# Patient Record
Sex: FEMALE | Race: WHITE | NOT HISPANIC OR LATINO | URBAN - METROPOLITAN AREA
[De-identification: names, ages, dates, MRNs, and addresses within clinical notes are randomized per-mention and may not be internally consistent; named-entity substitution may affect disease eponyms.]

---

## 2018-04-12 ENCOUNTER — OUTPATIENT (OUTPATIENT)
Dept: OUTPATIENT SERVICES | Facility: HOSPITAL | Age: 83
LOS: 1 days | Discharge: HOME | End: 2018-04-12

## 2018-04-12 DIAGNOSIS — R51 HEADACHE: ICD-10-CM

## 2020-01-01 ENCOUNTER — OUTPATIENT (OUTPATIENT)
Dept: OUTPATIENT SERVICES | Facility: HOSPITAL | Age: 85
LOS: 1 days | End: 2020-01-01
Payer: MEDICARE

## 2020-01-01 PROCEDURE — G9001: CPT

## 2020-01-27 ENCOUNTER — INPATIENT (INPATIENT)
Facility: HOSPITAL | Age: 85
LOS: 2 days | Discharge: HOME HEALTH | End: 2020-01-30
Attending: INTERNAL MEDICINE | Admitting: INTERNAL MEDICINE
Payer: MEDICARE

## 2020-01-27 VITALS
OXYGEN SATURATION: 90 % | SYSTOLIC BLOOD PRESSURE: 175 MMHG | DIASTOLIC BLOOD PRESSURE: 81 MMHG | HEART RATE: 74 BPM | RESPIRATION RATE: 18 BRPM

## 2020-01-27 LAB
ALBUMIN SERPL ELPH-MCNC: 4 G/DL — SIGNIFICANT CHANGE UP (ref 3.5–5.2)
ALP SERPL-CCNC: 200 U/L — HIGH (ref 30–115)
ALT FLD-CCNC: 7 U/L — SIGNIFICANT CHANGE UP (ref 0–41)
ANION GAP SERPL CALC-SCNC: 13 MMOL/L — SIGNIFICANT CHANGE UP (ref 7–14)
APPEARANCE UR: CLEAR — SIGNIFICANT CHANGE UP
APTT BLD: 30.4 SEC — SIGNIFICANT CHANGE UP (ref 27–39.2)
AST SERPL-CCNC: 20 U/L — SIGNIFICANT CHANGE UP (ref 0–41)
BACTERIA # UR AUTO: NEGATIVE — SIGNIFICANT CHANGE UP
BASOPHILS # BLD AUTO: 0.03 K/UL — SIGNIFICANT CHANGE UP (ref 0–0.2)
BASOPHILS NFR BLD AUTO: 0.3 % — SIGNIFICANT CHANGE UP (ref 0–1)
BILIRUB SERPL-MCNC: 0.7 MG/DL — SIGNIFICANT CHANGE UP (ref 0.2–1.2)
BILIRUB UR-MCNC: NEGATIVE — SIGNIFICANT CHANGE UP
BUN SERPL-MCNC: 17 MG/DL — SIGNIFICANT CHANGE UP (ref 10–20)
CALCIUM SERPL-MCNC: 9.5 MG/DL — SIGNIFICANT CHANGE UP (ref 8.5–10.1)
CHLORIDE SERPL-SCNC: 105 MMOL/L — SIGNIFICANT CHANGE UP (ref 98–110)
CO2 SERPL-SCNC: 25 MMOL/L — SIGNIFICANT CHANGE UP (ref 17–32)
COLOR SPEC: COLORLESS — SIGNIFICANT CHANGE UP
CREAT SERPL-MCNC: 0.9 MG/DL — SIGNIFICANT CHANGE UP (ref 0.7–1.5)
DIFF PNL FLD: ABNORMAL
EOSINOPHIL # BLD AUTO: 0.21 K/UL — SIGNIFICANT CHANGE UP (ref 0–0.7)
EOSINOPHIL NFR BLD AUTO: 2.3 % — SIGNIFICANT CHANGE UP (ref 0–8)
EPI CELLS # UR: 2 /HPF — SIGNIFICANT CHANGE UP (ref 0–5)
GLUCOSE BLDC GLUCOMTR-MCNC: 134 MG/DL — HIGH (ref 70–99)
GLUCOSE SERPL-MCNC: 190 MG/DL — HIGH (ref 70–99)
GLUCOSE UR QL: NEGATIVE — SIGNIFICANT CHANGE UP
HCT VFR BLD CALC: 42.2 % — SIGNIFICANT CHANGE UP (ref 37–47)
HGB BLD-MCNC: 13.5 G/DL — SIGNIFICANT CHANGE UP (ref 12–16)
HYALINE CASTS # UR AUTO: 4 /LPF — SIGNIFICANT CHANGE UP (ref 0–7)
IMM GRANULOCYTES NFR BLD AUTO: 0.2 % — SIGNIFICANT CHANGE UP (ref 0.1–0.3)
INR BLD: 1.14 RATIO — SIGNIFICANT CHANGE UP (ref 0.65–1.3)
KETONES UR-MCNC: NEGATIVE — SIGNIFICANT CHANGE UP
LEUKOCYTE ESTERASE UR-ACNC: ABNORMAL
LYMPHOCYTES # BLD AUTO: 3.24 K/UL — SIGNIFICANT CHANGE UP (ref 1.2–3.4)
LYMPHOCYTES # BLD AUTO: 35.5 % — SIGNIFICANT CHANGE UP (ref 20.5–51.1)
MCHC RBC-ENTMCNC: 29.6 PG — SIGNIFICANT CHANGE UP (ref 27–31)
MCHC RBC-ENTMCNC: 32 G/DL — SIGNIFICANT CHANGE UP (ref 32–37)
MCV RBC AUTO: 92.5 FL — SIGNIFICANT CHANGE UP (ref 81–99)
MONOCYTES # BLD AUTO: 0.6 K/UL — SIGNIFICANT CHANGE UP (ref 0.1–0.6)
MONOCYTES NFR BLD AUTO: 6.6 % — SIGNIFICANT CHANGE UP (ref 1.7–9.3)
NEUTROPHILS # BLD AUTO: 5.02 K/UL — SIGNIFICANT CHANGE UP (ref 1.4–6.5)
NEUTROPHILS NFR BLD AUTO: 55.1 % — SIGNIFICANT CHANGE UP (ref 42.2–75.2)
NITRITE UR-MCNC: NEGATIVE — SIGNIFICANT CHANGE UP
NRBC # BLD: 0 /100 WBCS — SIGNIFICANT CHANGE UP (ref 0–0)
PH UR: 7.5 — SIGNIFICANT CHANGE UP (ref 5–8)
PLATELET # BLD AUTO: 190 K/UL — SIGNIFICANT CHANGE UP (ref 130–400)
POTASSIUM SERPL-MCNC: 4.4 MMOL/L — SIGNIFICANT CHANGE UP (ref 3.5–5)
POTASSIUM SERPL-SCNC: 4.4 MMOL/L — SIGNIFICANT CHANGE UP (ref 3.5–5)
PROT SERPL-MCNC: 6.7 G/DL — SIGNIFICANT CHANGE UP (ref 6–8)
PROT UR-MCNC: ABNORMAL
PROTHROM AB SERPL-ACNC: 13.1 SEC — HIGH (ref 9.95–12.87)
RBC # BLD: 4.56 M/UL — SIGNIFICANT CHANGE UP (ref 4.2–5.4)
RBC # FLD: 13.4 % — SIGNIFICANT CHANGE UP (ref 11.5–14.5)
RBC CASTS # UR COMP ASSIST: 153 /HPF — HIGH (ref 0–4)
SODIUM SERPL-SCNC: 143 MMOL/L — SIGNIFICANT CHANGE UP (ref 135–146)
SP GR SPEC: >1.05 (ref 1.01–1.02)
UROBILINOGEN FLD QL: SIGNIFICANT CHANGE UP
WBC # BLD: 9.12 K/UL — SIGNIFICANT CHANGE UP (ref 4.8–10.8)
WBC # FLD AUTO: 9.12 K/UL — SIGNIFICANT CHANGE UP (ref 4.8–10.8)
WBC UR QL: 43 /HPF — HIGH (ref 0–5)

## 2020-01-27 PROCEDURE — 99221 1ST HOSP IP/OBS SF/LOW 40: CPT

## 2020-01-27 PROCEDURE — 70450 CT HEAD/BRAIN W/O DYE: CPT | Mod: 26

## 2020-01-27 PROCEDURE — 93306 TTE W/DOPPLER COMPLETE: CPT | Mod: 26

## 2020-01-27 PROCEDURE — 93010 ELECTROCARDIOGRAM REPORT: CPT

## 2020-01-27 PROCEDURE — 0042T: CPT

## 2020-01-27 PROCEDURE — 99291 CRITICAL CARE FIRST HOUR: CPT

## 2020-01-27 RX ORDER — SIMVASTATIN 20 MG/1
1 TABLET, FILM COATED ORAL
Qty: 0 | Refills: 0 | DISCHARGE

## 2020-01-27 RX ORDER — LOSARTAN POTASSIUM 100 MG/1
1 TABLET, FILM COATED ORAL
Qty: 0 | Refills: 0 | DISCHARGE

## 2020-01-27 RX ORDER — ASPIRIN/CALCIUM CARB/MAGNESIUM 324 MG
1 TABLET ORAL
Qty: 0 | Refills: 0 | DISCHARGE

## 2020-01-27 RX ORDER — LANOLIN ALCOHOL/MO/W.PET/CERES
3 CREAM (GRAM) TOPICAL AT BEDTIME
Refills: 0 | Status: DISCONTINUED | OUTPATIENT
Start: 2020-01-27 | End: 2020-01-30

## 2020-01-27 RX ORDER — LEVOTHYROXINE SODIUM 125 MCG
1 TABLET ORAL
Qty: 0 | Refills: 0 | DISCHARGE

## 2020-01-27 RX ORDER — SIMVASTATIN 20 MG/1
40 TABLET, FILM COATED ORAL AT BEDTIME
Refills: 0 | Status: DISCONTINUED | OUTPATIENT
Start: 2020-01-27 | End: 2020-01-28

## 2020-01-27 RX ORDER — LOSARTAN POTASSIUM 100 MG/1
100 TABLET, FILM COATED ORAL DAILY
Refills: 0 | Status: DISCONTINUED | OUTPATIENT
Start: 2020-01-27 | End: 2020-01-30

## 2020-01-27 RX ORDER — ENOXAPARIN SODIUM 100 MG/ML
40 INJECTION SUBCUTANEOUS AT BEDTIME
Refills: 0 | Status: DISCONTINUED | OUTPATIENT
Start: 2020-01-27 | End: 2020-01-30

## 2020-01-27 RX ORDER — METOPROLOL TARTRATE 50 MG
1 TABLET ORAL
Qty: 0 | Refills: 0 | DISCHARGE

## 2020-01-27 RX ORDER — CHLORHEXIDINE GLUCONATE 213 G/1000ML
1 SOLUTION TOPICAL
Refills: 0 | Status: DISCONTINUED | OUTPATIENT
Start: 2020-01-27 | End: 2020-01-30

## 2020-01-27 RX ORDER — ASPIRIN/CALCIUM CARB/MAGNESIUM 324 MG
324 TABLET ORAL DAILY
Refills: 0 | Status: DISCONTINUED | OUTPATIENT
Start: 2020-01-27 | End: 2020-01-28

## 2020-01-27 RX ORDER — LEVOTHYROXINE SODIUM 125 MCG
50 TABLET ORAL DAILY
Refills: 0 | Status: DISCONTINUED | OUTPATIENT
Start: 2020-01-27 | End: 2020-01-30

## 2020-01-27 RX ORDER — LANOLIN ALCOHOL/MO/W.PET/CERES
1 CREAM (GRAM) TOPICAL
Qty: 0 | Refills: 0 | DISCHARGE

## 2020-01-27 RX ORDER — METOPROLOL TARTRATE 50 MG
50 TABLET ORAL
Refills: 0 | Status: DISCONTINUED | OUTPATIENT
Start: 2020-01-27 | End: 2020-01-30

## 2020-01-27 RX ADMIN — Medication 324 MILLIGRAM(S): at 11:06

## 2020-01-27 RX ADMIN — Medication 50 MILLIGRAM(S): at 17:38

## 2020-01-27 RX ADMIN — ENOXAPARIN SODIUM 40 MILLIGRAM(S): 100 INJECTION SUBCUTANEOUS at 22:44

## 2020-01-27 RX ADMIN — SIMVASTATIN 40 MILLIGRAM(S): 20 TABLET, FILM COATED ORAL at 22:44

## 2020-01-27 RX ADMIN — Medication 3 MILLIGRAM(S): at 22:44

## 2020-01-27 NOTE — ED PROVIDER NOTE - PHYSICAL EXAMINATION
CONSTITUTIONAL: Well-developed; well-nourished; in no acute distress.   SKIN: warm, dry  HEAD: Normocephalic; atraumatic.  EYES: no conjunctival injection. PERRL.   ENT: No nasal discharge; airway clear.  NECK: Supple; non tender.  CARD: S1, S2 normal; no murmurs, gallops, or rubs. Regular rate and rhythm.   RESP: No wheezes, rales or rhonchi.  ABD: soft ntnd  EXT: Normal ROM.  No clubbing, cyanosis or edema.   LYMPH: No acute cervical adenopathy.  NEURO: Moving all extremities. Follows commands intermittently. Speaking a few Colombian words.   PSYCH: Cooperative, appropriate.

## 2020-01-27 NOTE — H&P ADULT - NSHPSOCIALHISTORY_GEN_ALL_CORE
Never smoker, sip of wine with dinner, no illicit drug use  Lives with daughter and son in law  Walks with walker at home

## 2020-01-27 NOTE — ED PROVIDER NOTE - CLINICAL SUMMARY MEDICAL DECISION MAKING FREE TEXT BOX
A/P: Code stroke activated. See by neurology. Head CT with no acute hemorrhage. Perfusion study with no acute abnormality. Labs reviewed. Out of window for tPA. No indication for intervention.   Strength in arm returned to 5/5. Confusion persists.  Given ASA.  Will admit to stroke unit.

## 2020-01-27 NOTE — ED PROVIDER NOTE - PROGRESS NOTE DETAILS
Stroke code pre-note. ElSherif at bedside, can got o stroke unit. Patient to be admitted to an inpatient floor-stroke unit Case discussed with and care endorsed to medical admitting resident- MAR made aware that US is still pending, will follow results. Admitting physician notified. Attending read of perfusion scan with possible small area of ischemic penumbra to cerebellum. Received ASA. Admitted to stroke unit.

## 2020-01-27 NOTE — ED PROVIDER NOTE - OBJECTIVE STATEMENT
89 y/o female with PMH of HTN who presents to ED for aphasia. Pt was last seen well at 10pm last night, woke this AM and was noted to be aphasic by daughter, on arrival to Ed pt speaking itaian, normally speaks mostly english. No fever or chills. No head injury.

## 2020-01-27 NOTE — H&P ADULT - ATTENDING COMMENTS
Patient seen and evaluated in the ED. Agree with a resident as above, except as noted in my note from 1/28/20

## 2020-01-27 NOTE — H&P ADULT - NSHPPHYSICALEXAM_GEN_ALL_CORE
Vital Signs:  T(C): 36.4, Max: 36.4 (01-27 @ 11:06)  T(F): 97.5, Max: 97.5 (01-27 @ 11:06)  HR: 84 (74 - 84)  BP: 150/110 (150/110 - 175/81)  RR: 18 (18 - 18)  SpO2: 98% (88% - 99%)    Physical Exam:  General: Awake, Alert. Not in acute distress.  Heart: Regular rate and rhythm; S1, S2; No murmurs.  Lungs: Clear to auscultation bilaterally.  Abdomen: Soft, nontender, nondistended.  Extremities: No edema in upper or lower extremities.  Neuro: AAOx1 name only, No focal deficits.

## 2020-01-27 NOTE — H&P ADULT - HISTORY OF PRESENT ILLNESS
89 year old female with PMH of Dementia, HTN, HLD, Hypothyroidism, and Diabetes presents for TIA.    Last known well was 10pm last night when patient's daughter was helping her get ready for bed. This morning at approximately 8am the daughter went to help the patient get up and found the patient not speaking, not responding to questions, and not following commands. They tried to give her morning Aspirin 81mg with water, but the patient was unable to take the medicine. They brought the patient to the hospital, where the patient began speaking [place]ian. The family reports the patient normally speaks english and hasn't spoken Bruneian for years. Shortly afterward the patient began acting normally with no persistent symptoms.    The patient had a TIA in February 2018 involving a fall in the bathroom with no residual deficits. For the past week the family noticed the patient exhibiting right arm weakness and appearing more unsteady than usual, but otherwise no symptoms prior to the episode this morning.    In the ED stroke code was called. CT negative for acute hemorrhage or large territory infarct. Patient was given Aspirin and evaluated by Neurology who approved admission to the stroke unit.

## 2020-01-27 NOTE — H&P ADULT - NSHPLABSRESULTS_GEN_ALL_CORE
Labs:  CBC (01-27 @ 09:53)                        Hgb: 13.5   WBC: 9.12  )-----------------( Plts: 190                              Hct: 42.2     Chem (01-27 @ 09:53)  Na: 143  |     Cl: 105     |  BUN: 17  -----------------------------------------< Gluc: 190    K: 4.4   |    HCO3: 25  |  Cr: 0.9    Ca 9.5 (01-27 @ 09:53)    LFTs (01-27 @ 09:53)  TPro 6.7  /  Alb 4.0  TBili 0.7  /  DBili     AST 20  /  ALT 7   /  AlkPhos 200    PT/INR (01-27 @ 09:53)  PT: 13.10; INR: 1.14   PTT: 30.4      RADIOLOGY:    < from: CT Brain Stroke Protocol (01.27.20 @ 10:09) >  IMPRESSION:  In comparison with the prior noncontrast CT scan of the brain dated April 12, 2018:  No CT evidence of acute intracranial hemorrhage or large territorial infarct.  Mild progression of the periventricular subcortical white matter hypodensities consistent with ischemic change, age indeterminate.  In view of the patient's stated clinical history of possible cerebrovascular accident, follow-up examination is recommended, if clinically warranted.  < end of copied text >      < from: CT Perfusion w/ Maps w/ IV Cont (01.27.20 @ 10:16) >  IMPRESSION:  1.  CTA: No evidence of major vascular stenosis or occlusion.   2.  Scattered areas of calcific plaque with about 50% stenosis of the right ICA origin and mild stenoses of the carotid siphons and distal vertebral arteries.  3.  CT perfusion: Apparent small (3 cc) area of ischemic penumbra within the right cerebellar hemisphere without core infarct.  This can be artifactual; recommend attention on follow up head CT or MRI.  < end of copied text >

## 2020-01-27 NOTE — SWALLOW BEDSIDE ASSESSMENT ADULT - SWALLOW EVAL: DIAGNOSIS
+toleration of Dysphagia Diet III Mechanical soft consistency with cut up meats, thins with no overt s/s of aspiration vs penetration, +mild oral dysphagia for regular +toleration of Dysphagia diet I puree consistency, Dysphagia Diet II mechanical soft consistency with ground meat , Dysphagia Diet III Mechanical soft consistency with cut up meats, thins with no overt s/s of aspiration vs penetration, +mild oral dysphagia for regular

## 2020-01-27 NOTE — CONSULT NOTE ADULT - SUBJECTIVE AND OBJECTIVE BOX
**STROKE CODE CONSULT NOTE**    Last known well time/Time of onset of symptoms: 10:30 pm last night 1/26/2020    HPI:   · HPI Objective Statement: 89 y/o female with PMH of HTN who presents to ED for aphasia. Pt was last seen well at 10pm last night, woke this AM and was noted to be aphasic by daughter, on arrival to Ed pt speaking itaian, normally speaks mostly english. No fever or chills. No head injury.	  Patient language improved from when the daughter initially saw her this morning but still not baseline.  PAtient usually needs assistance with most ADLs  EMS noted right sided weakness which is better but still present on arrival     PAST MEDICAL & SURGICAL HISTORY:  Diabetes  Hypothyroidism  Hyperlipidemia  Hypertension  Dementia      FAMILY HISTORY: non contributory      SOCIAL HISTORY:  No smoking drinking or drug use    ROS:  Constitutional: No fever, weight loss or fatigue  Eyes: No eye pain, visual disturbances, or discharge  ENMT:  No difficulty hearing, tinnitus, vertigo; No sinus or throat pain  Neck: No pain or stiffness  Respiratory: No cough, wheezing, chills or hemoptysis  Cardiovascular: No chest pain, palpitations, shortness of breath, dizziness or leg swelling  Gastrointestinal: No abdominal pain. No nausea, vomiting or hematemesis; No diarrhea or constipation. Nohematochezia.  Genitourinary: No dysuria, frequency, hematuria or incontinence  Neurological: As per HPI  Skin: No itching, burning, rashes or lesions   Endocrine: No heat or cold intolerance; No hair loss  Musculoskeletal: No joint pain or swelling; No muscle, back or extremity pain  Psychiatric: No depression, anxiety, mood swings or difficulty sleeping  Heme/Lymph: No easy bruising or bleeding gums    MEDICATIONS  (STANDING):  aspirin  chewable 324 milliGRAM(s) Oral daily  chlorhexidine 4% Liquid 1 Application(s) Topical <User Schedule>  enoxaparin Injectable 40 milliGRAM(s) SubCutaneous at bedtime  levothyroxine 50 MICROGram(s) Oral daily  losartan 100 milliGRAM(s) Oral daily  melatonin 3 milliGRAM(s) Oral at bedtime  metoprolol tartrate 50 milliGRAM(s) Oral two times a day  simvastatin 40 milliGRAM(s) Oral at bedtime    MEDICATIONS  (PRN):      Allergies    No Known Allergies    Intolerances        Vital Signs Last 24 Hrs  T(C): 36.4 (27 Jan 2020 11:06), Max: 36.4 (27 Jan 2020 11:06)  T(F): 97.5 (27 Jan 2020 11:06), Max: 97.5 (27 Jan 2020 11:06)  HR: 76 (27 Jan 2020 17:06) (74 - 84)  BP: 130/71 (27 Jan 2020 17:06) (130/71 - 175/81)  BP(mean): 94 (27 Jan 2020 17:06) (94 - 94)  RR: 20 (27 Jan 2020 17:06) (18 - 20)  SpO2: 94% (27 Jan 2020 17:06) (88% - 99%)    PHYSICAL EXAM:  Constitutional: WDWN; NAD  Cardiovascular: RRR, no appreciable murmurs; no carotid bruits  Neurologic:  Alert following simple commands, mostly cursing at staff  BTT b/l present  NO facial  RUE drift  b/l LE drift  Sensory responds to stimuli b/l  FTN no dysmetric      NIHSS: 5  mrankin 4      Fingerstick Blood Glucose: CAPILLARY BLOOD GLUCOSE      POCT Blood Glucose.: 134 mg/dL (27 Jan 2020 17:14)       LABS:                        13.5   9.12  )-----------( 190      ( 27 Jan 2020 09:53 )             42.2     01-27    143  |  105  |  17  ----------------------------<  190<H>  4.4   |  25  |  0.9    Ca    9.5      27 Jan 2020 09:53    TPro  6.7  /  Alb  4.0  /  TBili  0.7  /  DBili  x   /  AST  20  /  ALT  7   /  AlkPhos  200<H>  01-27    PT/INR - ( 27 Jan 2020 09:53 )   PT: 13.10 sec;   INR: 1.14 ratio         PTT - ( 27 Jan 2020 09:53 )  PTT:30.4 sec      Urinalysis Basic - ( 27 Jan 2020 12:19 )    Color: Colorless / Appearance: Clear / SG: >1.050 / pH: x  Gluc: x / Ketone: Negative  / Bili: Negative / Urobili: <2 mg/dL   Blood: x / Protein: 30 mg/dL / Nitrite: Negative   Leuk Esterase: Moderate / RBC: 153 /HPF / WBC 43 /HPF   Sq Epi: x / Non Sq Epi: 2 /HPF / Bacteria: Negative        RADIOLOGY & ADDITIONAL STUDIES:  < from: CT Brain Stroke Protocol (01.27.20 @ 10:09) >    IMPRESSION:    In comparison with the prior noncontrast CT scan of the brain dated April 12, 2018:    No CT evidence of acute intracranial hemorrhage or large territorial infarct.    Mild progression of the periventricular subcortical white matter hypodensities consistent with ischemic change, age indeterminate.    In view of the patient's stated clinical history of possible cerebrovascular accident, follow-up examination is recommended, if clinically warranted.    Dr. Sonu Mccarthy discussed preliminary findings with ANUJ MEJIA MD on 1/27/2020 10:14 AM with readback.    < end of copied text >      < from: CT Perfusion w/ Maps w/ IV Cont (01.27.20 @ 10:16) >  A neck:   The visualized aortic arch and great vessel origins demonstrate calcific plaque without thickening and stenosis.    The right common carotid artery is patent. There is atheromatous plaque at the right carotid bifurcation with about 50% stenosis of the distal CCA extending into the ICA origin. The ECA origin is patent.    The left common, internal and external carotid arteries are patent. There is calcific plaque at the left carotidbifurcation without significant stenosis.    The vertebral arteries are patent.    CTA brain: There is calcific plaque of the carotid siphons with mild stenoses. The anterior and middle cerebral arteries are patent. There is a hypoplastic A1 segment of the right NICK, normal variation.    The distal vertebral arteries demonstrate calcific plaque with mild stenoses. The basilar artery is patent. The posterior cerebral arteries are patent.    Perfusion: An apparent small area of Tmax >6 seconds volume of 3 mL is noted within the right cerebellar hemisphere. There is no evidence of diminished cerebral blood flow.    Other:   Multilevel cervical spine degenerative changes.  Patchy right upper lobe airspace opacity is noted.    IMPRESSION:    1.  CTA: No evidence of major vascular stenosis or occlusion.     2.  Scattered areas of calcific plaque with about 50% stenosis of the right ICA origin and mild stenoses of the carotid siphons and distal vertebral arteries.    3.  CT perfusion: Apparent small (3 cc) area of ischemic penumbra within the right cerebellar hemisphere without core infarct.  This can be artifactual; recommend attention on follow up head CT or MRI.      < end of copied text >              IV-tPA (Y/N):     No                              Bolus time:  Reason IV-tPA not given: out of window  Not interventional candidate and no large vessel occlussion      ASSESSMENT/PLAN: 89yo woman presenting with aphasia and right hemiparesis which has improved slightly.  As she still has some right mild hemiparesis likely acute CVA  1. Admit to storke unit  2. ASA 81mg QD and atorvastatin 80  3. Check lipid profile, hgba1c, Cardiac enzymes, u/a u/cx  4. echo   5. MRI brain w/o KEV  6. Neurochecks and vitals q4   7. PT/OT/Speech/Rehab  8. Keep -180

## 2020-01-27 NOTE — CONSULT NOTE ADULT - ASSESSMENT
IMPRESSION: Rehab of gait dysfunction    PRECAUTIONS: [  ] Cardiac  [  ] Respiratory  [  ] Seizures [  ] Contact Isolation  [  ] Droplet Isolation  [  ] Other    Weight Bearing Status:     RECOMMENDATION:    Out of Bed to Chair     DVT/Decubiti Prophylaxis    REHAB PLAN:     [ x  ] Bedside P/T 3-5 times a week   [   ]   Bedside O/T  2-3 times a week             [   ] No Rehab Therapy Indicated                   [   ]  Speech Therapy   Conditioning/ROM                                    ADL  Bed Mobility                                               Conditioning/ROM  Transfers                                                     Bed Mobility  Sitting /Standing Balance                         Transfers                                        Gait Training                                               Sitting/Standing Balance  Stair Training [   ]Applicable                    Home equipment Eval                                                                        Splinting  [   ] Only      GOALS:   ADL   [   ]   Independent                    Transfers  [ x  ] Independent                          Ambulation  [ x  ] Independent     [  x  ] With device                            [   ]  CG                                                         [   ]  CG                                                                  [   ] CG                            [    ] Min A                                                   [   ] Min A                                                              [   ] Min  A          DISCHARGE PLAN:   [   ]  Good candidate for Intensive Rehabilitation/Hospital based                                             Will tolerate 3hrs Intensive Rehab Daily                                       [x    ]  Short Term Rehab in Skilled Nursing Facility                     vs                  [  x  ]  Home with Outpatient or VN services                                         [    ]  Possible Candidate for Intensive Hospital based Rehab

## 2020-01-27 NOTE — ED PROVIDER NOTE - ATTENDING CONTRIBUTION TO CARE
89 y/o female with hx of TIA's, HTN. Last known well 10PM last night when she went to sleep. woke up this AM at 8:30 confused and with apparent weakness of right arm. Initially aphasic, then began speaking incoherently. No facial droop. No fever. No hx of trauma.   O/E: Constitutional: Non-toxic in appearance. Eyes: PERRL. No pallor, no jaundice. Neck: Neck supple, no meningeal signs. Respiratory: Lungs CTA and equal b/l. Cardio: S1 S2 regular, no murmur. ABD: ABD soft, no tenderness. Extremities: No pedal edema, no calf tenderness. Skin: No skin rash. Neuro: Oriented to person and place. Answers some questions inappropriately. CN II-XII intact, strength 4/5 right arm, 5/5 left arm and b/l legs, sensation intact and equal, finger-to-nose intact.  Finger stick WNL.  A/P: Code stroke activated. See by neurology. Head CT with no acute hemorrhage. Perfusion study with no acute abnormality. Labs reviewed. Out of window for tPA. No indication for intervention.   Strength in arm returned to 5/5. Confusion persists.  Given ASA.  Will admit to stroke unit.

## 2020-01-27 NOTE — CONSULT NOTE ADULT - SUBJECTIVE AND OBJECTIVE BOX
HPI:  89 year old female with PMH of Dementia, HTN, HLD, Hypothyroidism, and Diabetes presents for TIA.    Last known well was 10pm last night when patient's daughter was helping her get ready for bed. This morning at approximately 8am the daughter went to help the patient get up and found the patient not speaking, not responding to questions, and not following commands. They tried to give her morning Aspirin 81mg with water, but the patient was unable to take the medicine. They brought the patient to the hospital, where the patient began speaking [place]ese. The family reports the patient normally speaks english and hasn't spoken Sudanese for years. Shortly afterward the patient began acting normally with no persistent symptoms.    The patient had a TIA in February 2018 involving a fall in the bathroom with no residual deficits. For the past week the family noticed the patient exhibiting right arm weakness and appearing more unsteady than usual, but otherwise no symptoms prior to the episode this morning.    In the ED stroke code was called. CT negative for acute hemorrhage or large territory infarct. Patient was given Aspirin and evaluated by Neurology who approved admission to the stroke unit. (27 Jan 2020 11:45)      PAST MEDICAL & SURGICAL HISTORY:  Diabetes  Hypothyroidism  Hyperlipidemia  Hypertension  Dementia      Hospital Course:    TODAY'S SUBJECTIVE & REVIEW OF SYMPTOMS:     Constitutional WNL   Cardio WNL   Resp WNL   GI WNL  Heme WNL  Endo WNL  Skin WNL  MSK Weakness  Neuro dysarthria  Cognitive WNL  Psych WNL      MEDICATIONS  (STANDING):  aspirin  chewable 324 milliGRAM(s) Oral daily  chlorhexidine 4% Liquid 1 Application(s) Topical <User Schedule>  enoxaparin Injectable 40 milliGRAM(s) SubCutaneous at bedtime  levothyroxine 50 MICROGram(s) Oral daily  losartan 100 milliGRAM(s) Oral daily  melatonin 3 milliGRAM(s) Oral at bedtime  metoprolol tartrate 50 milliGRAM(s) Oral two times a day  simvastatin 40 milliGRAM(s) Oral at bedtime    MEDICATIONS  (PRN):      FAMILY HISTORY:      Allergies    No Known Allergies    Intolerances        SOCIAL HISTORY:    [  ] Etoh  [  ] Smoking  [  ] Substance abuse     Home Environment:  [  ] Home Alone  [ x ] Lives with Family  [  ] Home Health Aid    Dwelling:  [  ] Apartment  [x  ] Private House  [  ] Adult Home  [  ] Skilled Nursing Facility      [  ] Short Term  [  ] Long Term  [x  ] Stairs       Elevator [  ]    FUNCTIONAL STATUS PTA: (Check all that apply)  Ambulation: [x   ]Independent    [  ] Dependent     [  ] Non-Ambulatory  Assistive Device: [  ] SA Cane  [  ]  Q Cane  [  ] Walker  [  ]  Wheelchair  ADL : [ x ] Independent  [  ]  Dependent       Vital Signs Last 24 Hrs  T(C): 36.4 (27 Jan 2020 11:06), Max: 36.4 (27 Jan 2020 11:06)  T(F): 97.5 (27 Jan 2020 11:06), Max: 97.5 (27 Jan 2020 11:06)  HR: 84 (27 Jan 2020 11:06) (74 - 84)  BP: 150/110 (27 Jan 2020 11:06) (150/110 - 175/81)  BP(mean): --  RR: 18 (27 Jan 2020 11:06) (18 - 18)  SpO2: 98% (27 Jan 2020 11:06) (88% - 99%)      PHYSICAL EXAM: Alert & Oriented X3  GENERAL: NAD  HEAD:  Atraumatic, Normocephalic  CHEST/LUNG: Clear   HEART: S1S2+  ABDOMEN: Soft, Nontender  EXTREMITIES:  no calf tenderness    NERVOUS SYSTEM:  Cranial Nerves 2-12 intact [  ] Abnormal  [x  ]dysarthria  ROM: WFL all extremities [  ]  Abnormal [x  ]able to move all ext  Motor Strength: WFL all extremities  [  ]  Abnormal [x  ]  Sensation: intact to light touch [x  ] Abnormal [  ]  Reflexes: Symmetric [  ]  Abnormal [  ]    FUNCTIONAL STATUS:  Bed Mobility: Independent [  ]  Supervision [  ]  Needs Assistance [ x ]  N/A [  ]  Transfers: Independent [  ]  Supervision [  ]  Needs Assistance [ x ]  N/A [  ]   Ambulation: Independent [  ]  Supervision [  ]  Needs Assistance [  ]  N/A [  ]  ADL: Independent [  ] Requires Assistance [  ] N/A [  ]      LABS:                        13.5   9.12  )-----------( 190      ( 27 Jan 2020 09:53 )             42.2     01-27    143  |  105  |  17  ----------------------------<  190<H>  4.4   |  25  |  0.9    Ca    9.5      27 Jan 2020 09:53    TPro  6.7  /  Alb  4.0  /  TBili  0.7  /  DBili  x   /  AST  20  /  ALT  7   /  AlkPhos  200<H>  01-27    PT/INR - ( 27 Jan 2020 09:53 )   PT: 13.10 sec;   INR: 1.14 ratio         PTT - ( 27 Jan 2020 09:53 )  PTT:30.4 sec  Urinalysis Basic - ( 27 Jan 2020 12:19 )    Color: Colorless / Appearance: Clear / SG: >1.050 / pH: x  Gluc: x / Ketone: Negative  / Bili: Negative / Urobili: <2 mg/dL   Blood: x / Protein: 30 mg/dL / Nitrite: Negative   Leuk Esterase: Moderate / RBC: 153 /HPF / WBC 43 /HPF   Sq Epi: x / Non Sq Epi: 2 /HPF / Bacteria: Negative        RADIOLOGY & ADDITIONAL STUDIES:    Assesment:

## 2020-01-27 NOTE — H&P ADULT - ASSESSMENT
89 year old female with PMH of Dementia, HTN, HLD, Hypothyroidism, and Diabetes presents for episode of aphasia.    #TIA  - Admit to Stroke Unit  - CTH 1/27 no acute intracranial hemorrhage or large territory infarct, mild progression of periventricular subcortical white matter hypodensities consistent with ischemic change  - CT Perfusion 1/27 no major stenosis or occlusion, 50% stenosis of right ICA, 3cc area of ischemic penumbra in right cerebellar hemisphere without core infarct (possibly artifactual)  - Neuro checks q2H  - Speech/Swallow eval  - PT/OT/Physiatry eval  - 2D Echo  - Lipid Panel and a1C  - Follow up Neuro recommendations    #HTN  - Continue Losartan 100mg Daily and Metoprolol 50mg BID    #HLD  - Continue Simvastatin 40mg qHS    #Hypothyroidism  - Continue Levothyroxine 50mcg Daily    #Diabetes  - Takes Januvia at home  - Monitor fingerstick glucose, start insulin if >180    #Pending: Stroke workup  #Disposition: From home  #Diet: DASH/TLC  #DVT Prophylaxis: Lovenox 40mg SubQ qHS  #Activity: Bedrest  #Code Status: Full Code

## 2020-01-27 NOTE — SWALLOW BEDSIDE ASSESSMENT ADULT - COMMENTS
pt received in bed, alert, verbally responsive, on RA. +mild confusion, ongoing events as per family. +toleration with no overt s/s of aspiration vs penetration +mild oral dysphagia with no overt s/s of aspiration vs penetration pt received in bed, alert, verbally responsive, on RA. +mild confusion, ongoing event as per family. no c/o of pain. +residual r facial droop.

## 2020-01-28 LAB
ANION GAP SERPL CALC-SCNC: 17 MMOL/L — HIGH (ref 7–14)
BUN SERPL-MCNC: 17 MG/DL — SIGNIFICANT CHANGE UP (ref 10–20)
CALCIUM SERPL-MCNC: 9.6 MG/DL — SIGNIFICANT CHANGE UP (ref 8.5–10.1)
CHLORIDE SERPL-SCNC: 102 MMOL/L — SIGNIFICANT CHANGE UP (ref 98–110)
CHOLEST SERPL-MCNC: 122 MG/DL — SIGNIFICANT CHANGE UP (ref 100–200)
CK MB CFR SERPL CALC: 4.1 NG/ML — SIGNIFICANT CHANGE UP (ref 0.6–6.3)
CO2 SERPL-SCNC: 23 MMOL/L — SIGNIFICANT CHANGE UP (ref 17–32)
CREAT SERPL-MCNC: 0.8 MG/DL — SIGNIFICANT CHANGE UP (ref 0.7–1.5)
ESTIMATED AVERAGE GLUCOSE: 143 MG/DL — HIGH (ref 68–114)
GLUCOSE BLDC GLUCOMTR-MCNC: 157 MG/DL — HIGH (ref 70–99)
GLUCOSE SERPL-MCNC: 146 MG/DL — HIGH (ref 70–99)
HBA1C BLD-MCNC: 6.6 % — HIGH (ref 4–5.6)
HCT VFR BLD CALC: 42 % — SIGNIFICANT CHANGE UP (ref 37–47)
HDLC SERPL-MCNC: 40 MG/DL — LOW
HGB BLD-MCNC: 13.6 G/DL — SIGNIFICANT CHANGE UP (ref 12–16)
LIPID PNL WITH DIRECT LDL SERPL: 63 MG/DL — SIGNIFICANT CHANGE UP (ref 4–129)
MAGNESIUM SERPL-MCNC: 1.6 MG/DL — LOW (ref 1.8–2.4)
MCHC RBC-ENTMCNC: 30 PG — SIGNIFICANT CHANGE UP (ref 27–31)
MCHC RBC-ENTMCNC: 32.4 G/DL — SIGNIFICANT CHANGE UP (ref 32–37)
MCV RBC AUTO: 92.5 FL — SIGNIFICANT CHANGE UP (ref 81–99)
NRBC # BLD: 0 /100 WBCS — SIGNIFICANT CHANGE UP (ref 0–0)
PLATELET # BLD AUTO: 216 K/UL — SIGNIFICANT CHANGE UP (ref 130–400)
POTASSIUM SERPL-MCNC: 3.4 MMOL/L — LOW (ref 3.5–5)
POTASSIUM SERPL-SCNC: 3.4 MMOL/L — LOW (ref 3.5–5)
RBC # BLD: 4.54 M/UL — SIGNIFICANT CHANGE UP (ref 4.2–5.4)
RBC # FLD: 13.5 % — SIGNIFICANT CHANGE UP (ref 11.5–14.5)
SODIUM SERPL-SCNC: 142 MMOL/L — SIGNIFICANT CHANGE UP (ref 135–146)
TOTAL CHOLESTEROL/HDL RATIO MEASUREMENT: 3 RATIO — LOW (ref 4–5.5)
TRIGL SERPL-MCNC: 124 MG/DL — SIGNIFICANT CHANGE UP (ref 10–149)
TROPONIN T SERPL-MCNC: <0.01 NG/ML — SIGNIFICANT CHANGE UP
WBC # BLD: 13.24 K/UL — HIGH (ref 4.8–10.8)
WBC # FLD AUTO: 13.24 K/UL — HIGH (ref 4.8–10.8)

## 2020-01-28 PROCEDURE — 70551 MRI BRAIN STEM W/O DYE: CPT | Mod: 26

## 2020-01-28 PROCEDURE — 99232 SBSQ HOSP IP/OBS MODERATE 35: CPT

## 2020-01-28 RX ORDER — CEFTRIAXONE 500 MG/1
1000 INJECTION, POWDER, FOR SOLUTION INTRAMUSCULAR; INTRAVENOUS ONCE
Refills: 0 | Status: COMPLETED | OUTPATIENT
Start: 2020-01-28 | End: 2020-01-28

## 2020-01-28 RX ORDER — ASPIRIN/CALCIUM CARB/MAGNESIUM 324 MG
162 TABLET ORAL DAILY
Refills: 0 | Status: DISCONTINUED | OUTPATIENT
Start: 2020-01-28 | End: 2020-01-30

## 2020-01-28 RX ORDER — CEFTRIAXONE 500 MG/1
1000 INJECTION, POWDER, FOR SOLUTION INTRAMUSCULAR; INTRAVENOUS EVERY 24 HOURS
Refills: 0 | Status: DISCONTINUED | OUTPATIENT
Start: 2020-01-29 | End: 2020-01-30

## 2020-01-28 RX ORDER — ASPIRIN/CALCIUM CARB/MAGNESIUM 324 MG
81 TABLET ORAL DAILY
Refills: 0 | Status: DISCONTINUED | OUTPATIENT
Start: 2020-01-28 | End: 2020-01-28

## 2020-01-28 RX ORDER — CEFTRIAXONE 500 MG/1
INJECTION, POWDER, FOR SOLUTION INTRAMUSCULAR; INTRAVENOUS
Refills: 0 | Status: DISCONTINUED | OUTPATIENT
Start: 2020-01-28 | End: 2020-01-30

## 2020-01-28 RX ORDER — DIPHENHYDRAMINE HCL 50 MG
25 CAPSULE ORAL ONCE
Refills: 0 | Status: COMPLETED | OUTPATIENT
Start: 2020-01-28 | End: 2020-01-28

## 2020-01-28 RX ADMIN — Medication 50 MILLIGRAM(S): at 06:15

## 2020-01-28 RX ADMIN — ENOXAPARIN SODIUM 40 MILLIGRAM(S): 100 INJECTION SUBCUTANEOUS at 21:34

## 2020-01-28 RX ADMIN — CEFTRIAXONE 100 MILLIGRAM(S): 500 INJECTION, POWDER, FOR SOLUTION INTRAMUSCULAR; INTRAVENOUS at 13:56

## 2020-01-28 RX ADMIN — Medication 50 MILLIGRAM(S): at 17:09

## 2020-01-28 RX ADMIN — Medication 25 MILLIGRAM(S): at 10:02

## 2020-01-28 RX ADMIN — Medication 0.5 MILLIGRAM(S): at 20:11

## 2020-01-28 RX ADMIN — Medication 162 MILLIGRAM(S): at 13:56

## 2020-01-28 RX ADMIN — Medication 3 MILLIGRAM(S): at 21:34

## 2020-01-28 RX ADMIN — LOSARTAN POTASSIUM 100 MILLIGRAM(S): 100 TABLET, FILM COATED ORAL at 06:15

## 2020-01-28 RX ADMIN — Medication 50 MICROGRAM(S): at 06:15

## 2020-01-28 NOTE — PHYSICAL THERAPY INITIAL EVALUATION ADULT - TRANSFER SAFETY CONCERNS NOTED: BED/CHAIR, REHAB EVAL
stand pivot/decreased step length/decreased safety awareness/losing balance/decreased sequencing ability

## 2020-01-28 NOTE — PHYSICAL THERAPY INITIAL EVALUATION ADULT - IMPAIRMENTS CONTRIBUTING TO GAIT DEVIATIONS, PT EVAL
cognition/impaired motor control/impaired postural control/decreased strength/ataxic/impaired balance/impaired coordination

## 2020-01-28 NOTE — OCCUPATIONAL THERAPY INITIAL EVALUATION ADULT - PERTINENT HX OF CURRENT PROBLEM, REHAB EVAL
Pt is a right handed 89 y/o woman brought into hospital by family after found not speaking or responding to commands. Pt with hx of TIA in feb 2018.

## 2020-01-28 NOTE — PROGRESS NOTE ADULT - SUBJECTIVE AND OBJECTIVE BOX
BRIJESH POWER    Chief Complaint: AMS    HPI:  89 year old female with PMH of Dementia, HTN, HLD, Hypothyroidism, and Diabetes presents for TIA.    Last known well was 10pm last night when patient's daughter was helping her get ready for bed. This morning at approximately 8am the daughter went to help the patient get up and found the patient not speaking, not responding to questions, and not following commands. They tried to give her morning Aspirin 81mg with water, but the patient was unable to take the medicine. They brought the patient to the hospital, where the patient began speaking Danish. The family reports the patient normally speaks english and hasn't spoken Danish for years. Shortly afterward the patient began acting normally with no persistent symptoms.    The patient had a TIA in February 2018 involving a fall in the bathroom with no residual deficits. For the past week the family noticed the patient exhibiting right arm weakness and appearing more unsteady than usual, but otherwise no symptoms prior to the episode this morning.    In the ED stroke code was called. CT negative for acute hemorrhage or large territory infarct. Patient was given Aspirin and evaluated by Neurology who approved admission to the stroke unit.       Relevant PMH:  [] Prior ischemic stroke/TIA  [] Afib  []CAD  [x]HTN  [x]DLD  [x]DM []PVD []Obesity [] Sedintary lifestyle []CHF  []KANDI  []Cancer Hx     Social History: [] Smoking []  Drug Use: []   Alcohol Use:   [] Other:      Possible Cause of Stroke:  Unknown at this time, will have a better understanding post stroke workup.  Relevant Cerebral Imaging:  < from: CT Brain Stroke Protocol (01.27.20 @ 10:09) >  IMPRESSION:    In comparison with the prior noncontrast CT scan of the brain dated April 12, 2018:    No CT evidence of acute intracranial hemorrhage or large territorial infarct.    Mild progression of the periventricular subcortical white matter hypodensities consistent with ischemic change, age indeterminate.    In view of the patient's stated clinical history of possible cerebrovascular accident, follow-up examination is recommended, if     Relevant Cervicocerebral Imaging:      CT Perfusion w/ Maps w/ IV Cont:   EXAM:  CT PERFUSION W MAPS IC          IMPRESSION:    1.  CTA: No evidence of major vascular stenosis or occlusion.     2.  Scattered areas of calcific plaque with about 50% stenosis of the right ICA origin and mild stenoses of the carotid siphons and distal vertebral arteries.    3.  CT perfusion: Apparent small (3 cc) area of ischemic penumbra within the right cerebellar hemisphere without core infarct.  This can be artifactual; recommend attention on follow up head CT or MRI.    Relevant blood tests:  Direct LDL: 63 mg/dL [4 - 129] (01-28-20 @ 05:37)      Relevant cardiac rhythm monitoring:  No reported events  Relevant Cardiac Structure:(TTE/MARIA FERNANDA +/-):[]No intracardiac thrombus/[] no vegetation/[]no [place]ia/EF:  < from: Transthoracic Echocardiogram (01.27.20 @ 12:22) >  Summary:   1. Technically difficult study.   2. Left ventricular ejection fraction, by visual estimation, is 20 to 25%.   3. Severely decreased global left ventricular systolic function.   4. Multiple left ventricular regional wall motion abnormalities exist. See wall motion findings.   5. Sclerotic aortic valve with normal opening.   6. Mild aortic regurgitation.   7. Mild mitral regurgitation.   8. Mild-to-moderate tricuspid regurgitation.   9. Estimated pulmonary artery systolic pressure is 45.5 mmHg assuming a right atrial pressure of 3 mmHg, which is consistent with mild pulmonary hypertension.      Home Medications:  aspirin 81 mg oral tablet: 1 tab(s) orally once a day (27 Jan 2020 11:35)  levothyroxine 50 mcg (0.05 mg) oral tablet: 1 tab(s) orally once a day (27 Jan 2020 11:35)  losartan 100 mg oral tablet: 1 tab(s) orally once a day (27 Jan 2020 11:35)  Melatonin 3 mg oral tablet: 1 tab(s) orally once a day (at bedtime) (27 Jan 2020 11:35)  Metoprolol Tartrate 50 mg oral tablet: 1 tab(s) orally 2 times a day (27 Jan 2020 11:34)  simvastatin 40 mg oral tablet: 1 tab(s) orally once a day (at bedtime) (27 Jan 2020 11:48)      MEDICATIONS  (STANDING):  aspirin enteric coated 81 milliGRAM(s) Oral daily  chlorhexidine 4% Liquid 1 Application(s) Topical <User Schedule>  enoxaparin Injectable 40 milliGRAM(s) SubCutaneous at bedtime  levothyroxine 50 MICROGram(s) Oral daily  losartan 100 milliGRAM(s) Oral daily  melatonin 3 milliGRAM(s) Oral at bedtime  metoprolol tartrate 50 milliGRAM(s) Oral two times a day  simvastatin 40 milliGRAM(s) Oral at bedtime      PT/OT/Speech/Rehab/S&Swr: pending    Exam:    Vital Signs Last 24 Hrs  T(C): 36.4 (28 Jan 2020 08:55), Max: 36.4 (27 Jan 2020 11:06)  T(F): 97.6 (28 Jan 2020 08:55), Max: 97.6 (28 Jan 2020 08:55)  HR: 73 (28 Jan 2020 08:55) (73 - 84)  BP: 177/65 (28 Jan 2020 08:55) (130/71 - 177/65)  BP(mean): 94 (27 Jan 2020 17:06) (94 - 94)  RR: 16 (28 Jan 2020 08:55) (16 - 20)  SpO2: 93% (28 Jan 2020 08:55) (93% - 98%)    NIHSS      LOC:       1a: 0    1b(Questions):    2       1c(Instructions):    1         Best Gaze:0  Visual:0  Motor:                 RUE: 1    RLE:1     LUE: 0    LLE:    0 FACE: 0    Limb Ataxia:0  Sensory:     0  Language:     2  Dysarthria:        2  Extinction and Inattention:0    Bilateral upper and lower extremities moving spontaneously R<L.  Difficult to discern weakness due to cooperation.            NIHSS today:      7       m-RS:3

## 2020-01-28 NOTE — PHYSICAL THERAPY INITIAL EVALUATION ADULT - GAIT DEVIATIONS NOTED, PT EVAL
decreased step length/decreased velocity of limb motion/decreased swing-to-stance ratio/decreased weight-shifting ability/decreased kaylee/increased time in double stance/decreased stride length

## 2020-01-28 NOTE — OCCUPATIONAL THERAPY INITIAL EVALUATION ADULT - ADDITIONAL COMMENTS
Pt resides in  with 8 steps to enter, no stairs inside. Pt daughter-in-law assists pt with bathing in bathtub shower. +HHA 7hrs/6 days per week to assist with all Activities of daily living.

## 2020-01-28 NOTE — PROGRESS NOTE ADULT - ASSESSMENT
Impression:  ASSESSMENT/PLAN: 89yo woman presenting with aphasia and right hemiparesis which has improved slightly.  As she still has some right mild hemiparesis which may be due to acute ishemic stroke. Unknown etiology at this time will have a better understanding post stroke workup.       1. Admit to stroke unit  2. Continue ASA 81mg QD and atorvastatin 80  3. Check hgba1c  4. MRI brain w/o KEV  5. Neurochecks and vitals q4   6. PT/OT/Speech/Rehab  7. Keep -180    Jack Walker NP  [phone]      Suggestion:  Routine stroke workup including:    Disposition: Impression:  ASSESSMENT/PLAN: 89yo woman presenting with aphasia and right hemiparesis which has improved slightly.  As she still has some right mild hemiparesis which may be due to acute ishemic stroke. Unknown etiology at this time will have a better understanding post stroke workup.       1. Admit to stroke unit  2. Increase ASA 162mg QD and atorvastatin 80  3. Check hgba1c  4. MRI brain w/o KEV  5. Neurochecks and vitals q4   6. PT/OT/Speech/Rehab  7. Keep -180    Jack Walker NP  [phone]      Suggestion:  Routine stroke workup including:    Disposition:

## 2020-01-28 NOTE — PROGRESS NOTE ADULT - SUBJECTIVE AND OBJECTIVE BOX
BRIJESH POWER 89y Female  MRN#: [id number]   CODE STATUS:________      SUBJECTIVE  Patient is a 89y old Female who presents with a chief complaint of TIA (27 Jan 2020 17:17)  Currently admitted to medicine with the primary diagnosis of Stroke  Hospital course has been complicated by _______.   Today is hospital day 1d, and this morning she is _________ and reports ________ overnight events.     Present Today:           Rodriges Catheter ()No/ ()Yes? Indication:          Central Line ()No/ ()Yes? Indication:          IV Fluids ()No/ ()Yes? Type:  Rate:  Indication:      OBJECTIVE  PAST MEDICAL & SURGICAL HISTORY  Diabetes  Hypothyroidism  Hyperlipidemia  Hypertension  Dementia    ALLERGIES:  No Known Allergies    MEDICATIONS:  STANDING MEDICATIONS  aspirin enteric coated 81 milliGRAM(s) Oral daily  chlorhexidine 4% Liquid 1 Application(s) Topical <User Schedule>  enoxaparin Injectable 40 milliGRAM(s) SubCutaneous at bedtime  levothyroxine 50 MICROGram(s) Oral daily  losartan 100 milliGRAM(s) Oral daily  melatonin 3 milliGRAM(s) Oral at bedtime  metoprolol tartrate 50 milliGRAM(s) Oral two times a day  simvastatin 40 milliGRAM(s) Oral at bedtime    PRN MEDICATIONS      VITAL SIGNS: Last 24 Hours  T(C): 36.4 (28 Jan 2020 08:55), Max: 36.4 (27 Jan 2020 11:06)  T(F): 97.6 (28 Jan 2020 08:55), Max: 97.6 (28 Jan 2020 08:55)  HR: 73 (28 Jan 2020 08:55) (73 - 84)  BP: 177/65 (28 Jan 2020 08:55) (130/71 - 177/65)  BP(mean): 94 (27 Jan 2020 17:06) (94 - 94)  RR: 16 (28 Jan 2020 08:55) (16 - 20)  SpO2: 93% (28 Jan 2020 08:55) (88% - 99%)    LABS:                        13.6   13.24 )-----------( 216      ( 28 Jan 2020 05:37 )             42.0     01-28    142  |  102  |  17  ----------------------------<  146<H>  3.4<L>   |  23  |  0.8    Ca    9.6      28 Jan 2020 05:37  Mg     1.6     01-28    TPro  6.7  /  Alb  4.0  /  TBili  0.7  /  DBili  x   /  AST  20  /  ALT  7   /  AlkPhos  200<H>  01-27    PT/INR - ( 27 Jan 2020 09:53 )   PT: 13.10 sec;   INR: 1.14 ratio         PTT - ( 27 Jan 2020 09:53 )  PTT:30.4 sec  Urinalysis Basic - ( 27 Jan 2020 12:19 )    Color: Colorless / Appearance: Clear / SG: >1.050 / pH: x  Gluc: x / Ketone: Negative  / Bili: Negative / Urobili: <2 mg/dL   Blood: x / Protein: 30 mg/dL / Nitrite: Negative   Leuk Esterase: Moderate / RBC: 153 /HPF / WBC 43 /HPF   Sq Epi: x / Non Sq Epi: 2 /HPF / Bacteria: Negative                RADIOLOGY:      PHYSICAL EXAM:    GENERAL: NAD, well-developed, AAOx3  HEENT:  Atraumatic, Normocephalic. EOMI, PERRLA, conjunctiva and sclera clear, No JVD  PULMONARY: Clear to auscultation bilaterally; No wheeze  CARDIOVASCULAR: Regular rate and rhythm; No murmurs, rubs, or gallops  GASTROINTESTINAL: Soft, Nontender, Nondistended; Bowel sounds present  MUSCULOSKELETAL:  2+ Peripheral Pulses, No clubbing, cyanosis, or edema  NEUROLOGY: non-focal  SKIN: No rashes or lesions      ADMISSION SUMMARY  Patient is a 89y old Female who presents with a chief complaint of TIA (27 Jan 2020 17:17)  Currently admitted to medicine with the primary diagnosis of Stroke  Hospital course has been complicated by _______.       ASSESSMENT & PLAN    1. STROKE      2.    3. Diabetes  Hypothyroidism  Hyperlipidemia  Hypertension  Dementia        Present today:  ( ) Congestive Heart Failure, Yes? ( )Acute / ( )Acute on Chronic / ( )Chronic  :  ( )Systolic / ( )Diastolic               Plan:  ( ) Complicated Pneumonia, Type?  ( )Parapneumonic effusion / ( )Abscess / ( ) Multilobar / ( )Other               Plan:  ( ) Morbid Obesity, Yes? BMI:               Plan:  ( ) Functional Quadriplegia               Plan:  ( ) Encephalopathy               Plan:    ( ) Discussion with patient and/or family regarding goals of care  ( ) Discussed Case and Plan with Medical Attending, Name:      # Planned Disposition: ________ BRIJESH POWER 89y Female  MRN#: [id number]     SUBJECTIVE  Patient is a 89y old Female who presents with a chief complaint of TIA   Currently admitted to medicine with the primary diagnosis of Stroke  Today is hospital day 1d, and this morning she reports delirium overnight.    OBJECTIVE  PAST MEDICAL & SURGICAL HISTORY  Diabetes  Hypothyroidism  Hyperlipidemia  Hypertension  Dementia    ALLERGIES:  No Known Allergies    MEDICATIONS:  STANDING MEDICATIONS  aspirin enteric coated 81 milliGRAM(s) Oral daily  chlorhexidine 4% Liquid 1 Application(s) Topical <User Schedule>  enoxaparin Injectable 40 milliGRAM(s) SubCutaneous at bedtime  levothyroxine 50 MICROGram(s) Oral daily  losartan 100 milliGRAM(s) Oral daily  melatonin 3 milliGRAM(s) Oral at bedtime  metoprolol tartrate 50 milliGRAM(s) Oral two times a day  simvastatin 40 milliGRAM(s) Oral at bedtime    PRN MEDICATIONS      VITAL SIGNS: Last 24 Hours  T(C): 36.4 (28 Jan 2020 08:55), Max: 36.4 (27 Jan 2020 11:06)  T(F): 97.6 (28 Jan 2020 08:55), Max: 97.6 (28 Jan 2020 08:55)  HR: 73 (28 Jan 2020 08:55) (73 - 84)  BP: 177/65 (28 Jan 2020 08:55) (130/71 - 177/65)  BP(mean): 94 (27 Jan 2020 17:06) (94 - 94)  RR: 16 (28 Jan 2020 08:55) (16 - 20)  SpO2: 93% (28 Jan 2020 08:55) (88% - 99%)    LABS:                        13.6   13.24 )-----------( 216      ( 28 Jan 2020 05:37 )             42.0     01-28    142  |  102  |  17  ----------------------------<  146<H>  3.4<L>   |  23  |  0.8    Ca    9.6      28 Jan 2020 05:37  Mg     1.6     01-28    TPro  6.7  /  Alb  4.0  /  TBili  0.7  /  DBili  x   /  AST  20  /  ALT  7   /  AlkPhos  200<H>  01-27    PT/INR - ( 27 Jan 2020 09:53 )   PT: 13.10 sec;   INR: 1.14 ratio         PTT - ( 27 Jan 2020 09:53 )  PTT:30.4 sec  Urinalysis Basic - ( 27 Jan 2020 12:19 )    Color: Colorless / Appearance: Clear / SG: >1.050 / pH: x  Gluc: x / Ketone: Negative  / Bili: Negative / Urobili: <2 mg/dL   Blood: x / Protein: 30 mg/dL / Nitrite: Negative   Leuk Esterase: Moderate / RBC: 153 /HPF / WBC 43 /HPF   Sq Epi: x / Non Sq Epi: 2 /HPF / Bacteria: Negative      RADIOLOGY:  < from: CT Brain Stroke Protocol (01.27.20 @ 10:09) >  IMPRESSION:    In comparison with the prior noncontrast CT scan of the brain dated April 12, 2018:    No CT evidence of acute intracranial hemorrhage or large territorial infarct.    Mild progression of the periventricular subcortical white matter hypodensities consistent with ischemic change, age indeterminate.    < end of copied text >    < from: CT Perfusion w/ Maps w/ IV Cont (01.27.20 @ 10:16) >  IMPRESSION:    1.  CTA: No evidence of major vascular stenosis or occlusion.     2.  Scattered areas of calcific plaque with about 50% stenosis of the right ICA origin and mild stenoses of the carotid siphons and distal vertebral arteries.    3.  CT perfusion: Apparent small (3 cc) area of ischemic penumbra within the right cerebellar hemisphere without core infarct.  This can be artifactual; recommend attention on follow up head CT or MRI.    < end of copied text >        PHYSICAL EXAM:    GENERAL: NAD, well-developed, AAOx3  HEENT:  Atraumatic, Normocephalic. EOMI, PERRLA, conjunctiva and sclera clear, No JVD  PULMONARY: Clear to auscultation bilaterally; No wheeze  CARDIOVASCULAR: Regular rate and rhythm; No murmurs, rubs, or gallops  GASTROINTESTINAL: Soft, Nontender, Nondistended; Bowel sounds present  MUSCULOSKELETAL:  2+ Peripheral Pulses, No clubbing, cyanosis, or edema  NEUROLOGY: non-focal  SKIN: No rashes or lesions      ADMISSION SUMMARY  Patient is a 89y old Female who presents with a chief complaint of TIA   Currently admitted to medicine with the primary diagnosis of Stroke    ASSESSMENT & PLAN  89 year old female with PMH of Dementia, HTN, HLD, Hypothyroidism, and Diabetes presents for episode of aphasia and right sided weakness.    #Stroke vs TIA  -Patient is delirius this morning. As per the family, she looks more confused than baseline. Speech is normal  -Unable to perform complete physical exam due to delirium but she is able to move all her extremities  - CT Head showed no acute intracranial hemorrhage or large territory infarct, mild progression of periventricular subcortical white matter hypodensities consistent with ischemic change  - CT Perfusion 1/27 no major stenosis or occlusion, 50% stenosis of right ICA, 3cc area of ischemic penumbra in right cerebellar hemisphere without core infarct (possibly artifactual)  - MRI brain pending  - Neuro checks q4h  - Speech/Swallow recommended dysphagia 3  - PT/OT eval pending  - Family wants home PT  - Admit to Stroke Unit  - 2D Echo showed EF 20-25% with mild pulmonary hypertension  - LDL 63  - Neurology following    #HTN  - Continue Losartan 100mg Daily and Metoprolol 50mg BID    #HLD  - started on atorvastatin 80mg    #Hypothyroidism  - Continue Levothyroxine 50mcg Daily    #Diabetes  - Takes Januvia at home  - Monitor fingerstick glucose, start insulin if >180    #Pending: Stroke workup  #Disposition: From home  #Diet: DASH/TLC  #DVT Prophylaxis: Lovenox 40mg SubQ qHS  #Activity: Bedrest  #Code Status: Full Code BRIJESH POWER 89y Female  MRN#: [id number]     SUBJECTIVE  Patient is a 89y old Female who presents with a chief complaint of TIA   Currently admitted to medicine with the primary diagnosis of Stroke  Today is hospital day 1d, and this morning she reports delirium overnight.    OBJECTIVE  PAST MEDICAL & SURGICAL HISTORY  Diabetes  Hypothyroidism  Hyperlipidemia  Hypertension  Dementia    ALLERGIES:  No Known Allergies    MEDICATIONS:  STANDING MEDICATIONS  aspirin enteric coated 81 milliGRAM(s) Oral daily  chlorhexidine 4% Liquid 1 Application(s) Topical <User Schedule>  enoxaparin Injectable 40 milliGRAM(s) SubCutaneous at bedtime  levothyroxine 50 MICROGram(s) Oral daily  losartan 100 milliGRAM(s) Oral daily  melatonin 3 milliGRAM(s) Oral at bedtime  metoprolol tartrate 50 milliGRAM(s) Oral two times a day  simvastatin 40 milliGRAM(s) Oral at bedtime    PRN MEDICATIONS      VITAL SIGNS: Last 24 Hours  T(C): 36.4 (28 Jan 2020 08:55), Max: 36.4 (27 Jan 2020 11:06)  T(F): 97.6 (28 Jan 2020 08:55), Max: 97.6 (28 Jan 2020 08:55)  HR: 73 (28 Jan 2020 08:55) (73 - 84)  BP: 177/65 (28 Jan 2020 08:55) (130/71 - 177/65)  BP(mean): 94 (27 Jan 2020 17:06) (94 - 94)  RR: 16 (28 Jan 2020 08:55) (16 - 20)  SpO2: 93% (28 Jan 2020 08:55) (88% - 99%)    LABS:                        13.6   13.24 )-----------( 216      ( 28 Jan 2020 05:37 )             42.0     01-28    142  |  102  |  17  ----------------------------<  146<H>  3.4<L>   |  23  |  0.8    Ca    9.6      28 Jan 2020 05:37  Mg     1.6     01-28    TPro  6.7  /  Alb  4.0  /  TBili  0.7  /  DBili  x   /  AST  20  /  ALT  7   /  AlkPhos  200<H>  01-27    PT/INR - ( 27 Jan 2020 09:53 )   PT: 13.10 sec;   INR: 1.14 ratio         PTT - ( 27 Jan 2020 09:53 )  PTT:30.4 sec  Urinalysis Basic - ( 27 Jan 2020 12:19 )    Color: Colorless / Appearance: Clear / SG: >1.050 / pH: x  Gluc: x / Ketone: Negative  / Bili: Negative / Urobili: <2 mg/dL   Blood: x / Protein: 30 mg/dL / Nitrite: Negative   Leuk Esterase: Moderate / RBC: 153 /HPF / WBC 43 /HPF   Sq Epi: x / Non Sq Epi: 2 /HPF / Bacteria: Negative      RADIOLOGY:  < from: CT Brain Stroke Protocol (01.27.20 @ 10:09) >  IMPRESSION:    In comparison with the prior noncontrast CT scan of the brain dated April 12, 2018:    No CT evidence of acute intracranial hemorrhage or large territorial infarct.    Mild progression of the periventricular subcortical white matter hypodensities consistent with ischemic change, age indeterminate.    < end of copied text >    < from: CT Perfusion w/ Maps w/ IV Cont (01.27.20 @ 10:16) >  IMPRESSION:    1.  CTA: No evidence of major vascular stenosis or occlusion.     2.  Scattered areas of calcific plaque with about 50% stenosis of the right ICA origin and mild stenoses of the carotid siphons and distal vertebral arteries.    3.  CT perfusion: Apparent small (3 cc) area of ischemic penumbra within the right cerebellar hemisphere without core infarct.  This can be artifactual; recommend attention on follow up head CT or MRI.    < end of copied text >        PHYSICAL EXAM:    GENERAL: NAD, well-developed, AAOx3  HEENT:  Atraumatic, Normocephalic. EOMI, PERRLA, conjunctiva and sclera clear, No JVD  PULMONARY: Clear to auscultation bilaterally; No wheeze  CARDIOVASCULAR: Regular rate and rhythm; No murmurs, rubs, or gallops  GASTROINTESTINAL: Soft, Nontender, Nondistended; Bowel sounds present  MUSCULOSKELETAL:  2+ Peripheral Pulses, No clubbing, cyanosis, or edema  NEUROLOGY: non-focal  SKIN: No rashes or lesions      ADMISSION SUMMARY  Patient is a 89y old Female who presents with a chief complaint of TIA   Currently admitted to medicine with the primary diagnosis of Stroke    ASSESSMENT & PLAN  89 year old female with PMH of Dementia, HTN, HLD, Hypothyroidism, and Diabetes presents for episode of aphasia and right sided weakness.    #Stroke vs TIA  -Patient is delirius this morning. As per the family, she looks more confused than baseline. Speech is normal  -Unable to perform complete physical exam due to delirium but she is able to move all her extremities  - CT Head showed no acute intracranial hemorrhage or large territory infarct, mild progression of periventricular subcortical white matter hypodensities consistent with ischemic change  - CT Perfusion 1/27 no major stenosis or occlusion, 50% stenosis of right ICA, 3cc area of ischemic penumbra in right cerebellar hemisphere without core infarct (possibly artifactual)  - MRI brain pending  - Neuro checks q4h  - Speech/Swallow recommended dysphagia 3  - PT/OT eval pending  - Family wants home PT  - Admit to Stroke Unit  - 2D Echo showed EF 20-25% with mild pulmonary hypertension  - LDL 63  - Neurology following    #Heart failure with systolic dysfunction  -No symptoms of volume overload  -EKG showed T wave inversions in V4-V6. Repeat EKG  -No active chest pain  -ECHO showed EF 20-25% with decreased global left ventricular function  -Trops ordered for 4pm  -c/w aspirin, metoprolol,losartan and statin    #HTN  - Continue Losartan 100mg Daily and Metoprolol 50mg BID    #HLD  - started on atorvastatin 80mg    #Hypothyroidism  - Continue Levothyroxine 50mcg Daily    #Diabetes  - Takes Januvia at home  - Monitor fingerstick glucose, start insulin if >180    #Pending: Stroke workup  #Disposition: From home  #Diet: DASH/TLC  #DVT Prophylaxis: Lovenox 40mg SubQ qHS  #Activity: Bedrest  #Code Status: Full Code

## 2020-01-28 NOTE — PHYSICAL THERAPY INITIAL EVALUATION ADULT - PERTINENT HX OF CURRENT PROBLEM, REHAB EVAL
patient's daughter was helping her get ready for bed. In the morning the daughter went to help the patient get up and found her not speaking, or responding to questions, and not following commands. She was unable to take her morning Aspirin 81mg with water. At the the hospital, the patient began speaking Telugu. The family reports the patient normally speaks english and hasn't spoken Telugu for years. Shortly afterward the patient began acting normally with no persistent symptoms.

## 2020-01-28 NOTE — PROGRESS NOTE ADULT - ATTENDING COMMENTS
Patient seen and examined yovany Robles and notes, labs, vitals and imaging reviewed by me personally.  Patient still has mild right hemiparesis    Plan as above

## 2020-01-28 NOTE — PHYSICAL THERAPY INITIAL EVALUATION ADULT - IMPAIRED TRANSFERS: BED/CHAIR, REHAB EVAL
impaired postural control/decreased strength/ataxic/impaired balance/cognition/impaired coordination

## 2020-01-28 NOTE — OCCUPATIONAL THERAPY INITIAL EVALUATION ADULT - RANGE OF MOTION EXAMINATION, UPPER EXTREMITY
unable to formally assess 2* no command following. AROM of pt jeet elbows, wrists and digits observed WFL, PROM of jeet shoulders WFL, jeet shoulders AROM observed at least to 90 degrees flexion

## 2020-01-28 NOTE — PROGRESS NOTE ADULT - SUBJECTIVE AND OBJECTIVE BOX
Patient was seen and examined. Spoke with RN. Chart reviewed.  No events overnight.  Vital Signs Last 24 Hrs  T(F): 97.6 (28 Jan 2020 08:55), Max: 97.6 (28 Jan 2020 08:55)  HR: 73 (28 Jan 2020 08:55) (73 - 76)  BP: 137/67 (28 Jan 2020 11:01) (130/71 - 177/65)  SpO2: 93% (28 Jan 2020 08:55) (93% - 94%)  MEDICATIONS  (STANDING):  aspirin enteric coated 81 milliGRAM(s) Oral daily  chlorhexidine 4% Liquid 1 Application(s) Topical <User Schedule>  enoxaparin Injectable 40 milliGRAM(s) SubCutaneous at bedtime  levothyroxine 50 MICROGram(s) Oral daily  losartan 100 milliGRAM(s) Oral daily  melatonin 3 milliGRAM(s) Oral at bedtime  metoprolol tartrate 50 milliGRAM(s) Oral two times a day    MEDICATIONS  (PRN):    Labs:                        13.6   13.24 )-----------( 216      ( 28 Jan 2020 05:37 )             42.0                         13.5   9.12  )-----------( 190      ( 27 Jan 2020 09:53 )             42.2     28 Jan 2020 05:37    142    |  102    |  17     ----------------------------<  146    3.4     |  23     |  0.8    27 Jan 2020 09:53    143    |  105    |  17     ----------------------------<  190    4.4     |  25     |  0.9      Ca    9.6        28 Jan 2020 05:37  Ca    9.5        27 Jan 2020 09:53  Mg     1.6       28 Jan 2020 05:37    TPro  6.7    /  Alb  4.0    /  TBili  0.7    /  DBili  x      /  AST  20     /  ALT  7      /  AlkPhos  200    27 Jan 2020 09:53    PT/INR - ( 27 Jan 2020 09:53 )   PT: 13.10 sec;   INR: 1.14 ratio         PTT - ( 27 Jan 2020 09:53 )  PTT:30.4 sec  Urinalysis Basic - ( 27 Jan 2020 12:19 )    Color: Colorless / Appearance: Clear / SG: >1.050 / pH: x  Gluc: x / Ketone: Negative  / Bili: Negative / Urobili: <2 mg/dL   Blood: x / Protein: 30 mg/dL / Nitrite: Negative   Leuk Esterase: Moderate / RBC: 153 /HPF / WBC 43 /HPF   Sq Epi: x / Non Sq Epi: 2 /HPF / Bacteria: Negative        General: comfortable, NAD  Neurology: A&A   Head:  Normocephalic, atraumatic  ENT:  Mucosa moist, no ulcerations  Neck:  Supple, no JVD,   Resp: CTA B/L  CV: RRR, S1S2,   GI: Soft, NT, bowel sounds  MS: No edema, + peripheral pulses, FROM all 4 extremity      A/P:  89 year old female with PMH of Dementia, HTN, HLD, Hypothyroidism, and Diabetes presents with aphasia and right sided weakness.  Echo showed EF 20-25% with decreased global left ventricular function  seen in the ED     stroke unit  tele, trend TNI   delirium precautions   fall precautions   aspiration precaution, dysphagia diet   MRI   neuro checks q4   Neuro f/u   care as per stroke protocol   cardiology eval (Dr. Villagomez)   Asa, Statin, continue cardiac meds   BP control as per post stroke parameters   PT/Rehab   DVT prophylaxis  Decubitus prevention- all measures as per RN protocol  Please call or text me with any questions or updates Patient was seen and examined. Spoke with RN. Chart reviewed.  No events overnight.  Vital Signs Last 24 Hrs  T(F): 97.6 (28 Jan 2020 08:55), Max: 97.6 (28 Jan 2020 08:55)  HR: 73 (28 Jan 2020 08:55) (73 - 76)  BP: 137/67 (28 Jan 2020 11:01) (130/71 - 177/65)  SpO2: 93% (28 Jan 2020 08:55) (93% - 94%)  MEDICATIONS  (STANDING):  aspirin enteric coated 81 milliGRAM(s) Oral daily  chlorhexidine 4% Liquid 1 Application(s) Topical <User Schedule>  enoxaparin Injectable 40 milliGRAM(s) SubCutaneous at bedtime  levothyroxine 50 MICROGram(s) Oral daily  losartan 100 milliGRAM(s) Oral daily  melatonin 3 milliGRAM(s) Oral at bedtime  metoprolol tartrate 50 milliGRAM(s) Oral two times a day    MEDICATIONS  (PRN):    Labs:                        13.6   13.24 )-----------( 216      ( 28 Jan 2020 05:37 )             42.0                         13.5   9.12  )-----------( 190      ( 27 Jan 2020 09:53 )             42.2     28 Jan 2020 05:37    142    |  102    |  17     ----------------------------<  146    3.4     |  23     |  0.8    27 Jan 2020 09:53    143    |  105    |  17     ----------------------------<  190    4.4     |  25     |  0.9      Ca    9.6        28 Jan 2020 05:37  Ca    9.5        27 Jan 2020 09:53  Mg     1.6       28 Jan 2020 05:37    TPro  6.7    /  Alb  4.0    /  TBili  0.7    /  DBili  x      /  AST  20     /  ALT  7      /  AlkPhos  200    27 Jan 2020 09:53    PT/INR - ( 27 Jan 2020 09:53 )   PT: 13.10 sec;   INR: 1.14 ratio         PTT - ( 27 Jan 2020 09:53 )  PTT:30.4 sec  Urinalysis Basic - ( 27 Jan 2020 12:19 )    Color: Colorless / Appearance: Clear / SG: >1.050 / pH: x  Gluc: x / Ketone: Negative  / Bili: Negative / Urobili: <2 mg/dL   Blood: x / Protein: 30 mg/dL / Nitrite: Negative   Leuk Esterase: Moderate / RBC: 153 /HPF / WBC 43 /HPF   Sq Epi: x / Non Sq Epi: 2 /HPF / Bacteria: Negative        General: comfortable, NAD  Neurology: A&A   Head:  Normocephalic, atraumatic  ENT:  Mucosa moist, no ulcerations  Neck:  Supple, no JVD,   Resp: CTA B/L  CV: RRR, S1S2,   GI: Soft, NT, bowel sounds  MS: No edema, + peripheral pulses, FROM all 4 extremity      A/P:  89 year old female with PMH of Dementia, HTN, HLD, Hypothyroidism, and Diabetes presents with aphasia and right sided weakness.  Echo showed EF 20-25% with decreased global left ventricular function, UTI   seen in the ED     stroke unit  tele, trend TNI   delirium precautions   frequent redirection   avoid sedatives   fall precautions   aspiration precaution, dysphagia diet   MRI   neuro checks q4   Neuro f/u   care as per stroke protocol   cardiology eval (Dr. Waddell as per family request)   Asa, Statin, continue cardiac meds   BP control as per post stroke parameters   abx, f/u cx   d/w family   PT/Rehab   DVT prophylaxis  Decubitus prevention- all measures as per RN protocol  Please call or text me with any questions or updates

## 2020-01-28 NOTE — OCCUPATIONAL THERAPY INITIAL EVALUATION ADULT - PLANNED THERAPY INTERVENTIONS, OT EVAL
balance training/cognitive, visual perceptual/bed mobility training/fine motor coordination training/ROM/strengthening/parent/caregiver training.../transfer training

## 2020-01-29 ENCOUNTER — TRANSCRIPTION ENCOUNTER (OUTPATIENT)
Age: 85
End: 2020-01-29

## 2020-01-29 DIAGNOSIS — Z71.89 OTHER SPECIFIED COUNSELING: ICD-10-CM

## 2020-01-29 LAB
ALBUMIN SERPL ELPH-MCNC: 3.9 G/DL — SIGNIFICANT CHANGE UP (ref 3.5–5.2)
ALP SERPL-CCNC: 175 U/L — HIGH (ref 30–115)
ALT FLD-CCNC: 8 U/L — SIGNIFICANT CHANGE UP (ref 0–41)
ANION GAP SERPL CALC-SCNC: 14 MMOL/L — SIGNIFICANT CHANGE UP (ref 7–14)
AST SERPL-CCNC: 18 U/L — SIGNIFICANT CHANGE UP (ref 0–41)
BILIRUB SERPL-MCNC: 0.6 MG/DL — SIGNIFICANT CHANGE UP (ref 0.2–1.2)
BUN SERPL-MCNC: 16 MG/DL — SIGNIFICANT CHANGE UP (ref 10–20)
CALCIUM SERPL-MCNC: 9.4 MG/DL — SIGNIFICANT CHANGE UP (ref 8.5–10.1)
CHLORIDE SERPL-SCNC: 101 MMOL/L — SIGNIFICANT CHANGE UP (ref 98–110)
CK MB CFR SERPL CALC: 3.8 NG/ML — SIGNIFICANT CHANGE UP (ref 0.6–6.3)
CO2 SERPL-SCNC: 24 MMOL/L — SIGNIFICANT CHANGE UP (ref 17–32)
CREAT SERPL-MCNC: 0.9 MG/DL — SIGNIFICANT CHANGE UP (ref 0.7–1.5)
CULTURE RESULTS: SIGNIFICANT CHANGE UP
ESTIMATED AVERAGE GLUCOSE: 137 MG/DL — HIGH (ref 68–114)
GLUCOSE BLDC GLUCOMTR-MCNC: 106 MG/DL — HIGH (ref 70–99)
GLUCOSE BLDC GLUCOMTR-MCNC: 109 MG/DL — HIGH (ref 70–99)
GLUCOSE BLDC GLUCOMTR-MCNC: 122 MG/DL — HIGH (ref 70–99)
GLUCOSE SERPL-MCNC: 121 MG/DL — HIGH (ref 70–99)
HBA1C BLD-MCNC: 6.4 % — HIGH (ref 4–5.6)
HCT VFR BLD CALC: 39.3 % — SIGNIFICANT CHANGE UP (ref 37–47)
HGB BLD-MCNC: 12.5 G/DL — SIGNIFICANT CHANGE UP (ref 12–16)
MAGNESIUM SERPL-MCNC: 1.6 MG/DL — LOW (ref 1.8–2.4)
MCHC RBC-ENTMCNC: 29.3 PG — SIGNIFICANT CHANGE UP (ref 27–31)
MCHC RBC-ENTMCNC: 31.8 G/DL — LOW (ref 32–37)
MCV RBC AUTO: 92 FL — SIGNIFICANT CHANGE UP (ref 81–99)
NRBC # BLD: 0 /100 WBCS — SIGNIFICANT CHANGE UP (ref 0–0)
PLATELET # BLD AUTO: 182 K/UL — SIGNIFICANT CHANGE UP (ref 130–400)
POTASSIUM SERPL-MCNC: 3.2 MMOL/L — LOW (ref 3.5–5)
POTASSIUM SERPL-SCNC: 3.2 MMOL/L — LOW (ref 3.5–5)
PROT SERPL-MCNC: 6.2 G/DL — SIGNIFICANT CHANGE UP (ref 6–8)
RBC # BLD: 4.27 M/UL — SIGNIFICANT CHANGE UP (ref 4.2–5.4)
RBC # FLD: 13.6 % — SIGNIFICANT CHANGE UP (ref 11.5–14.5)
SODIUM SERPL-SCNC: 139 MMOL/L — SIGNIFICANT CHANGE UP (ref 135–146)
SPECIMEN SOURCE: SIGNIFICANT CHANGE UP
TROPONIN T SERPL-MCNC: <0.01 NG/ML — SIGNIFICANT CHANGE UP
WBC # BLD: 8.77 K/UL — SIGNIFICANT CHANGE UP (ref 4.8–10.8)
WBC # FLD AUTO: 8.77 K/UL — SIGNIFICANT CHANGE UP (ref 4.8–10.8)

## 2020-01-29 RX ORDER — POTASSIUM CHLORIDE 20 MEQ
20 PACKET (EA) ORAL ONCE
Refills: 0 | Status: COMPLETED | OUTPATIENT
Start: 2020-01-29 | End: 2020-01-29

## 2020-01-29 RX ORDER — CEPHALEXIN 500 MG
1 CAPSULE ORAL
Qty: 10 | Refills: 0
Start: 2020-01-29 | End: 2020-02-02

## 2020-01-29 RX ORDER — MAGNESIUM SULFATE 500 MG/ML
2 VIAL (ML) INJECTION
Refills: 0 | Status: COMPLETED | OUTPATIENT
Start: 2020-01-29 | End: 2020-01-29

## 2020-01-29 RX ADMIN — Medication 50 MILLIGRAM(S): at 16:50

## 2020-01-29 RX ADMIN — ENOXAPARIN SODIUM 40 MILLIGRAM(S): 100 INJECTION SUBCUTANEOUS at 21:39

## 2020-01-29 RX ADMIN — CEFTRIAXONE 100 MILLIGRAM(S): 500 INJECTION, POWDER, FOR SOLUTION INTRAMUSCULAR; INTRAVENOUS at 11:51

## 2020-01-29 RX ADMIN — Medication 50 MILLIEQUIVALENT(S): at 09:10

## 2020-01-29 RX ADMIN — LOSARTAN POTASSIUM 100 MILLIGRAM(S): 100 TABLET, FILM COATED ORAL at 06:11

## 2020-01-29 RX ADMIN — Medication 162 MILLIGRAM(S): at 11:51

## 2020-01-29 RX ADMIN — Medication 50 GRAM(S): at 10:22

## 2020-01-29 RX ADMIN — Medication 50 MICROGRAM(S): at 06:11

## 2020-01-29 RX ADMIN — Medication 50 MILLIGRAM(S): at 06:10

## 2020-01-29 RX ADMIN — Medication 3 MILLIGRAM(S): at 21:39

## 2020-01-29 RX ADMIN — Medication 50 GRAM(S): at 09:09

## 2020-01-29 NOTE — DISCHARGE NOTE PROVIDER - NSDCMRMEDTOKEN_GEN_ALL_CORE_FT
aspirin 81 mg oral tablet: 1 tab(s) orally once a day  Keflex 250 mg oral capsule: 1 cap(s) orally 2 times a day   levothyroxine 50 mcg (0.05 mg) oral tablet: 1 tab(s) orally once a day  losartan 100 mg oral tablet: 1 tab(s) orally once a day  Melatonin 3 mg oral tablet: 1 tab(s) orally once a day (at bedtime)  Metoprolol Tartrate 50 mg oral tablet: 1 tab(s) orally 2 times a day  simvastatin 40 mg oral tablet: 1 tab(s) orally once a day (at bedtime) aspirin 81 mg oral tablet: 1 tab(s) orally once a day  levothyroxine 50 mcg (0.05 mg) oral tablet: 1 tab(s) orally once a day  losartan 100 mg oral tablet: 1 tab(s) orally once a day  Melatonin 3 mg oral tablet: 1 tab(s) orally once a day (at bedtime)  Metoprolol Tartrate 50 mg oral tablet: 1 tab(s) orally 2 times a day  simvastatin 40 mg oral tablet: 1 tab(s) orally once a day (at bedtime)

## 2020-01-29 NOTE — DISCHARGE NOTE PROVIDER - PROVIDER TOKENS
PROVIDER:[TOKEN:[[id number]],FOLLOWUP:[2 weeks]],PROVIDER:[TOKEN:[[id number]],FOLLOWUP:[2 weeks],ESTABLISHEDPATIENT:[T]]

## 2020-01-29 NOTE — DISCHARGE NOTE PROVIDER - HOSPITAL COURSE
89 year old female with PMH of Dementia, HTN, HLD, Hypothyroidism, and Diabetes presents for TIA. Last known well was 10pm night before presentation when patient's daughter was helping her get ready for bed. The day of presentation, in the morning at approximately 8am the daughter went to help the patient get up and found the patient not speaking, not responding to questions, and not following commands. They tried to give her morning Aspirin 81mg with water, but the patient was unable to take the medicine. They brought the patient to the hospital, where the patient began speaking Welsh. The family reports the patient normally speaks english and hasn't spoken Welsh for years. Shortly afterward the patient began acting normally with no persistent symptoms.    The patient had a TIA in February 2018 involving a fall in the bathroom with no residual deficits. For the past week the family noticed the patient exhibiting right arm weakness and appearing more unsteady than usual, but otherwise no symptoms prior to the episode this morning. In the ED stroke code was called. CT negative for acute hemorrhage or large territory infarct. Patient was given Aspirin and evaluated by Neurology who approved admission to the stroke unit. Patient had CT head and MR brain which showed no evidence of stroke. Patient was found to have low EF on ECHO 20-25% which is new as per the family. She was evaluated by cardiology. Patient is being discharged home with outpatient rehab.

## 2020-01-29 NOTE — CONSULT NOTE ADULT - SUBJECTIVE AND OBJECTIVE BOX
CHIEF COMPLAINT:Patient is a 89y old  Female who presents with a chief complaint of TIA (29 Jan 2020 16:30)      HISTORY OF PRESENT ILLNESS:   HPI:  89 year old female with PMH of Dementia, HTN, HLD, Hypothyroidism, and Diabetes presents for TIA.    Last known well was 10pm last night when patient's daughter was helping her get ready for bed. This morning at approximately 8am the daughter went to help the patient get up and found the patient not speaking, not responding to questions, and not following commands. They tried to give her morning Aspirin 81mg with water, but the patient was unable to take the medicine. They brought the patient to the hospital, where the patient began speaking Pakistani. The family reports the patient normally speaks english and hasn't spoken Pakistani for years. Shortly afterward the patient began acting normally with no persistent symptoms.    The patient had a TIA in February 2018 involving a fall in the bathroom with no residual deficits. For the past week the family noticed the patient exhibiting right arm weakness and appearing more unsteady than usual, but otherwise no symptoms prior to the episode this morning.    In the ED stroke code was called. CT negative for acute hemorrhage or large territory infarct. Patient was given Aspirin and evaluated by Neurology who approved admission to the stroke unit. (27 Jan 2020 11:45)    Pt with aphasia and  right hemiparesis.  Called to evaluate incidental finding of decreased EF.     PAST MEDICAL & SURGICAL HISTORY:  Diabetes  Hypothyroidism  Hyperlipidemia  Hypertension  Dementia    FAMILY HISTORY:    Allergies    No Known Allergies    Intolerances    	  Home Medications:  aspirin 81 mg oral tablet: 1 tab(s) orally once a day (27 Jan 2020 11:35)  levothyroxine 50 mcg (0.05 mg) oral tablet: 1 tab(s) orally once a day (27 Jan 2020 11:35)  losartan 100 mg oral tablet: 1 tab(s) orally once a day (27 Jan 2020 11:35)  Melatonin 3 mg oral tablet: 1 tab(s) orally once a day (at bedtime) (27 Jan 2020 11:35)  Metoprolol Tartrate 50 mg oral tablet: 1 tab(s) orally 2 times a day (27 Jan 2020 11:34)  simvastatin 40 mg oral tablet: 1 tab(s) orally once a day (at bedtime) (27 Jan 2020 11:48)    MEDICATIONS  (STANDING):  aspirin  chewable 162 milliGRAM(s) Oral daily  cefTRIAXone   IVPB 1000 milliGRAM(s) IV Intermittent every 24 hours  cefTRIAXone   IVPB      chlorhexidine 4% Liquid 1 Application(s) Topical <User Schedule>  enoxaparin Injectable 40 milliGRAM(s) SubCutaneous at bedtime  levothyroxine 50 MICROGram(s) Oral daily  losartan 100 milliGRAM(s) Oral daily  melatonin 3 milliGRAM(s) Oral at bedtime  metoprolol tartrate 50 milliGRAM(s) Oral two times a day    MEDICATIONS  (PRN):        SOCIAL HISTORY:    [ ] Non-smoker  [ ] Smoker  [ ] Alcohol      REVIEW OF SYSTEMS:    PHYSICAL EXAM:  T(C): 36.6 (01-29-20 @ 17:01), Max: 36.6 (01-29-20 @ 17:01)  HR: 81 (01-29-20 @ 17:01) (62 - 88)  BP: 121/66 (01-29-20 @ 17:01) (108/65 - 148/59)  RR: 18 (01-29-20 @ 17:01) (18 - 18)  SpO2: 92% (01-29-20 @ 17:01) (92% - 95%)  Wt(kg): --  I&O's Summary    Daily     Daily     General Appearance: Normal	  Cardiovascular: Normal S1 S2, No JVD, No murmurs, No edema  Respiratory: Lungs clear to auscultation	  Psychiatry: A & O x 3, Mood & affect appropriate  Gastrointestinal:  Soft, Non-tender  Skin: No rashes, No ecchymoses, No cyanosis	  Neurologic: Non-focal  Extremities: Normal range of motion, No clubbing, cyanosis or edema  Vascular: Peripheral pulses palpable 2+ bilaterally        LABS:	 	                        12.5   8.77  )-----------( 182      ( 29 Jan 2020 05:50 )             39.3     01-29    139  |  101  |  16  ----------------------------<  121<H>  3.2<L>   |  24  |  0.9  01-28    142  |  102  |  17  ----------------------------<  146<H>  3.4<L>   |  23  |  0.8    Ca    9.4      29 Jan 2020 05:50  Ca    9.6      28 Jan 2020 05:37  Mg     1.6     01-29  Mg     1.6     01-28    TPro  6.2  /  Alb  3.9  /  TBili  0.6  /  DBili  x   /  AST  18  /  ALT  8   /  AlkPhos  175<H>  01-29      proBNP:   Lipid Profile:   HgA1c: Hemoglobin A1C, Whole Blood: 6.4 % (01-29 @ 05:50)    TSH:       CARDIAC MARKERS:            TELEMETRY EVENTS: 	    ECG:  	< from: 12 Lead ECG (01.27.20 @ 13:49) >  Diagnosis Line Normal sinus rhythm 82  Septal infarct , age undetermined  Abnormal ECG    < end of copied text >    RADIOLOGY:  	    PREVIOUS DIAGNOSTIC TESTING:    [ ] Echocardiogram:< from: Transthoracic Echocardiogram (01.27.20 @ 12:22) >  1. Technically difficult study.   2. Left ventricular ejection fraction, by visual estimation, is 20 to 25%.   3. Severely decreased global left ventricular systolic function.   4. Multiple left ventricular regional wall motion abnormalities exist. See wall motion findings.   5. Sclerotic aortic valve with normal opening.   6. Mild aortic regurgitation.   7. Mild mitral regurgitation.   8. Mild-to-moderate tricuspid regurgitation.   9. Estimated pulmonary artery systolic pressure is 45.5 mmHg assuming a right atrial pressure of 3 mmHg, which is consistent with mild pulmonary hypertension.    < end of copied text >    [ ]  Catheterization:  [ ] Stress Test:

## 2020-01-29 NOTE — DISCHARGE NOTE PROVIDER - CARE PROVIDER_API CALL
Dhruv Silver)  Cardiovascular Disease; Interventional Cardiology  [address] [facility] [facility]  [place] [address]  Phone: [phone]  Fax: [phone]  Follow Up Time: 2 weeks    Jose Joe)  EEGEpilepsy; Neurology  [address] [address], [address]  [place] [address]  Phone: [phone]  Fax: [phone]  Established Patient  Follow Up Time: 2 weeks

## 2020-01-29 NOTE — SWALLOW BEDSIDE ASSESSMENT ADULT - SWALLOW EVAL: RECOMMENDED DIET
Dysphagia Diet III Mechanical soft consistency with cut up meats, thins
dys 3 w/ thin only if pt w/ increased level of arousal

## 2020-01-29 NOTE — SWALLOW BEDSIDE ASSESSMENT ADULT - SLP PERTINENT HISTORY OF CURRENT PROBLEM
89 year old female with PMH of Dementia, HTN, HLD, Hypothyroidism, and Diabetes. hx of TIA in February 2018 with no residual deficits. pt reports right arm weakness within past week. CT negative.
89 year old female with PMH of Dementia, HTN, HLD, Hypothyroidism, and Diabetes. hx of TIA in February 2018 with no residual deficits. pt reports right arm weakness within past week. CT negative.

## 2020-01-29 NOTE — CONSULT NOTE ADULT - ASSESSMENT
Patient with noted Global decrease EF on echo in setting of recent cva.     1. DCM appears global with no major valve disease on echo and no evidence of remote MI at least by ECG.  No invasive management at age 89 with dementia and recent CVA  On appropriate meds thus far for decrease EF BB and ARB  No signs of volume overload avoid diuretics for now.     2. CVA noted findings on CT.  does not appear to be embolic no signs of afib and no apical thrombus noted on echo.    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

## 2020-01-29 NOTE — DISCHARGE NOTE PROVIDER - NSDCCPCAREPLAN_GEN_ALL_CORE_FT
PRINCIPAL DISCHARGE DIAGNOSIS  Diagnosis: TIA (transient ischemic attack)  Assessment and Plan of Treatment: You were admitted with right sided weakness, confusion and disorientation. You were evaluated for stroke. MRI brain was negative for stroke. You can be discharged home with outpatient physical therapy.      SECONDARY DISCHARGE DIAGNOSES  Diagnosis: Acute cystitis  Assessment and Plan of Treatment: You were admitted with infection in urine. We treated you with antibiotics. Continue antibiotics for 5 more days as prescribed.    Diagnosis: Chronic CHF  Assessment and Plan of Treatment: your ECHO showed low EF 20-25%. You currently have no signs of fluid overload. We requested cardiology to evaluate you during the admission. Follow up with Cardiologist as outpatient .

## 2020-01-29 NOTE — PROGRESS NOTE ADULT - SUBJECTIVE AND OBJECTIVE BOX
BRIJESH POWER 89y Female  MRN#: [id number]     SUBJECTIVE  Patient is a 89y old Female who presents with a chief complaint of TIA   Currently admitted to medicine with the primary diagnosis of Stroke  Today is hospital day 2d, and this morning she reports no overnight events.     OBJECTIVE  PAST MEDICAL & SURGICAL HISTORY  Diabetes  Hypothyroidism  Hyperlipidemia  Hypertension  Dementia    ALLERGIES:  No Known Allergies    MEDICATIONS:  STANDING MEDICATIONS  aspirin  chewable 162 milliGRAM(s) Oral daily  cefTRIAXone   IVPB 1000 milliGRAM(s) IV Intermittent every 24 hours  cefTRIAXone   IVPB      chlorhexidine 4% Liquid 1 Application(s) Topical <User Schedule>  enoxaparin Injectable 40 milliGRAM(s) SubCutaneous at bedtime  levothyroxine 50 MICROGram(s) Oral daily  losartan 100 milliGRAM(s) Oral daily  melatonin 3 milliGRAM(s) Oral at bedtime  metoprolol tartrate 50 milliGRAM(s) Oral two times a day    PRN MEDICATIONS      VITAL SIGNS: Last 24 Hours  T(C): 35.7 (29 Jan 2020 07:00), Max: 36.5 (28 Jan 2020 15:59)  T(F): 96.3 (29 Jan 2020 07:00), Max: 97.7 (28 Jan 2020 15:59)  HR: 62 (29 Jan 2020 07:00) (62 - 94)  BP: 108/65 (29 Jan 2020 07:00) (108/65 - 153/65)  BP(mean): --  RR: 18 (29 Jan 2020 07:00) (17 - 18)  SpO2: 95% (29 Jan 2020 07:00) (94% - 95%)    LABS:                        12.5   8.77  )-----------( 182      ( 29 Jan 2020 05:50 )             39.3     01-29    139  |  101  |  16  ----------------------------<  121<H>  3.2<L>   |  24  |  0.9    Ca    9.4      29 Jan 2020 05:50  Mg     1.6     01-29    TPro  6.2  /  Alb  3.9  /  TBili  0.6  /  DBili  x   /  AST  18  /  ALT  8   /  AlkPhos  175<H>  01-29          Troponin T, Serum: <0.01 ng/mL (01-29-20 @ 05:50)  Troponin T, Serum: <0.01 ng/mL (01-28-20 @ 16:44)      Culture - Urine (collected 27 Jan 2020 12:19)  Source: .Urine Clean Catch (Midstream)  Final Report (29 Jan 2020 06:54):    >=3 organisms. Probable collection contamination.      CARDIAC MARKERS ( 29 Jan 2020 05:50 )  x     / <0.01 ng/mL / x     / x     / 3.8 ng/mL  CARDIAC MARKERS ( 28 Jan 2020 16:44 )  x     / <0.01 ng/mL / x     / x     / 4.1 ng/mL      RADIOLOGY:  < from: CT Perfusion w/ Maps w/ IV Cont (01.27.20 @ 10:16) >  IMPRESSION:    1.  CTA: No evidence of major vascular stenosis or occlusion.     2.  Scattered areas of calcific plaque with about 50% stenosis of the right ICA origin and mild stenoses of the carotid siphons and distal vertebral arteries.    3.  CT perfusion: Apparent small (3 cc) area of ischemic penumbra within the right cerebellar hemisphere without core infarct.  This can be artifactual; recommend attention on follow up head CT or MRI.    < end of copied text >      < from: CT Brain Stroke Protocol (01.27.20 @ 10:09) >  IMPRESSION:    In comparison with the prior noncontrast CT scan of the brain dated April 12, 2018:    No CT evidence of acute intracranial hemorrhage or large territorial infarct.    Mild progression of the periventricular subcortical white matter hypodensities consistent with ischemic change, age indeterminate.    In view of the patient's stated clinical history of possible cerebrovascular accident, follow-up examination is recommended, if clinically warranted.      < end of copied text >    < from: MR Head No Cont (01.28.20 @ 20:56) >  IMPRESSION:     1.  No evidence of recent infarct or acute intracranial hemorrhage.    2.  Mild chronic microvascular changes.    < end of copied text >    PHYSICAL EXAM:    GENERAL: NAD, well-developed, AAOx3  HEENT:  Atraumatic, Normocephalic. EOMI, PERRLA, conjunctiva and sclera clear, No JVD  PULMONARY: Clear to auscultation bilaterally; No wheeze  CARDIOVASCULAR: Regular rate and rhythm; No murmurs, rubs, or gallops  GASTROINTESTINAL: Soft, Nontender, Nondistended; Bowel sounds present  MUSCULOSKELETAL:  2+ Peripheral Pulses, No clubbing, cyanosis, or edema  NEUROLOGY: non-focal  SKIN: No rashes or lesions      ADMISSION SUMMARY  Patient is a 89y old Female who presents with a chief complaint of TIA   Currently admitted to medicine with the primary diagnosis of Stroke      ASSESSMENT & PLAN  89 year old female with PMH of Dementia, HTN, HLD, Hypothyroidism, and Diabetes presents for episode of aphasia and right sided weakness.    #Stroke vs TIA  -Patient is delirius this morning. As per the family, she looks more confused than baseline. Speech is normal  -Unable to perform complete physical exam due to delirium but she is able to move all her extremities  - CT Head showed no acute intracranial hemorrhage or large territory infarct, mild progression of periventricular subcortical white matter hypodensities consistent with ischemic change  - CT Perfusion 1/27 no major stenosis or occlusion, 50% stenosis of right ICA, 3cc area of ischemic penumbra in right cerebellar hemisphere without core infarct (possibly artifactual)  - MRI brain showed no evidence of recent infarct, mass or hemorrhage  - Neuro checks q4h  - Speech/Swallow recommended dysphagia 3  - PT/OT eval pending  - Family wants home PT  - Admit to Stroke Unit  - 2D Echo showed EF 20-25% with mild pulmonary hypertension  - LDL 63  - Neurology following    #Heart failure with systolic dysfunction  -No symptoms of volume overload  -EKG showed T wave inversions in V4-V6. Repeat EKG  -No active chest pain  -ECHO showed EF 20-25% with decreased global left ventricular function  -Trops normal  -c/w aspirin, metoprolol, losartan and statin  -Cardiology eval pending    #HTN  - Continue Losartan 100mg Daily and Metoprolol 50mg BID    #HLD  - started on atorvastatin 80mg    #Hypothyroidism  - Continue Levothyroxine 50mcg Daily    #Diabetes  - Takes Januvia at home  - Monitor fingerstick glucose, start insulin if >180    #Pending: Cardiology eval  #Disposition: From home  #Diet: DASH/TLC  #DVT Prophylaxis: Lovenox 40mg SubQ qHS  #Activity: Bedrest  #Code Status: Full Code

## 2020-01-29 NOTE — CHART NOTE - NSCHARTNOTEFT_GEN_A_CORE
Patient and family wish to hold off discharge tonight and to go home in AM. Will hold off and anticipate for AM.

## 2020-01-29 NOTE — PROGRESS NOTE ADULT - SUBJECTIVE AND OBJECTIVE BOX
Patient was seen and examined in ER- did not awake as per son's request. Spoke with son at bedside. Chart reviewed.  No events overnight.  Vital Signs Last 24 Hrs  T(F): 96.3 (29 Jan 2020 07:00), Max: 97.7 (28 Jan 2020 15:59)  HR: 62 (29 Jan 2020 07:00) (62 - 94)  BP: 108/65 (29 Jan 2020 07:00) (108/65 - 177/65)  SpO2: 95% (29 Jan 2020 07:00) (93% - 95%)  MEDICATIONS  (STANDING):  aspirin  chewable 162 milliGRAM(s) Oral daily  cefTRIAXone   IVPB 1000 milliGRAM(s) IV Intermittent every 24 hours  cefTRIAXone   IVPB      chlorhexidine 4% Liquid 1 Application(s) Topical <User Schedule>  enoxaparin Injectable 40 milliGRAM(s) SubCutaneous at bedtime  levothyroxine 50 MICROGram(s) Oral daily  losartan 100 milliGRAM(s) Oral daily  magnesium sulfate  IVPB 2 Gram(s) IV Intermittent every 2 hours  melatonin 3 milliGRAM(s) Oral at bedtime  metoprolol tartrate 50 milliGRAM(s) Oral two times a day  potassium chloride  20 mEq/100 mL IVPB 20 milliEquivalent(s) IV Intermittent once    MEDICATIONS  (PRN):    Labs:                        12.5   8.77  )-----------( 182      ( 29 Jan 2020 05:50 )             39.3                         13.6   13.24 )-----------( 216      ( 28 Jan 2020 05:37 )             42.0     29 Jan 2020 05:50    139    |  101    |  16     ----------------------------<  121    3.2     |  24     |  0.9    28 Jan 2020 05:37    142    |  102    |  17     ----------------------------<  146    3.4     |  23     |  0.8      Ca    9.4        29 Jan 2020 05:50  Ca    9.6        28 Jan 2020 05:37  Mg     1.6       29 Jan 2020 05:50  Mg     1.6       28 Jan 2020 05:37    TPro  6.2    /  Alb  3.9    /  TBili  0.6    /  DBili  x      /  AST  18     /  ALT  8      /  AlkPhos  175    29 Jan 2020 05:50  TPro  6.7    /  Alb  4.0    /  TBili  0.7    /  DBili  x      /  AST  20     /  ALT  7      /  AlkPhos  200    27 Jan 2020 09:53    PT/INR - ( 27 Jan 2020 09:53 )   PT: 13.10 sec;   INR: 1.14 ratio         PTT - ( 27 Jan 2020 09:53 )  PTT:30.4 sec  Urinalysis Basic - ( 27 Jan 2020 12:19 )    Color: Colorless / Appearance: Clear / SG: >1.050 / pH: x  Gluc: x / Ketone: Negative  / Bili: Negative / Urobili: <2 mg/dL   Blood: x / Protein: 30 mg/dL / Nitrite: Negative   Leuk Esterase: Moderate / RBC: 153 /HPF / WBC 43 /HPF   Sq Epi: x / Non Sq Epi: 2 /HPF / Bacteria: Negative        Culture - Urine (collected 27 Jan 2020 12:19)  Source: .Urine Clean Catch (Midstream)  Final Report (29 Jan 2020 06:54):    >=3 organisms. Probable collection contamination.      General: comfortable, NAD  Neurology: A&Ox3, nonfocal  Head:  Normocephalic, atraumatic  ENT:  Mucosa moist, no ulcerations  Neck:  Supple, no JVD,   Skin: no breakdowns (as per RN)  Resp: CTA B/L  CV: RRR, S1S2,   GI: Soft, NT, bowel sounds  MS: No edema, + peripheral pulses, FROM all 4 extremity    MRI noted- results on chart- unremarkable      A/P:  89 year old female with PMH of Dementia, HTN, HLD, Hypothyroidism, and Diabetes presents with aphasia and right sided weakness.        neuro checks q4   Neuro f/u     empiric abx for UTI- stop after total three days    stroke unit    delirium precautions   frequent redirection   avoid sedatives   fall precautions   aspiration precaution, dysphagia diet    cardiology eval today- Dr. Waddell ( requested yesterday as per family request for Echo that showed EF 20-25% with decreased global left ventricular function)  Asa, Statin,   BP control as per post stroke parameters     PT/Rehab     DC planning when cleared by neurology    DVT prophylaxis  Decubitus prevention- all measures as per RN protocol  Please call or text me with any questions or updates

## 2020-01-29 NOTE — CONSULT NOTE ADULT - ASSESSMENT
CVA, TIA, metabolic cerebral event?  accidental finding of a cardiomyopathy, unknown etiology, well compensated, most likely chronic  no evidence of volume overload    no need for further intervention or cardiac work up  decrease Losartan to 50 daily along with ASA and Metoprolol  assess for the feeding, speech and swallow, rehab and recovery from the current CNS status  avoid high volume intake  out patient medicine as above

## 2020-01-29 NOTE — CONSULT NOTE ADULT - SUBJECTIVE AND OBJECTIVE BOX
Patient is a 89y old  Female who presents with a chief complaint of TIA (29 Jan 2020 16:30)      HPI:  Cardiology service on call was called to assess the patient foe the accidental finding of a cardiomyopathy after her recent TIA-CVA.  no orthopnea, no respiratory distress, no signs of volume overload, patient still is confused, lethargic.    89 year old female with PMH of Dementia, HTN, HLD, Hypothyroidism, and Diabetes presents for TIA.  Last known well was 10pm last night when patient's daughter was helping her get ready for bed. This morning at approximately 8am the daughter went to help the patient get up and found the patient not speaking, not responding to questions, and not following commands. They tried to give her morning Aspirin 81mg with water, but the patient was unable to take the medicine. They brought the patient to the hospital, where the patient began speaking [place]ese. The family reports the patient normally speaks english and hasn't spoken [place]ese for years. Shortly afterward the patient began acting normally with no persistent symptoms.    The patient had a TIA in February 2018 involving a fall in the bathroom with no residual deficits. For the past week the family noticed the patient exhibiting right arm weakness and appearing more unsteady than usual, but otherwise no symptoms prior to the episode this morning.    In the ED stroke code was called. CT negative for acute hemorrhage or large territory infarct. Patient was given Aspirin and evaluated by Neurology who approved admission to the stroke unit. (27 Jan 2020 11:45)      PAST MEDICAL & SURGICAL HISTORY:  Diabetes  Hypothyroidism  Hyperlipidemia  Hypertension  Dementia      PREVIOUS DIAGNOSTIC TESTING:      ECHO  FINDINGS: < from: Transthoracic Echocardiogram (01.27.20 @ 12:22) >   1. Technically difficult study.   2. Left ventricular ejection fraction, by visual estimation, is 20 to 25%.   3. Severely decreased global left ventricular systolic function.   4. Multiple left ventricular regional wall motion abnormalities exist. See wall motion findings.   5. Sclerotic aortic valve with normal opening.   6. Mild aortic regurgitation.   7. Mild mitral regurgitation.   8. Mild-to-moderate tricuspid regurgitation.   9. Estimated pulmonary artery systolic pressure is 45.5 mmHg assuming a right atrial pressure of 3 mmHg, which is consistent with mild pulmonary hypertension.    < end of copied text >        MEDICATIONS  (STANDING):  aspirin  chewable 162 milliGRAM(s) Oral daily  cefTRIAXone   IVPB 1000 milliGRAM(s) IV Intermittent every 24 hours  cefTRIAXone   IVPB      chlorhexidine 4% Liquid 1 Application(s) Topical <User Schedule>  enoxaparin Injectable 40 milliGRAM(s) SubCutaneous at bedtime  levothyroxine 50 MICROGram(s) Oral daily  losartan 100 milliGRAM(s) Oral daily  melatonin 3 milliGRAM(s) Oral at bedtime  metoprolol tartrate 50 milliGRAM(s) Oral two times a day    MEDICATIONS  (PRN):      FAMILY HISTORY: not known       SOCIAL HISTORY:  CIGARETTES: no  ALCOHOL: no  DRUGS: no                      REVIEW OF SYSTEMS:  CONSTITUTIONAL: No distress  unable to get ROS from her, but she is comfortable         Vital Signs Last 24 Hrs  T(C): 35.7 (29 Jan 2020 07:00), Max: 36.1 (29 Jan 2020 00:00)  T(F): 96.3 (29 Jan 2020 07:00), Max: 97 (29 Jan 2020 00:00)  HR: 62 (29 Jan 2020 07:00) (62 - 88)  BP: 108/65 (29 Jan 2020 07:00) (108/65 - 148/59)  BP(mean): --  RR: 18 (29 Jan 2020 07:00) (18 - 18)  SpO2: 95% (29 Jan 2020 07:00) (94% - 95%)                      PHYSICAL EXAM:  GENERAL: No distress,  HEAD:  Atraumatic, Normocephalic  NECK: Supple, No JVD, No Bruit   NERVOUS SYSTEM: sleepy, confused, disoriented, Normal speech; Normal motor  CHEST/LUNG: Normal air entry to lung base bilaterally; No wheeze, crackle, rales, rhonchi  HEART: Regular heart beat, S1, A2, P2, No S3, No gallop, No murmur  ABDOMEN: Soft, Non tender, Non distended; Bowel sounds present  EXTREMITIES:  2+ Peripheral Pulses, No clubbing, No edema    TELEMETRY: NSR    ECG: < from: 12 Lead ECG (01.27.20 @ 13:49) >   Normal sinus rhythm  Septal infarct , age undetermined    < end of copied text >      I&O's Detail      LABS:                        12.5   8.77  )-----------( 182      ( 29 Jan 2020 05:50 )             39.3     01-29    139  |  101  |  16  ----------------------------<  121<H>  3.2<L>   |  24  |  0.9    Ca    9.4      29 Jan 2020 05:50  Mg     1.6     01-29    TPro  6.2  /  Alb  3.9  /  TBili  0.6  /  DBili  x   /  AST  18  /  ALT  8   /  AlkPhos  175<H>  01-29    CARDIAC MARKERS ( 29 Jan 2020 05:50 )  x     / <0.01 ng/mL / x     / x     / 3.8 ng/mL  CARDIAC MARKERS ( 28 Jan 2020 16:44 )  x     / <0.01 ng/mL / x     / x     / 4.1 ng/mL          I&O's Summary      RADIOLOGY & ADDITIONAL STUDIES: < from: CT Perfusion w/ Maps w/ IV Cont (01.27.20 @ 10:16) >  1.  CTA: No evidence of major vascular stenosis or occlusion.     2.  Scattered areas of calcific plaque with about 50% stenosis of the right ICA origin and mild stenoses of the carotid siphons and distal vertebral arteries.    3.  CT perfusion: Apparent small (3 cc) area of ischemic penumbra within the right cerebellar hemisphere without core infarct.  This can be artifactual; recommend attention on follow up head CT or MRI.          < end of copied text >

## 2020-01-30 ENCOUNTER — TRANSCRIPTION ENCOUNTER (OUTPATIENT)
Age: 85
End: 2020-01-30

## 2020-01-30 VITALS
DIASTOLIC BLOOD PRESSURE: 65 MMHG | HEART RATE: 79 BPM | RESPIRATION RATE: 18 BRPM | SYSTOLIC BLOOD PRESSURE: 141 MMHG | OXYGEN SATURATION: 95 %

## 2020-01-30 RX ADMIN — LOSARTAN POTASSIUM 100 MILLIGRAM(S): 100 TABLET, FILM COATED ORAL at 06:47

## 2020-01-30 RX ADMIN — Medication 50 MILLIGRAM(S): at 06:47

## 2020-01-30 RX ADMIN — CEFTRIAXONE 100 MILLIGRAM(S): 500 INJECTION, POWDER, FOR SOLUTION INTRAMUSCULAR; INTRAVENOUS at 08:15

## 2020-01-30 RX ADMIN — Medication 50 MICROGRAM(S): at 06:47

## 2020-01-30 NOTE — PROGRESS NOTE ADULT - SUBJECTIVE AND OBJECTIVE BOX
Patient was seen and examined in ED3. Spoke with RN and family. Chart reviewed.  Family didnt want to take home last night, but insisting on discharge this am  Vital Signs Last 24 Hrs  T(F): 97.6 (30 Jan 2020 00:09), Max: 97.8 (29 Jan 2020 17:01)  HR: 81 (30 Jan 2020 00:09) (81 - 81)  BP: 123/56 (30 Jan 2020 00:09) (121/66 - 123/56)  SpO2: 96% (30 Jan 2020 00:09) (92% - 96%)  MEDICATIONS  (STANDING):  aspirin  chewable 162 milliGRAM(s) Oral daily  cefTRIAXone   IVPB 1000 milliGRAM(s) IV Intermittent every 24 hours  cefTRIAXone   IVPB      chlorhexidine 4% Liquid 1 Application(s) Topical <User Schedule>  enoxaparin Injectable 40 milliGRAM(s) SubCutaneous at bedtime  levothyroxine 50 MICROGram(s) Oral daily  losartan 100 milliGRAM(s) Oral daily  melatonin 3 milliGRAM(s) Oral at bedtime  metoprolol tartrate 50 milliGRAM(s) Oral two times a day    MEDICATIONS  (PRN):    Labs:                        12.5   8.77  )-----------( 182      ( 29 Jan 2020 05:50 )             39.3     29 Jan 2020 05:50    139    |  101    |  16     ----------------------------<  121    3.2     |  24     |  0.9      Ca    9.4        29 Jan 2020 05:50  Mg     1.6       29 Jan 2020 05:50    TPro  6.2    /  Alb  3.9    /  TBili  0.6    /  DBili  x      /  AST  18     /  ALT  8      /  AlkPhos  175    29 Jan 2020 05:50          Culture - Urine (collected 27 Jan 2020 12:19)  Source: .Urine Clean Catch (Midstream)  Final Report (29 Jan 2020 06:54):    >=3 organisms. Probable collection contamination.        A/P:  89 year old female with PMH of Dementia, HTN, HLD, Hypothyroidism, and Diabetes presents with aphasia and right sided weakness.      DC planning    outpt cardio and neuro f/u    rocephin- last day today    frequent redirection   avoid sedatives   fall precautions   aspiration precaution, dysphagia diet  DVT prophylaxis  Decubitus prevention- all measures as per RN protocol  Please call or text me with any questions or updates

## 2020-01-30 NOTE — DISCHARGE NOTE NURSING/CASE MANAGEMENT/SOCIAL WORK - NSDCPEPTSTRK_GEN_ALL_CORE
Stroke education booklet/Need for follow up after discharge/Stroke warning signs and symptoms/Prescribed medications/Risk factors for stroke/Stroke support groups for patients, families, and friends/Call 911 for stroke/Signs and symptoms of stroke

## 2020-01-30 NOTE — DISCHARGE NOTE NURSING/CASE MANAGEMENT/SOCIAL WORK - PATIENT PORTAL LINK FT
You can access the FollowMyHealth Patient Portal offered by [facility] by registering at the following website: [email or web address]. By joining [employer]’s FollowMyHealth portal, you will also be able to view your health information using other applications (apps) compatible with our system.

## 2020-01-31 PROBLEM — Z00.00 ENCOUNTER FOR PREVENTIVE HEALTH EXAMINATION: Status: ACTIVE | Noted: 2020-01-31

## 2020-02-04 DIAGNOSIS — E03.9 HYPOTHYROIDISM, UNSPECIFIED: ICD-10-CM

## 2020-02-04 DIAGNOSIS — R47.01 APHASIA: ICD-10-CM

## 2020-02-04 DIAGNOSIS — E78.5 HYPERLIPIDEMIA, UNSPECIFIED: ICD-10-CM

## 2020-02-04 DIAGNOSIS — G81.91 HEMIPLEGIA, UNSPECIFIED AFFECTING RIGHT DOMINANT SIDE: ICD-10-CM

## 2020-02-04 DIAGNOSIS — N30.00 ACUTE CYSTITIS WITHOUT HEMATURIA: ICD-10-CM

## 2020-02-04 DIAGNOSIS — E11.9 TYPE 2 DIABETES MELLITUS WITHOUT COMPLICATIONS: ICD-10-CM

## 2020-02-04 DIAGNOSIS — Z79.82 LONG TERM (CURRENT) USE OF ASPIRIN: ICD-10-CM

## 2020-02-04 DIAGNOSIS — I42.9 CARDIOMYOPATHY, UNSPECIFIED: ICD-10-CM

## 2020-02-04 DIAGNOSIS — R26.89 OTHER ABNORMALITIES OF GAIT AND MOBILITY: ICD-10-CM

## 2020-02-04 DIAGNOSIS — G47.00 INSOMNIA, UNSPECIFIED: ICD-10-CM

## 2020-02-04 DIAGNOSIS — R13.19 OTHER DYSPHAGIA: ICD-10-CM

## 2020-02-04 DIAGNOSIS — I11.0 HYPERTENSIVE HEART DISEASE WITH HEART FAILURE: ICD-10-CM

## 2020-02-04 DIAGNOSIS — I10 ESSENTIAL (PRIMARY) HYPERTENSION: ICD-10-CM

## 2020-02-04 DIAGNOSIS — I65.21 OCCLUSION AND STENOSIS OF RIGHT CAROTID ARTERY: ICD-10-CM

## 2020-02-04 DIAGNOSIS — Z86.73 PERSONAL HISTORY OF TRANSIENT ISCHEMIC ATTACK (TIA), AND CEREBRAL INFARCTION WITHOUT RESIDUAL DEFICITS: ICD-10-CM

## 2020-02-04 DIAGNOSIS — Z79.84 LONG TERM (CURRENT) USE OF ORAL HYPOGLYCEMIC DRUGS: ICD-10-CM

## 2020-02-04 DIAGNOSIS — I50.22 CHRONIC SYSTOLIC (CONGESTIVE) HEART FAILURE: ICD-10-CM

## 2020-02-04 DIAGNOSIS — G45.9 TRANSIENT CEREBRAL ISCHEMIC ATTACK, UNSPECIFIED: ICD-10-CM

## 2020-02-04 DIAGNOSIS — F05 DELIRIUM DUE TO KNOWN PHYSIOLOGICAL CONDITION: ICD-10-CM

## 2023-11-27 NOTE — OCCUPATIONAL THERAPY INITIAL EVALUATION ADULT - RUE MMT, REHAB EVAL
Orders prepped per last CPE. Please review and sign; please add anything else that may be needed   grasp observed, L

## 2024-10-01 ENCOUNTER — INPATIENT (INPATIENT)
Facility: HOSPITAL | Age: 89
LOS: 27 days | Discharge: NURSING, REHAB OR LONG-TERM CARE | DRG: 177 | End: 2024-10-29
Attending: HOSPITALIST | Admitting: INTERNAL MEDICINE
Payer: MEDICARE

## 2024-10-01 VITALS
DIASTOLIC BLOOD PRESSURE: 84 MMHG | SYSTOLIC BLOOD PRESSURE: 176 MMHG | OXYGEN SATURATION: 98 % | HEART RATE: 76 BPM | TEMPERATURE: 99 F | RESPIRATION RATE: 21 BRPM

## 2024-10-01 DIAGNOSIS — J18.9 PNEUMONIA, UNSPECIFIED ORGANISM: ICD-10-CM

## 2024-10-01 PROBLEM — E03.9 HYPOTHYROIDISM, UNSPECIFIED: Chronic | Status: ACTIVE | Noted: 2020-01-27

## 2024-10-01 PROBLEM — E78.5 HYPERLIPIDEMIA, UNSPECIFIED: Chronic | Status: ACTIVE | Noted: 2020-01-27

## 2024-10-01 PROBLEM — E11.9 TYPE 2 DIABETES MELLITUS WITHOUT COMPLICATIONS: Chronic | Status: ACTIVE | Noted: 2020-01-27

## 2024-10-01 PROBLEM — I10 ESSENTIAL (PRIMARY) HYPERTENSION: Chronic | Status: ACTIVE | Noted: 2020-01-27

## 2024-10-01 PROBLEM — F03.90 UNSPECIFIED DEMENTIA, UNSPECIFIED SEVERITY, WITHOUT BEHAVIORAL DISTURBANCE, PSYCHOTIC DISTURBANCE, MOOD DISTURBANCE, AND ANXIETY: Chronic | Status: ACTIVE | Noted: 2020-01-27

## 2024-10-01 LAB
ALBUMIN SERPL ELPH-MCNC: 3.8 G/DL — SIGNIFICANT CHANGE UP (ref 3.5–5.2)
ALP SERPL-CCNC: 190 U/L — HIGH (ref 30–115)
ALT FLD-CCNC: 33 U/L — SIGNIFICANT CHANGE UP (ref 0–41)
ANION GAP SERPL CALC-SCNC: 16 MMOL/L — HIGH (ref 7–14)
APTT BLD: 23.8 SEC — CRITICAL LOW (ref 27–39.2)
AST SERPL-CCNC: 81 U/L — HIGH (ref 0–41)
BASE EXCESS BLDV CALC-SCNC: -2.5 MMOL/L — LOW (ref -2–3)
BASOPHILS # BLD AUTO: 0.02 K/UL — SIGNIFICANT CHANGE UP (ref 0–0.2)
BASOPHILS NFR BLD AUTO: 0.2 % — SIGNIFICANT CHANGE UP (ref 0–1)
BILIRUB SERPL-MCNC: 0.5 MG/DL — SIGNIFICANT CHANGE UP (ref 0.2–1.2)
BUN SERPL-MCNC: 76 MG/DL — CRITICAL HIGH (ref 10–20)
CA-I SERPL-SCNC: 1.15 MMOL/L — SIGNIFICANT CHANGE UP (ref 1.15–1.33)
CALCIUM SERPL-MCNC: 8.7 MG/DL — SIGNIFICANT CHANGE UP (ref 8.4–10.5)
CHLORIDE SERPL-SCNC: 103 MMOL/L — SIGNIFICANT CHANGE UP (ref 98–110)
CO2 SERPL-SCNC: 21 MMOL/L — SIGNIFICANT CHANGE UP (ref 17–32)
CREAT SERPL-MCNC: 1.8 MG/DL — HIGH (ref 0.7–1.5)
EGFR: 25 ML/MIN/1.73M2 — LOW
EOSINOPHIL # BLD AUTO: 0.01 K/UL — SIGNIFICANT CHANGE UP (ref 0–0.7)
EOSINOPHIL NFR BLD AUTO: 0.1 % — SIGNIFICANT CHANGE UP (ref 0–8)
GAS PNL BLDV: 133 MMOL/L — LOW (ref 136–145)
GAS PNL BLDV: SIGNIFICANT CHANGE UP
GLUCOSE SERPL-MCNC: 206 MG/DL — HIGH (ref 70–99)
HCO3 BLDV-SCNC: 24 MMOL/L — SIGNIFICANT CHANGE UP (ref 22–29)
HCT VFR BLD CALC: 46.3 % — SIGNIFICANT CHANGE UP (ref 37–47)
HCT VFR BLDA CALC: 46 % — SIGNIFICANT CHANGE UP (ref 34.5–46.5)
HGB BLD CALC-MCNC: 15.2 G/DL — SIGNIFICANT CHANGE UP (ref 11.7–16.1)
HGB BLD-MCNC: 14.9 G/DL — SIGNIFICANT CHANGE UP (ref 12–16)
IMM GRANULOCYTES NFR BLD AUTO: 0.8 % — HIGH (ref 0.1–0.3)
INR BLD: 0.96 RATIO — SIGNIFICANT CHANGE UP (ref 0.65–1.3)
LACTATE BLDV-MCNC: 2.1 MMOL/L — HIGH (ref 0.5–2)
LACTATE SERPL-SCNC: 2.4 MMOL/L — HIGH (ref 0.7–2)
LYMPHOCYTES # BLD AUTO: 1.91 K/UL — SIGNIFICANT CHANGE UP (ref 1.2–3.4)
LYMPHOCYTES # BLD AUTO: 16.1 % — LOW (ref 20.5–51.1)
MAGNESIUM SERPL-MCNC: 2.4 MG/DL — SIGNIFICANT CHANGE UP (ref 1.8–2.4)
MCHC RBC-ENTMCNC: 31.3 PG — HIGH (ref 27–31)
MCHC RBC-ENTMCNC: 32.2 G/DL — SIGNIFICANT CHANGE UP (ref 32–37)
MCV RBC AUTO: 97.3 FL — SIGNIFICANT CHANGE UP (ref 81–99)
MONOCYTES # BLD AUTO: 0.65 K/UL — HIGH (ref 0.1–0.6)
MONOCYTES NFR BLD AUTO: 5.5 % — SIGNIFICANT CHANGE UP (ref 1.7–9.3)
NEUTROPHILS # BLD AUTO: 9.14 K/UL — HIGH (ref 1.4–6.5)
NEUTROPHILS NFR BLD AUTO: 77.3 % — HIGH (ref 42.2–75.2)
NRBC # BLD: 0 /100 WBCS — SIGNIFICANT CHANGE UP (ref 0–0)
NT-PROBNP SERPL-SCNC: HIGH PG/ML (ref 0–300)
PCO2 BLDV: 45 MMHG — HIGH (ref 39–42)
PH BLDV: 7.33 — SIGNIFICANT CHANGE UP (ref 7.32–7.43)
PLATELET # BLD AUTO: 150 K/UL — SIGNIFICANT CHANGE UP (ref 130–400)
PMV BLD: 12.4 FL — HIGH (ref 7.4–10.4)
PO2 BLDV: 22 MMHG — LOW (ref 25–45)
POTASSIUM BLDV-SCNC: 5 MMOL/L — SIGNIFICANT CHANGE UP (ref 3.5–5.1)
POTASSIUM SERPL-MCNC: 5.5 MMOL/L — HIGH (ref 3.5–5)
POTASSIUM SERPL-SCNC: 5.5 MMOL/L — HIGH (ref 3.5–5)
PROT SERPL-MCNC: 6.5 G/DL — SIGNIFICANT CHANGE UP (ref 6–8)
PROTHROM AB SERPL-ACNC: 10.9 SEC — SIGNIFICANT CHANGE UP (ref 9.95–12.87)
RBC # BLD: 4.76 M/UL — SIGNIFICANT CHANGE UP (ref 4.2–5.4)
RBC # FLD: 14.2 % — SIGNIFICANT CHANGE UP (ref 11.5–14.5)
SAO2 % BLDV: 26.4 % — LOW (ref 67–88)
SODIUM SERPL-SCNC: 140 MMOL/L — SIGNIFICANT CHANGE UP (ref 135–146)
TROPONIN T, HIGH SENSITIVITY RESULT: 437 NG/L — CRITICAL HIGH (ref 6–13)
WBC # BLD: 11.83 K/UL — HIGH (ref 4.8–10.8)
WBC # FLD AUTO: 11.83 K/UL — HIGH (ref 4.8–10.8)

## 2024-10-01 PROCEDURE — 93005 ELECTROCARDIOGRAM TRACING: CPT

## 2024-10-01 PROCEDURE — 82330 ASSAY OF CALCIUM: CPT

## 2024-10-01 PROCEDURE — 93306 TTE W/DOPPLER COMPLETE: CPT

## 2024-10-01 PROCEDURE — 99285 EMERGENCY DEPT VISIT HI MDM: CPT

## 2024-10-01 PROCEDURE — 85025 COMPLETE CBC W/AUTO DIFF WBC: CPT

## 2024-10-01 PROCEDURE — 87640 STAPH A DNA AMP PROBE: CPT

## 2024-10-01 PROCEDURE — 87899 AGENT NOS ASSAY W/OPTIC: CPT

## 2024-10-01 PROCEDURE — 80053 COMPREHEN METABOLIC PANEL: CPT

## 2024-10-01 PROCEDURE — 0225U NFCT DS DNA&RNA 21 SARSCOV2: CPT

## 2024-10-01 PROCEDURE — 97163 PT EVAL HIGH COMPLEX 45 MIN: CPT | Mod: GP

## 2024-10-01 PROCEDURE — 74176 CT ABD & PELVIS W/O CONTRAST: CPT | Mod: 26,MC

## 2024-10-01 PROCEDURE — 73610 X-RAY EXAM OF ANKLE: CPT | Mod: LT

## 2024-10-01 PROCEDURE — 84145 PROCALCITONIN (PCT): CPT

## 2024-10-01 PROCEDURE — 92610 EVALUATE SWALLOWING FUNCTION: CPT | Mod: GN

## 2024-10-01 PROCEDURE — 83036 HEMOGLOBIN GLYCOSYLATED A1C: CPT

## 2024-10-01 PROCEDURE — 82962 GLUCOSE BLOOD TEST: CPT

## 2024-10-01 PROCEDURE — 84295 ASSAY OF SERUM SODIUM: CPT

## 2024-10-01 PROCEDURE — 87641 MR-STAPH DNA AMP PROBE: CPT

## 2024-10-01 PROCEDURE — 93970 EXTREMITY STUDY: CPT

## 2024-10-01 PROCEDURE — 83880 ASSAY OF NATRIURETIC PEPTIDE: CPT

## 2024-10-01 PROCEDURE — 97530 THERAPEUTIC ACTIVITIES: CPT | Mod: GP

## 2024-10-01 PROCEDURE — 87449 NOS EACH ORGANISM AG IA: CPT

## 2024-10-01 PROCEDURE — 81001 URINALYSIS AUTO W/SCOPE: CPT

## 2024-10-01 PROCEDURE — 71045 X-RAY EXAM CHEST 1 VIEW: CPT

## 2024-10-01 PROCEDURE — 84484 ASSAY OF TROPONIN QUANT: CPT

## 2024-10-01 PROCEDURE — 80048 BASIC METABOLIC PNL TOTAL CA: CPT

## 2024-10-01 PROCEDURE — 84443 ASSAY THYROID STIM HORMONE: CPT

## 2024-10-01 PROCEDURE — 87086 URINE CULTURE/COLONY COUNT: CPT

## 2024-10-01 PROCEDURE — 71045 X-RAY EXAM CHEST 1 VIEW: CPT | Mod: 26

## 2024-10-01 PROCEDURE — 85018 HEMOGLOBIN: CPT

## 2024-10-01 PROCEDURE — 85379 FIBRIN DEGRADATION QUANT: CPT

## 2024-10-01 PROCEDURE — 70450 CT HEAD/BRAIN W/O DYE: CPT | Mod: 26,MC

## 2024-10-01 PROCEDURE — 85027 COMPLETE CBC AUTOMATED: CPT

## 2024-10-01 PROCEDURE — 97112 NEUROMUSCULAR REEDUCATION: CPT | Mod: GP

## 2024-10-01 PROCEDURE — 99223 1ST HOSP IP/OBS HIGH 75: CPT

## 2024-10-01 PROCEDURE — 97110 THERAPEUTIC EXERCISES: CPT | Mod: GP

## 2024-10-01 PROCEDURE — 36415 COLL VENOUS BLD VENIPUNCTURE: CPT

## 2024-10-01 PROCEDURE — 85610 PROTHROMBIN TIME: CPT

## 2024-10-01 PROCEDURE — 93971 EXTREMITY STUDY: CPT | Mod: RT

## 2024-10-01 PROCEDURE — 85730 THROMBOPLASTIN TIME PARTIAL: CPT

## 2024-10-01 PROCEDURE — 84449 ASSAY OF TRANSCORTIN: CPT

## 2024-10-01 PROCEDURE — 71275 CT ANGIOGRAPHY CHEST: CPT | Mod: MC

## 2024-10-01 PROCEDURE — 83605 ASSAY OF LACTIC ACID: CPT

## 2024-10-01 PROCEDURE — 82533 TOTAL CORTISOL: CPT

## 2024-10-01 PROCEDURE — 86900 BLOOD TYPING SEROLOGIC ABO: CPT

## 2024-10-01 PROCEDURE — 92526 ORAL FUNCTION THERAPY: CPT | Mod: GN

## 2024-10-01 PROCEDURE — 86850 RBC ANTIBODY SCREEN: CPT

## 2024-10-01 PROCEDURE — 86901 BLOOD TYPING SEROLOGIC RH(D): CPT

## 2024-10-01 PROCEDURE — 82803 BLOOD GASES ANY COMBINATION: CPT

## 2024-10-01 PROCEDURE — 85014 HEMATOCRIT: CPT

## 2024-10-01 PROCEDURE — 94640 AIRWAY INHALATION TREATMENT: CPT

## 2024-10-01 PROCEDURE — 83735 ASSAY OF MAGNESIUM: CPT

## 2024-10-01 PROCEDURE — 84132 ASSAY OF SERUM POTASSIUM: CPT

## 2024-10-01 PROCEDURE — 87040 BLOOD CULTURE FOR BACTERIA: CPT

## 2024-10-01 RX ORDER — CEFTRIAXONE SODIUM 10 G
1000 VIAL (EA) INJECTION EVERY 24 HOURS
Refills: 0 | Status: COMPLETED | OUTPATIENT
Start: 2024-10-02 | End: 2024-10-06

## 2024-10-01 RX ORDER — DOXYCYCLINE HYCLATE 100 MG/1
TABLET, FILM COATED ORAL
Refills: 0 | Status: DISCONTINUED | OUTPATIENT
Start: 2024-10-01 | End: 2024-10-07

## 2024-10-01 RX ORDER — DOXYCYCLINE HYCLATE 100 MG/1
100 TABLET, FILM COATED ORAL EVERY 12 HOURS
Refills: 0 | Status: DISCONTINUED | OUTPATIENT
Start: 2024-10-02 | End: 2024-10-07

## 2024-10-01 RX ORDER — CEFEPIME 2 G/1
1000 INJECTION, POWDER, FOR SOLUTION INTRAVENOUS ONCE
Refills: 0 | Status: COMPLETED | OUTPATIENT
Start: 2024-10-01 | End: 2024-10-01

## 2024-10-01 RX ORDER — CEFTRIAXONE SODIUM 10 G
VIAL (EA) INJECTION
Refills: 0 | Status: COMPLETED | OUTPATIENT
Start: 2024-10-01 | End: 2024-10-07

## 2024-10-01 RX ORDER — IPRATROPIUM BROMIDE AND ALBUTEROL SULFATE .5; 2.5 MG/3ML; MG/3ML
9 SOLUTION RESPIRATORY (INHALATION) ONCE
Refills: 0 | Status: DISCONTINUED | OUTPATIENT
Start: 2024-10-01 | End: 2024-10-25

## 2024-10-01 RX ORDER — ASPIRIN/MAG CARB/ALUMINUM AMIN 325 MG
324 TABLET ORAL ONCE
Refills: 0 | Status: COMPLETED | OUTPATIENT
Start: 2024-10-01 | End: 2024-10-01

## 2024-10-01 RX ORDER — DOXYCYCLINE HYCLATE 100 MG/1
100 TABLET, FILM COATED ORAL ONCE
Refills: 0 | Status: COMPLETED | OUTPATIENT
Start: 2024-10-01 | End: 2024-10-01

## 2024-10-01 RX ORDER — CEFTRIAXONE SODIUM 10 G
1000 VIAL (EA) INJECTION ONCE
Refills: 0 | Status: COMPLETED | OUTPATIENT
Start: 2024-10-01 | End: 2024-10-01

## 2024-10-01 RX ADMIN — DOXYCYCLINE HYCLATE 100 MILLIGRAM(S): 100 TABLET, FILM COATED ORAL at 23:56

## 2024-10-01 RX ADMIN — Medication 500 MILLILITER(S): at 17:34

## 2024-10-01 RX ADMIN — CEFEPIME 100 MILLIGRAM(S): 2 INJECTION, POWDER, FOR SOLUTION INTRAVENOUS at 14:58

## 2024-10-01 RX ADMIN — Medication 324 MILLIGRAM(S): at 16:34

## 2024-10-01 RX ADMIN — Medication 500 MILLILITER(S): at 14:58

## 2024-10-01 RX ADMIN — Medication 1000 MILLIGRAM(S): at 23:23

## 2024-10-01 NOTE — ED PROVIDER NOTE - CLINICAL SUMMARY MEDICAL DECISION MAKING FREE TEXT BOX
89-year-old female with PMHx as noted, in ER with family for generalized weakness, decreased p.o. intake, recently diagnosed pneumonia.  Labs reviewed: WBC 11 Hgb 14, CMP with MARTINE - BUN/creatinine 76/1.8, K+ 5.5, , BNP 24K, lactate 2.1.  CXR with retrocardiac infiltrate.  CT head: No acute pathology.  CT abdomen: Mild fullness in L renal pelvis, L distal ureteral stone x 2; fecal retention; small L pleural effusion with adjacent consolidation.  Patient given IVF, IV antibiotics.  Patient had transient hypotension in ER.  Extensive discussion with family regarding goals of care given patient's advanced age.  They agree for patient to be DNR/DNI with trial of NIV nightly if needed.  On reevaluation, patient much more awake and alert after IVF.  Patient admitted to telemetry for further care and evaluation.

## 2024-10-01 NOTE — ED ADULT NURSE NOTE - NSFALLHARMRISKINTERV_ED_ALL_ED

## 2024-10-01 NOTE — ED PROVIDER NOTE - CARE PLAN
1 Principal Discharge DX:	Pneumonia  Secondary Diagnosis:	NSTEMI (non-ST elevation myocardial infarction)  Secondary Diagnosis:	MARTINE (acute kidney injury)  Secondary Diagnosis:	Kidney stones

## 2024-10-01 NOTE — H&P ADULT - NSHPPHYSICALEXAM_GEN_ALL_CORE
CONST: Chronically ill appearing   CARD: Normal S1 S2; Normal rate and rhythm  RESP: Equal BS B/L, No wheezes, rhonchi or rales. No distress  GI: Diffuse abd pain, abd soft, no rebound or guarding.   MS: Normal ROM in all extremities.   NEURO: A&Ox1, No focal deficits, minimally verbal

## 2024-10-01 NOTE — ED PROVIDER NOTE - ATTENDING APP SHARED VISIT CONTRIBUTION OF CARE
89 y/o female with h/o dementia, HTN, HLD, hypothyroidism, DM, TIA, in ER with family for eval of generalized weakness, recently diagnosed with PNA.  Pt lives with son and daughter-in-law, state that they were recently sick with URI's and then~ 4 days ago pt also developed cough and weakness.  They had medical service come to house 3 days ago, had port x-ray done which showed PNA.  pt was started on zirthomax, the following day cefpodoxime was added on.  Per family, pt very weak an lethargic, not eating.  O2 sat noted to be low and so pt sent to ER.  No reported fever.  no V.  + diarrhea.  no fall/trauma. Per sons at bedside pt has no advnaced directives. uses walker, needs assistance with ADL's at baseline. confused and not very verbal at baseline.   PE - elderly female, NAD, nc/at, eomi, perrl, op - mm dry, no erythema, + normal WOB, + bibasilar crackles, rrr, abd- soft, + diffuse tenderness, no guarding/rebound, nabs, no LE swelling/tenderness, pt lethargic but arousable, moves all extremities, follows some commands, no gross focal neuro deficits.  -IVF, IV abx, check labs, cxr, ct abd, cultures. 89 y/o female with h/o dementia, HTN, HLD, hypothyroidism, DM, TIA, in ER with family for eval of generalized weakness, recently diagnosed with PNA.  Pt lives with son and daughter-in-law, state that they were recently sick with URI's and then~ 4 days ago pt also developed cough and weakness.  They had medical service come to house 3 days ago, had port x-ray done which showed PNA.  pt was started on zirthomax, the following day cefpodoxime was added on.  Per family, pt very weak an lethargic, not eating.  O2 sat noted to be low and so pt sent to ER.  No reported fever.  no V.  + diarrhea.  no fall/trauma. Per sons at bedside pt has no advanced directives. uses walker, needs assistance with ADL's at baseline. confused and not very verbal at baseline.   PE - elderly female, NAD, nc/at, eomi, perrl, op - mm dry, no erythema, + normal WOB, + bibasilar crackles, rrr, abd- soft, + diffuse tenderness, no guarding/rebound, nabs, no LE swelling/tenderness, pt lethargic but arousable, moves all extremities, follows some commands, no gross focal neuro deficits.  -IVF, IV abx, check labs, cxr, ct abd, cultures.

## 2024-10-01 NOTE — H&P ADULT - ASSESSMENT
89 yo F with hx of HTN, HLD, DM II, HFrEF (LVEF 20-25% 2020), Hypothyroidism, CVA, Dementia and Recent PNA (dx 4 days ago) presents to ED for generalized weakness. Positive sick contact.     #Acute hypoxic respiratory failure 2/2 PNA    - No sepsis PoA  - CXR shows Retrocardiac opacity.  - CT abdomen shows Small left pleural effusion with adjacent consolidation which may reflect atelectasis or infiltrate. Mild fullness left renal pelvis. Mild left ureter with distal left ureteral stone. Stone within the urinary bladder on the left.   - s/p cefepime given in ED.   - c/w Doxy and Ceftriaxone for now.   - f/u BCx, procalcitonin, MRSA nares, atypical PNA panel, RVP  - supplemental O2 prn  - f/u UA    #Elevated troponin likely demand ischemia   - 1st troponin 437  --> 350  - no ischemic changes on ECG  - s/p ASA loading in ED  - c/w ASA, statin  - check TTE  - seen by Dr. Silver previously; not following with cardiology currently  -  Cardiology  - monitor on Telemetry     #MARTINE   - serum Cr. 1.8 currently. Baseline Cr 1.0 in May 2024.   - likely prerenal in context of decreased PO intake   - s/p 1L IVF given in ED.   - CT abdomen neg for hydronephrosis. Moderate distention the urinary bladder.  - Bladder scan q8H  - monitor BMP closely  - Avoid overload    #HFrEF   - TTE in 2020: LVEF 20-25%.   - Clinically patient does not look fluid overloaded.   - monitor for SO2 and signs of fluid overloaded.   - check TTE   - Avoid overload    #Constipation with fecal retention  - CT abdomen shows Significant fecal retention.  - bowel regimen.     HTN - hold losartan due to MARTINE. Hold lopressor as BP borderline.   DM II - monitor FS AC HS. Insulin sliding scale.   Hypothyroidism - c/w synthroid. check TSH.     DVT ppx: Heparin SC  GI ppx: not indicated.   Diet: Diabetic; S&S  Activity: as tolerated. PT eval.  Dispo: Tele; From Home.

## 2024-10-01 NOTE — H&P ADULT - HISTORY OF PRESENT ILLNESS
89 yo F with hx of HTN, HLD, DM II, HFrEF (LVEF 20-25% 2020), Hypothyroidism, CVA, Dementia and Recent PNA (dx on CXR 4 days ago) presents to ED for generalized weakness and decreased PO intake. 4 days ago, patient started having cough and decreased PO intake. CXR was done and patient was diagnosed with PNA and was started on 5 days course of Azithromycin and Cefpodoxime with Prednisone 20mg OD. Patient had positive sick contact whereby both son and daughter developed URTI symptoms few days earleir. Patient is generally not very verbal at baseline and doesn't raise complains. She is mobile with a walker and 1 person assistance. Over past few days has been very week to ambulate. Family denies any reported chest pain, shortness of breath, fever, chills, abdominal pain, nausea, vomiting. They report loose BM over past 1 day.     On presentation vitals:   · BP Systolic	 176 mm Hg  · BP Diastolic	84 mm Hg  · Heart Rate	76 /min  · Respiration Rate (breaths/min)	 21 /min  · Temp (F)	98.7 Degrees F  · Temp (C)	37.1 Degrees C  · Temp site	oral  · SpO2 (%)	98 %  · O2 Delivery/Oxygen Delivery Method	nasal cannula  · Oxygen Therapy Flow (L/min)	2 L/min     Labs are significant for: Leucocytosis 12K, Cr 1.8 (baseline 1), Trop 437>350, ProBNP 24K.   ECG with no ischemia changes    Imaging: CT head non con: No acute intracranial pathology. No evidence of midline shift, mass effect or intracranial hemorrhage.  CT A/P:  Mild fullness left renal pelvis. Mild left ureter with distal left ureteral stone. Stone within the urinary bladder on the left.  Significant fecal retention. Moderate distention the urinary bladder.  Small left pleural effusion with adjacent consolidation which may reflect atelectasis or infiltrate.    Patient admitted to medicine for management of suspected PNA and MARTINE.

## 2024-10-01 NOTE — ED ADULT TRIAGE NOTE - CHIEF COMPLAINT QUOTE
pt from home, family c/o low pOx 86% on room air. family also states patient is more lethargic than usual. dx with PNA 3 days ago

## 2024-10-01 NOTE — ED PROVIDER NOTE - PHYSICAL EXAMINATION
CONST: Chronically ill appearing   EYES: PERRL, EOMI, Sclera and conjunctiva clear.   ENT: Oropharynx normal appearing, no erythema or exudates. Uvula midline.  CARD: Normal S1 S2; Normal rate and rhythm  RESP: Equal BS B/L, No wheezes, rhonchi or rales. No distress  GI: Diffuse abd pain, abd soft, no rebound or guarding.   MS: Normal ROM in all extremities. No midline spinal tenderness.  SKIN: Warm, dry, no acute rashes.   NEURO: A&Ox3, No focal deficits. Strength 5/5 with no sensory deficits. Steady gait

## 2024-10-01 NOTE — ED PROVIDER NOTE - OBJECTIVE STATEMENT
89-year-old female past medical history of hypertension, hyperlipidemia, hypothyroidism, diabetes, dementia presents to the ED for evaluation of weakness.  Family at bedside providing history.  Patient diagnosed with pneumonia 4 days ago started on antibiotics, however having worsening oxygen saturation levels at home 85% on room air.  Has been compliant with azithromycin and cefpodoxime.  Family admits to cough, decreased p.o. intake and associated intermittent diarrhea.  Denies any fevers, chills, chest pain, nausea, vomiting, hematochezia, dysuria, hematuria

## 2024-10-01 NOTE — H&P ADULT - ATTENDING COMMENTS
Patient seen and examined at bedside independently of the residents. I read the resident's note and agree with the plan with the additions and corrections as noted below. My note supersedes the resident's note.     REVIEW OF SYSTEMS:  Negative except in HPI.     PMH: HTN, HLD, DM II, HFrEF (LVEF 20-25% 2020), Hypothyroidism, CVA, Dementia and Recent PNA    FHx: Reviewed. No fhx of asthma/copd, No fhx of liver and pulmonary disease. No fhx of hematological disorder.     Physical Exam:  GEN: No acute distress. Awake, Alert but non verbal.   Head: Atraumatic, Normocephalic.   Eye: PEERLA. No sclera icterus.   ENT: Normal oropharynx, no thyromegaly, no mass, no lymphadenopathy.   LUNGS:+ expiratory rhonchi b/l lung fields.   HEART: Normal. S1/S2 present. RRR. No murmur/gallops.   ABD: Soft, non-tender, non-distended. Bowel sounds present.   EXT: No pitting edema. No erythema. No tenderness.  Integumentary: No rash, No sore, No petechia.   NEURO: Moving all extremities.     Vital Signs Last 24 Hrs  T(C): 36.7 (01 Oct 2024 16:21), Max: 37.1 (01 Oct 2024 12:41)  T(F): 98 (01 Oct 2024 16:21), Max: 98.7 (01 Oct 2024 12:41)  HR: 70 (01 Oct 2024 19:38) (48 - 76)  BP: 110/66 (01 Oct 2024 19:38) (84/53 - 176/84)  BP(mean): 63 (01 Oct 2024 16:15) (63 - 63)  RR: 18 (01 Oct 2024 19:38) (18 - 22)  SpO2: 98% (01 Oct 2024 19:38) (97% - 98%)    Parameters below as of 01 Oct 2024 19:38  Patient On (Oxygen Delivery Method): nasal cannula  O2 Flow (L/min): 4L      Please see the above notes for Labs and radiology.     Assessment and Plan:     89 yo F with hx of HTN, HLD, DM II, HFrEF (LVEF 20-25% 2020), Hypothyroidism, CVA, Dementia and Recent PNA (dx 4 days ago) presents to ED for generalized weakness. Positive sick contact.     Acute hypoxic respiratory failure 2/2 PNA    - CXR shows Retrocardiac opacity. No pneumothorax.  - CT abdomen shows Small left pleural effusion with adjacent consolidation which may reflect atelectasis or infiltrate. Mild fullness left renal pelvis. Mild left ureter with distal left ureteral stone. Stone within the urinary bladder on the left.   - s/p cefepime given in ED.   - c/w Doxy and Ceftriaxone for now.   - f/u BCx, procalcitonin, MRSA nares, atypical PNA panel, RVP  - supplemental O2 prn    Elevated troponin  - 1st troponin 437  --> trend troponin  - check TTE  - monitor on Telemetry   - f/u Cardiology Dr. Silver.    MARTINE   - serum Cr. 1.8 currently. Baseline Cr 1.0 in May 2024.   - s/p 1L IVF given in ED.   - CT abdomen neg for hydronephrosis. Check urine studies.   - avoid nephrotoxic drugs.   - monitor BMP closely.     Constipation with fecal retention  - CT abdomen shows Significant fecal retention.  - bowel regimen.     DVT ppx: Heparin SC  GI ppx: not indicated.   Diet: speech and swallow eval.    Activity: as tolerated.     Date seen by the attending: 10/01/2024  Total time spent: 75 minutes. Patient seen and examined at bedside independently of the residents. I read the resident's note and agree with the plan with the additions and corrections as noted below. My note supersedes the resident's note.     REVIEW OF SYSTEMS:  Negative except in HPI.     PMH: HTN, HLD, DM II, HFrEF (LVEF 20-25% 2020), Hypothyroidism, CVA, Dementia and Recent PNA    FHx: Reviewed. No fhx of asthma/copd, No fhx of liver and pulmonary disease. No fhx of hematological disorder.     Physical Exam:  GEN: No acute distress. Awake, Alert but non verbal.   Head: Atraumatic, Normocephalic.   Eye: PEERLA. No sclera icterus.   ENT: Normal oropharynx, no thyromegaly, no mass, no lymphadenopathy.   LUNGS:+ expiratory rhonchi b/l lung fields.   HEART: Normal. S1/S2 present. RRR. No murmur/gallops.   ABD: Soft, non-tender, non-distended. Bowel sounds present.   EXT: No pitting edema. No erythema. No tenderness.  Integumentary: No rash, No sore, No petechia.   NEURO: Moving all extremities.     Vital Signs Last 24 Hrs  T(C): 36.7 (01 Oct 2024 16:21), Max: 37.1 (01 Oct 2024 12:41)  T(F): 98 (01 Oct 2024 16:21), Max: 98.7 (01 Oct 2024 12:41)  HR: 70 (01 Oct 2024 19:38) (48 - 76)  BP: 110/66 (01 Oct 2024 19:38) (84/53 - 176/84)  BP(mean): 63 (01 Oct 2024 16:15) (63 - 63)  RR: 18 (01 Oct 2024 19:38) (18 - 22)  SpO2: 98% (01 Oct 2024 19:38) (97% - 98%)    Parameters below as of 01 Oct 2024 19:38  Patient On (Oxygen Delivery Method): nasal cannula  O2 Flow (L/min): 4L      Please see the above notes for Labs and radiology.     Assessment and Plan:     89 yo F with hx of HTN, HLD, DM II, HFrEF (LVEF 20-25% 2020), Hypothyroidism, CVA, Dementia and Recent PNA (dx 4 days ago) presents to ED for generalized weakness. Positive sick contact.     Acute hypoxic respiratory failure 2/2 PNA    - CXR shows Retrocardiac opacity. No pneumothorax.  - CT abdomen shows Small left pleural effusion with adjacent consolidation which may reflect atelectasis or infiltrate. Mild fullness left renal pelvis. Mild left ureter with distal left ureteral stone. Stone within the urinary bladder on the left.   - s/p cefepime given in ED.   - c/w Doxy and Ceftriaxone for now.   - f/u BCx, procalcitonin, MRSA nares, atypical PNA panel, RVP  - supplemental O2 prn    Elevated troponin  - 1st troponin 437  --> trend troponin  - check TTE  - f/u Cardiology Dr. Silver.  - monitor on Telemetry     MARTINE   - serum Cr. 1.8 currently. Baseline Cr 1.0 in May 2024.   - s/p 1L IVF given in ED.   - CT abdomen neg for hydronephrosis. Check urine studies.   - avoid nephrotoxic drugs.   - monitor BMP closely.     Constipation with fecal retention  - CT abdomen shows Significant fecal retention.  - bowel regimen.     HTN - hold losartan due to MARTINE. Hold lopressor as BP borderline.   DM II - monitor FS AC HS. Insulin sliding scale.     DVT ppx: Heparin SC  GI ppx: not indicated.   Diet: speech and swallow eval.    Activity: as tolerated.     Date seen by the attending: 10/01/2024  Total time spent: 75 minutes. Patient seen and examined at bedside independently of the residents. I read the resident's note and agree with the plan with the additions and corrections as noted below. My note supersedes the resident's note.     REVIEW OF SYSTEMS:  Negative except in HPI.     PMH: HTN, HLD, DM II, HFrEF (LVEF 20-25% 2020), Hypothyroidism, CVA, Dementia and Recent PNA    FHx: Reviewed. No fhx of asthma/copd, No fhx of liver and pulmonary disease. No fhx of hematological disorder.     Physical Exam:  GEN: No acute distress. Awake, Alert but non verbal.   Head: Atraumatic, Normocephalic.   Eye: PEERLA. No sclera icterus.   ENT: Normal oropharynx, no thyromegaly, no mass, no lymphadenopathy.   LUNGS:+ expiratory rhonchi b/l lung fields.   HEART: Normal. S1/S2 present. RRR. No murmur/gallops.   ABD: Soft, non-tender, non-distended. Bowel sounds present.   EXT: No pitting edema. No erythema. No tenderness.  Integumentary: No rash, No sore, No petechia.   NEURO: Moving all extremities.     Vital Signs Last 24 Hrs  T(C): 36.7 (01 Oct 2024 16:21), Max: 37.1 (01 Oct 2024 12:41)  T(F): 98 (01 Oct 2024 16:21), Max: 98.7 (01 Oct 2024 12:41)  HR: 70 (01 Oct 2024 19:38) (48 - 76)  BP: 110/66 (01 Oct 2024 19:38) (84/53 - 176/84)  BP(mean): 63 (01 Oct 2024 16:15) (63 - 63)  RR: 18 (01 Oct 2024 19:38) (18 - 22)  SpO2: 98% (01 Oct 2024 19:38) (97% - 98%)    Parameters below as of 01 Oct 2024 19:38  Patient On (Oxygen Delivery Method): nasal cannula  O2 Flow (L/min): 4L      Please see the above notes for Labs and radiology.     Assessment and Plan:     89 yo F with hx of HTN, HLD, DM II, HFrEF (LVEF 20-25% 2020), Hypothyroidism, CVA, Dementia and Recent PNA (dx 4 days ago) presents to ED for generalized weakness. Positive sick contact.     Acute hypoxic respiratory failure 2/2 PNA    - CXR shows Retrocardiac opacity. No pneumothorax.  - CT abdomen shows Small left pleural effusion with adjacent consolidation which may reflect atelectasis or infiltrate. Mild fullness left renal pelvis. Mild left ureter with distal left ureteral stone. Stone within the urinary bladder on the left.   - s/p cefepime given in ED.   - c/w Doxy and Ceftriaxone for now.   - f/u BCx, procalcitonin, MRSA nares, atypical PNA panel, RVP  - supplemental O2 prn    Elevated troponin  - 1st troponin 437  --> trend troponin  - c/w ASA, statin  - check TTE  - f/u Cardiology Dr. Silver.  - monitor on Telemetry     MARTINE   - serum Cr. 1.8 currently. Baseline Cr 1.0 in May 2024.   - s/p 1L IVF given in ED.   - CT abdomen neg for hydronephrosis. Check urine studies.   - avoid nephrotoxic drugs.   - monitor BMP closely.     Constipation with fecal retention  - CT abdomen shows Significant fecal retention.  - bowel regimen.     HTN - hold losartan due to MARTINE. Hold lopressor as BP borderline.   DM II - monitor FS AC HS. Insulin sliding scale.     DVT ppx: Heparin SC  GI ppx: not indicated.   Diet: speech and swallow eval.    Activity: as tolerated.     Date seen by the attending: 10/01/2024  Total time spent: 75 minutes. Patient seen and examined at bedside independently of the residents. I read the resident's note and agree with the plan with the additions and corrections as noted below. My note supersedes the resident's note.     REVIEW OF SYSTEMS:  Negative except in HPI.     PMH: HTN, HLD, DM II, HFrEF (LVEF 20-25% 2020), Hypothyroidism, CVA, Dementia and Recent PNA    FHx: Reviewed. No fhx of asthma/copd, No fhx of liver and pulmonary disease. No fhx of hematological disorder.     Physical Exam:  GEN: No acute distress. Awake, Alert but non verbal.   Head: Atraumatic, Normocephalic.   Eye: PEERLA. No sclera icterus.   ENT: Normal oropharynx, no thyromegaly, no mass, no lymphadenopathy.   LUNGS:+ expiratory rhonchi b/l lung fields.   HEART: Normal. S1/S2 present. RRR. No murmur/gallops.   ABD: Soft, non-tender, non-distended. Bowel sounds present.   EXT: No pitting edema. No erythema. No tenderness.  Integumentary: No rash, No sore, No petechia.   NEURO: Moving all extremities.     Vital Signs Last 24 Hrs  T(C): 36.7 (01 Oct 2024 16:21), Max: 37.1 (01 Oct 2024 12:41)  T(F): 98 (01 Oct 2024 16:21), Max: 98.7 (01 Oct 2024 12:41)  HR: 70 (01 Oct 2024 19:38) (48 - 76)  BP: 110/66 (01 Oct 2024 19:38) (84/53 - 176/84)  BP(mean): 63 (01 Oct 2024 16:15) (63 - 63)  RR: 18 (01 Oct 2024 19:38) (18 - 22)  SpO2: 98% (01 Oct 2024 19:38) (97% - 98%)    Parameters below as of 01 Oct 2024 19:38  Patient On (Oxygen Delivery Method): nasal cannula  O2 Flow (L/min): 4L      Please see the above notes for Labs and radiology.     Assessment and Plan:     89 yo F with hx of HTN, HLD, DM II, HFrEF (LVEF 20-25% 2020), Hypothyroidism, CVA, Dementia and Recent PNA (dx 4 days ago) presents to ED for generalized weakness. Positive sick contact.     Acute hypoxic respiratory failure 2/2 PNA    - CXR shows Retrocardiac opacity. No pneumothorax.  - CT abdomen shows Small left pleural effusion with adjacent consolidation which may reflect atelectasis or infiltrate. Mild fullness left renal pelvis. Mild left ureter with distal left ureteral stone. Stone within the urinary bladder on the left.   - s/p cefepime given in ED.   - c/w Doxy and Ceftriaxone for now.   - f/u BCx, procalcitonin, MRSA nares, atypical PNA panel, RVP  - supplemental O2 prn    Elevated troponin  - 1st troponin 437  --> trend troponin  - c/w ASA, statin  - check TTE  - f/u Cardiology Dr. Silver.  - monitor on Telemetry     MARTINE   - serum Cr. 1.8 currently. Baseline Cr 1.0 in May 2024.   - s/p 1L IVF given in ED.   - CT abdomen neg for hydronephrosis. Check urine studies.   - avoid nephrotoxic drugs.   - monitor BMP closely.     HFrEF   - TTE in 2020: LVEF 20-25%.   - Clinically patient does not look fluid overloaded.   - monitor for SO2 and signs of fluid overloaded.   - check TTE   - caution with IVF.     Constipation with fecal retention  - CT abdomen shows Significant fecal retention.  - bowel regimen.     HTN - hold losartan due to MARTINE. Hold lopressor as BP borderline.   DM II - monitor FS AC HS. Insulin sliding scale.   Hypothyroidism - c/w synthroid. check TSH.     DVT ppx: Heparin SC  GI ppx: not indicated.   Diet: speech and swallow eval.    Activity: as tolerated.     Date seen by the attending: 10/01/2024  Total time spent: 75 minutes.

## 2024-10-01 NOTE — ED PROVIDER NOTE - PROGRESS NOTE DETAILS
cardiology consult placed JS: Discussed with family at bedside patient currently with multiple medical comorbidities, pneumonia, MARTINE, NSTEMI.  Prognosis very guarded.  MOLST form filled out.  Patient to be DNR or DNI with trial of noninvasive ventilation JS: cardiology consult placed

## 2024-10-02 ENCOUNTER — RESULT REVIEW (OUTPATIENT)
Age: 89
End: 2024-10-02

## 2024-10-02 LAB
ANION GAP SERPL CALC-SCNC: 13 MMOL/L — SIGNIFICANT CHANGE UP (ref 7–14)
APPEARANCE UR: CLEAR — SIGNIFICANT CHANGE UP
BACTERIA # UR AUTO: ABNORMAL /HPF
BASOPHILS # BLD AUTO: 0.01 K/UL — SIGNIFICANT CHANGE UP (ref 0–0.2)
BASOPHILS NFR BLD AUTO: 0.1 % — SIGNIFICANT CHANGE UP (ref 0–1)
BILIRUB UR-MCNC: NEGATIVE — SIGNIFICANT CHANGE UP
BUN SERPL-MCNC: 62 MG/DL — CRITICAL HIGH (ref 10–20)
CALCIUM SERPL-MCNC: 8.6 MG/DL — SIGNIFICANT CHANGE UP (ref 8.4–10.5)
CAST: 1 /LPF — SIGNIFICANT CHANGE UP (ref 0–4)
CHLORIDE SERPL-SCNC: 105 MMOL/L — SIGNIFICANT CHANGE UP (ref 98–110)
CO2 SERPL-SCNC: 22 MMOL/L — SIGNIFICANT CHANGE UP (ref 17–32)
COLOR SPEC: SIGNIFICANT CHANGE UP
CREAT SERPL-MCNC: 1.1 MG/DL — SIGNIFICANT CHANGE UP (ref 0.7–1.5)
D DIMER BLD IA.RAPID-MCNC: 1995 NG/ML DDU — HIGH
DIFF PNL FLD: ABNORMAL
EGFR: 44 ML/MIN/1.73M2 — LOW
EOSINOPHIL # BLD AUTO: 0.03 K/UL — SIGNIFICANT CHANGE UP (ref 0–0.7)
EOSINOPHIL NFR BLD AUTO: 0.3 % — SIGNIFICANT CHANGE UP (ref 0–8)
FLUAV AG NPH QL: SIGNIFICANT CHANGE UP
FLUBV AG NPH QL: SIGNIFICANT CHANGE UP
GLUCOSE BLDC GLUCOMTR-MCNC: 126 MG/DL — HIGH (ref 70–99)
GLUCOSE BLDC GLUCOMTR-MCNC: 147 MG/DL — HIGH (ref 70–99)
GLUCOSE BLDC GLUCOMTR-MCNC: 157 MG/DL — HIGH (ref 70–99)
GLUCOSE SERPL-MCNC: 175 MG/DL — HIGH (ref 70–99)
GLUCOSE UR QL: NEGATIVE MG/DL — SIGNIFICANT CHANGE UP
HCT VFR BLD CALC: 38.5 % — SIGNIFICANT CHANGE UP (ref 37–47)
HGB BLD-MCNC: 12.6 G/DL — SIGNIFICANT CHANGE UP (ref 12–16)
IMM GRANULOCYTES NFR BLD AUTO: 1 % — HIGH (ref 0.1–0.3)
INR BLD: 1.07 RATIO — SIGNIFICANT CHANGE UP (ref 0.65–1.3)
KETONES UR-MCNC: NEGATIVE MG/DL — SIGNIFICANT CHANGE UP
LEGIONELLA AG UR QL: NEGATIVE — SIGNIFICANT CHANGE UP
LEUKOCYTE ESTERASE UR-ACNC: ABNORMAL
LYMPHOCYTES # BLD AUTO: 2.44 K/UL — SIGNIFICANT CHANGE UP (ref 1.2–3.4)
LYMPHOCYTES # BLD AUTO: 27 % — SIGNIFICANT CHANGE UP (ref 20.5–51.1)
MCHC RBC-ENTMCNC: 31.3 PG — HIGH (ref 27–31)
MCHC RBC-ENTMCNC: 32.7 G/DL — SIGNIFICANT CHANGE UP (ref 32–37)
MCV RBC AUTO: 95.8 FL — SIGNIFICANT CHANGE UP (ref 81–99)
MONOCYTES # BLD AUTO: 0.83 K/UL — HIGH (ref 0.1–0.6)
MONOCYTES NFR BLD AUTO: 9.2 % — SIGNIFICANT CHANGE UP (ref 1.7–9.3)
MRSA PCR RESULT.: NEGATIVE — SIGNIFICANT CHANGE UP
NEUTROPHILS # BLD AUTO: 5.65 K/UL — SIGNIFICANT CHANGE UP (ref 1.4–6.5)
NEUTROPHILS NFR BLD AUTO: 62.4 % — SIGNIFICANT CHANGE UP (ref 42.2–75.2)
NITRITE UR-MCNC: NEGATIVE — SIGNIFICANT CHANGE UP
NRBC # BLD: 0 /100 WBCS — SIGNIFICANT CHANGE UP (ref 0–0)
NT-PROBNP SERPL-SCNC: HIGH PG/ML (ref 0–300)
PH UR: 5.5 — SIGNIFICANT CHANGE UP (ref 5–8)
PLATELET # BLD AUTO: 115 K/UL — LOW (ref 130–400)
PMV BLD: 12.3 FL — HIGH (ref 7.4–10.4)
POTASSIUM SERPL-MCNC: 4 MMOL/L — SIGNIFICANT CHANGE UP (ref 3.5–5)
POTASSIUM SERPL-SCNC: 4 MMOL/L — SIGNIFICANT CHANGE UP (ref 3.5–5)
PROT UR-MCNC: 30 MG/DL
PROTHROM AB SERPL-ACNC: 12.2 SEC — SIGNIFICANT CHANGE UP (ref 9.95–12.87)
RAPID RVP RESULT: DETECTED
RBC # BLD: 4.02 M/UL — LOW (ref 4.2–5.4)
RBC # FLD: 13.8 % — SIGNIFICANT CHANGE UP (ref 11.5–14.5)
RBC CASTS # UR COMP ASSIST: 1 /HPF — SIGNIFICANT CHANGE UP (ref 0–4)
RSV RNA NPH QL NAA+NON-PROBE: SIGNIFICANT CHANGE UP
SARS-COV-2 RNA SPEC QL NAA+PROBE: DETECTED
SARS-COV-2 RNA SPEC QL NAA+PROBE: DETECTED
SODIUM SERPL-SCNC: 140 MMOL/L — SIGNIFICANT CHANGE UP (ref 135–146)
SP GR SPEC: 1.02 — SIGNIFICANT CHANGE UP (ref 1–1.03)
SQUAMOUS # UR AUTO: 2 /HPF — SIGNIFICANT CHANGE UP (ref 0–5)
TROPONIN T, HIGH SENSITIVITY RESULT: 335 NG/L — CRITICAL HIGH (ref 6–13)
TROPONIN T, HIGH SENSITIVITY RESULT: 350 NG/L — CRITICAL HIGH (ref 6–13)
TROPONIN T, HIGH SENSITIVITY RESULT: 364 NG/L — CRITICAL HIGH (ref 6–13)
UROBILINOGEN FLD QL: 0.2 MG/DL — SIGNIFICANT CHANGE UP (ref 0.2–1)
WBC # BLD: 9.05 K/UL — SIGNIFICANT CHANGE UP (ref 4.8–10.8)
WBC # FLD AUTO: 9.05 K/UL — SIGNIFICANT CHANGE UP (ref 4.8–10.8)
WBC UR QL: 30 /HPF — HIGH (ref 0–5)

## 2024-10-02 PROCEDURE — 71275 CT ANGIOGRAPHY CHEST: CPT | Mod: 26

## 2024-10-02 PROCEDURE — 93306 TTE W/DOPPLER COMPLETE: CPT | Mod: 26

## 2024-10-02 PROCEDURE — 71045 X-RAY EXAM CHEST 1 VIEW: CPT | Mod: 26

## 2024-10-02 PROCEDURE — 93970 EXTREMITY STUDY: CPT | Mod: 26

## 2024-10-02 PROCEDURE — 99233 SBSQ HOSP IP/OBS HIGH 50: CPT

## 2024-10-02 PROCEDURE — 99291 CRITICAL CARE FIRST HOUR: CPT

## 2024-10-02 RX ORDER — HEPARIN SODIUM 10000 [USP'U]/ML
INJECTION INTRAVENOUS; SUBCUTANEOUS
Qty: 25000 | Refills: 0 | Status: DISCONTINUED | OUTPATIENT
Start: 2024-10-02 | End: 2024-10-02

## 2024-10-02 RX ORDER — INSULIN LISPRO 100/ML
VIAL (ML) SUBCUTANEOUS
Refills: 0 | Status: DISCONTINUED | OUTPATIENT
Start: 2024-10-01 | End: 2024-10-25

## 2024-10-02 RX ORDER — REMDESIVIR 100 MG/1
100 INJECTION, POWDER, LYOPHILIZED, FOR SOLUTION INTRAVENOUS ONCE
Refills: 0 | Status: COMPLETED | OUTPATIENT
Start: 2024-10-03 | End: 2024-10-03

## 2024-10-02 RX ORDER — SODIUM CHLORIDE 9 MG/ML
100 INJECTION, SOLUTION INTRAMUSCULAR; INTRAVENOUS; SUBCUTANEOUS
Refills: 0 | Status: COMPLETED | OUTPATIENT
Start: 2024-10-02 | End: 2024-10-02

## 2024-10-02 RX ORDER — ASPIRIN/MAG CARB/ALUMINUM AMIN 325 MG
81 TABLET ORAL DAILY
Refills: 0 | Status: DISCONTINUED | OUTPATIENT
Start: 2024-10-01 | End: 2024-10-25

## 2024-10-02 RX ORDER — REMDESIVIR 100 MG/1
100 INJECTION, POWDER, LYOPHILIZED, FOR SOLUTION INTRAVENOUS ONCE
Refills: 0 | Status: COMPLETED | OUTPATIENT
Start: 2024-10-04 | End: 2024-10-04

## 2024-10-02 RX ORDER — SENNA 187 MG
2 TABLET ORAL AT BEDTIME
Refills: 0 | Status: DISCONTINUED | OUTPATIENT
Start: 2024-10-01 | End: 2024-10-25

## 2024-10-02 RX ORDER — HEPARIN SODIUM 10000 [USP'U]/ML
4000 INJECTION INTRAVENOUS; SUBCUTANEOUS EVERY 6 HOURS
Refills: 0 | Status: DISCONTINUED | OUTPATIENT
Start: 2024-10-02 | End: 2024-10-02

## 2024-10-02 RX ORDER — HEPARIN SODIUM 10000 [USP'U]/ML
5000 INJECTION INTRAVENOUS; SUBCUTANEOUS EVERY 12 HOURS
Refills: 0 | Status: DISCONTINUED | OUTPATIENT
Start: 2024-10-01 | End: 2024-10-02

## 2024-10-02 RX ORDER — GLUCAGON INJECTION, SOLUTION 1 MG/.2ML
1 INJECTION, SOLUTION SUBCUTANEOUS ONCE
Refills: 0 | Status: DISCONTINUED | OUTPATIENT
Start: 2024-10-02 | End: 2024-10-21

## 2024-10-02 RX ORDER — REMDESIVIR 100 MG/1
200 INJECTION, POWDER, LYOPHILIZED, FOR SOLUTION INTRAVENOUS ONCE
Refills: 0 | Status: COMPLETED | OUTPATIENT
Start: 2024-10-02 | End: 2024-10-02

## 2024-10-02 RX ORDER — DEXAMETHASONE 1.5 MG 1.5 MG/1
6 TABLET ORAL EVERY 24 HOURS
Refills: 0 | Status: DISCONTINUED | OUTPATIENT
Start: 2024-10-02 | End: 2024-10-09

## 2024-10-02 RX ORDER — POLYETHYLENE GLYCOL 3350 17 G/17G
17 POWDER, FOR SOLUTION ORAL
Refills: 0 | Status: DISCONTINUED | OUTPATIENT
Start: 2024-10-01 | End: 2024-10-25

## 2024-10-02 RX ORDER — HEPARIN SODIUM 10000 [USP'U]/ML
2000 INJECTION INTRAVENOUS; SUBCUTANEOUS EVERY 6 HOURS
Refills: 0 | Status: DISCONTINUED | OUTPATIENT
Start: 2024-10-02 | End: 2024-10-02

## 2024-10-02 RX ORDER — ENOXAPARIN SODIUM 40MG/0.4ML
50 SYRINGE (ML) SUBCUTANEOUS EVERY 12 HOURS
Refills: 0 | Status: DISCONTINUED | OUTPATIENT
Start: 2024-10-02 | End: 2024-10-03

## 2024-10-02 RX ORDER — LEVOTHYROXINE SODIUM 88 MCG
50 TABLET ORAL DAILY
Refills: 0 | Status: DISCONTINUED | OUTPATIENT
Start: 2024-10-01 | End: 2024-10-25

## 2024-10-02 RX ADMIN — Medication 50 MILLIGRAM(S): at 17:58

## 2024-10-02 RX ADMIN — HEPARIN SODIUM 1000 UNIT(S)/HR: 10000 INJECTION INTRAVENOUS; SUBCUTANEOUS at 12:23

## 2024-10-02 RX ADMIN — POLYETHYLENE GLYCOL 3350 17 GRAM(S): 17 POWDER, FOR SOLUTION ORAL at 18:03

## 2024-10-02 RX ADMIN — Medication 2 TABLET(S): at 21:29

## 2024-10-02 RX ADMIN — DOXYCYCLINE HYCLATE 100 MILLIGRAM(S): 100 TABLET, FILM COATED ORAL at 06:54

## 2024-10-02 RX ADMIN — DOXYCYCLINE HYCLATE 100 MILLIGRAM(S): 100 TABLET, FILM COATED ORAL at 17:58

## 2024-10-02 RX ADMIN — Medication 100 MILLIGRAM(S): at 22:29

## 2024-10-02 RX ADMIN — Medication 50 MICROGRAM(S): at 06:53

## 2024-10-02 RX ADMIN — HEPARIN SODIUM 5000 UNIT(S): 10000 INJECTION INTRAVENOUS; SUBCUTANEOUS at 06:54

## 2024-10-02 RX ADMIN — Medication 1: at 12:33

## 2024-10-02 RX ADMIN — Medication 81 MILLIGRAM(S): at 12:24

## 2024-10-02 RX ADMIN — REMDESIVIR 200 MILLIGRAM(S): 100 INJECTION, POWDER, LYOPHILIZED, FOR SOLUTION INTRAVENOUS at 11:19

## 2024-10-02 RX ADMIN — SODIUM CHLORIDE 310 MILLILITER(S): 9 INJECTION, SOLUTION INTRAMUSCULAR; INTRAVENOUS; SUBCUTANEOUS at 11:12

## 2024-10-02 RX ADMIN — Medication 100 MILLILITER(S): at 17:59

## 2024-10-02 RX ADMIN — Medication 20 MILLIGRAM(S): at 21:29

## 2024-10-02 NOTE — CONSULT NOTE ADULT - ASSESSMENT
IMPRESSION:    Covid-19 Infection  Recent PNA  HO HTN/HLD  HO DM II  CHF w/ improved EF  HO Hypothyroidism  HO CVA  HO Dementia     and  (dx on CXR 4 days ago) presents to ED for generalized weakness and decreased PO intake. 4 days ago, patient started having cough and decreased PO intake. CXR was done and patient was diagnosed with PNA and was started on 5 days course of Azithromycin and Cefpodoxime with Prednisone 20mg OD. Patient had positive sick contact whereby both son and daughter developed URTI symptoms few days earleir. Patient is generally not very verbal at baseline and doesn't raise complains. She is mobile with a walker and 1 person assistance. Over past few days has been very week to ambulate. Family denies any reported chest pain, shortness of breath, fever, chills, abdominal pain, nausea, vomiting. They report loose BM over past 1 day.       PLAN:    CNS:    HEENT: Oral care    PULMONARY:  HOB @ 45 degrees.  Aspiration precautions     CARDIOVASCULAR:    GI: GI prophylaxis.  Feeding.  Bowel regimen     RENAL:  Follow up lytes.  Correct as needed    INFECTIOUS DISEASE: Follow up cultures    HEMATOLOGICAL:  DVT prophylaxis.    ENDOCRINE:  Follow up FS.  Insulin protocol if needed.    MUSCULOSKELETAL:    Dispo: IMPRESSION:    Covid-19 Infection  Concern for PE  Suspected PNA  MARTINE  Elevated Troponin  Constipation  HO HTN/HLD  HO DM II  CHF w/ improved EF  HO Hypothyroidism  HO CVA  HO Dementia    PLAN:    CNS: MS poor baseline    HEENT: Oral care    PULMONARY:  HOB @ 45 degrees.  Aspiration precautions.  Supplemental O2 to main SpO2 94%.  Follow up CTA.    CARDIOVASCULAR: TTE noted w/ improvement in LVEF but McConnel sign present.  MAP adequate.  Keep even balance.    GI: GI prophylaxis.  Feeding.  Bowel regimen     RENAL:  Follow up lytes.  Correct as needed.    INFECTIOUS DISEASE: Follow up cultures.  Finish 5-7 day course of Abx.  Procal.  ID eval.  Dexamethasone and Remdesivir.    HEMATOLOGICAL:  DVT prophylaxis.    ENDOCRINE:  Follow up FS.  Insulin protocol if needed.    MUSCULOSKELETAL: IAT    Dispo: Tele for now pending CTA IMPRESSION:    Covid-19 Infection  Concern for PE  Suspected PNA  MARTINE  Elevated Troponin  Constipation  HO HTN/HLD  HO DM II  CHF w/ improved EF  HO Hypothyroidism  HO CVA  HO Dementia    PLAN:    CNS: MS poor baseline    HEENT: Oral care    PULMONARY:  HOB @ 45 degrees.  Aspiration precautions.  Supplemental O2 to main SpO2 94%.  Follow up CTA.    CARDIOVASCULAR: TTE noted w/ improvement in LVEF but McConnel sign present.  MAP adequate.  Keep even balance.    GI: GI prophylaxis.  Speech eval.  Bowel regimen     RENAL:  Follow up lytes.  Correct as needed.    INFECTIOUS DISEASE: Follow up cultures.  Finish 5-7 day course of Abx.  Procal.  ID eval.  Dexamethasone and Remdesivir.    HEMATOLOGICAL:  DVT prophylaxis.    ENDOCRINE:  Follow up FS.  Insulin protocol if needed.    MUSCULOSKELETAL: IAT    Dispo: Tele for now pending CTA IMPRESSION:    Covid-19 Infection  Concern for PE  Suspected PNA  MARTINE  Elevated Troponin  Constipation  HO HTN/HLD  HO DM II  CHF w/ improved EF  HO Hypothyroidism  HO CVA  HO Dementia    PLAN:    CNS: MS poor baseline    HEENT: Oral care    PULMONARY:  HOB @ 45 degrees.  Aspiration precautions.  Supplemental O2 to main SpO2 94%.  Follow up CTA.    CARDIOVASCULAR: TTE noted w/ improvement in LVEF but McConnel sign present.  MAP adequate.  Keep even balance.    GI: GI prophylaxis.  Speech eval.  Bowel regimen     RENAL:  Follow up lytes.  Correct as needed.    INFECTIOUS DISEASE: Follow up cultures.  Finish 5-7 day course of Abx.  Procal.  ID eval.  Dexamethasone and Remdesivir.    HEMATOLOGICAL:  DVT prophylaxis.    ENDOCRINE:  Follow up FS.  Insulin protocol if needed.    MUSCULOSKELETAL: IAT    Dispo: SDU  Palliative care IMPRESSION:    Suspected Hemodynamically Stable PE  Positive COVID-19 Infection   Constipation  HO HTN/HLD  HO DM II  HO HFpEF  HO Hypothyroidism  HO CVA  HO Dementia    PLAN:    CNS: avoid depressants, MS at baseline    HEENT: Oral care    PULMONARY:  HOB @ 45 degrees.  Aspiration precautions.  Supplemental O2 to main SpO2 94%.  Follow up CTA Chest    CARDIOVASCULAR: TTE noted, trend CE,  MAP adequate.  Keep even balance.    GI: GI prophylaxis. Feeding per speech and swallow.  Bowel regimen     RENAL:  Follow up lytes.  Correct as needed.    INFECTIOUS DISEASE: Follow up cultures.  Dexamethasone and Remdesivir per ID, check pr-inflammatory markers, procalcitonin    HEMATOLOGICAL:  on heparin drip, monitor PTT, check LE duplex     ENDOCRINE:  Follow up FS.  Insulin protocol if needed.    MUSCULOSKELETAL: bedrest for now     SDU monitoring

## 2024-10-02 NOTE — DISCHARGE NOTE NURSING/CASE MANAGEMENT/SOCIAL WORK - PATIENT PORTAL LINK FT
You can access the FollowMyHealth Patient Portal offered by [facility] by registering at the following website: [email or web address]. By joining [facility]’s FollowMyHealth portal, you will also be able to view your health information using other applications (apps) compatible with our system.

## 2024-10-02 NOTE — CONSULT NOTE ADULT - SUBJECTIVE AND OBJECTIVE BOX
Patient is a 89y old  Female who presents with a chief complaint of     HPI:  89 yo F with hx of HTN, HLD, DM II, HFrEF (LVEF 20-25% ), Hypothyroidism, CVA, Dementia and Recent PNA (dx on CXR 4 days ago) presents to ED for generalized weakness and decreased PO intake. 4 days ago, patient started having cough and decreased PO intake. CXR was done and patient was diagnosed with PNA and was started on 5 days course of Azithromycin and Cefpodoxime with Prednisone 20mg OD. Patient had positive sick contact whereby both son and daughter developed URTI symptoms few days earleir. Patient is generally not very verbal at baseline and doesn't raise complains. She is mobile with a walker and 1 person assistance. Over past few days has been very week to ambulate. Family denies any reported chest pain, shortness of breath, fever, chills, abdominal pain, nausea, vomiting. They report loose BM over past 1 day.     On presentation vitals:   · BP Systolic	 176 mm Hg  · BP Diastolic	84 mm Hg  · Heart Rate	76 /min  · Respiration Rate (breaths/min)	 21 /min  · Temp (F)	98.7 Degrees F  · Temp (C)	37.1 Degrees C  · Temp site	oral  · SpO2 (%)	98 %  · O2 Delivery/Oxygen Delivery Method	nasal cannula  · Oxygen Therapy Flow (L/min)	2 L/min     Labs are significant for: Leucocytosis 12K, Cr 1.8 (baseline 1), Trop 437>350, ProBNP 24K.   ECG with no ischemia changes    Imaging: CT head non con: No acute intracranial pathology. No evidence of midline shift, mass effect or intracranial hemorrhage.  CT A/P:  Mild fullness left renal pelvis. Mild left ureter with distal left ureteral stone. Stone within the urinary bladder on the left.  Significant fecal retention. Moderate distention the urinary bladder.  Small left pleural effusion with adjacent consolidation which may reflect atelectasis or infiltrate.    Patient admitted to medicine for management of suspected PNA and MARTINE.  (01 Oct 2024 23:12)      PAST MEDICAL & SURGICAL HISTORY:  Dementia      Hypertension      Hyperlipidemia      Hypothyroidism      Diabetes          SOCIAL HX:   Smoking                         ETOH                            Other    FAMILY HISTORY:  :  No known cardiovacular family hisotry     Review Of Systems:     All ROS are negative except per HPI       Allergies    No Known Allergies    Intolerances          PHYSICAL EXAM    ICU Vital Signs Last 24 Hrs  T(C): 36.8 (02 Oct 2024 07:25), Max: 37.1 (01 Oct 2024 12:41)  T(F): 98.2 (02 Oct 2024 07:25), Max: 98.7 (01 Oct 2024 12:41)  HR: 85 (02 Oct 2024 07:25) (48 - 85)  BP: 127/83 (02 Oct 2024 07:25) (84/53 - 176/84)  BP(mean): 85 (02 Oct 2024 05:35) (63 - 85)  ABP: --  ABP(mean): --  RR: 18 (02 Oct 2024 07:25) (18 - 22)  SpO2: 99% (02 Oct 2024 07:25) (95% - 99%)    O2 Parameters below as of 02 Oct 2024 07:  Patient On (Oxygen Delivery Method): nasal cannula  O2 Flow (L/min): 2          CONSTITUTIONAL:  NAD    ENT:   Airway patent,   Mouth with normal mucosa.   No thrush    CARDIAC:   Normal rate,   Regular rhythm.    No edema    RESPIRATORY:   No wheezing  Bilateral BS   Not tachypneic,  No use of accessory muscles    GASTROINTESTINAL:  Abdomen soft,   Non-tender,   No guarding,   + BS    NEUROLOGICAL:   Alert and oriented   No motor deficits.    SKIN:   Skin normal color for race,   No evidence of rash.            10-01-24 @ 07:01  -  10-02-24 @ 07:00  --------------------------------------------------------  IN:  Total IN: 0 mL    OUT:    Intermittent Catheterization - Urethral (mL): 700 mL  Total OUT: 700 mL    Total NET: -700 mL          LABS:                          14.9   11.83 )-----------( 150      ( 01 Oct 2024 15:15 )             46.3                                               10    140  |  103  |  76[HH]  ----------------------------<  206[H]  5.5[H]   |  21  |  1.8[H]    Ca    8.7      01 Oct 2024 15:15  Mg     2.4     10-01    TPro  6.5  /  Alb  3.8  /  TBili  0.5  /  DBili  x   /  AST  81[H]  /  ALT  33  /  AlkPhos  190[H]  10-01      PT/INR - ( 01 Oct 2024 15:15 )   PT: 10.90 sec;   INR: 0.96 ratio         PTT - ( 01 Oct 2024 15:15 )  PTT:23.8 sec                                       Urinalysis Basic - ( 02 Oct 2024 00:45 )    Color: Dark Yellow / Appearance: Clear / S.022 / pH: x  Gluc: x / Ketone: Negative mg/dL  / Bili: Negative / Urobili: 0.2 mg/dL   Blood: x / Protein: 30 mg/dL / Nitrite: Negative   Leuk Esterase: Moderate / RBC: 1 /HPF / WBC 30 /HPF   Sq Epi: x / Non Sq Epi: 2 /HPF / Bacteria: Occasional /HPF                                                  LIVER FUNCTIONS - ( 01 Oct 2024 15:15 )  Alb: 3.8 g/dL / Pro: 6.5 g/dL / ALK PHOS: 190 U/L / ALT: 33 U/L / AST: 81 U/L / GGT: x                                                  Urinalysis with Rflx Culture (collected 02 Oct 2024 00:45)                                                                                           X-Rays reviewed                                                                                     ECHO    CXR interpreted by me     MEDICATIONS  (STANDING):  albuterol/ipratropium for Nebulization. 9 milliLiter(s) Nebulizer once  aspirin  chewable 81 milliGRAM(s) Oral daily  atorvastatin 20 milliGRAM(s) Oral at bedtime  bisacodyl Suppository 10 milliGRAM(s) Rectal once  cefTRIAXone   IVPB 1000 milliGRAM(s) IV Intermittent every 24 hours  cefTRIAXone   IVPB      dexAMETHasone  Injectable 6 milliGRAM(s) IV Push every 24 hours  dextrose 5%. 1000 milliLiter(s) (100 mL/Hr) IV Continuous <Continuous>  dextrose 5%. 1000 milliLiter(s) (50 mL/Hr) IV Continuous <Continuous>  dextrose 50% Injectable 12.5 Gram(s) IV Push once  dextrose 50% Injectable 25 Gram(s) IV Push once  dextrose 50% Injectable 25 Gram(s) IV Push once  doxycycline IVPB      doxycycline IVPB 100 milliGRAM(s) IV Intermittent every 12 hours  glucagon  Injectable 1 milliGRAM(s) IntraMuscular once  heparin   Injectable 5000 Unit(s) SubCutaneous every 12 hours  insulin lispro (ADMELOG) corrective regimen sliding scale   SubCutaneous three times a day before meals  levothyroxine 50 MICROGram(s) Oral daily  polyethylene glycol 3350 17 Gram(s) Oral two times a day  remdesivir  IVPB 200 milliGRAM(s) IV Intermittent once  senna 2 Tablet(s) Oral at bedtime  sodium chloride 0.9%. 100 milliLiter(s) (310 mL/Hr) IV Continuous <Continuous>    MEDICATIONS  (PRN):  dextrose Oral Gel 15 Gram(s) Oral once PRN Blood Glucose LESS THAN 70 milliGRAM(s)/deciliter         Patient is a 89y old  Female who presents with a chief complaint of     HPI:  89 yo F with hx of HTN, HLD, DM II, HFrEF (LVEF 20-25% ), Hypothyroidism, CVA, Dementia and Recent PNA (dx on CXR 4 days ago) presents to ED for generalized weakness and decreased PO intake. 4 days ago, patient started having cough and decreased PO intake. CXR was done and patient was diagnosed with PNA and was started on 5 days course of Azithromycin and Cefpodoxime with Prednisone 20mg OD. Patient had positive sick contact whereby both son and daughter developed URTI symptoms few days earleir. Patient is generally not very verbal at baseline and doesn't raise complains. She is mobile with a walker and 1 person assistance. Over past few days has been very week to ambulate. Family denies any reported chest pain, shortness of breath, fever, chills, abdominal pain, nausea, vomiting. They report loose BM over past 1 day.     On presentation vitals:   · BP Systolic	 176 mm Hg  · BP Diastolic	84 mm Hg  · Heart Rate	76 /min  · Respiration Rate (breaths/min)	 21 /min  · Temp (F)	98.7 Degrees F  · Temp (C)	37.1 Degrees C  · Temp site	oral  · SpO2 (%)	98 %  · O2 Delivery/Oxygen Delivery Method	nasal cannula  · Oxygen Therapy Flow (L/min)	2 L/min     Labs are significant for: Leucocytosis 12K, Cr 1.8 (baseline 1), Trop 437>350, ProBNP 24K.   ECG with no ischemia changes    Imaging: CT head non con: No acute intracranial pathology. No evidence of midline shift, mass effect or intracranial hemorrhage.  CT A/P:  Mild fullness left renal pelvis. Mild left ureter with distal left ureteral stone. Stone within the urinary bladder on the left.  Significant fecal retention. Moderate distention the urinary bladder.  Small left pleural effusion with adjacent consolidation which may reflect atelectasis or infiltrate.    Patient admitted to medicine for management of suspected PNA and MARTINE.  (01 Oct 2024 23:12)      PAST MEDICAL & SURGICAL HISTORY:  Dementia      Hypertension      Hyperlipidemia      Hypothyroidism      Diabetes          SOCIAL HX:   Smoking     Never                    ETOH                            Other    FAMILY HISTORY:  :  No known cardiovacular family hisotry     Review Of Systems:     All ROS are negative except per HPI       Allergies    No Known Allergies    Intolerances          PHYSICAL EXAM    ICU Vital Signs Last 24 Hrs  T(C): 36.8 (02 Oct 2024 07:25), Max: 37.1 (01 Oct 2024 12:41)  T(F): 98.2 (02 Oct 2024 07:25), Max: 98.7 (01 Oct 2024 12:41)  HR: 85 (02 Oct 2024 07:25) (48 - 85)  BP: 127/83 (02 Oct 2024 07:25) (84/53 - 176/84)  BP(mean): 85 (02 Oct 2024 05:35) (63 - 85)  ABP: --  ABP(mean): --  RR: 18 (02 Oct 2024 07:25) (18 - 22)  SpO2: 99% (02 Oct 2024 07:25) (95% - 99%)    O2 Parameters below as of 02 Oct 2024 07:  Patient On (Oxygen Delivery Method): nasal cannula  O2 Flow (L/min): 2          CONSTITUTIONAL:  Elderly    ENT:   Airway patent,   Mouth with normal mucosa.   No thrush    CARDIAC:   Normal rate,   Regular rhythm.    No edema    RESPIRATORY:   Bilateral rhonchi   Not tachypneic,  No use of accessory muscles    GASTROINTESTINAL:  Abdomen soft,   Non-tender    NEUROLOGICAL:   Awake  Not answering questions    SKIN:   Skin normal color for race,   No evidence of rash.            10-01-24 @ 07:01  -  10-02-24 @ 07:00  --------------------------------------------------------  IN:  Total IN: 0 mL    OUT:    Intermittent Catheterization - Urethral (mL): 700 mL  Total OUT: 700 mL    Total NET: -700 mL          LABS:                          14.9   11.83 )-----------( 150      ( 01 Oct 2024 15:15 )             46.3                                               10-01    140  |  103  |  76[HH]  ----------------------------<  206[H]  5.5[H]   |  21  |  1.8[H]    Ca    8.7      01 Oct 2024 15:15  Mg     2.4     10-01    TPro  6.5  /  Alb  3.8  /  TBili  0.5  /  DBili  x   /  AST  81[H]  /  ALT  33  /  AlkPhos  190[H]  10-01      PT/INR - ( 01 Oct 2024 15:15 )   PT: 10.90 sec;   INR: 0.96 ratio         PTT - ( 01 Oct 2024 15:15 )  PTT:23.8 sec                                       Urinalysis Basic - ( 02 Oct 2024 00:45 )    Color: Dark Yellow / Appearance: Clear / S.022 / pH: x  Gluc: x / Ketone: Negative mg/dL  / Bili: Negative / Urobili: 0.2 mg/dL   Blood: x / Protein: 30 mg/dL / Nitrite: Negative   Leuk Esterase: Moderate / RBC: 1 /HPF / WBC 30 /HPF   Sq Epi: x / Non Sq Epi: 2 /HPF / Bacteria: Occasional /HPF                                                  LIVER FUNCTIONS - ( 01 Oct 2024 15:15 )  Alb: 3.8 g/dL / Pro: 6.5 g/dL / ALK PHOS: 190 U/L / ALT: 33 U/L / AST: 81 U/L / GGT: x                                                  Urinalysis with Rflx Culture (collected 02 Oct 2024 00:45)                                                                                           X-Rays reviewed                                                                                     ECHO    CXR interpreted by me     MEDICATIONS  (STANDING):  albuterol/ipratropium for Nebulization. 9 milliLiter(s) Nebulizer once  aspirin  chewable 81 milliGRAM(s) Oral daily  atorvastatin 20 milliGRAM(s) Oral at bedtime  bisacodyl Suppository 10 milliGRAM(s) Rectal once  cefTRIAXone   IVPB 1000 milliGRAM(s) IV Intermittent every 24 hours  cefTRIAXone   IVPB      dexAMETHasone  Injectable 6 milliGRAM(s) IV Push every 24 hours  dextrose 5%. 1000 milliLiter(s) (100 mL/Hr) IV Continuous <Continuous>  dextrose 5%. 1000 milliLiter(s) (50 mL/Hr) IV Continuous <Continuous>  dextrose 50% Injectable 12.5 Gram(s) IV Push once  dextrose 50% Injectable 25 Gram(s) IV Push once  dextrose 50% Injectable 25 Gram(s) IV Push once  doxycycline IVPB      doxycycline IVPB 100 milliGRAM(s) IV Intermittent every 12 hours  glucagon  Injectable 1 milliGRAM(s) IntraMuscular once  heparin   Injectable 5000 Unit(s) SubCutaneous every 12 hours  insulin lispro (ADMELOG) corrective regimen sliding scale   SubCutaneous three times a day before meals  levothyroxine 50 MICROGram(s) Oral daily  polyethylene glycol 3350 17 Gram(s) Oral two times a day  remdesivir  IVPB 200 milliGRAM(s) IV Intermittent once  senna 2 Tablet(s) Oral at bedtime  sodium chloride 0.9%. 100 milliLiter(s) (310 mL/Hr) IV Continuous <Continuous>    MEDICATIONS  (PRN):  dextrose Oral Gel 15 Gram(s) Oral once PRN Blood Glucose LESS THAN 70 milliGRAM(s)/deciliter         Patient is a 89y old  Female who presents with a chief complaint of     HPI:  89 yo F with hx of HTN, HLD, DM II, HFrEF (LVEF 20-25% ), Hypothyroidism, CVA, Dementia and Recent PNA (dx on CXR 4 days ago) presents to ED for generalized weakness and decreased PO intake. 4 days ago, patient started having cough and decreased PO intake. CXR was done and patient was diagnosed with PNA and was started on 5 days course of Azithromycin and Cefpodoxime with Prednisone 20mg OD. Patient had positive sick contact whereby both son and daughter developed URTI symptoms few days earleir. Patient is generally not very verbal at baseline and doesn't raise complains. She is mobile with a walker and 1 person assistance. Over past few days has been very week to ambulate. Family denies any reported chest pain, shortness of breath, fever, chills, abdominal pain, nausea, vomiting. They report loose BM over past 1 day.     On presentation vitals:   · BP Systolic	 176 mm Hg  · BP Diastolic	84 mm Hg  · Heart Rate	76 /min  · Respiration Rate (breaths/min)	 21 /min  · Temp (F)	98.7 Degrees F  · Temp (C)	37.1 Degrees C  · Temp site	oral  · SpO2 (%)	98 %  · O2 Delivery/Oxygen Delivery Method	nasal cannula  · Oxygen Therapy Flow (L/min)	2 L/min     Labs are significant for: Leucocytosis 12K, Cr 1.8 (baseline 1), Trop 437>350, ProBNP 24K.   ECG with no ischemia changes    Imaging: CT head non con: No acute intracranial pathology. No evidence of midline shift, mass effect or intracranial hemorrhage.  CT A/P:  Mild fullness left renal pelvis. Mild left ureter with distal left ureteral stone. Stone within the urinary bladder on the left.  Significant fecal retention. Moderate distention the urinary bladder.  Small left pleural effusion with adjacent consolidation which may reflect atelectasis or infiltrate.    Patient admitted to medicine for management of suspected PNA and MARTINE.  (01 Oct 2024 23:12)      PAST MEDICAL & SURGICAL HISTORY:  Dementia      Hypertension      Hyperlipidemia      Hypothyroidism      Diabetes          SOCIAL HX:   Smoking     Never           FAMILY HISTORY:  :  No known cardiovascular family history     Review Of Systems:     All ROS are negative except per HPI       Allergies    No Known Allergies    Intolerances          PHYSICAL EXAM    ICU Vital Signs Last 24 Hrs  T(C): 36.8 (02 Oct 2024 07:25), Max: 37.1 (01 Oct 2024 12:41)  T(F): 98.2 (02 Oct 2024 07:25), Max: 98.7 (01 Oct 2024 12:41)  HR: 85 (02 Oct 2024 07:25) (48 - 85)  BP: 127/83 (02 Oct 2024 07:25) (84/53 - 176/84)  BP(mean): 85 (02 Oct 2024 05:35) (63 - 85)  RR: 18 (02 Oct 2024 07:25) (18 - 22)  SpO2: 99% (02 Oct 2024 07:25) (95% - 99%)    O2 Parameters below as of 02 Oct 2024 07:25  Patient On (Oxygen Delivery Method): nasal cannula  O2 Flow (L/min): 2          CONSTITUTIONAL:  Elderly    ENT:   Airway patent,   Mouth with normal mucosa.   No thrush    CARDIAC:   Normal rate,   Regular rhythm.    No edema    RESPIRATORY:   Bilateral rhonchi   Not tachypneic,  No use of accessory muscles    GASTROINTESTINAL:  Abdomen soft,   Non-tender    NEUROLOGICAL:   Awake      SKIN:   Skin normal color for race,   No evidence of rash.            10-01-24 @ 07:01  -  10-02-24 @ 07:00  --------------------------------------------------------  IN:  Total IN: 0 mL    OUT:    Intermittent Catheterization - Urethral (mL): 700 mL  Total OUT: 700 mL    Total NET: -700 mL          LABS:                          14.9   11.83 )-----------( 150      ( 01 Oct 2024 15:15 )             46.3                                               10-01    140  |  103  |  76[HH]  ----------------------------<  206[H]  5.5[H]   |  21  |  1.8[H]    Ca    8.7      01 Oct 2024 15:15  Mg     2.4     10-01    TPro  6.5  /  Alb  3.8  /  TBili  0.5  /  DBili  x   /  AST  81[H]  /  ALT  33  /  AlkPhos  190[H]  10-01      PT/INR - ( 01 Oct 2024 15:15 )   PT: 10.90 sec;   INR: 0.96 ratio         PTT - ( 01 Oct 2024 15:15 )  PTT:23.8 sec                                       Urinalysis Basic - ( 02 Oct 2024 00:45 )    Color: Dark Yellow / Appearance: Clear / S.022 / pH: x  Gluc: x / Ketone: Negative mg/dL  / Bili: Negative / Urobili: 0.2 mg/dL   Blood: x / Protein: 30 mg/dL / Nitrite: Negative   Leuk Esterase: Moderate / RBC: 1 /HPF / WBC 30 /HPF   Sq Epi: x / Non Sq Epi: 2 /HPF / Bacteria: Occasional /HPF                                                  LIVER FUNCTIONS - ( 01 Oct 2024 15:15 )  Alb: 3.8 g/dL / Pro: 6.5 g/dL / ALK PHOS: 190 U/L / ALT: 33 U/L / AST: 81 U/L / GGT: x                                                  Urinalysis with Rflx Culture (collected 02 Oct 2024 00:45)                                                                                     MEDICATIONS  (STANDING):  albuterol/ipratropium for Nebulization. 9 milliLiter(s) Nebulizer once  aspirin  chewable 81 milliGRAM(s) Oral daily  atorvastatin 20 milliGRAM(s) Oral at bedtime  bisacodyl Suppository 10 milliGRAM(s) Rectal once  cefTRIAXone   IVPB 1000 milliGRAM(s) IV Intermittent every 24 hours  cefTRIAXone   IVPB      dexAMETHasone  Injectable 6 milliGRAM(s) IV Push every 24 hours  dextrose 5%. 1000 milliLiter(s) (100 mL/Hr) IV Continuous <Continuous>  dextrose 5%. 1000 milliLiter(s) (50 mL/Hr) IV Continuous <Continuous>  dextrose 50% Injectable 12.5 Gram(s) IV Push once  dextrose 50% Injectable 25 Gram(s) IV Push once  dextrose 50% Injectable 25 Gram(s) IV Push once  doxycycline IVPB      doxycycline IVPB 100 milliGRAM(s) IV Intermittent every 12 hours  glucagon  Injectable 1 milliGRAM(s) IntraMuscular once  heparin   Injectable 5000 Unit(s) SubCutaneous every 12 hours  insulin lispro (ADMELOG) corrective regimen sliding scale   SubCutaneous three times a day before meals  levothyroxine 50 MICROGram(s) Oral daily  polyethylene glycol 3350 17 Gram(s) Oral two times a day  remdesivir  IVPB 200 milliGRAM(s) IV Intermittent once  senna 2 Tablet(s) Oral at bedtime  sodium chloride 0.9%. 100 milliLiter(s) (310 mL/Hr) IV Continuous <Continuous>    MEDICATIONS  (PRN):  dextrose Oral Gel 15 Gram(s) Oral once PRN Blood Glucose LESS THAN 70 milliGRAM(s)/deciliter

## 2024-10-02 NOTE — PATIENT PROFILE ADULT - FALL HARM RISK - RISK INTERVENTIONS

## 2024-10-02 NOTE — PROGRESS NOTE ADULT - ASSESSMENT
89 yo F with hx of HTN, HLD, DM II, HFrEF (LVEF 20-25% 2020), Hypothyroidism, CVA, Dementia and Recent PNA (dx 4 days ago) presents to ED for generalized weakness. Positive sick contact.     # Acute hypoxic respiratory failure 2/2 suspected PE and viral vs CAP (Covid +ve)   - CTA PE pending   - Echo demonstrated Romano's sign, biomarkers elevated, requiring oxygen supplementation with desaturation on RA at rest   - started empirically on heparin drip pending CTA PE   - CT abdomen: Small left pleural effusion with adjacent consolidation which may reflect atelectasis or infiltrate. Mild fullness left renal pelvis. Mild left ureter with distal left ureteral stone. Stone within the urinary bladder on the left.   - cont doxy and CTX for now   - ID consult pending   - RVP +ve for Covid -> on RDV and dexamethasone   - f/u BCx, procal, nasal MRSA     #Elevated troponin likely demand ischemia iso suspected PE   # HFimpEF   - 1st troponin 437  --> 350 -> 335  - no ischemic changes on ECG  - c/w ASA, statin  - TTE in 2020: LVEF 20-25%  - TTE 10/2/2024: LVEF 69%, G1DD, LV Ejection Fraction by Abreu's Method with a biplane EF of 69 %. Right ventricular systolic function is reduced with apical sparing consistent with acute pulmonary embolism (Romano's sign). The RV appears moderately dilated. Moderate TR. Moderate MR. Borderline pulm HTN   - seen by Dr. Silver previously; not following with cardiology currently  - cardiology consult pending     #MARTINE likely prerenal   - serum Cr. 1.8 currently. Baseline Cr 1.0 in May 2024.   - CT abdomen neg for hydronephrosis. Moderate distention the urinary bladder.  - Bladder scan q8H  - monitor BMP closely  - start on IVF hydration following CTA PE    #Constipation with fecal retention  - CT abdomen shows Significant fecal retention.  - bowel regimen.     HTN - hold losartan due to MARTINE. Hold lopressor as BP borderline.   DM II - monitor FS AC HS. Insulin sliding scale.   Hypothyroidism - c/w synthroid. check TSH.     DVT ppx: Heparin drip   GI ppx: not indicated.   Diet: Diabetic; S&S  Activity: as tolerated. PT eval.  Dispo: SDU; from home. 89 yo F with hx of HTN, HLD, DM II, HFrEF (LVEF 20-25% 2020), Hypothyroidism, CVA, Dementia and Recent PNA (dx 4 days ago) presents to ED for generalized weakness. Positive sick contact.     #Acute hypoxic respiratory failure 2/2 submassive PE with RV strain (trop, elevated bpnp, hemodynamically stable) and COVID pneumonia    - CTA PE as above  - Echo demonstrated Romano's sign, biomarkers elevated, requiring oxygen supplementation with desaturation on RA at rest   - Cr improved, DC heparin ggt, start theraputic lovenox   - CT abdomen: Small left pleural effusion with adjacent consolidation which may reflect atelectasis or infiltrate. Mild fullness left renal pelvis. Mild left ureter with distal left ureteral stone. Stone within the urinary bladder on the left.   - cont doxy and CTX  - ID consult pending   - RVP +ve for Covid -> on RDV and dexamethasone   - f/u BCx, procal, nasal MRSA   - CC consult--> upgrade to SDU   - IR consult appreciated, will follow up reccs, no thrombectomy   - echo reviewed     #Elevated troponin likely demand ischemia iso suspected PE and hypotension   #chronic  HFimpEF   - 1st troponin 437  --> 350 -> 335  - no ischemic changes on ECG  - c/w ASA, statin  - TTE in 2020: LVEF 20-25%  - TTE 10/2/2024: LVEF 69%, G1DD, LV Ejection Fraction by Abreu's Method with a biplane EF of 69 %. Right ventricular systolic function is reduced with apical sparing consistent with acute pulmonary embolism (Romano's sign). The RV appears moderately dilated. Moderate TR. Moderate MR. Borderline pulm HTN   - seen by Dr. Silver previously; not following with cardiology currently  - cardiology consult pending     #MARTINE likely prerenal - resolving   - serum Cr. 1.8 currently. Baseline Cr 1.0 in May 2024.   - Cr 1.1 now   - CT abdomen neg for hydronephrosis. Moderate distention the urinary bladder.  - Bladder scan q8H  - monitor BMP closely  - start on IVF hydration following CTA PE    #Constipation with fecal retention  - CT abdomen shows Significant fecal retention.  - bowel regimen.     HTN - hold losartan due to MARTINE. Hold lopressor as BP borderline.   DM II - monitor FS AC HS. Insulin sliding scale.   Hypothyroidism - c/w synthroid. check TSH.     #Progress Note Handoff  Pending (specify):  cc, sdu  Family discussion: house staff updated pt family  Disposition: dw cc, sdu   Decision to admit the pt is based on acuity as above

## 2024-10-02 NOTE — CONSULT NOTE ADULT - ATTENDING COMMENTS
IMPRESSION:    Suspected Hemodynamically Stable PE  Positive COVID-19 Infection   Constipation  HO HTN/HLD  HO DM II  HO HFpEF  HO Hypothyroidism  HO CVA  HO Dementia    Patient seen and examined, daughter present by bedside. Recommendations reviewed as above.

## 2024-10-02 NOTE — CHART NOTE - NSCHARTNOTEFT_GEN_A_CORE
Transfer from: ED3   Transfer to: SDU     HPI:   89 yo F with hx of HTN, HLD, DM II, HFrEF (LVEF 20-25% 2020), Hypothyroidism, CVA, Dementia and Recent PNA (dx on CXR 4 days ago) presents to ED for generalized weakness and decreased PO intake. 4 days ago, patient started having cough and decreased PO intake. CXR was done and patient was diagnosed with PNA and was started on 5 days course of Azithromycin and Cefpodoxime with Prednisone 20mg OD. Patient had positive sick contact whereby both son and daughter developed URTI symptoms few days earleir. Patient is generally not very verbal at baseline and doesn't raise complains. She is mobile with a walker and 1 person assistance. Over past few days has been very week to ambulate. Family denies any reported chest pain, shortness of breath, fever, chills, abdominal pain, nausea, vomiting. They report loose BM over past 1 day.     ED course:   Vitals: T: 98.7F, BP: 176/84, HR: 76, RR: 21, SpO2: 98% on 2L NC   Labs are significant for: Leucocytosis 12K, Cr 1.8 (baseline 1), Trop 437>350, ProBNP 24K  ECG with no ischemia changes  Imaging: CT head non con: No acute intracranial pathology. No evidence of midline shift, mass effect or intracranial hemorrhage.  CT A/P: Mild fullness left renal pelvis. Mild left ureter with distal left ureteral stone. Stone within the urinary bladder on the left. Significant fecal retention. Moderate distention the urinary bladder. Small left pleural effusion with adjacent consolidation which may reflect atelectasis or infiltrate.    Hospital course:   Patient admitted to medicine for possible PNA and MARTINE. Patient is very hard of hearing. On ceftriaxone and azithromycin for possible PNA. Found to be Covid +ve, started on 3-day course RDV and dexmethasone. Patient had repeat Echo, noted to have improvement in EF from 20-25% to 69%; seen to have Romano's sign on Echo, concern for PE. Biomarkers elevated: trop 437 -> 350 -> 335; BNP in the 47053i. Patient started empirically on heparin drip, CTA PE ordered. Evaluated by pulm fellow, recommended upgrade to SDU.    Follow-up:   [ ] f/u CTA PE; started empirically on heparin drip given Echo findings   [ ] f/u duplex, d-dimer, CBC, BMP   [ ] trend biomarkers (trop and BNP)   [ ] IR consult if proximal PE seen on CTA PE for thrombectomy   [ ] will need to closely monitor creatinine following CTA PE as pt presented with MARTINE; creatinine 1.8 on admission, baseline 1.0   [ ] ID consult pending for abx and Covid +ve; started on RDV and dexamethasone as patient requiring oxygen and noted to desaturate on RA   [ ] cardio consult pending for elevated trops    Assessment/Plan:   89 yo F with hx of HTN, HLD, DM II, HFrEF (LVEF 20-25% 2020), Hypothyroidism, CVA, Dementia and Recent PNA (dx 4 days ago) presents to ED for generalized weakness. Positive sick contact.     # Acute hypoxic respiratory failure 2/2 suspected PE and viral vs CAP (Covid +ve)   - CTA PE pending   - Echo demonstrated Romano's sign, biomarkers elevated, requiring oxygen supplementation with desaturation on RA at rest   - started empirically on heparin drip pending CTA PE   - CT abdomen: Small left pleural effusion with adjacent consolidation which may reflect atelectasis or infiltrate. Mild fullness left renal pelvis. Mild left ureter with distal left ureteral stone. Stone within the urinary bladder on the left.   - cont doxy and CTX for now   - ID consult pending   - RVP +ve for Covid -> on RDV and dexamethasone   - f/u BCx     #Elevated troponin likely demand ischemia iso suspected PE   # HFimpEF   - 1st troponin 437  --> 350 -> 335  - no ischemic changes on ECG  - c/w ASA, statin  - TTE in 2020: LVEF 20-25%  - TTE 10/2/2024: LVEF 69%, G1DD, LV Ejection Fraction by Abreu's Method with a biplane EF of 69 %. Right ventricular systolic function is reduced with apical sparing consistent with acute pulmonary embolism (Romano's sign). The RV appears moderately dilated. Moderate TR. Moderate MR. Borderline pulm HTN   - seen by Dr. Silver previously; not following with cardiology currently  - cardiology consult pending     #MARTINE likely prerenal   - serum Cr. 1.8 currently. Baseline Cr 1.0 in May 2024.   - CT abdomen neg for hydronephrosis. Moderate distention the urinary bladder.  - Bladder scan q8H  - monitor BMP closely  - start on IVF hydration following CTA PE    #Constipation with fecal retention  - CT abdomen shows Significant fecal retention.  - bowel regimen.     HTN - hold losartan due to MARTINE. Hold lopressor as BP borderline.   DM II - monitor FS AC HS. Insulin sliding scale.   Hypothyroidism - c/w synthroid. check TSH.     DVT ppx: Heparin drip   GI ppx: not indicated.   Diet: Diabetic; S&S  Activity: as tolerated. PT eval.  Dispo: SDU; from home Transfer from: ED3   Transfer to: SDU     HPI:   89 yo F with hx of HTN, HLD, DM II, HFrEF (LVEF 20-25% 2020), Hypothyroidism, CVA, Dementia and Recent PNA (dx on CXR 4 days ago) presents to ED for generalized weakness and decreased PO intake. 4 days ago, patient started having cough and decreased PO intake. CXR was done and patient was diagnosed with PNA and was started on 5 days course of Azithromycin and Cefpodoxime with Prednisone 20mg OD. Patient had positive sick contact whereby both son and daughter developed URTI symptoms few days earleir. Patient is generally not very verbal at baseline and doesn't raise complains. She is mobile with a walker and 1 person assistance. Over past few days has been very week to ambulate. Family denies any reported chest pain, shortness of breath, fever, chills, abdominal pain, nausea, vomiting. They report loose BM over past 1 day.     ED course:   Vitals: T: 98.7F, BP: 176/84, HR: 76, RR: 21, SpO2: 98% on 2L NC   Labs are significant for: Leucocytosis 12K, Cr 1.8 (baseline 1), Trop 437>350, ProBNP 24K  ECG with no ischemia changes  Imaging: CT head non con: No acute intracranial pathology. No evidence of midline shift, mass effect or intracranial hemorrhage.  CT A/P: Mild fullness left renal pelvis. Mild left ureter with distal left ureteral stone. Stone within the urinary bladder on the left. Significant fecal retention. Moderate distention the urinary bladder. Small left pleural effusion with adjacent consolidation which may reflect atelectasis or infiltrate.    Hospital course:   Patient admitted to medicine for possible PNA and MARTINE. Patient is very hard of hearing. On ceftriaxone and azithromycin for possible PNA. Found to be Covid +ve, started on 3-day course RDV and dexmethasone. Patient had repeat Echo, noted to have improvement in EF from 20-25% to 69%; seen to have Romano's sign on Echo, concern for PE. Biomarkers elevated: trop 437 -> 350 -> 335; BNP in the 16993d. Patient started empirically on heparin drip, CTA PE ordered. Evaluated by pulm fellow, recommended upgrade to SDU.    Follow-up:   [ ] f/u CTA PE; started empirically on heparin drip given Echo findings   [ ] f/u duplex, d-dimer, CBC, BMP   [ ] trend biomarkers (trop and BNP)   [ ] IR consult if proximal PE seen on CTA PE for possible thrombectomy   [ ] will need to closely monitor creatinine following CTA PE as pt presented with MARTINE; creatinine 1.8 on admission, baseline 1.0; will start IVF hydration    [ ] ID consult pending for abx and Covid +ve; started on RDV and dexamethasone as patient requiring oxygen and noted to desaturate on RA   [ ] cardio consult pending for elevated trops    Assessment/Plan:   89 yo F with hx of HTN, HLD, DM II, HFrEF (LVEF 20-25% 2020), Hypothyroidism, CVA, Dementia and Recent PNA (dx 4 days ago) presents to ED for generalized weakness. Positive sick contact.     # Acute hypoxic respiratory failure 2/2 suspected PE and viral vs CAP (Covid +ve)   - CTA PE pending   - Echo demonstrated Romano's sign, biomarkers elevated, requiring oxygen supplementation with desaturation on RA at rest   - started empirically on heparin drip pending CTA PE   - CT abdomen: Small left pleural effusion with adjacent consolidation which may reflect atelectasis or infiltrate. Mild fullness left renal pelvis. Mild left ureter with distal left ureteral stone. Stone within the urinary bladder on the left.   - cont doxy and CTX for now   - ID consult pending   - RVP +ve for Covid -> on RDV and dexamethasone   - f/u BCx, procal, nasal MRSA     #Elevated troponin likely demand ischemia iso suspected PE   # HFimpEF   - 1st troponin 437  --> 350 -> 335  - no ischemic changes on ECG  - c/w ASA, statin  - TTE in 2020: LVEF 20-25%  - TTE 10/2/2024: LVEF 69%, G1DD, LV Ejection Fraction by Abreu's Method with a biplane EF of 69 %. Right ventricular systolic function is reduced with apical sparing consistent with acute pulmonary embolism (Romano's sign). The RV appears moderately dilated. Moderate TR. Moderate MR. Borderline pulm HTN   - seen by Dr. Silver previously; not following with cardiology currently  - cardiology consult pending     #MARTINE likely prerenal   - serum Cr. 1.8 currently. Baseline Cr 1.0 in May 2024.   - CT abdomen neg for hydronephrosis. Moderate distention the urinary bladder.  - Bladder scan q8H  - monitor BMP closely  - start on IVF hydration following CTA PE    #Constipation with fecal retention  - CT abdomen shows Significant fecal retention.  - bowel regimen.     HTN - hold losartan due to MARTINE. Hold lopressor as BP borderline.   DM II - monitor FS AC HS. Insulin sliding scale.   Hypothyroidism - c/w synthroid. check TSH.     DVT ppx: Heparin drip   GI ppx: not indicated.   Diet: Diabetic; S&S  Activity: as tolerated. PT eval.  Dispo: SDU; from home

## 2024-10-02 NOTE — PROGRESS NOTE ADULT - SUBJECTIVE AND OBJECTIVE BOX
Patient is a 89y old  Female who presents with a chief complaint of SOB (10-02-24)      Pt seen and examined at bedside. No CP or SOB.      PAST MEDICAL & SURGICAL HISTORY:  Dementia    Hypertension    Hyperlipidemia    Hypothyroidism    Diabetes        VITAL SIGNS (Last 24 hrs):  T(C): 36.4 (10-02-24 @ 15:54), Max: 36.8 (10-02-24 @ 07:25)  HR: 80 (10-02-24 @ 15:54) (62 - 85)  BP: 112/66 (10-02-24 @ 15:54) (107/65 - 127/83)  RR: 18 (10-02-24 @ 15:54) (18 - 18)  SpO2: 97% (10-02-24 @ 15:54) (95% - 99%)  Wt(kg): --  Daily     Daily     I&O's Summary    01 Oct 2024 07:01  -  02 Oct 2024 07:00  --------------------------------------------------------  IN: 0 mL / OUT: 700 mL / NET: -700 mL        PHYSICAL EXAM:  GENERAL: NAD, elderly  HEAD:  Atraumatic, Normocephalic  EYES: EOMI, PERRLA, conjunctiva and sclera clear  NECK: Supple, No JVD  CHEST/LUNG: Clear to auscultation bilaterally; No wheeze  HEART: Regular rate and rhythm; No murmurs, rubs, or gallops  ABDOMEN: Soft, Nontender, Nondistended; Bowel sounds present  EXTREMITIES:  2+ Peripheral Pulses, No clubbing, cyanosis, or edema  PSYCH: Awake    Labs Reviewed  Spoke to patient in regards to abnormal labs.    CBC Full  -  ( 02 Oct 2024 11:58 )  WBC Count : 9.05 K/uL  Hemoglobin : 12.6 g/dL  Hematocrit : 38.5 %  Platelet Count - Automated : 115 K/uL  Mean Cell Volume : 95.8 fL  Mean Cell Hemoglobin : 31.3 pg  Mean Cell Hemoglobin Concentration : 32.7 g/dL  Auto Neutrophil # : 5.65 K/uL  Auto Lymphocyte # : 2.44 K/uL  Auto Monocyte # : 0.83 K/uL  Auto Eosinophil # : 0.03 K/uL  Auto Basophil # : 0.01 K/uL  Auto Neutrophil % : 62.4 %  Auto Lymphocyte % : 27.0 %  Auto Monocyte % : 9.2 %  Auto Eosinophil % : 0.3 %  Auto Basophil % : 0.1 %    BMP:    10-02 @ 11:58    Blood Urea Nitrogen - 62  Calcium - 8.6  Carbond Dioxide - 22  Chloride - 105  Creatinine - 1.1  Glucose - 175  Potassium - 4.0  Sodium - 140      Hemoglobin A1c -   PT/INR - ( 02 Oct 2024 11:58 )   PT: 12.20 sec;   INR: 1.07 ratio         PTT - ( 01 Oct 2024 15:15 )  PTT:23.8 sec  Urine Culture:        COVID Labs  CRP:      D-Dimer:  1995 ng/mL DDU (10-02-24 @ 11:58)            Imaging reviewed independently and reviewed read  < from: CT Angio Chest PE Protocol w/ IV Cont (10.02.24 @ 14:14) >  IMPRESSION:  Acute pulmonary embolus in the right upper segmental branch (3-38) with   associated right heart strain. 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  Elevated RV LV ratio 1.8    Bibasilar atelectasis. Left lower lobe lobar pneumonia.    Nodules as described above.    Discussed with Dr. Nohemi Sandy at 2:53 PM.      Addendum: After discussion with attending and learning of the chronic PE   versus external compression in the right main pulmonary artery in   addition to the acute PE the new findings were relayed to Dr. Nohemi Sandy at 3:50 by Khoa Hinton.    < end of copied text >    < from: TTE Echo Complete w/o Contrast w/ Doppler (10.02.24 @ 07:47) >  Summary:   1. Normal global left ventricular systolic function.   2. LV Ejection Fraction by Abreu's Method with a biplane EF of 69 %.   3. Increased relative wall thickness with normal mass index consistent   with left ventricular concentric remodeling.   4. Spectral Doppler shows impaired relaxation pattern of left   ventricular myocardial filling (Grade I diastolic dysfunction).   5. Right ventricular systolic function is reduced with apical sparing   consistent with acute pulmonary embolism (Romano's sign). The RV   appears moderately dilated.   6. Sclerotic aortic valve with normal opening.   7. Mild aortic regurgitation.   8. Moderate mitral valve regurgitation.   9. Moderate tricuspid regurgitation.  10. Estimated pulmonary artery systolic pressure is 37.8 mmHg assuming a   right atrial pressure of 3 mmHg, which is consistent with borderline   pulmonary hypertension.  11. Compared to the previous study 1/27/2020, the LV function has   improved. RV function is reduced on this study.    < end of copied text >      MEDICATIONS  (STANDING):  albuterol/ipratropium for Nebulization. 9 milliLiter(s) Nebulizer once  aspirin  chewable 81 milliGRAM(s) Oral daily  atorvastatin 20 milliGRAM(s) Oral at bedtime  bisacodyl Suppository 10 milliGRAM(s) Rectal once  cefTRIAXone   IVPB 1000 milliGRAM(s) IV Intermittent every 24 hours  cefTRIAXone   IVPB      dexAMETHasone  Injectable 6 milliGRAM(s) IV Push every 24 hours  dextrose 5%. 1000 milliLiter(s) (100 mL/Hr) IV Continuous <Continuous>  dextrose 5%. 1000 milliLiter(s) (50 mL/Hr) IV Continuous <Continuous>  dextrose 50% Injectable 12.5 Gram(s) IV Push once  dextrose 50% Injectable 25 Gram(s) IV Push once  dextrose 50% Injectable 25 Gram(s) IV Push once  doxycycline IVPB 100 milliGRAM(s) IV Intermittent every 12 hours  doxycycline IVPB      glucagon  Injectable 1 milliGRAM(s) IntraMuscular once  heparin  Infusion.  Unit(s)/Hr (10 mL/Hr) IV Continuous <Continuous>  insulin lispro (ADMELOG) corrective regimen sliding scale   SubCutaneous three times a day before meals  lactated ringers. 1000 milliLiter(s) (100 mL/Hr) IV Continuous <Continuous>  levothyroxine 50 MICROGram(s) Oral daily  polyethylene glycol 3350 17 Gram(s) Oral two times a day  senna 2 Tablet(s) Oral at bedtime    MEDICATIONS  (PRN):  dextrose Oral Gel 15 Gram(s) Oral once PRN Blood Glucose LESS THAN 70 milliGRAM(s)/deciliter  heparin   Injectable 4000 Unit(s) IV Push every 6 hours PRN For aPTT less than 40  heparin   Injectable 2000 Unit(s) IV Push every 6 hours PRN For aPTT between 40 - 57

## 2024-10-02 NOTE — CONSULT NOTE ADULT - SUBJECTIVE AND OBJECTIVE BOX
INTERVENTIONAL RADIOLOGY CONSULT:     Procedure Requested: PE thrombectomy    HPI:  89 yo F with hx of HTN, HLD, DM II, HFrEF (LVEF 20-25% 2020), Hypothyroidism, CVA, Dementia and Recent PNA (dx on CXR 4 days ago) presents to ED for generalized weakness and decreased PO intake. 4 days ago, patient started having cough and decreased PO intake. CXR was done and patient was diagnosed with PNA and was started on 5 days course of Azithromycin and Cefpodoxime with Prednisone 20mg OD. Patient had positive sick contact whereby both son and daughter developed URTI symptoms few days earleir. Patient is generally not very verbal at baseline and doesn't raise complains. She is mobile with a walker and 1 person assistance. Over past few days has been very week to ambulate. Family denies any reported chest pain, shortness of breath, fever, chills, abdominal pain, nausea, vomiting. They report loose BM over past 1 day.     On presentation vitals:   · BP Systolic	 176 mm Hg  · BP Diastolic	84 mm Hg  · Heart Rate	76 /min  · Respiration Rate (breaths/min)	 21 /min  · Temp (F)	98.7 Degrees F  · Temp (C)	37.1 Degrees C  · Temp site	oral  · SpO2 (%)	98 %  · O2 Delivery/Oxygen Delivery Method	nasal cannula  · Oxygen Therapy Flow (L/min)	2 L/min     Labs are significant for: Leucocytosis 12K, Cr 1.8 (baseline 1), Trop 437>350, ProBNP 24K.   ECG with no ischemia changes    Imaging: CT head non con: No acute intracranial pathology. No evidence of midline shift, mass effect or intracranial hemorrhage.  CT A/P:  Mild fullness left renal pelvis. Mild left ureter with distal left ureteral stone. Stone within the urinary bladder on the left.  Significant fecal retention. Moderate distention the urinary bladder.  Small left pleural effusion with adjacent consolidation which may reflect atelectasis or infiltrate.    Patient admitted to medicine for management of suspected PNA and MARTINE.  (01 Oct 2024 23:12)      PAST MEDICAL & SURGICAL HISTORY:  Dementia      Hypertension      Hyperlipidemia      Hypothyroidism      Diabetes          MEDICATIONS  (STANDING):  albuterol/ipratropium for Nebulization. 9 milliLiter(s) Nebulizer once  aspirin  chewable 81 milliGRAM(s) Oral daily  atorvastatin 20 milliGRAM(s) Oral at bedtime  bisacodyl Suppository 10 milliGRAM(s) Rectal once  cefTRIAXone   IVPB 1000 milliGRAM(s) IV Intermittent every 24 hours  cefTRIAXone   IVPB      dexAMETHasone  Injectable 6 milliGRAM(s) IV Push every 24 hours  dextrose 5%. 1000 milliLiter(s) (100 mL/Hr) IV Continuous <Continuous>  dextrose 5%. 1000 milliLiter(s) (50 mL/Hr) IV Continuous <Continuous>  dextrose 50% Injectable 25 Gram(s) IV Push once  dextrose 50% Injectable 25 Gram(s) IV Push once  dextrose 50% Injectable 12.5 Gram(s) IV Push once  doxycycline IVPB      doxycycline IVPB 100 milliGRAM(s) IV Intermittent every 12 hours  glucagon  Injectable 1 milliGRAM(s) IntraMuscular once  heparin  Infusion.  Unit(s)/Hr (10 mL/Hr) IV Continuous <Continuous>  insulin lispro (ADMELOG) corrective regimen sliding scale   SubCutaneous three times a day before meals  lactated ringers. 1000 milliLiter(s) (100 mL/Hr) IV Continuous <Continuous>  levothyroxine 50 MICROGram(s) Oral daily  polyethylene glycol 3350 17 Gram(s) Oral two times a day  senna 2 Tablet(s) Oral at bedtime    MEDICATIONS  (PRN):  dextrose Oral Gel 15 Gram(s) Oral once PRN Blood Glucose LESS THAN 70 milliGRAM(s)/deciliter  heparin   Injectable 4000 Unit(s) IV Push every 6 hours PRN For aPTT less than 40  heparin   Injectable 2000 Unit(s) IV Push every 6 hours PRN For aPTT between 40 - 57      Allergies    No Known Allergies      Physical Exam:   Vital Signs Last 24 Hrs  T(C): 36.4 (02 Oct 2024 15:54), Max: 36.8 (02 Oct 2024 07:25)  T(F): 97.5 (02 Oct 2024 15:54), Max: 98.2 (02 Oct 2024 07:25)  HR: 80 (02 Oct 2024 15:54) (62 - 85)  BP: 112/66 (02 Oct 2024 15:54) (107/65 - 127/83)  BP(mean): 85 (02 Oct 2024 05:35) (85 - 85)  RR: 18 (02 Oct 2024 15:54) (18 - 18)  SpO2: 97% (02 Oct 2024 15:54) (95% - 99%)    Parameters below as of 02 Oct 2024 15:54  Patient On (Oxygen Delivery Method): nasal cannula  O2 Flow (L/min): 2      Labs:                         12.6   9.05  )-----------( 115      ( 02 Oct 2024 11:58 )             38.5     10-02    140  |  105  |  62[HH]  ----------------------------<  175[H]  4.0   |  22  |  1.1    Ca    8.6      02 Oct 2024 11:58  Mg     2.4     10-01    TPro  6.5  /  Alb  3.8  /  TBili  0.5  /  DBili  x   /  AST  81[H]  /  ALT  33  /  AlkPhos  190[H]  10-01    PT/INR - ( 02 Oct 2024 11:58 )   PT: 12.20 sec;   INR: 1.07 ratio         PTT - ( 01 Oct 2024 15:15 )  PTT:23.8 sec    Pertinent labs:                      12.6   9.05  )-----------( 115      ( 02 Oct 2024 11:58 )             38.5       10-02    140  |  105  |  62[HH]  ----------------------------<  175[H]  4.0   |  22  |  1.1    Ca    8.6      02 Oct 2024 11:58  Mg     2.4     10-01    TPro  6.5  /  Alb  3.8  /  TBili  0.5  /  DBili  x   /  AST  81[H]  /  ALT  33  /  AlkPhos  190[H]  10-01      PT/INR - ( 02 Oct 2024 11:58 )   PT: 12.20 sec;   INR: 1.07 ratio         PTT - ( 01 Oct 2024 15:15 )  PTT:23.8 sec    Radiology & Additional Studies:     Radiology imaging reviewed.       ASSESSMENT/ PLAN:     89-year-old woman with HTN, HLD, DM, CHF, CVA, dementia who presented with generalized weakness. 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   -- No IR intervention at this time.   -- Recommend full-dose     Thank you for the courtesy of this consult, please call [phone] with any further questions.    INTERVENTIONAL RADIOLOGY CONSULT:     Procedure Requested: PE thrombectomy    HPI:  89 yo F with hx of HTN, HLD, DM II, HFrEF (LVEF 20-25% 2020), Hypothyroidism, CVA, Dementia and Recent PNA (dx on CXR 4 days ago) presents to ED for generalized weakness and decreased PO intake. 4 days ago, patient started having cough and decreased PO intake. CXR was done and patient was diagnosed with PNA and was started on 5 days course of Azithromycin and Cefpodoxime with Prednisone 20mg OD. Patient had positive sick contact whereby both son and daughter developed URTI symptoms few days earleir. Patient is generally not very verbal at baseline and doesn't raise complains. She is mobile with a walker and 1 person assistance. Over past few days has been very week to ambulate. Family denies any reported chest pain, shortness of breath, fever, chills, abdominal pain, nausea, vomiting. They report loose BM over past 1 day.     On presentation vitals:   · BP Systolic	 176 mm Hg  · BP Diastolic	84 mm Hg  · Heart Rate	76 /min  · Respiration Rate (breaths/min)	 21 /min  · Temp (F)	98.7 Degrees F  · Temp (C)	37.1 Degrees C  · Temp site	oral  · SpO2 (%)	98 %  · O2 Delivery/Oxygen Delivery Method	nasal cannula  · Oxygen Therapy Flow (L/min)	2 L/min     Labs are significant for: Leucocytosis 12K, Cr 1.8 (baseline 1), Trop 437>350, ProBNP 24K.   ECG with no ischemia changes    Imaging: CT head non con: No acute intracranial pathology. No evidence of midline shift, mass effect or intracranial hemorrhage.  CT A/P:  Mild fullness left renal pelvis. Mild left ureter with distal left ureteral stone. Stone within the urinary bladder on the left.  Significant fecal retention. Moderate distention the urinary bladder.  Small left pleural effusion with adjacent consolidation which may reflect atelectasis or infiltrate.    Patient admitted to medicine for management of suspected PNA and MARTINE.  (01 Oct 2024 23:12)      PAST MEDICAL & SURGICAL HISTORY:  Dementia      Hypertension      Hyperlipidemia      Hypothyroidism      Diabetes          MEDICATIONS  (STANDING):  albuterol/ipratropium for Nebulization. 9 milliLiter(s) Nebulizer once  aspirin  chewable 81 milliGRAM(s) Oral daily  atorvastatin 20 milliGRAM(s) Oral at bedtime  bisacodyl Suppository 10 milliGRAM(s) Rectal once  cefTRIAXone   IVPB 1000 milliGRAM(s) IV Intermittent every 24 hours  cefTRIAXone   IVPB      dexAMETHasone  Injectable 6 milliGRAM(s) IV Push every 24 hours  dextrose 5%. 1000 milliLiter(s) (100 mL/Hr) IV Continuous <Continuous>  dextrose 5%. 1000 milliLiter(s) (50 mL/Hr) IV Continuous <Continuous>  dextrose 50% Injectable 25 Gram(s) IV Push once  dextrose 50% Injectable 25 Gram(s) IV Push once  dextrose 50% Injectable 12.5 Gram(s) IV Push once  doxycycline IVPB      doxycycline IVPB 100 milliGRAM(s) IV Intermittent every 12 hours  glucagon  Injectable 1 milliGRAM(s) IntraMuscular once  heparin  Infusion.  Unit(s)/Hr (10 mL/Hr) IV Continuous <Continuous>  insulin lispro (ADMELOG) corrective regimen sliding scale   SubCutaneous three times a day before meals  lactated ringers. 1000 milliLiter(s) (100 mL/Hr) IV Continuous <Continuous>  levothyroxine 50 MICROGram(s) Oral daily  polyethylene glycol 3350 17 Gram(s) Oral two times a day  senna 2 Tablet(s) Oral at bedtime    MEDICATIONS  (PRN):  dextrose Oral Gel 15 Gram(s) Oral once PRN Blood Glucose LESS THAN 70 milliGRAM(s)/deciliter  heparin   Injectable 4000 Unit(s) IV Push every 6 hours PRN For aPTT less than 40  heparin   Injectable 2000 Unit(s) IV Push every 6 hours PRN For aPTT between 40 - 57      Allergies    No Known Allergies      Physical Exam:   Vital Signs Last 24 Hrs  T(C): 36.4 (02 Oct 2024 15:54), Max: 36.8 (02 Oct 2024 07:25)  T(F): 97.5 (02 Oct 2024 15:54), Max: 98.2 (02 Oct 2024 07:25)  HR: 80 (02 Oct 2024 15:54) (62 - 85)  BP: 112/66 (02 Oct 2024 15:54) (107/65 - 127/83)  BP(mean): 85 (02 Oct 2024 05:35) (85 - 85)  RR: 18 (02 Oct 2024 15:54) (18 - 18)  SpO2: 97% (02 Oct 2024 15:54) (95% - 99%)    Parameters below as of 02 Oct 2024 15:54  Patient On (Oxygen Delivery Method): nasal cannula  O2 Flow (L/min): 2      Labs:                         12.6   9.05  )-----------( 115      ( 02 Oct 2024 11:58 )             38.5     10-02    140  |  105  |  62[HH]  ----------------------------<  175[H]  4.0   |  22  |  1.1    Ca    8.6      02 Oct 2024 11:58  Mg     2.4     10-01    TPro  6.5  /  Alb  3.8  /  TBili  0.5  /  DBili  x   /  AST  81[H]  /  ALT  33  /  AlkPhos  190[H]  10-01    PT/INR - ( 02 Oct 2024 11:58 )   PT: 12.20 sec;   INR: 1.07 ratio         PTT - ( 01 Oct 2024 15:15 )  PTT:23.8 sec    Pertinent labs:                      12.6   9.05  )-----------( 115      ( 02 Oct 2024 11:58 )             38.5       10-02    140  |  105  |  62[HH]  ----------------------------<  175[H]  4.0   |  22  |  1.1    Ca    8.6      02 Oct 2024 11:58  Mg     2.4     10-01    TPro  6.5  /  Alb  3.8  /  TBili  0.5  /  DBili  x   /  AST  81[H]  /  ALT  33  /  AlkPhos  190[H]  10-01      PT/INR - ( 02 Oct 2024 11:58 )   PT: 12.20 sec;   INR: 1.07 ratio         PTT - ( 01 Oct 2024 15:15 )  PTT:23.8 sec    Radiology & Additional Studies:     Radiology imaging reviewed.       ASSESSMENT/ PLAN:     89-year-old woman with HTN, HLD, DM, CHF, CVA, dementia who presented with generalized weakness. CT chest demonstrated     -- Eccentrically-located likely chronic thrombus vs external compression in the right descending interlobar/segmental branch not amenable to IR intervention.   -- No IR intervention at this time.   -- Recommend full-dose AC.   -- Multifactorial etiologies for RHS, hypoxia, and elevated cardiac biomarkers.     Thank you for the courtesy of this consult, please call [phone] with any further questions.

## 2024-10-03 DIAGNOSIS — I26.99 OTHER PULMONARY EMBOLISM WITHOUT ACUTE COR PULMONALE: ICD-10-CM

## 2024-10-03 DIAGNOSIS — Z51.5 ENCOUNTER FOR PALLIATIVE CARE: ICD-10-CM

## 2024-10-03 DIAGNOSIS — J96.01 ACUTE RESPIRATORY FAILURE WITH HYPOXIA: ICD-10-CM

## 2024-10-03 LAB
ALBUMIN SERPL ELPH-MCNC: 3.3 G/DL — LOW (ref 3.5–5.2)
ALP SERPL-CCNC: 156 U/L — HIGH (ref 30–115)
ALT FLD-CCNC: 22 U/L — SIGNIFICANT CHANGE UP (ref 0–41)
ANION GAP SERPL CALC-SCNC: 14 MMOL/L — SIGNIFICANT CHANGE UP (ref 7–14)
AST SERPL-CCNC: 29 U/L — SIGNIFICANT CHANGE UP (ref 0–41)
BASOPHILS # BLD AUTO: 0.02 K/UL — SIGNIFICANT CHANGE UP (ref 0–0.2)
BASOPHILS NFR BLD AUTO: 0.2 % — SIGNIFICANT CHANGE UP (ref 0–1)
BILIRUB SERPL-MCNC: 0.3 MG/DL — SIGNIFICANT CHANGE UP (ref 0.2–1.2)
BUN SERPL-MCNC: 47 MG/DL — HIGH (ref 10–20)
CALCIUM SERPL-MCNC: 9 MG/DL — SIGNIFICANT CHANGE UP (ref 8.4–10.5)
CHLORIDE SERPL-SCNC: 106 MMOL/L — SIGNIFICANT CHANGE UP (ref 98–110)
CO2 SERPL-SCNC: 19 MMOL/L — SIGNIFICANT CHANGE UP (ref 17–32)
CREAT SERPL-MCNC: 1 MG/DL — SIGNIFICANT CHANGE UP (ref 0.7–1.5)
CULTURE RESULTS: SIGNIFICANT CHANGE UP
EGFR: 50 ML/MIN/1.73M2 — LOW
EOSINOPHIL # BLD AUTO: 0.01 K/UL — SIGNIFICANT CHANGE UP (ref 0–0.7)
EOSINOPHIL NFR BLD AUTO: 0.1 % — SIGNIFICANT CHANGE UP (ref 0–8)
GLUCOSE BLDC GLUCOMTR-MCNC: 147 MG/DL — HIGH (ref 70–99)
GLUCOSE BLDC GLUCOMTR-MCNC: 193 MG/DL — HIGH (ref 70–99)
GLUCOSE BLDC GLUCOMTR-MCNC: 204 MG/DL — HIGH (ref 70–99)
GLUCOSE BLDC GLUCOMTR-MCNC: 207 MG/DL — HIGH (ref 70–99)
GLUCOSE SERPL-MCNC: 161 MG/DL — HIGH (ref 70–99)
HCT VFR BLD CALC: 42.2 % — SIGNIFICANT CHANGE UP (ref 37–47)
HGB BLD-MCNC: 13.6 G/DL — SIGNIFICANT CHANGE UP (ref 12–16)
IMM GRANULOCYTES NFR BLD AUTO: 1.4 % — HIGH (ref 0.1–0.3)
LYMPHOCYTES # BLD AUTO: 1.82 K/UL — SIGNIFICANT CHANGE UP (ref 1.2–3.4)
LYMPHOCYTES # BLD AUTO: 17.3 % — LOW (ref 20.5–51.1)
MAGNESIUM SERPL-MCNC: 1.9 MG/DL — SIGNIFICANT CHANGE UP (ref 1.8–2.4)
MCHC RBC-ENTMCNC: 31.1 PG — HIGH (ref 27–31)
MCHC RBC-ENTMCNC: 32.2 G/DL — SIGNIFICANT CHANGE UP (ref 32–37)
MCV RBC AUTO: 96.6 FL — SIGNIFICANT CHANGE UP (ref 81–99)
MONOCYTES # BLD AUTO: 0.44 K/UL — SIGNIFICANT CHANGE UP (ref 0.1–0.6)
MONOCYTES NFR BLD AUTO: 4.2 % — SIGNIFICANT CHANGE UP (ref 1.7–9.3)
NEUTROPHILS # BLD AUTO: 8.06 K/UL — HIGH (ref 1.4–6.5)
NEUTROPHILS NFR BLD AUTO: 76.8 % — HIGH (ref 42.2–75.2)
NRBC # BLD: 0 /100 WBCS — SIGNIFICANT CHANGE UP (ref 0–0)
PLATELET # BLD AUTO: 122 K/UL — LOW (ref 130–400)
PMV BLD: 12.7 FL — HIGH (ref 7.4–10.4)
POTASSIUM SERPL-MCNC: 4.6 MMOL/L — SIGNIFICANT CHANGE UP (ref 3.5–5)
POTASSIUM SERPL-SCNC: 4.6 MMOL/L — SIGNIFICANT CHANGE UP (ref 3.5–5)
PROT SERPL-MCNC: 5.5 G/DL — LOW (ref 6–8)
RBC # BLD: 4.37 M/UL — SIGNIFICANT CHANGE UP (ref 4.2–5.4)
RBC # FLD: 13.7 % — SIGNIFICANT CHANGE UP (ref 11.5–14.5)
S PNEUM AG UR QL: NEGATIVE — SIGNIFICANT CHANGE UP
SODIUM SERPL-SCNC: 139 MMOL/L — SIGNIFICANT CHANGE UP (ref 135–146)
SPECIMEN SOURCE: SIGNIFICANT CHANGE UP
WBC # BLD: 10.5 K/UL — SIGNIFICANT CHANGE UP (ref 4.8–10.8)
WBC # FLD AUTO: 10.5 K/UL — SIGNIFICANT CHANGE UP (ref 4.8–10.8)

## 2024-10-03 PROCEDURE — 99223 1ST HOSP IP/OBS HIGH 75: CPT

## 2024-10-03 PROCEDURE — 99233 SBSQ HOSP IP/OBS HIGH 50: CPT

## 2024-10-03 RX ORDER — APIXABAN 5 MG/1
10 TABLET, FILM COATED ORAL
Refills: 0 | Status: COMPLETED | OUTPATIENT
Start: 2024-10-03 | End: 2024-10-10

## 2024-10-03 RX ADMIN — DOXYCYCLINE HYCLATE 100 MILLIGRAM(S): 100 TABLET, FILM COATED ORAL at 05:27

## 2024-10-03 RX ADMIN — Medication 75 MILLILITER(S): at 11:05

## 2024-10-03 RX ADMIN — Medication 50 MICROGRAM(S): at 05:27

## 2024-10-03 RX ADMIN — Medication 2 TABLET(S): at 21:43

## 2024-10-03 RX ADMIN — DOXYCYCLINE HYCLATE 100 MILLIGRAM(S): 100 TABLET, FILM COATED ORAL at 17:30

## 2024-10-03 RX ADMIN — DEXAMETHASONE 1.5 MG 6 MILLIGRAM(S): 1.5 TABLET ORAL at 05:27

## 2024-10-03 RX ADMIN — POLYETHYLENE GLYCOL 3350 17 GRAM(S): 17 POWDER, FOR SOLUTION ORAL at 17:30

## 2024-10-03 RX ADMIN — Medication 50 MILLIGRAM(S): at 05:27

## 2024-10-03 RX ADMIN — Medication 2: at 13:10

## 2024-10-03 RX ADMIN — REMDESIVIR 200 MILLIGRAM(S): 100 INJECTION, POWDER, LYOPHILIZED, FOR SOLUTION INTRAVENOUS at 18:47

## 2024-10-03 RX ADMIN — Medication 20 MILLIGRAM(S): at 21:43

## 2024-10-03 RX ADMIN — POLYETHYLENE GLYCOL 3350 17 GRAM(S): 17 POWDER, FOR SOLUTION ORAL at 05:28

## 2024-10-03 NOTE — CONSULT NOTE ADULT - CONVERSATION DETAILS
Discussed with patient's 2 sons and introduced palliative care. They understand that patient is here with PE and COVID. They would like to continue treatment to help her recover so that she can return home. They confirm that patient should be DNR/DNI.

## 2024-10-03 NOTE — CONSULT NOTE ADULT - ASSESSMENT
89 yo F with hx of HTN, HLD, DM II, HFrEF (LVEF 20-25% 2020), Hypothyroidism, CVA, Dementia and Recent PNA (dx 4 days ago) presents to ED for generalized weakness found to have respiratory failure 2/2 PE.  89 yo F with hx of HTN, HLD, DM II, HFrEF (LVEF 20-25% 2020), Hypothyroidism, CVA, Dementia and Recent PNA (dx 4 days ago) presents to ED for generalized weakness found to have respiratory failure 2/2 PE.     Palliative care consulted to introduce CMO to family. Discussed with patient's 2 sons (including Eliseo) and introduced palliative care. They state that they have not heard of palliative care previously. They understand that patient is here with PE and COVID. They would like to continue treatment to help her recover so that she can return home. They gave no indication that they are interested in CMO. They confirm that patient should be DNR/DNI.     Plan:  - symptoms per primary team  - continue current medical management    - family hoping that patient can discharge home  - DNR/DNI    Palliative care will sign off.   Please call [phone] with questions or concerns 24/7.   _____________  Man Oreilly MD  Palliative Medicine  [facility]   [facility]  [phone]

## 2024-10-03 NOTE — PROGRESS NOTE ADULT - ASSESSMENT
89 yo F with hx of HTN, HLD, DM II, HFrEF (LVEF 20-25% 2020), Hypothyroidism, CVA, Dementia and Recent PNA (dx 4 days ago) presents to ED for generalized weakness. Positive sick contact.     #Acute hypoxic respiratory failure 2/2 submassive PE with RV strain (trop, elevated bpnp, hemodynamically stable) and COVID pneumonia    - CTA PE as above  - Echo demonstrated Romano's sign, biomarkers elevated, requiring oxygen supplementation with desaturation on RA at rest   - Cr improved, DC heparin ggt, start theraputic lovenox   - CT abdomen: Small left pleural effusion with adjacent consolidation which may reflect atelectasis or infiltrate. Mild fullness left renal pelvis. Mild left ureter with distal left ureteral stone. Stone within the urinary bladder on the left.   - cont doxy and CTX  - ID consult appreciated  - RVP +ve for Covid -> on RDV and dexamethasone   - f/u BCx, procal, nasal MRSA   - CC consult--> upgrade to SDU   - IR consult appreciated, will follow up reccs, no thrombectomy   - echo reviewed   - dexmethasone 6 mg x 10 days,  pt sat RA 92-93%     #Elevated troponin likely demand ischemia iso suspected PE and hypotension   #chronic  HFimpEF   - 1st troponin 437  --> 350 -> 335  - no ischemic changes on ECG  - c/w ASA, statin  - TTE in 2020: LVEF 20-25%  - TTE 10/2/2024: LVEF 69%, G1DD, LV Ejection Fraction by Abreu's Method with a biplane EF of 69 %. Right ventricular systolic function is reduced with apical sparing consistent with acute pulmonary embolism (Romano's sign). The RV appears moderately dilated. Moderate TR. Moderate MR. Borderline pulm HTN   - seen by Dr. Silver previously; not following with cardiology currently  - cardiology consult pending     #MARTINE likely prerenal - resolving   - serum Cr. 1.8 currently. Baseline Cr 1.0 in May 2024.   - Cr 1.1 now   - CT abdomen neg for hydronephrosis. Moderate distention the urinary bladder.  - Bladder scan q8H  - monitor BMP closely  - start on IVF hydration following CTA PE    #Constipation with fecal retention  - CT abdomen shows Significant fecal retention.  - bowel regimen.     HTN - hold losartan due to MARTINE. Hold lopressor as BP borderline.   DM II - monitor FS AC HS. Insulin sliding scale.   Hypothyroidism - c/w synthroid. check TSH.     #Progress Note Handoff  Pending (specify):  cc, sdu  Family discussion: house staff updated pt family  Disposition: dw shanice, sdu   Decision to admit the pt is based on acuity as above    89 yo F with hx of HTN, HLD, DM II, HFrEF (LVEF 20-25% 2020), Hypothyroidism, CVA, Dementia and Recent PNA (dx 4 days ago) presents to ED for generalized weakness. Positive sick contact.     #Acute hypoxic respiratory failure 2/2 submassive PE with RV strain (trop, elevated bpnp, hemodynamically stable) and COVID pneumonia    - CTA PE as above  - Echo demonstrated Romano's sign, biomarkers elevated, requiring oxygen supplementation with desaturation on RA at rest   - Cr improved, DC heparin ggt, start theraputic lovenox   - CT abdomen: Small left pleural effusion with adjacent consolidation which may reflect atelectasis or infiltrate. Mild fullness left renal pelvis. Mild left ureter with distal left ureteral stone. Stone within the urinary bladder on the left.   - cont doxy and CTX  - ID consult appreciated  - RVP +ve for Covid -> on RDV and dexamethasone   - f/u BCx, procal, nasal MRSA   - CC consult--> upgrade to SDU   - IR consult appreciated, will follow up reccs, no thrombectomy   - echo reviewed   - dexmethasone 6 mg x 10 days,  pt sat RA 92-93%   - transitioned from lovenox to eliquis 10 mg BID   - Discussed benefits and risks of starting anticoagulation including risk of excessively bleeding gums or nose bleeds, hematuria,  hemorrhagic stroke, GI bleed,  excessive bleeding after trauma or cuts and even death. Advised seek medical intervention immediately. Colin Gomes agreed decided to start anticoagulation given benefits outweighs risk.  - Palliative care consult     #Elevated troponin likely demand ischemia iso suspected PE and hypotension   #chronic  HFimpEF   - 1st troponin 437  --> 350 -> 335  - no ischemic changes on ECG  - c/w ASA, statin  - TTE in 2020: LVEF 20-25%  - TTE 10/2/2024: LVEF 69%, G1DD, LV Ejection Fraction by Abreu's Method with a biplane EF of 69 %. Right ventricular systolic function is reduced with apical sparing consistent with acute pulmonary embolism (Romano's sign). The RV appears moderately dilated. Moderate TR. Moderate MR. Borderline pulm HTN   - seen by Dr. Silver previously; not following with cardiology currently  - cardiology consult pending     #MARTINE likely prerenal - resolving   - serum Cr. 1.8 currently. Baseline Cr 1.0 in May 2024.   - Cr 1.1 now   - CT abdomen neg for hydronephrosis. Moderate distention the urinary bladder.  - Bladder scan q8H  - monitor BMP closely  - continue  IVF hydration following CTA PE    #Constipation with fecal retention  - CT abdomen shows Significant fecal retention.  - bowel regimen.     HTN - hold losartan due to MARTINE. Hold lopressor as BP borderline.   DM II - monitor FS AC HS. Insulin sliding scale.   Hypothyroidism - c/w synthroid. check TSH.     #Progress Note Handoff  Pending (specify):  clinical improvement, cardiology, CC  Family discussion: house staff updated pt family  Disposition: dw cc, sdu -->tele   Decision to admit the pt is based on acuity as above

## 2024-10-03 NOTE — CONSULT NOTE ADULT - SUBJECTIVE AND OBJECTIVE BOX
BRIJESH POWER  89y, Female  Allergy: No Known Allergies      All historical available data reviewed.    HPI:  89 yo F with hx of HTN, HLD, DM II, HFrEF (LVEF 20-25% 2020), Hypothyroidism, CVA, Dementia and Recent PNA (dx on CXR 4 days ago) presents to ED for generalized weakness and decreased PO intake. 4 days ago, patient started having cough and decreased PO intake. CXR was done and patient was diagnosed with PNA and was started on 5 days course of Azithromycin and Cefpodoxime with Prednisone 20mg OD. Patient had positive sick contact whereby both son and daughter developed URTI symptoms few days earleir. Patient is generally not very verbal at baseline and doesn't raise complains. She is mobile with a walker and 1 person assistance. Over past few days has been very week to ambulate. Family denies any reported chest pain, shortness of breath, fever, chills, abdominal pain, nausea, vomiting. They report loose BM over past 1 day.     On presentation vitals:   · BP Systolic	 176 mm Hg  · BP Diastolic	84 mm Hg  · Heart Rate	76 /min  · Respiration Rate (breaths/min)	 21 /min  · Temp (F)	98.7 Degrees F  · Temp (C)	37.1 Degrees C  · Temp site	oral  · SpO2 (%)	98 %  · O2 Delivery/Oxygen Delivery Method	nasal cannula  · Oxygen Therapy Flow (L/min)	2 L/min     Labs are significant for: Leucocytosis 12K, Cr 1.8 (baseline 1), Trop 437>350, ProBNP 24K.   ECG with no ischemia changes    Imaging: CT head non con: No acute intracranial pathology. No evidence of midline shift, mass effect or intracranial hemorrhage.  CT A/P:  Mild fullness left renal pelvis. Mild left ureter with distal left ureteral stone. Stone within the urinary bladder on the left.  Significant fecal retention. Moderate distention the urinary bladder.  Small left pleural effusion with adjacent consolidation which may reflect atelectasis or infiltrate.    Patient admitted to medicine for management of suspected PNA and MARTINE.  (01 Oct 2024 23:12)    FAMILY HISTORY:    PAST MEDICAL & SURGICAL HISTORY:  Dementia      Hypertension      Hyperlipidemia      Hypothyroidism      Diabetes            VITALS:  T(F): 97.2, Max: 97.5 (10-02-24 @ 15:54)  HR: 75  BP: 101/60  RR: 18Vital Signs Last 24 Hrs  T(C): 36.2 (03 Oct 2024 08:38), Max: 36.4 (02 Oct 2024 15:54)  T(F): 97.2 (03 Oct 2024 08:38), Max: 97.5 (02 Oct 2024 15:54)  HR: 75 (03 Oct 2024 08:38) (75 - 90)  BP: 101/60 (03 Oct 2024 08:38) (101/60 - 132/60)  BP(mean): --  RR: 18 (03 Oct 2024 08:38) (18 - 18)  SpO2: 96% (03 Oct 2024 08:38) (95% - 98%)    Parameters below as of 03 Oct 2024 04:00  Patient On (Oxygen Delivery Method): nasal cannula  O2 Flow (L/min): 2      TESTS & MEASUREMENTS:                        13.6   10.50 )-----------( 122      ( 03 Oct 2024 07:30 )             42.2     10-03    139  |  106  |  47[H]  ----------------------------<  161[H]  4.6   |  19  |  1.0    Ca    9.0      03 Oct 2024 07:30  Mg     1.9     10-03    TPro  5.5[L]  /  Alb  3.3[L]  /  TBili  0.3  /  DBili  x   /  AST  29  /  ALT  22  /  AlkPhos  156[H]  10-03    LIVER FUNCTIONS - ( 03 Oct 2024 07:30 )  Alb: 3.3 g/dL / Pro: 5.5 g/dL / ALK PHOS: 156 U/L / ALT: 22 U/L / AST: 29 U/L / GGT: x             Urinalysis with Rflx Culture (collected 10-02-24 @ 00:45)    Culture - Blood (collected 10-01-24 @ 15:23)  Source: .Blood BLOOD  Preliminary Report (10-02-24 @ 22:01):    No growth at 24 hours    Culture - Blood (collected 10-01-24 @ 15:23)  Source: .Blood BLOOD  Preliminary Report (10-02-24 @ 22:01):    No growth at 24 hours      Urinalysis Basic - ( 03 Oct 2024 07:30 )    Color: x / Appearance: x / SG: x / pH: x  Gluc: 161 mg/dL / Ketone: x  / Bili: x / Urobili: x   Blood: x / Protein: x / Nitrite: x   Leuk Esterase: x / RBC: x / WBC x   Sq Epi: x / Non Sq Epi: x / Bacteria: x          RADIOLOGY & ADDITIONAL TESTS:  Personal review of radiological diagnostics performed  Echo and EKG results noted when applicable.     MEDICATIONS:  albuterol/ipratropium for Nebulization. 9 milliLiter(s) Nebulizer once  aspirin  chewable 81 milliGRAM(s) Oral daily  atorvastatin 20 milliGRAM(s) Oral at bedtime  bisacodyl Suppository 10 milliGRAM(s) Rectal once  cefTRIAXone   IVPB      cefTRIAXone   IVPB 1000 milliGRAM(s) IV Intermittent every 24 hours  dexAMETHasone  Injectable 6 milliGRAM(s) IV Push every 24 hours  dextrose 5%. 1000 milliLiter(s) IV Continuous <Continuous>  dextrose 5%. 1000 milliLiter(s) IV Continuous <Continuous>  dextrose 50% Injectable 12.5 Gram(s) IV Push once  dextrose 50% Injectable 25 Gram(s) IV Push once  dextrose 50% Injectable 25 Gram(s) IV Push once  dextrose Oral Gel 15 Gram(s) Oral once PRN  doxycycline IVPB      doxycycline IVPB 100 milliGRAM(s) IV Intermittent every 12 hours  enoxaparin Injectable 50 milliGRAM(s) SubCutaneous every 12 hours  glucagon  Injectable 1 milliGRAM(s) IntraMuscular once  insulin lispro (ADMELOG) corrective regimen sliding scale   SubCutaneous three times a day before meals  lactated ringers. 1000 milliLiter(s) IV Continuous <Continuous>  levothyroxine 50 MICROGram(s) Oral daily  polyethylene glycol 3350 17 Gram(s) Oral two times a day  remdesivir  IVPB 100 milliGRAM(s) IV Intermittent once  senna 2 Tablet(s) Oral at bedtime      ANTIBIOTICS:  cefTRIAXone   IVPB 1000 milliGRAM(s) IV Intermittent every 24 hours  cefTRIAXone   IVPB      doxycycline IVPB      doxycycline IVPB 100 milliGRAM(s) IV Intermittent every 12 hours  remdesivir  IVPB 100 milliGRAM(s) IV Intermittent once

## 2024-10-03 NOTE — PROGRESS NOTE ADULT - SUBJECTIVE AND OBJECTIVE BOX
Patient is a 89y old  Female who presents with a chief complaint of SOB (10-02-24)      Pt seen and examined at bedside. Unable to get ros. No OVN events           PAST MEDICAL & SURGICAL HISTORY:  Dementia    Hypertension    Hyperlipidemia    Hypothyroidism    Diabetes        VITAL SIGNS (Last 24 hrs):  T(C): 36.2 (10-03-24 @ 08:38), Max: 36.4 (10-02-24 @ 15:54)  HR: 75 (10-03-24 @ 08:38) (75 - 90)  BP: 101/60 (10-03-24 @ 08:38) (101/60 - 132/60)  RR: 18 (10-03-24 @ 08:38) (18 - 18)  SpO2: 96% (10-03-24 @ 08:38) (95% - 98%)  Wt(kg): --  Daily     Daily     I&O's Summary    02 Oct 2024 07:01  -  03 Oct 2024 07:00  --------------------------------------------------------  IN: 0 mL / OUT: 600 mL / NET: -600 mL        PHYSICAL EXAM:  GENERAL: NAD, elderly  HEAD:  Atraumatic, Normocephalic  EYES: EOMI, PERRLA, conjunctiva and sclera clear  NECK: Supple, No JVD  CHEST/LUNG: Clear to auscultation bilaterally; No wheeze  HEART: Regular rate and rhythm; No murmurs, rubs, or gallops  ABDOMEN: Soft, Nontender, Nondistended; Bowel sounds present  EXTREMITIES:  2+ Peripheral Pulses, No clubbing, cyanosis, or edema  PSYCH: Awake  Labs Reviewed  Spoke to patient in regards to abnormal labs.    CBC Full  -  ( 03 Oct 2024 07:30 )  WBC Count : 10.50 K/uL  Hemoglobin : 13.6 g/dL  Hematocrit : 42.2 %  Platelet Count - Automated : 122 K/uL  Mean Cell Volume : 96.6 fL  Mean Cell Hemoglobin : 31.1 pg  Mean Cell Hemoglobin Concentration : 32.2 g/dL  Auto Neutrophil # : 8.06 K/uL  Auto Lymphocyte # : 1.82 K/uL  Auto Monocyte # : 0.44 K/uL  Auto Eosinophil # : 0.01 K/uL  Auto Basophil # : 0.02 K/uL  Auto Neutrophil % : 76.8 %  Auto Lymphocyte % : 17.3 %  Auto Monocyte % : 4.2 %  Auto Eosinophil % : 0.1 %  Auto Basophil % : 0.2 %    BMP:    10-03 @ 07:30    Blood Urea Nitrogen - 47  Calcium - 9.0  Carbond Dioxide - 19  Chloride - 106  Creatinine - 1.0  Glucose - 161  Potassium - 4.6  Sodium - 139      Hemoglobin A1c -   PT/INR - ( 02 Oct 2024 11:58 )   PT: 12.20 sec;   INR: 1.07 ratio         PTT - ( 01 Oct 2024 15:15 )  PTT:23.8 sec  Urine Culture:  10-02 @ 00:45 Urine culture: --    Culture Results:   No growth  Method Type: --  Organism: --  Organism Identification: --  Specimen Source: Clean Catch None  10-01 @ 15:23 Urine culture: --    Culture Results:   No growth at 24 hours  Method Type: --  Organism: --  Organism Identification: --  Specimen Source: .Blood BLOOD        COVID Labs  CRP:      D-Dimer:  1995 ng/mL DDU (10-02-24 @ 11:58)            Imaging reviewed independently and reviewed read    < from: CT Angio Chest PE Protocol w/ IV Cont (10.02.24 @ 14:14) >  IMPRESSION:  Acute pulmonary embolus in the right upper segmental branch (3-38) with   associated right heart strain. 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  Elevated RV LV ratio 1.8    Bibasilar atelectasis. Left lower lobe lobar pneumonia.    Nodules as described above.    Discussed with Dr. Nohemi Sandy at 2:53 PM.      Addendum: After discussion with attending and learning of the chronic PE   versus external compression in the right main pulmonary artery in   addition to the acute PE the new findings were relayed to Dr. Nohemi Sandy at 3:50 by Khoa Hinton.    < end of copied text >      MEDICATIONS  (STANDING):  albuterol/ipratropium for Nebulization. 9 milliLiter(s) Nebulizer once  aspirin  chewable 81 milliGRAM(s) Oral daily  atorvastatin 20 milliGRAM(s) Oral at bedtime  bisacodyl Suppository 10 milliGRAM(s) Rectal once  cefTRIAXone   IVPB 1000 milliGRAM(s) IV Intermittent every 24 hours  cefTRIAXone   IVPB      dexAMETHasone  Injectable 6 milliGRAM(s) IV Push every 24 hours  dextrose 5%. 1000 milliLiter(s) (100 mL/Hr) IV Continuous <Continuous>  dextrose 5%. 1000 milliLiter(s) (50 mL/Hr) IV Continuous <Continuous>  dextrose 50% Injectable 25 Gram(s) IV Push once  dextrose 50% Injectable 25 Gram(s) IV Push once  dextrose 50% Injectable 12.5 Gram(s) IV Push once  doxycycline IVPB 100 milliGRAM(s) IV Intermittent every 12 hours  doxycycline IVPB      enoxaparin Injectable 50 milliGRAM(s) SubCutaneous every 12 hours  glucagon  Injectable 1 milliGRAM(s) IntraMuscular once  insulin lispro (ADMELOG) corrective regimen sliding scale   SubCutaneous three times a day before meals  lactated ringers. 1000 milliLiter(s) (75 mL/Hr) IV Continuous <Continuous>  levothyroxine 50 MICROGram(s) Oral daily  polyethylene glycol 3350 17 Gram(s) Oral two times a day  remdesivir  IVPB 100 milliGRAM(s) IV Intermittent once  senna 2 Tablet(s) Oral at bedtime    MEDICATIONS  (PRN):  dextrose Oral Gel 15 Gram(s) Oral once PRN Blood Glucose LESS THAN 70 milliGRAM(s)/deciliter

## 2024-10-03 NOTE — CONSULT NOTE ADULT - SUBJECTIVE AND OBJECTIVE BOX
HPI:  89 yo F with hx of HTN, HLD, DM II, HFrEF (LVEF 20-25% 2020), Hypothyroidism, CVA, Dementia and Recent PNA (dx on CXR 4 days ago) presents to ED for generalized weakness and decreased PO intake. 4 days ago, patient started having cough and decreased PO intake. CXR was done and patient was diagnosed with PNA and was started on 5 days course of Azithromycin and Cefpodoxime with Prednisone 20mg OD. Patient had positive sick contact whereby both son and daughter developed URTI symptoms few days earleir. Patient is generally not very verbal at baseline and doesn't raise complains. She is mobile with a walker and 1 person assistance. Over past few days has been very week to ambulate. Family denies any reported chest pain, shortness of breath, fever, chills, abdominal pain, nausea, vomiting. They report loose BM over past 1 day.     On presentation vitals:   · BP Systolic	 176 mm Hg  · BP Diastolic	84 mm Hg  · Heart Rate	76 /min  · Respiration Rate (breaths/min)	 21 /min  · Temp (F)	98.7 Degrees F  · Temp (C)	37.1 Degrees C  · Temp site	oral  · SpO2 (%)	98 %  · O2 Delivery/Oxygen Delivery Method	nasal cannula  · Oxygen Therapy Flow (L/min)	2 L/min     Labs are significant for: Leucocytosis 12K, Cr 1.8 (baseline 1), Trop 437>350, ProBNP 24K.   ECG with no ischemia changes    Imaging: CT head non con: No acute intracranial pathology. No evidence of midline shift, mass effect or intracranial hemorrhage.  CT A/P:  Mild fullness left renal pelvis. Mild left ureter with distal left ureteral stone. Stone within the urinary bladder on the left.  Significant fecal retention. Moderate distention the urinary bladder.  Small left pleural effusion with adjacent consolidation which may reflect atelectasis or infiltrate.    Patient admitted to medicine for management of suspected PNA and MARTINE.  (01 Oct 2024 23:12)      ITEMS NOT CHECKED ARE NOT PRESENT    SOCIAL HISTORY:   Significant other/partner[ ]  Children[ ]  Shinto/Spirituality:  Substance hx:  [ ]   Tobacco hx:  [ ]   Alcohol hx: [ ]   Living Situation: [ ]Home  [ ]Long term care  [ ]Rehab [ ]Other  Home Services: [ ] [facility] [ ] Visting RN [ ] Hospice  Occupation:  Home Opioid hx:  [ ] Y [ ] N [ ] I-Stop Reference No:     ADVANCE DIRECTIVES:    [ ] Full Code [ ] DNR  MOLST  [ ]  Living Will  [ ]   DECISION MAKER(s):  [ ] Health Care Proxy(s)  [ ] Surrogate(s)  [ ] Guardian           Name(s): Phone Number(s):    BASELINE (I)ADL(s) (prior to admission):  [place]: [ ]Total  [ ] Moderate [ ]Dependent  Palliative Performance Status Version 2:         %    [email or web address]/files/[email or web address]_performance_scale_[email or web address].pdf    Allergies    No Known Allergies    Intolerances    MEDICATIONS  (STANDING):  albuterol/ipratropium for Nebulization. 9 milliLiter(s) Nebulizer once  aspirin  chewable 81 milliGRAM(s) Oral daily  atorvastatin 20 milliGRAM(s) Oral at bedtime  bisacodyl Suppository 10 milliGRAM(s) Rectal once  cefTRIAXone   IVPB      cefTRIAXone   IVPB 1000 milliGRAM(s) IV Intermittent every 24 hours  dexAMETHasone  Injectable 6 milliGRAM(s) IV Push every 24 hours  dextrose 5%. 1000 milliLiter(s) (100 mL/Hr) IV Continuous <Continuous>  dextrose 5%. 1000 milliLiter(s) (50 mL/Hr) IV Continuous <Continuous>  dextrose 50% Injectable 25 Gram(s) IV Push once  dextrose 50% Injectable 25 Gram(s) IV Push once  dextrose 50% Injectable 12.5 Gram(s) IV Push once  doxycycline IVPB      doxycycline IVPB 100 milliGRAM(s) IV Intermittent every 12 hours  enoxaparin Injectable 50 milliGRAM(s) SubCutaneous every 12 hours  glucagon  Injectable 1 milliGRAM(s) IntraMuscular once  insulin lispro (ADMELOG) corrective regimen sliding scale   SubCutaneous three times a day before meals  lactated ringers. 1000 milliLiter(s) (75 mL/Hr) IV Continuous <Continuous>  levothyroxine 50 MICROGram(s) Oral daily  polyethylene glycol 3350 17 Gram(s) Oral two times a day  remdesivir  IVPB 100 milliGRAM(s) IV Intermittent once  senna 2 Tablet(s) Oral at bedtime    MEDICATIONS  (PRN):  dextrose Oral Gel 15 Gram(s) Oral once PRN Blood Glucose LESS THAN 70 milliGRAM(s)/deciliter      PRESENT SYMPTOMS: [ ]Unable to obtain due to poor mentation   Source if other than patient:  [ ]Family   [ ]Team     Pain: [ ]yes [ ]no  QOL impact -   Location -                    Aggravating factors -  Alleviating factors -   Quality -  Radiation -  Timing -   Severity (0-10 scale):  Minimal acceptable level (0-10 scale):     CPOT:    https://www.[facility].org/getattachment/[email or web address]/Critical-Care-Pain-Observation-Tool-(CPOT)    PAIN AD Score:   http://geriatrictoolkit.[facility]/cog/painad.pdf     Dyspnea:                           [ ]None[ ]Mild [ ]Moderate [ ]Severe     Respiratory Distress Observation Scale (RDOS):   A score of 0 to 2 signifies little or no respiratory distress, 3 signifies mild distress, scores 4 to 6 indicate moderate distress, and scores greater than 7 signify severe distress  [email or web address]://[email or web address]/sites/default/files/PDFS/663509-fqpkbcxtjaf-dmqadrqb-skjgntsffaf-daevm.pdf    Anxiety:                             [ ]None[ ]Mild [ ]Moderate [ ]Severe   Fatigue:                             [ ]None[ ]Mild [ ]Moderate [ ]Severe   Nausea:                             [ ]None[ ]Mild [ ]Moderate [ ]Severe   Loss of appetite:              [ ]None[ ]Mild [ ]Moderate [ ]Severe   Constipation:                    [ ]None[ ]Mild [ ]Moderate [ ]Severe    Other Symptoms:  [ ]All other review of systems negative     Palliative Performance Status Version 2:         %    http://[email or web address].org/files/news/palliative_performance_scale_[email or web address].pdf  PHYSICAL EXAM:    GENERAL:  NAD   PULMONARY:  Non labored breathing  NEUROLOGIC: Grossly intact  BEHAVIORAL/PSYCH:  Calm    LABS: I have reviewed daily labs                          13.6   10.50 )-----------( 122      ( 03 Oct 2024 07:30 )             42.2       10-02    140  |  105  |  62[HH]  ----------------------------<  175[H]  4.0   |  22  |  1.1    Ca    8.6      02 Oct 2024 11:58  Mg     2.4     10-01    TPro  6.5  /  Alb  3.8  /  TBili  0.5  /  DBili  x   /  AST  81[H]  /  ALT  33  /  AlkPhos  190[H]  10-01          RADIOLOGY & ADDITIONAL STUDIES: I have reviewed new imaging    PROTEIN CALORIE MALNUTRITION PRESENT: [ ]mild [ ]moderate [ ]severe [ ]underweight [ ]morbid obesity  https://www.andeal.org/vero/2440/web/files/ONC/Table_Clinical%20Characteristics%20to%20Document%20Malnutrition-White%20JV%20et%20al%202012.pdf      Weight (kg): 52.617 (10-02-24 @ 10:55)    [ ]PPSV2 < or = to 30% [ ]significant weight loss  [ ]poor nutritional intake  [ ]anasarca      [ ]Artificial Nutrition      Palliative Care Spiritual/Emotional Screening Tool Question  Severity (0-4):                    OR                    [ x] Unable to determine. Will assess at later time if appropriate.  Score of 2 or greater indicates recommendation of Chaplaincy and/or SW referral  Chaplaincy Referral: [ ] Yes [ ] Refused [ ] Following     Caregiver Felicity:  [ ] Yes [ ] No    OR    [x ] Unable to determine. Will assess at later time if appropriate.  Social Work Referral [ ]  Patient and Family Centered Care Referral [ ]    Anticipatory Grief Present: [ ] Yes [ ] No    OR     [ x] Unable to determine. Will assess at later time if appropriate.  Social Work Referral [ ]  Patient and Family Centered Care Referral [ ]    REFERRALS:   [ ]Chaplaincy  [ ]Hospice  [ ]Child Life  [ ]Social Work  [ ]Case management [ ]Holistic Therapy     Palliative care education provided to patient and/or family HPI:  89 yo F with hx of HTN, HLD, DM II, HFrEF (LVEF 20-25% 2020), Hypothyroidism, CVA, Dementia and Recent PNA (dx on CXR 4 days ago) presents to ED for generalized weakness and decreased PO intake. 4 days ago, patient started having cough and decreased PO intake. CXR was done and patient was diagnosed with PNA and was started on 5 days course of Azithromycin and Cefpodoxime with Prednisone 20mg OD. Patient had positive sick contact whereby both son and daughter developed URTI symptoms few days earleir. Patient is generally not very verbal at baseline and doesn't raise complains. She is mobile with a walker and 1 person assistance. Over past few days has been very week to ambulate. Family denies any reported chest pain, shortness of breath, fever, chills, abdominal pain, nausea, vomiting. They report loose BM over past 1 day.     On presentation vitals:   · BP Systolic	 176 mm Hg  · BP Diastolic	84 mm Hg  · Heart Rate	76 /min  · Respiration Rate (breaths/min)	 21 /min  · Temp (F)	98.7 Degrees F  · Temp (C)	37.1 Degrees C  · Temp site	oral  · SpO2 (%)	98 %  · O2 Delivery/Oxygen Delivery Method	nasal cannula  · Oxygen Therapy Flow (L/min)	2 L/min     Labs are significant for: Leucocytosis 12K, Cr 1.8 (baseline 1), Trop 437>350, ProBNP 24K.   ECG with no ischemia changes    Imaging: CT head non con: No acute intracranial pathology. No evidence of midline shift, mass effect or intracranial hemorrhage.  CT A/P:  Mild fullness left renal pelvis. Mild left ureter with distal left ureteral stone. Stone within the urinary bladder on the left.  Significant fecal retention. Moderate distention the urinary bladder.  Small left pleural effusion with adjacent consolidation which may reflect atelectasis or infiltrate.    Patient admitted to medicine for management of suspected PNA and MARTINE.  (01 Oct 2024 23:12)    Patient resting in bed. Discussed with patient's sons.     ITEMS NOT CHECKED ARE NOT PRESENT    SOCIAL HISTORY:   Significant other/partner[ ]  Children[x ]  Rastafari/Spirituality:  Substance hx:  [ ]   Tobacco hx:  [ ]   Alcohol hx: [ ]   Living Situation: [ ]Home  [ ]Long term care  [ ]Rehab [ ]Other  Home Services: [ ] HHA [ ] Chely RN [ ] Hospice  Occupation:  Home Opioid hx:  [ ] Y [ ] N [ ] I-Stop Reference No:     ADVANCE DIRECTIVES:    [ ] Full Code [x ] DNR  MOLST  [ ]  Living Will  [ ]   DECISION MAKER(s):  [ ] Health Care Proxy(s)  [ ] Surrogate(s)  [ ] Guardian           Name(s): Phone Number(s):    BASELINE (I)ADL(s) (prior to admission):  [place]: [ ]Total  [ ] Moderate [ ]Dependent  Palliative Performance Status Version 2:         %    http://[facility].org/files/news/palliative_performance_scale_[email or web address].pdf    Allergies    No Known Allergies    Intolerances    MEDICATIONS  (STANDING):  albuterol/ipratropium for Nebulization. 9 milliLiter(s) Nebulizer once  aspirin  chewable 81 milliGRAM(s) Oral daily  atorvastatin 20 milliGRAM(s) Oral at bedtime  bisacodyl Suppository 10 milliGRAM(s) Rectal once  cefTRIAXone   IVPB      cefTRIAXone   IVPB 1000 milliGRAM(s) IV Intermittent every 24 hours  dexAMETHasone  Injectable 6 milliGRAM(s) IV Push every 24 hours  dextrose 5%. 1000 milliLiter(s) (100 mL/Hr) IV Continuous <Continuous>  dextrose 5%. 1000 milliLiter(s) (50 mL/Hr) IV Continuous <Continuous>  dextrose 50% Injectable 25 Gram(s) IV Push once  dextrose 50% Injectable 25 Gram(s) IV Push once  dextrose 50% Injectable 12.5 Gram(s) IV Push once  doxycycline IVPB      doxycycline IVPB 100 milliGRAM(s) IV Intermittent every 12 hours  enoxaparin Injectable 50 milliGRAM(s) SubCutaneous every 12 hours  glucagon  Injectable 1 milliGRAM(s) IntraMuscular once  insulin lispro (ADMELOG) corrective regimen sliding scale   SubCutaneous three times a day before meals  lactated ringers. 1000 milliLiter(s) (75 mL/Hr) IV Continuous <Continuous>  levothyroxine 50 MICROGram(s) Oral daily  polyethylene glycol 3350 17 Gram(s) Oral two times a day  remdesivir  IVPB 100 milliGRAM(s) IV Intermittent once  senna 2 Tablet(s) Oral at bedtime    MEDICATIONS  (PRN):  dextrose Oral Gel 15 Gram(s) Oral once PRN Blood Glucose LESS THAN 70 milliGRAM(s)/deciliter      PRESENT SYMPTOMS: [x]Unable to obtain due to poor mentation   Source if other than patient:  [ ]Family   [ ]Team     Pain: [ ]yes [ ]no  QOL impact -   Location -                    Aggravating factors -  Alleviating factors -   Quality -  Radiation -  Timing -   Severity (0-10 scale):  Minimal acceptable level (0-10 scale):     CPOT:    https://www.[facility].org/getattachment/[email or web address]/Critical-Care-Pain-Observation-Tool-(CPOT)    PAIN AD Score: 0  http://geriatrictoolkit.[facility]/cog/painad.pdf     Dyspnea:                           [ ]None[ ]Mild [ ]Moderate [ ]Severe     Respiratory Distress Observation Scale (RDOS): 0  A score of 0 to 2 signifies little or no respiratory distress, 3 signifies mild distress, scores 4 to 6 indicate moderate distress, and scores greater than 7 signify severe distress  https://www.[facility].ca/sites/default/files/PDFS/415463-axkqojquwmc-hqbvomow-ovcqndnnfii-okhox.pdf    Anxiety:                             [ ]None[ ]Mild [ ]Moderate [ ]Severe   Fatigue:                             [ ]None[ ]Mild [ ]Moderate [ ]Severe   Nausea:                             [ ]None[ ]Mild [ ]Moderate [ ]Severe   Loss of appetite:              [ ]None[ ]Mild [ ]Moderate [ ]Severe   Constipation:                    [ ]None[ ]Mild [ ]Moderate [ ]Severe    Other Symptoms:  [ ]All other review of systems negative     Palliative Performance Status Version 2:         %    http://[email or web address].org/files/news/palliative_performance_scale_[email or web address].pdf  PHYSICAL EXAM:    GENERAL:  NAD   PULMONARY:  Non labored breathing  NEUROLOGIC: Resting in bed  BEHAVIORAL/PSYCH:  Calm    LABS: I have reviewed daily labs                          13.6   10.50 )-----------( 122      ( 03 Oct 2024 07:30 )             42.2       10-02    140  |  105  |  62[HH]  ----------------------------<  175[H]  4.0   |  22  |  1.1    Ca    8.6      02 Oct 2024 11:58  Mg     2.4     10-01    TPro  6.5  /  Alb  3.8  /  TBili  0.5  /  DBili  x   /  AST  81[H]  /  ALT  33  /  AlkPhos  190[H]  10-01          RADIOLOGY & ADDITIONAL STUDIES: I have reviewed new imaging    PROTEIN CALORIE MALNUTRITION PRESENT: [ ]mild [ ]moderate [ ]severe [ ]underweight [ ]morbid obesity  https://www.andeal.org/vault/2440/web/files/ONC/Table_Clinical%20Characteristics%20to%20Document%20Malnutrition-White%20JV%20et%20al%202012.pdf      Weight (kg): 52.617 (10-02-24 @ 10:55)    [ ]PPSV2 < or = to 30% [ ]significant weight loss  [ ]poor nutritional intake  [ ]anasarca      [ ]Artificial Nutrition      Palliative Care Spiritual/Emotional Screening Tool Question  Severity (0-4):                    OR                    [ x] Unable to determine. Will assess at later time if appropriate.  Score of 2 or greater indicates recommendation of Chaplaincy and/or SW referral  Chaplaincy Referral: [ ] Yes [ ] Refused [ ] Following     Caregiver [place]:  [ ] Yes [ ] No    OR    [x ] Unable to determine. Will assess at later time if appropriate.  Social Work Referral [ ]  Patient and Family Centered Care Referral [ ]    Anticipatory Grief Present: [ ] Yes [ ] No    OR     [ x] Unable to determine. Will assess at later time if appropriate.  Social Work Referral [ ]  Patient and Family Centered Care Referral [ ]    REFERRALS:   [ ]Chaplaincy  [ ]Hospice  [ ]Child Life  [ ]Social Work  [ ]Case management [ ]Holistic Therapy     Palliative care education provided to patient and/or family

## 2024-10-03 NOTE — CONSULT NOTE ADULT - COMMENTS
unable to obtain history secondary to patient's mental status and/or sedation  2 LIT NC  NAD at rest  Her son Eliseo at the bedside

## 2024-10-03 NOTE — PROGRESS NOTE ADULT - SUBJECTIVE AND OBJECTIVE BOX
BRIJESH POWER 89y Female  MRN#: [id number]   Hospital Day: 2d    HPI:  89 yo F with hx of HTN, HLD, DM II, HFrEF (LVEF 20-25% 2020), Hypothyroidism, CVA, Dementia and Recent PNA (dx on CXR 4 days ago) presents to ED for generalized weakness and decreased PO intake. 4 days ago, patient started having cough and decreased PO intake. CXR was done and patient was diagnosed with PNA and was started on 5 days course of Azithromycin and Cefpodoxime with Prednisone 20mg OD. Patient had positive sick contact whereby both son and daughter developed URTI symptoms few days earleir. Patient is generally not very verbal at baseline and doesn't raise complains. She is mobile with a walker and 1 person assistance. Over past few days has been very week to ambulate. Family denies any reported chest pain, shortness of breath, fever, chills, abdominal pain, nausea, vomiting. They report loose BM over past 1 day.     On presentation vitals:   · BP Systolic	 176 mm Hg  · BP Diastolic	84 mm Hg  · Heart Rate	76 /min  · Respiration Rate (breaths/min)	 21 /min  · Temp (F)	98.7 Degrees F  · Temp (C)	37.1 Degrees C  · Temp site	oral  · SpO2 (%)	98 %  · O2 Delivery/Oxygen Delivery Method	nasal cannula  · Oxygen Therapy Flow (L/min)	2 L/min     Labs are significant for: Leucocytosis 12K, Cr 1.8 (baseline 1), Trop 437>350, ProBNP 24K.   ECG with no ischemia changes    Imaging: CT head non con: No acute intracranial pathology. No evidence of midline shift, mass effect or intracranial hemorrhage.  CT A/P:  Mild fullness left renal pelvis. Mild left ureter with distal left ureteral stone. Stone within the urinary bladder on the left.  Significant fecal retention. Moderate distention the urinary bladder.  Small left pleural effusion with adjacent consolidation which may reflect atelectasis or infiltrate.    Patient admitted to medicine for management of suspected PNA and MARTINE.  (01 Oct 2024 23:12)      SUBJECTIVE  Patient is a 89y old Female who presents with a chief complaint of SOB (02 Oct 2024 16:32)  Currently admitted to medicine with the primary diagnosis of Pneumonia      INTERVAL HPI AND OVERNIGHT EVENTS:  Patient was examined and seen at bedside. This morning she is resting comfortably in bed and reports no issues or overnight events.      OBJECTIVE  PAST MEDICAL & SURGICAL HISTORY  Dementia    Hypertension    Hyperlipidemia    Hypothyroidism    Diabetes      ALLERGIES:  No Known Allergies    MEDICATIONS:  STANDING MEDICATIONS  albuterol/ipratropium for Nebulization. 9 milliLiter(s) Nebulizer once  aspirin  chewable 81 milliGRAM(s) Oral daily  atorvastatin 20 milliGRAM(s) Oral at bedtime  bisacodyl Suppository 10 milliGRAM(s) Rectal once  cefTRIAXone   IVPB      cefTRIAXone   IVPB 1000 milliGRAM(s) IV Intermittent every 24 hours  dexAMETHasone  Injectable 6 milliGRAM(s) IV Push every 24 hours  dextrose 5%. 1000 milliLiter(s) IV Continuous <Continuous>  dextrose 5%. 1000 milliLiter(s) IV Continuous <Continuous>  dextrose 50% Injectable 25 Gram(s) IV Push once  dextrose 50% Injectable 25 Gram(s) IV Push once  dextrose 50% Injectable 12.5 Gram(s) IV Push once  doxycycline IVPB      doxycycline IVPB 100 milliGRAM(s) IV Intermittent every 12 hours  enoxaparin Injectable 50 milliGRAM(s) SubCutaneous every 12 hours  glucagon  Injectable 1 milliGRAM(s) IntraMuscular once  insulin lispro (ADMELOG) corrective regimen sliding scale   SubCutaneous three times a day before meals  lactated ringers. 1000 milliLiter(s) IV Continuous <Continuous>  levothyroxine 50 MICROGram(s) Oral daily  polyethylene glycol 3350 17 Gram(s) Oral two times a day  remdesivir  IVPB 100 milliGRAM(s) IV Intermittent once  senna 2 Tablet(s) Oral at bedtime    PRN MEDICATIONS  dextrose Oral Gel 15 Gram(s) Oral once PRN      VITAL SIGNS: Last 24 Hours  T(C): 36.2 (03 Oct 2024 08:38), Max: 36.4 (02 Oct 2024 15:54)  T(F): 97.2 (03 Oct 2024 08:38), Max: 97.5 (02 Oct 2024 15:54)  HR: 75 (03 Oct 2024 08:38) (75 - 90)  BP: 101/60 (03 Oct 2024 08:38) (101/60 - 132/60)  BP(mean): --  RR: 18 (03 Oct 2024 08:38) (18 - 18)  SpO2: 96% (03 Oct 2024 08:38) (95% - 98%)    LABS:                        13.6   10.50 )-----------( 122      ( 03 Oct 2024 07:30 )             42.2     10-03    139  |  106  |  47[H]  ----------------------------<  161[H]  4.6   |  19  |  1.0    Ca    9.0      03 Oct 2024 07:30  Mg     1.9     10-03    TPro  5.5[L]  /  Alb  3.3[L]  /  TBili  0.3  /  DBili  x   /  AST  29  /  ALT  22  /  AlkPhos  156[H]  10-03    PT/INR - ( 02 Oct 2024 11:58 )   PT: 12.20 sec;   INR: 1.07 ratio           Urinalysis Basic - ( 03 Oct 2024 07:30 )    Color: x / Appearance: x / SG: x / pH: x  Gluc: 161 mg/dL / Ketone: x  / Bili: x / Urobili: x   Blood: x / Protein: x / Nitrite: x   Leuk Esterase: x / RBC: x / WBC x   Sq Epi: x / Non Sq Epi: x / Bacteria: x            Culture - Urine (collected 02 Oct 2024 00:45)  Source: Clean Catch None  Final Report (03 Oct 2024 11:59):    No growth    Urinalysis with Rflx Culture (collected 02 Oct 2024 00:45)    Culture - Blood (collected 01 Oct 2024 15:23)  Source: .Blood BLOOD  Preliminary Report (02 Oct 2024 22:01):    No growth at 24 hours    Culture - Blood (collected 01 Oct 2024 15:23)  Source: .Blood BLOOD  Preliminary Report (02 Oct 2024 22:01):    No growth at 24 hours      PHYSICAL EXAM:  CONSTITUTIONAL: No acute distress  HEAD: Atraumatic, normocephalic  EYES: EOM intact, conjunctiva and sclera clear  ENT: Supple, no masses, no thyromegaly, no bruits, no JVD; moist mucous membranes  PULMONARY: decreased lung sounds b/l  CARDIOVASCULAR: Regular rate and rhythm; no murmurs, rubs, or gallops  GASTROINTESTINAL: Soft, non-tender, non-distended; bowel sounds present  NEUROLOGY: non-focal    ASSESSMENT & PLAN    89 yo F with hx of HTN, HLD, DM II, HFrEF (LVEF 20-25% 2020), Hypothyroidism, CVA, Dementia and Recent PNA (dx 4 days ago) presents to ED for generalized weakness. Positive sick contact.     #Acute hypoxic respiratory failure 2/2 submassive PE with RV strain (trop, elevated bpnp, hemodynamically stable) and COVID pneumonia    - CTA PE as above  - Echo demonstrated Romano's sign, biomarkers elevated, requiring oxygen supplementation with desaturation on RA at rest   - Cr improved, DC heparin ggt, start theraputic lovenox   - CT abdomen: Small left pleural effusion with adjacent consolidation which may reflect atelectasis or infiltrate. Mild fullness left renal pelvis. Mild left ureter with distal left ureteral stone. Stone within the urinary bladder on the left.   - cont doxy and CTX  - ID consult pending   - RVP +ve for Covid -> on RDV and dexamethasone   - f/u BCx, procal, nasal MRSA   - CC consult--> upgrade to SDU   - IR consult appreciated, will follow up reccs, no thrombectomy   - echo noted  - f/u palliative final recs    #Elevated troponin likely demand ischemia iso suspected PE and hypotension   #chronic  HFimpEF   - 1st troponin 437  --> 350 -> 335  - no ischemic changes on ECG  - c/w ASA, statin  - TTE in 2020: LVEF 20-25%  - TTE 10/2/2024: LVEF 69%, G1DD, LV Ejection Fraction by Abreu's Method with a biplane EF of 69 %. Right ventricular systolic function is reduced with apical sparing consistent with acute pulmonary embolism (Romano's sign). The RV appears moderately dilated. Moderate TR. Moderate MR. Borderline pulm HTN   - seen by Dr. Silver previously; not following with cardiology currently  - fu cardiology cs    #MARTINE likely prerenal - resolving   - serum Cr. 1.8 currently. Baseline Cr 1.0 in May 2024.   - Cr 1.1 now   - CT abdomen neg for hydronephrosis. Moderate distention the urinary bladder.  - Bladder scan q8H  - monitor BMP closely    #Constipation with fecal retention  - CT abdomen shows Significant fecal retention.  - bowel regimen.     HTN - hold losartan due to MARTINE. Hold lopressor as BP borderline.   DM II - monitor FS AC HS. Insulin sliding scale.   Hypothyroidism - c/w synthroid. check TSH.

## 2024-10-03 NOTE — PROGRESS NOTE ADULT - ASSESSMENT
IMPRESSION:    PE   Possible chronic PE   Positive COVID-19 Infection   Constipation  HO HTN/HLD  HO DM II  HO HFpEF  HO Hypothyroidism  HO CVA  HO Dementia    PLAN:    CNS: Avoid depressants, MS at baseline    HEENT: Oral care    PULMONARY:  HOB @ 45 degrees.  Aspiration precautions.  Supplemental O2 to main SpO2 92-96%.  CTA reviewed     CARDIOVASCULAR:   MAP adequate.  ECHO noted.  Keep even balance.    GI: GI prophylaxis. Feeding per speech and swallow.  Bowel regimen     RENAL:  Follow up lytes.  Correct as needed.    INFECTIOUS DISEASE: Follow up cultures. Remdesivir per ID.  trend inflammatory markers.  FU procalcitonin    HEMATOLOGICAL:  Can switch to LMWH if GFR OK.  transition to oral prior to DC     ENDOCRINE:  Follow up FS.  Insulin protocol if needed.    MUSCULOSKELETAL: bedrest Off loading;  PT OT  for now     Tele

## 2024-10-03 NOTE — CONSULT NOTE ADULT - ASSESSMENT
89 yo F with hx of HTN, HLD, DM II, HFrEF (LVEF 20-25% 2020), Hypothyroidism, CVA, Dementia and Recent PNA (dx on CXR 4 days ago) presents to ED for generalized weakness and decreased PO intake. 4 days ago, patient started having cough and decreased PO intake. CXR was done and patient was diagnosed with PNA and was started on 5 days course of Azithromycin and Cefpodoxime with Prednisone 20mg OD. Patient had positive sick contact whereby both son and daughter developed URTI symptoms few days earleir. Patient is generally not very verbal at baseline and doesn't raise complains. She is mobile with a walker and 1 person assistance. Over past few days has been very week to ambulate. Family denies any reported chest pain, shortness of breath, fever, chills, abdominal pain, nausea, vomiting. They report loose BM over past 1 day.     IMPRESSION/RECOMMENDATIONS  Immunosuppression/Immunosenescence ( above age 60 yrs there is a exponential decline in immunity which could result in poor clinical outcomes.   COVID 19 with mild illness- pt has physiological /non pulmonary complaints  10/2 CT with no GGO  Pt is in the early viral replicative phase based on the timeline/onset of symptoms. ( as per son Eliseo at the bedside he was NG and his wife who had a URI also tested NG ? )  S/p vaccination  LLL bacterial PNA  10/2 CT with LLL : lobar consolidation. No pleural effusion. No cavitation  No risk factors for ORSA/ P aeruginosa  Nares ORSA NG  10/2 urine for legionella ag NG  10/1 BCx NG  WBc 10.5  BNP 40132    - mg iv on Day 1, then 100 mg iv D2 and D3.  -No steroids  -Rocephin 1 gm iv q24h  -Doxycycline 10 mg iv q12h  -Off loading to prevent pressure sores and preventive measures to avoid aspiration    Discussed with her son Eliseo at the bedside

## 2024-10-03 NOTE — PROGRESS NOTE ADULT - SUBJECTIVE AND OBJECTIVE BOX
Patient is a 89y old  Female who presents with a chief complaint of SOB (02 Oct 2024 16:32)        Over Night Events:  on NC.  off pressors.          ROS:     All ROS are negative except HPI         PHYSICAL EXAM    ICU Vital Signs Last 24 Hrs  T(C): 36.2 (03 Oct 2024 08:38), Max: 36.4 (02 Oct 2024 15:54)  T(F): 97.2 (03 Oct 2024 08:38), Max: 97.5 (02 Oct 2024 15:54)  HR: 75 (03 Oct 2024 08:38) (75 - 90)  BP: 101/60 (03 Oct 2024 08:38) (101/60 - 132/60)  BP(mean): --  ABP: --  ABP(mean): --  RR: 18 (03 Oct 2024 08:38) (18 - 18)  SpO2: 96% (03 Oct 2024 08:38) (95% - 98%)    O2 Parameters below as of 03 Oct 2024 04:00  Patient On (Oxygen Delivery Method): nasal cannula  O2 Flow (L/min): 2          CONSTITUTIONAL:  Ill appearing in NAD     ENT:   Airway patent,   Mouth with normal mucosa.       EYES:   Pupils equal,   Round and reactive to light.    CARDIAC:   Normal rate,   Regular rhythm.        Vascular:  Normal systolic impulse  No Carotid bruits    RESPIRATORY:   No wheezing  Bilateral BS  Normal chest expansion  Not tachypneic,  No use of accessory muscles    GASTROINTESTINAL:  Abdomen soft,   Non-tender,   No guarding,   + BS    MUSCULOSKELETAL:   No clubbing, cyanosis    NEUROLOGICAL:   Alert   No motor  deficits.    SKIN:   Skin normal color for race,   No evidence of rash.      10-02-24 @ 07:01  -  10-03-24 @ 07:00  --------------------------------------------------------  IN:  Total IN: 0 mL    OUT:    Intermittent Catheterization - Urethral (mL): 600 mL  Total OUT: 600 mL    Total NET: -600 mL          LABS:                            13.6   10.50 )-----------( 122      ( 03 Oct 2024 07:30 )             42.2                                               10-03             13.6   10.50 )-----------( 122      ( 10-03 @ 07:30 )             42.2                12.6   9.05  )-----------( 115      ( 10-02 @ 11:58 )             38.5                14.9   11.83 )-----------( 150      ( 10-01 @ 15:15 )             46.3           139  |  106  |  47[H]  ----------------------------<  161[H]  4.6   |  19  |  1.0    Ca    9.0      03 Oct 2024 07:30  Mg     1.9     10-03    TPro  5.5[L]  /  Alb  3.3[L]  /  TBili  0.3  /  DBili  x   /  AST  29  /  ALT  22  /  AlkPhos  156[H]  10-03      PT/INR - ( 02 Oct 2024 11:58 )   PT: 12.20 sec;   INR: 1.07 ratio         PTT - ( 01 Oct 2024 15:15 )  PTT:23.8 sec                                       Urinalysis Basic - ( 03 Oct 2024 07:30 )    Color: x / Appearance: x / SG: x / pH: x  Gluc: 161 mg/dL / Ketone: x  / Bili: x / Urobili: x   Blood: x / Protein: x / Nitrite: x   Leuk Esterase: x / RBC: x / WBC x   Sq Epi: x / Non Sq Epi: x / Bacteria: x                                                  LIVER FUNCTIONS - ( 03 Oct 2024 07:30 )  Alb: 3.3 g/dL / Pro: 5.5 g/dL / ALK PHOS: 156 U/L / ALT: 22 U/L / AST: 29 U/L / GGT: x                                                  Urinalysis with Rflx Culture (collected 02 Oct 2024 00:45)    Culture - Blood (collected 01 Oct 2024 15:23)  Source: .Blood BLOOD  Preliminary Report (02 Oct 2024 22:01):    No growth at 24 hours    Culture - Blood (collected 01 Oct 2024 15:23)  Source: .Blood BLOOD  Preliminary Report (02 Oct 2024 22:01):    No growth at 24 hours                                                                                           MEDICATIONS  (STANDING):  albuterol/ipratropium for Nebulization. 9 milliLiter(s) Nebulizer once  aspirin  chewable 81 milliGRAM(s) Oral daily  atorvastatin 20 milliGRAM(s) Oral at bedtime  bisacodyl Suppository 10 milliGRAM(s) Rectal once  cefTRIAXone   IVPB      cefTRIAXone   IVPB 1000 milliGRAM(s) IV Intermittent every 24 hours  dexAMETHasone  Injectable 6 milliGRAM(s) IV Push every 24 hours  dextrose 5%. 1000 milliLiter(s) (100 mL/Hr) IV Continuous <Continuous>  dextrose 5%. 1000 milliLiter(s) (50 mL/Hr) IV Continuous <Continuous>  dextrose 50% Injectable 25 Gram(s) IV Push once  dextrose 50% Injectable 25 Gram(s) IV Push once  dextrose 50% Injectable 12.5 Gram(s) IV Push once  doxycycline IVPB      doxycycline IVPB 100 milliGRAM(s) IV Intermittent every 12 hours  enoxaparin Injectable 50 milliGRAM(s) SubCutaneous every 12 hours  glucagon  Injectable 1 milliGRAM(s) IntraMuscular once  insulin lispro (ADMELOG) corrective regimen sliding scale   SubCutaneous three times a day before meals  lactated ringers. 1000 milliLiter(s) (75 mL/Hr) IV Continuous <Continuous>  levothyroxine 50 MICROGram(s) Oral daily  polyethylene glycol 3350 17 Gram(s) Oral two times a day  remdesivir  IVPB 100 milliGRAM(s) IV Intermittent once  senna 2 Tablet(s) Oral at bedtime    MEDICATIONS  (PRN):  dextrose Oral Gel 15 Gram(s) Oral once PRN Blood Glucose LESS THAN 70 milliGRAM(s)/deciliter      New X-rays reviewed:                                                                                  ECHO

## 2024-10-04 LAB
ANION GAP SERPL CALC-SCNC: 15 MMOL/L — HIGH (ref 7–14)
BUN SERPL-MCNC: 43 MG/DL — HIGH (ref 10–20)
CALCIUM SERPL-MCNC: 8.9 MG/DL — SIGNIFICANT CHANGE UP (ref 8.4–10.4)
CHLORIDE SERPL-SCNC: 107 MMOL/L — SIGNIFICANT CHANGE UP (ref 98–110)
CO2 SERPL-SCNC: 19 MMOL/L — SIGNIFICANT CHANGE UP (ref 17–32)
CREAT SERPL-MCNC: 1 MG/DL — SIGNIFICANT CHANGE UP (ref 0.7–1.5)
EGFR: 50 ML/MIN/1.73M2 — LOW
GLUCOSE BLDC GLUCOMTR-MCNC: 181 MG/DL — HIGH (ref 70–99)
GLUCOSE BLDC GLUCOMTR-MCNC: 192 MG/DL — HIGH (ref 70–99)
GLUCOSE BLDC GLUCOMTR-MCNC: 281 MG/DL — HIGH (ref 70–99)
GLUCOSE SERPL-MCNC: 187 MG/DL — HIGH (ref 70–99)
HCT VFR BLD CALC: 41.6 % — SIGNIFICANT CHANGE UP (ref 37–47)
HGB BLD-MCNC: 13.5 G/DL — SIGNIFICANT CHANGE UP (ref 12–16)
MCHC RBC-ENTMCNC: 31.2 PG — HIGH (ref 27–31)
MCHC RBC-ENTMCNC: 32.5 G/DL — SIGNIFICANT CHANGE UP (ref 32–37)
MCV RBC AUTO: 96.1 FL — SIGNIFICANT CHANGE UP (ref 81–99)
NRBC # BLD: 0 /100 WBCS — SIGNIFICANT CHANGE UP (ref 0–0)
PLATELET # BLD AUTO: 149 K/UL — SIGNIFICANT CHANGE UP (ref 130–400)
PMV BLD: 12.2 FL — HIGH (ref 7.4–10.4)
POTASSIUM SERPL-MCNC: 5.2 MMOL/L — HIGH (ref 3.5–5)
POTASSIUM SERPL-SCNC: 5.2 MMOL/L — HIGH (ref 3.5–5)
RBC # BLD: 4.33 M/UL — SIGNIFICANT CHANGE UP (ref 4.2–5.4)
RBC # FLD: 13.7 % — SIGNIFICANT CHANGE UP (ref 11.5–14.5)
SODIUM SERPL-SCNC: 141 MMOL/L — SIGNIFICANT CHANGE UP (ref 135–146)
WBC # BLD: 13.56 K/UL — HIGH (ref 4.8–10.8)
WBC # FLD AUTO: 13.56 K/UL — HIGH (ref 4.8–10.8)

## 2024-10-04 PROCEDURE — 99232 SBSQ HOSP IP/OBS MODERATE 35: CPT

## 2024-10-04 RX ORDER — PANTOPRAZOLE SODIUM 40 MG/1
40 TABLET, DELAYED RELEASE ORAL
Refills: 0 | Status: DISCONTINUED | OUTPATIENT
Start: 2024-10-04 | End: 2024-10-25

## 2024-10-04 RX ORDER — CHLORHEXIDINE GLUCONATE 40 MG/ML
1 SOLUTION TOPICAL DAILY
Refills: 0 | Status: DISCONTINUED | OUTPATIENT
Start: 2024-10-04 | End: 2024-10-29

## 2024-10-04 RX ORDER — SODIUM ZIRCONIUM CYCLOSILICATE 10 G/10G
10 POWDER, FOR SUSPENSION ORAL ONCE
Refills: 0 | Status: COMPLETED | OUTPATIENT
Start: 2024-10-04 | End: 2024-10-04

## 2024-10-04 RX ADMIN — Medication 1: at 08:23

## 2024-10-04 RX ADMIN — APIXABAN 10 MILLIGRAM(S): 5 TABLET, FILM COATED ORAL at 17:33

## 2024-10-04 RX ADMIN — REMDESIVIR 200 MILLIGRAM(S): 100 INJECTION, POWDER, LYOPHILIZED, FOR SOLUTION INTRAVENOUS at 18:39

## 2024-10-04 RX ADMIN — POLYETHYLENE GLYCOL 3350 17 GRAM(S): 17 POWDER, FOR SOLUTION ORAL at 17:31

## 2024-10-04 RX ADMIN — APIXABAN 10 MILLIGRAM(S): 5 TABLET, FILM COATED ORAL at 05:33

## 2024-10-04 RX ADMIN — Medication 20 MILLIGRAM(S): at 22:08

## 2024-10-04 RX ADMIN — Medication 100 MILLIGRAM(S): at 22:22

## 2024-10-04 RX ADMIN — DEXAMETHASONE 1.5 MG 6 MILLIGRAM(S): 1.5 TABLET ORAL at 05:32

## 2024-10-04 RX ADMIN — Medication 3: at 17:32

## 2024-10-04 RX ADMIN — DOXYCYCLINE HYCLATE 100 MILLIGRAM(S): 100 TABLET, FILM COATED ORAL at 17:32

## 2024-10-04 RX ADMIN — Medication 100 MILLIGRAM(S): at 00:03

## 2024-10-04 RX ADMIN — Medication 50 MICROGRAM(S): at 05:32

## 2024-10-04 RX ADMIN — SODIUM ZIRCONIUM CYCLOSILICATE 10 GRAM(S): 10 POWDER, FOR SUSPENSION ORAL at 17:31

## 2024-10-04 RX ADMIN — Medication 2 TABLET(S): at 22:08

## 2024-10-04 RX ADMIN — CHLORHEXIDINE GLUCONATE 1 APPLICATION(S): 40 SOLUTION TOPICAL at 11:51

## 2024-10-04 RX ADMIN — Medication 81 MILLIGRAM(S): at 11:48

## 2024-10-04 RX ADMIN — DOXYCYCLINE HYCLATE 100 MILLIGRAM(S): 100 TABLET, FILM COATED ORAL at 05:37

## 2024-10-04 RX ADMIN — Medication 1: at 11:48

## 2024-10-04 NOTE — DIETITIAN INITIAL EVALUATION ADULT - PERTINENT LABORATORY DATA
10-04    141  |  107  |  43[H]  ----------------------------<  187[H]  5.2[H]   |  19  |  1.0    Ca    8.9      04 Oct 2024 07:10  Mg     1.9     10-03    TPro  5.5[L]  /  Alb  3.3[L]  /  TBili  0.3  /  DBili  x   /  AST  29  /  ALT  22  /  AlkPhos  156[H]  10-03  POCT Blood Glucose.: 281 mg/dL (10-04-24 @ 16:36)

## 2024-10-04 NOTE — DIETITIAN INITIAL EVALUATION ADULT - PERTINENT MEDS FT
MEDICATIONS  (STANDING):  albuterol/ipratropium for Nebulization. 9 milliLiter(s) Nebulizer once  apixaban 10 milliGRAM(s) Oral two times a day  aspirin  chewable 81 milliGRAM(s) Oral daily  atorvastatin 20 milliGRAM(s) Oral at bedtime  bisacodyl Suppository 10 milliGRAM(s) Rectal once  cefTRIAXone   IVPB 1000 milliGRAM(s) IV Intermittent every 24 hours  cefTRIAXone   IVPB      chlorhexidine 2% Cloths 1 Application(s) Topical daily  dexAMETHasone  Injectable 6 milliGRAM(s) IV Push every 24 hours  dextrose 5%. 1000 milliLiter(s) (100 mL/Hr) IV Continuous <Continuous>  dextrose 5%. 1000 milliLiter(s) (50 mL/Hr) IV Continuous <Continuous>  dextrose 50% Injectable 25 Gram(s) IV Push once  dextrose 50% Injectable 25 Gram(s) IV Push once  dextrose 50% Injectable 12.5 Gram(s) IV Push once  doxycycline IVPB      doxycycline IVPB 100 milliGRAM(s) IV Intermittent every 12 hours  glucagon  Injectable 1 milliGRAM(s) IntraMuscular once  insulin lispro (ADMELOG) corrective regimen sliding scale   SubCutaneous three times a day before meals  lactated ringers. 1000 milliLiter(s) (75 mL/Hr) IV Continuous <Continuous>  levothyroxine 50 MICROGram(s) Oral daily  pantoprazole    Tablet 40 milliGRAM(s) Oral before breakfast  polyethylene glycol 3350 17 Gram(s) Oral two times a day  senna 2 Tablet(s) Oral at bedtime    MEDICATIONS  (PRN):  dextrose Oral Gel 15 Gram(s) Oral once PRN Blood Glucose LESS THAN 70 milliGRAM(s)/deciliter

## 2024-10-04 NOTE — DIETITIAN INITIAL EVALUATION ADULT - NUTRITIONGOAL OUTCOME1
Pt to demonstrate tolerance to diet order, with at least 50% po intake achieved over next 3-5 days.    Pt at high nutrition risk; RD to follow-up in 3-5 days.    Monitor: Skin, labs, BM, wt, nutrition focused physical findings, body composition, diet order, swallowing difficulty.

## 2024-10-04 NOTE — DIETITIAN INITIAL EVALUATION ADULT - ORAL INTAKE PTA/DIET HISTORY
Pt confused & disoriented at time of RD assessment and unable to participate in nutrition interview. No family at bedside during RD visit. Unable to reach emergency contact.

## 2024-10-04 NOTE — DIETITIAN INITIAL EVALUATION ADULT - OTHER INFO
Pertinent Medical Information: Pt presented to ED for generalized weakness. Positive sick contact. Acute hypoxic respiratory failure 2/2 submassive PE with RV strain (trop, elevated bpnp, hemodynamically stable) and COVID pneumonia. MARTINE noted resolving. Constipation with fecal retention - on bowel regimen.    Followed by SLP services this admit - 10/4 SLP eval notes "Moderate oral dysphagia with minced & moist, puree, mildly thick and thin liquids likely 2/2 cognition in the setting of dementia. +grossly overt s/s of aspiration with thin liquids. Toleration observed for puree and mildly thick liquids without overt s/s of penetration/ aspiration" Recommends Puree diet w/mildly thick liquids, allow for swallow between intakes; small sips/bites, dependent  1:1 feeding    PMH includes HTN, HLD, DM II, HFrEF (LVEF 20-25% 2020), Hypothyroidism, CVA, Dementia and Recent PNA (dx 4 days ago).

## 2024-10-04 NOTE — DIETITIAN INITIAL EVALUATION ADULT - NS FNS DIET ORDER
Minced & moist diet. Poor po intake in setting of confusion & swallowing difficulty; consuming 0-25% of meals this admit.

## 2024-10-04 NOTE — DIETITIAN INITIAL EVALUATION ADULT - ADD RECOMMEND
Recommendation: Change diet to puree diet w/mildly thick liquids, allow for swallow between intakes; small sips/bites, dependent 1:1 feeding. Order Magic Cup twice daily (290 kcal, 9 g protein per serving). Order Prosource Gelatein Plus once daily (160 kcal, 20 g protein).

## 2024-10-04 NOTE — DIETITIAN INITIAL EVALUATION ADULT - NSICDXPASTMEDICALHX_GEN_ALL_CORE_FT
PAST MEDICAL HISTORY:  Dementia     Diabetes     Hyperlipidemia     Hypertension     Hypothyroidism

## 2024-10-04 NOTE — PROGRESS NOTE ADULT - SUBJECTIVE AND OBJECTIVE BOX
24H events:    Patient is a 89y old Female who presents with a chief complaint of SOB (02 Oct 2024 16:32)    Primary diagnosis of Pneumonia       Today is hospital day 3d. This morning patient was seen and examined at bedside, resting comfortably in bed. No overnight events.     PAST MEDICAL & SURGICAL HISTORY  Dementia    Hypertension    Hyperlipidemia    Hypothyroidism    Diabetes      SOCIAL HISTORY:  Social History:      ALLERGIES:  No Known Allergies    MEDICATIONS:  STANDING MEDICATIONS  albuterol/ipratropium for Nebulization. 9 milliLiter(s) Nebulizer once  apixaban 10 milliGRAM(s) Oral two times a day  aspirin  chewable 81 milliGRAM(s) Oral daily  atorvastatin 20 milliGRAM(s) Oral at bedtime  bisacodyl Suppository 10 milliGRAM(s) Rectal once  cefTRIAXone   IVPB 1000 milliGRAM(s) IV Intermittent every 24 hours  cefTRIAXone   IVPB      chlorhexidine 2% Cloths 1 Application(s) Topical daily  dexAMETHasone  Injectable 6 milliGRAM(s) IV Push every 24 hours  dextrose 5%. 1000 milliLiter(s) IV Continuous <Continuous>  dextrose 5%. 1000 milliLiter(s) IV Continuous <Continuous>  dextrose 50% Injectable 25 Gram(s) IV Push once  dextrose 50% Injectable 25 Gram(s) IV Push once  dextrose 50% Injectable 12.5 Gram(s) IV Push once  doxycycline IVPB      doxycycline IVPB 100 milliGRAM(s) IV Intermittent every 12 hours  glucagon  Injectable 1 milliGRAM(s) IntraMuscular once  insulin lispro (ADMELOG) corrective regimen sliding scale   SubCutaneous three times a day before meals  lactated ringers. 1000 milliLiter(s) IV Continuous <Continuous>  levothyroxine 50 MICROGram(s) Oral daily  polyethylene glycol 3350 17 Gram(s) Oral two times a day  remdesivir  IVPB 100 milliGRAM(s) IV Intermittent once  senna 2 Tablet(s) Oral at bedtime  sodium zirconium cyclosilicate 10 Gram(s) Oral once    PRN MEDICATIONS  dextrose Oral Gel 15 Gram(s) Oral once PRN    VITALS:   T(F): 96  HR: 92  BP: 124/70  RR: 18  SpO2: 96%    PHYSICAL EXAM:  GENERAL: NAD, well-groomed, well-developed, A&Ox0  HEAD:  Atraumatic, Normocephalic  EYES: EOMI  NECK: Supple  NERVOUS SYSTEM:  Alert & Oriented X3, non focal   CHEST/LUNG: Rales heard bilaterally, dry cough present  HEART: Regular rate and rhythm; No murmurs, rubs, or gallops  ABDOMEN: Soft, Nontender, Nondistended; Bowel sounds present  EXTREMITIES:  2+ Peripheral Pulses, No clubbing, cyanosis, or edema  LYMPH: No lymphadenopathy noted  SKIN: No rashes or lesions  LABS:                        13.5   13.56 )-----------( 149      ( 04 Oct 2024 07:10 )             41.6     10-04    141  |  107  |  43[H]  ----------------------------<  187[H]  5.2[H]   |  19  |  1.0    Ca    8.9      04 Oct 2024 07:10  Mg     1.9     10-03    TPro  5.5[L]  /  Alb  3.3[L]  /  TBili  0.3  /  DBili  x   /  AST  29  /  ALT  22  /  AlkPhos  156[H]  10-03      Urinalysis Basic - ( 04 Oct 2024 07:10 )    Color: x / Appearance: x / SG: x / pH: x  Gluc: 187 mg/dL / Ketone: x  / Bili: x / Urobili: x   Blood: x / Protein: x / Nitrite: x   Leuk Esterase: x / RBC: x / WBC x   Sq Epi: x / Non Sq Epi: x / Bacteria: x            Culture - Urine (collected 02 Oct 2024 00:45)  Source: Clean Catch None  Final Report (03 Oct 2024 11:59):    No growth    Urinalysis with Rflx Culture (collected 02 Oct 2024 00:45)          RADIOLOGY:

## 2024-10-04 NOTE — PROGRESS NOTE ADULT - ASSESSMENT
89 yo F with hx of HTN, HLD, DM II, HFrEF (LVEF 20-25% 2020), Hypothyroidism, CVA, Dementia and Recent PNA (dx 4 days ago) presents to ED for generalized weakness. Positive sick contact.     #Acute hypoxic respiratory failure 2/2 submassive PE with RV strain (trop, elevated bpnp, hemodynamically stable) and COVID pneumonia    - CTA PE as above  - Echo demonstrated Romano's sign, biomarkers elevated, requiring oxygen supplementation with desaturation on RA at rest   - Cr improved, DC heparin ggt, start theraputic lovenox   - CT abdomen: Small left pleural effusion with adjacent consolidation which may reflect atelectasis or infiltrate. Mild fullness left renal pelvis. Mild left ureter with distal left ureteral stone. Stone within the urinary bladder on the left.   - cont doxy and CTX  - RVP +ve for Covid -> on RDV and dexamethasone, final day of RDV course  -ID recs appreciated: c/w rocephin and doxycyline  - Nasal MRSA negative  - 10/01 bcx prelim NGTD, f/u repeat AM BCx, procal pending  - IR consult appreciated, will follow up reccs, no thrombectomy, c/w full AC (Eliquis 10 BID)  - echo noted  - Palliative final recs appreciated    #Elevated troponin likely demand ischemia iso suspected PE and hypotension   #chronic  HFimpEF   - 1st troponin 437  --> 350 -> 335  - no ischemic changes on ECG  - c/w ASA, statin  - TTE in 2020: LVEF 20-25%  - TTE 10/2/2024: LVEF 69%, G1DD, LV Ejection Fraction by Abreu's Method with a biplane EF of 69 %. Right ventricular systolic function is reduced with apical sparing consistent with acute pulmonary embolism (Romano's sign). The RV appears moderately dilated. Moderate TR. Moderate MR. Borderline pulm HTN   - seen by Dr. Silver previously; not following with cardiology currently  - F/u cardiology recs from Dr. Silver    #MARTINE likely prerenal - resolving   - serum Cr. 1.8 currently. Baseline Cr 1.0 in May 2024.   - Cr 1.1 now   - CT abdomen neg for hydronephrosis. Moderate distention the urinary bladder.  - Bladder scan q8H  - monitor BMP closely    #Constipation with fecal retention  - CT abdomen shows Significant fecal retention.  - bowel regimen.     HTN - hold losartan due to MARTINE. Hold lopressor as BP borderline.   DM II - monitor FS AC HS. Insulin sliding scale.   Hypothyroidism - c/w synthroid. check TSH 10/5 AM.     DVT PPX: eliquis  GI PPX: protonix  DIET: minced and moist  ACTIVITY: as tolerated  CODE STATUS: DNR/DNI  DISPOSITION: acute, 3c    PENDING: cardio recs   89 yo F with hx of HTN, HLD, DM II, HFrEF (LVEF 20-25% 2020), Hypothyroidism, CVA, Dementia and Recent PNA (dx 4 days ago) presents to ED for generalized weakness. Positive sick contact.     #Acute hypoxic respiratory failure 2/2 submassive PE with RV strain (trop, elevated bpnp, hemodynamically stable) and COVID pneumonia    - CTA PE as above  - Echo demonstrated Romano's sign, biomarkers elevated, requiring oxygen supplementation with desaturation on RA at rest   - Cr improved, DC heparin ggt, start theraputic lovenox   - CT abdomen: Small left pleural effusion with adjacent consolidation which may reflect atelectasis or infiltrate. Mild fullness left renal pelvis. Mild left ureter with distal left ureteral stone. Stone within the urinary bladder on the left.   - cont doxy and CTX  - RVP +ve for Covid -> on RDV and dexamethasone, final day of RDV course  -ID recs appreciated: c/w rocephin and doxycyline  - Nasal MRSA negative  - 10/01 bcx prelim NGTD, f/u repeat AM BCx, procal pending  - IR consult appreciated: no thrombectomy for now, c/w full AC (Eliquis 10 BID)  - echo noted  - Palliative final recs appreciated    #Elevated troponin likely demand ischemia iso suspected PE and hypotension   #chronic  HFimpEF   - 1st troponin 437  --> 350 -> 335  - no ischemic changes on ECG  - c/w ASA, statin  - TTE in 2020: LVEF 20-25%  - TTE 10/2/2024: LVEF 69%, G1DD, LV Ejection Fraction by Abreu's Method with a biplane EF of 69 %. Right ventricular systolic function is reduced with apical sparing consistent with acute pulmonary embolism (Romano's sign). The RV appears moderately dilated. Moderate TR. Moderate MR. Borderline pulm HTN   - seen by Dr. Silver previously; not following with cardiology currently  - F/u cardiology recs from Dr. Silver    #MARTINE likely prerenal - resolving   - serum Cr. 1.8 currently. Baseline Cr 1.0 in May 2024.   - Cr 1.1 now   - CT abdomen neg for hydronephrosis. Moderate distention the urinary bladder.  - Bladder scan q8H  - monitor BMP closely    #Constipation with fecal retention  - CT abdomen shows Significant fecal retention.  - bowel regimen.     HTN - hold losartan due to MARTINE. Hold lopressor as BP borderline.   DM II - monitor FS AC HS. Insulin sliding scale.   Hypothyroidism - c/w synthroid. check TSH 10/5 AM.     DVT PPX: eliquis  GI PPX: protonix  DIET: minced and moist  ACTIVITY: as tolerated  CODE STATUS: DNR/DNI  DISPOSITION: acute, 3c    PENDING: cardio recs

## 2024-10-05 LAB
ALBUMIN SERPL ELPH-MCNC: 3 G/DL — LOW (ref 3.5–5.2)
ALP SERPL-CCNC: 137 U/L — HIGH (ref 30–115)
ALT FLD-CCNC: 23 U/L — SIGNIFICANT CHANGE UP (ref 0–41)
ANION GAP SERPL CALC-SCNC: 15 MMOL/L — HIGH (ref 7–14)
AST SERPL-CCNC: 22 U/L — SIGNIFICANT CHANGE UP (ref 0–41)
BILIRUB SERPL-MCNC: 0.3 MG/DL — SIGNIFICANT CHANGE UP (ref 0.2–1.2)
BUN SERPL-MCNC: 39 MG/DL — HIGH (ref 10–20)
CALCIUM SERPL-MCNC: 8.9 MG/DL — SIGNIFICANT CHANGE UP (ref 8.4–10.4)
CHLORIDE SERPL-SCNC: 104 MMOL/L — SIGNIFICANT CHANGE UP (ref 98–110)
CO2 SERPL-SCNC: 20 MMOL/L — SIGNIFICANT CHANGE UP (ref 17–32)
CREAT SERPL-MCNC: 0.9 MG/DL — SIGNIFICANT CHANGE UP (ref 0.7–1.5)
EGFR: 56 ML/MIN/1.73M2 — LOW
GLUCOSE BLDC GLUCOMTR-MCNC: 154 MG/DL — HIGH (ref 70–99)
GLUCOSE BLDC GLUCOMTR-MCNC: 180 MG/DL — HIGH (ref 70–99)
GLUCOSE BLDC GLUCOMTR-MCNC: 206 MG/DL — HIGH (ref 70–99)
GLUCOSE BLDC GLUCOMTR-MCNC: 252 MG/DL — HIGH (ref 70–99)
GLUCOSE SERPL-MCNC: 147 MG/DL — HIGH (ref 70–99)
HCT VFR BLD CALC: 42 % — SIGNIFICANT CHANGE UP (ref 37–47)
HGB BLD-MCNC: 13.5 G/DL — SIGNIFICANT CHANGE UP (ref 12–16)
MAGNESIUM SERPL-MCNC: 1.8 MG/DL — SIGNIFICANT CHANGE UP (ref 1.8–2.4)
MCHC RBC-ENTMCNC: 30.8 PG — SIGNIFICANT CHANGE UP (ref 27–31)
MCHC RBC-ENTMCNC: 32.1 G/DL — SIGNIFICANT CHANGE UP (ref 32–37)
MCV RBC AUTO: 95.7 FL — SIGNIFICANT CHANGE UP (ref 81–99)
NRBC # BLD: 0 /100 WBCS — SIGNIFICANT CHANGE UP (ref 0–0)
PLATELET # BLD AUTO: 203 K/UL — SIGNIFICANT CHANGE UP (ref 130–400)
PMV BLD: 11.8 FL — HIGH (ref 7.4–10.4)
POTASSIUM SERPL-MCNC: 4.4 MMOL/L — SIGNIFICANT CHANGE UP (ref 3.5–5)
POTASSIUM SERPL-SCNC: 4.4 MMOL/L — SIGNIFICANT CHANGE UP (ref 3.5–5)
PROCALCITONIN SERPL-MCNC: 0.08 NG/ML — SIGNIFICANT CHANGE UP (ref 0.02–0.1)
PROT SERPL-MCNC: 5.3 G/DL — LOW (ref 6–8)
RBC # BLD: 4.39 M/UL — SIGNIFICANT CHANGE UP (ref 4.2–5.4)
RBC # FLD: 13.4 % — SIGNIFICANT CHANGE UP (ref 11.5–14.5)
SODIUM SERPL-SCNC: 139 MMOL/L — SIGNIFICANT CHANGE UP (ref 135–146)
TSH SERPL-MCNC: 1.84 UIU/ML — SIGNIFICANT CHANGE UP (ref 0.27–4.2)
WBC # BLD: 19.23 K/UL — HIGH (ref 4.8–10.8)
WBC # FLD AUTO: 19.23 K/UL — HIGH (ref 4.8–10.8)

## 2024-10-05 PROCEDURE — 99232 SBSQ HOSP IP/OBS MODERATE 35: CPT

## 2024-10-05 PROCEDURE — 71045 X-RAY EXAM CHEST 1 VIEW: CPT | Mod: 26

## 2024-10-05 RX ORDER — APIXABAN 5 MG/1
1 TABLET, FILM COATED ORAL
Qty: 180 | Refills: 0
Start: 2024-10-05 | End: 2025-01-02

## 2024-10-05 RX ORDER — APIXABAN 5 MG/1
2 TABLET, FILM COATED ORAL
Qty: 20 | Refills: 0
Start: 2024-10-05 | End: 2024-10-09

## 2024-10-05 RX ADMIN — Medication 3: at 17:30

## 2024-10-05 RX ADMIN — PANTOPRAZOLE SODIUM 40 MILLIGRAM(S): 40 TABLET, DELAYED RELEASE ORAL at 05:36

## 2024-10-05 RX ADMIN — Medication 1: at 12:30

## 2024-10-05 RX ADMIN — Medication 81 MILLIGRAM(S): at 12:55

## 2024-10-05 RX ADMIN — POLYETHYLENE GLYCOL 3350 17 GRAM(S): 17 POWDER, FOR SOLUTION ORAL at 18:19

## 2024-10-05 RX ADMIN — DOXYCYCLINE HYCLATE 100 MILLIGRAM(S): 100 TABLET, FILM COATED ORAL at 05:36

## 2024-10-05 RX ADMIN — Medication 20 MILLIGRAM(S): at 22:05

## 2024-10-05 RX ADMIN — DEXAMETHASONE 1.5 MG 6 MILLIGRAM(S): 1.5 TABLET ORAL at 05:37

## 2024-10-05 RX ADMIN — CHLORHEXIDINE GLUCONATE 1 APPLICATION(S): 40 SOLUTION TOPICAL at 12:55

## 2024-10-05 RX ADMIN — APIXABAN 10 MILLIGRAM(S): 5 TABLET, FILM COATED ORAL at 18:19

## 2024-10-05 RX ADMIN — Medication 2 TABLET(S): at 22:05

## 2024-10-05 RX ADMIN — APIXABAN 10 MILLIGRAM(S): 5 TABLET, FILM COATED ORAL at 05:36

## 2024-10-05 RX ADMIN — DOXYCYCLINE HYCLATE 100 MILLIGRAM(S): 100 TABLET, FILM COATED ORAL at 18:19

## 2024-10-05 RX ADMIN — Medication 50 MICROGRAM(S): at 05:36

## 2024-10-05 NOTE — DISCHARGE NOTE PROVIDER - CARE PROVIDER_API CALL
Riley Strauss.  Internal Medicine  [address] [place] [address]  Phone: [phone]  Fax: [phone]  Follow Up Time: 1 week    Dhruv Silver  Interventional Cardiology  [address] [place] [address], [facility] [address]  [place] [address]  Phone: [phone]  Fax: [phone]  Follow Up Time: 2 weeks   Palliative Care team follow up,   Phone: (   )    -  Fax: (   )    -  Follow Up Time:

## 2024-10-05 NOTE — PROGRESS NOTE ADULT - SUBJECTIVE AND OBJECTIVE BOX
Patient is a 89y old  Female who presents with a chief complaint of covid pna (05 Oct 2024 11:37)      Patient seen and examined at bedside.  pt denies any chest pain or shortness of breath   ALLERGIES:  No Known Allergies    MEDICATIONS:  albuterol/ipratropium for Nebulization. 9 milliLiter(s) Nebulizer once  apixaban 10 milliGRAM(s) Oral two times a day  aspirin  chewable 81 milliGRAM(s) Oral daily  atorvastatin 20 milliGRAM(s) Oral at bedtime  bisacodyl Suppository 10 milliGRAM(s) Rectal once  cefTRIAXone   IVPB 1000 milliGRAM(s) IV Intermittent every 24 hours  cefTRIAXone   IVPB      chlorhexidine 2% Cloths 1 Application(s) Topical daily  dexAMETHasone  Injectable 6 milliGRAM(s) IV Push every 24 hours  dextrose 5%. 1000 milliLiter(s) IV Continuous <Continuous>  dextrose 5%. 1000 milliLiter(s) IV Continuous <Continuous>  dextrose 50% Injectable 25 Gram(s) IV Push once  dextrose 50% Injectable 25 Gram(s) IV Push once  dextrose 50% Injectable 12.5 Gram(s) IV Push once  dextrose Oral Gel 15 Gram(s) Oral once PRN  doxycycline IVPB      doxycycline IVPB 100 milliGRAM(s) IV Intermittent every 12 hours  glucagon  Injectable 1 milliGRAM(s) IntraMuscular once  insulin lispro (ADMELOG) corrective regimen sliding scale   SubCutaneous three times a day before meals  lactated ringers. 1000 milliLiter(s) IV Continuous <Continuous>  levothyroxine 50 MICROGram(s) Oral daily  pantoprazole    Tablet 40 milliGRAM(s) Oral before breakfast  polyethylene glycol 3350 17 Gram(s) Oral two times a day  senna 2 Tablet(s) Oral at bedtime    Vital Signs Last 24 Hrs  T(F): 96 (05 Oct 2024 13:39), Max: 98.5 (05 Oct 2024 05:49)  HR: 111 (05 Oct 2024 13:39) (51 - 111)  BP: 180/77 (05 Oct 2024 13:39) (138/79 - 180/77)  RR: 18 (05 Oct 2024 13:39) (18 - 18)  SpO2: 96% (05 Oct 2024 05:49) (96% - 97%)  I&O's Summary    04 Oct 2024 07:01  -  05 Oct 2024 07:00  --------------------------------------------------------  IN: 200 mL / OUT: 525 mL / NET: -325 mL    05 Oct 2024 07:01  -  05 Oct 2024 17:58  --------------------------------------------------------  IN: 260 mL / OUT: 1100 mL / NET: -840 mL        PHYSICAL EXAM:  General: NAD, Alert  ENT: MMM  Neck: Supple, No JVD  Lungs: scattered crackles, no wheezes   Cardio: RRR, S1/S2, 2/6 murmur   Abdomen: Soft, Nontender, Nondistended; Bowel sounds present  Extremities: No cyanosis, No edema    LABS:                        13.5   19.23 )-----------( 203      ( 05 Oct 2024 06:13 )             42.0     10-05    139  |  104  |  39  ----------------------------<  147  4.4   |  20  |  0.9    Ca    8.9      05 Oct 2024 06:13  Mg     1.8     10-05    TPro  5.3  /  Alb  3.0  /  TBili  0.3  /  DBili  x   /  AST  22  /  ALT  23  /  AlkPhos  137  10-05                TSH 1.84   TSH with FT4 reflex --  Total T3 --              POCT Blood Glucose.: 252 mg/dL (05 Oct 2024 16:51)  POCT Blood Glucose.: 180 mg/dL (05 Oct 2024 11:52)  POCT Blood Glucose.: 154 mg/dL (05 Oct 2024 08:34)      Urinalysis Basic - ( 05 Oct 2024 06:13 )    Color: x / Appearance: x / SG: x / pH: x  Gluc: 147 mg/dL / Ketone: x  / Bili: x / Urobili: x   Blood: x / Protein: x / Nitrite: x   Leuk Esterase: x / RBC: x / WBC x   Sq Epi: x / Non Sq Epi: x / Bacteria: x        Culture - Urine (collected 02 Oct 2024 00:45)  Source: Clean Catch None  Final Report (03 Oct 2024 11:59):    No growth    Culture - Blood (collected 01 Oct 2024 15:23)  Source: .Blood BLOOD  Preliminary Report (04 Oct 2024 22:01):    No growth at 72 Hours    Culture - Blood (collected 01 Oct 2024 15:23)  Source: .Blood BLOOD  Preliminary Report (04 Oct 2024 22:01):    No growth at 72 Hours          RADIOLOGY & ADDITIONAL TESTS:    Care Discussed with Consultants/Other Providers:

## 2024-10-05 NOTE — DISCHARGE NOTE PROVIDER - PROVIDER TOKENS
PROVIDER:[TOKEN:[[id number]],FOLLOWUP:[1 week]],PROVIDER:[TOKEN:[[id number]],FOLLOWUP:[2 weeks]] FREE:[LAST:[Palliative Care team follow up],PHONE:[(   )    -],FAX:[(   )    -]]

## 2024-10-05 NOTE — PROGRESS NOTE ADULT - ASSESSMENT
89 yo F with hx of HTN, HLD, DM II, HFrEF (LVEF 20-25% 2020), Hypothyroidism, CVA, Dementia and Recent PNA (dx 4 days ago) presents to ED for generalized weakness. Positive sick contact.     #Acute hypoxic respiratory failure 2/2 submassive PE with RV strain (trop, elevated bpnp, hemodynamically stable) and COVID pneumonia    - CTA PE as above  - Echo demonstrated Romano's sign, biomarkers elevated, requiring oxygen supplementation with desaturation on RA at rest   - Cr improved, DC heparin ggt, start theraputic lovenox   - CT abdomen: Small left pleural effusion with adjacent consolidation which may reflect atelectasis or infiltrate. Mild fullness left renal pelvis. Mild left ureter with distal left ureteral stone. Stone within the urinary bladder on the left.   - cont doxy and CTX  - RVP +ve for Covid -> on RDV and dexamethasone, final day of RDV course  -ID recs appreciated: c/w rocephin and doxycyline  - Nasal MRSA negative  - 10/01 bcx prelim NGTD, f/u repeat AM BCx, procal pending  - IR consult appreciated: no thrombectomy for now, c/w full AC (Eliquis 10 BID)  - echo noted  - Palliative final recs appreciated    #Elevated troponin likely demand ischemia iso suspected PE and hypotension   #chronic  HFimpEF   - 1st troponin 437  --> 350 -> 335  - no ischemic changes on ECG  - c/w ASA, statin  - TTE in 2020: LVEF 20-25%  - TTE 10/2/2024: LVEF 69%, G1DD, LV Ejection Fraction by Abreu's Method with a biplane EF of 69 %. Right ventricular systolic function is reduced with apical sparing consistent with acute pulmonary embolism (Romano's sign). The RV appears moderately dilated. Moderate TR. Moderate MR. Borderline pulm HTN   - seen by Dr. Silver previously; not following with cardiology currently  - F/u cardiology recs from Dr. Silver- spoke to his office 10/5 and Dr. Mata is to call back today     #MARTINE likely prerenal - resolving   - serum Cr. 1.8 currently. Baseline Cr 1.0 in May 2024.   - Cr 1.1 now   - CT abdomen neg for hydronephrosis. Moderate distention the urinary bladder.  - Bladder scan q8H  - monitor BMP closely    #Constipation with fecal retention  - CT abdomen shows Significant fecal retention.  - bowel regimen.     HTN - hold losartan due to MARTINE. Hold lopressor as BP borderline.   DM II - monitor FS AC HS. Insulin sliding scale.   Hypothyroidism - c/w synthroid. check TSH 10/5 AM.     DVT PPX: eliquis  GI PPX: protonix  DIET: minced and moist  ACTIVITY: as tolerated  CODE STATUS: DNR/DNI  DISPOSITION: acute, 3c  Family: son updated at bedside 10/5  PENDING: cardio recs, oxygen weaning

## 2024-10-05 NOTE — DISCHARGE NOTE PROVIDER - NSDCMRMEDTOKEN_GEN_ALL_CORE_FT
aspirin 81 mg oral tablet: 1 tab(s) orally once a day  Januvia 50 mg oral tablet: 1 tab(s) orally once a day  levothyroxine 50 mcg (0.05 mg) oral tablet: 1 tab(s) orally once a day  losartan 100 mg oral tablet: 1 tab(s) orally once a day  Melatonin 3 mg oral tablet: 1 tab(s) orally once a day (at bedtime)  Metoprolol Tartrate 50 mg oral tablet: 1 tab(s) orally 2 times a day  simvastatin 40 mg oral tablet: 1 tab(s) orally once a day (at bedtime)   apixaban 5 mg oral tablet: 2 tab(s) orally 2 times a day take till 10/11/2024  apixaban 5 mg oral tablet: 1 tab(s) orally 2 times a day  aspirin 81 mg oral tablet: 1 tab(s) orally once a day  Januvia 50 mg oral tablet: 1 tab(s) orally once a day  levothyroxine 50 mcg (0.05 mg) oral tablet: 1 tab(s) orally once a day  losartan 100 mg oral tablet: 1 tab(s) orally once a day  Melatonin 3 mg oral tablet: 1 tab(s) orally once a day (at bedtime)  Metoprolol Tartrate 50 mg oral tablet: 1 tab(s) orally 2 times a day  simvastatin 40 mg oral tablet: 1 tab(s) orally once a day (at bedtime)   LORazepam 2 mg/mL oral concentrate: 0.5 milligram(s) sublingual every 1 to 2 hours as needed for  anxiety  Melatonin 3 mg oral tablet: 1 tab(s) orally once a day (at bedtime)  morphine 20 mg/mL oral concentrate: 5 milligram(s) orally every 1 to 2 hours as needed for  moderate pain  ocular lubricant ophthalmic solution: 1 drop(s) to each affected eye once a day  polyethylene glycol 3350 oral powder for reconstitution: 17 gram(s) orally 2 times a day

## 2024-10-05 NOTE — DISCHARGE NOTE PROVIDER - HOSPITAL COURSE
89 yo F with hx of HTN, HLD, DM II, HFrEF (LVEF 20-25% 2020), Hypothyroidism, CVA, Dementia and Recent PNA (dx on CXR 4 days ago) presents to ED for generalized weakness and decreased PO intake. 4 days ago, patient started having cough and decreased PO intake. CXR was done and patient was diagnosed with PNA and was started on 5 days course of Azithromycin and Cefpodoxime with Prednisone 20mg OD. Patient had positive sick contact whereby both son and daughter developed URTI symptoms few days earleir. Patient is generally not very verbal at baseline and doesn't raise complains. She is mobile with a walker and 1 person assistance. Over past few days has been very week to ambulate. Family denies any reported chest pain, shortness of breath, fever, chills, abdominal pain, nausea, vomiting. They report loose BM over past 1 day.     ED course:   Vitals: T: 98.7F, BP: 176/84, HR: 76, RR: 21, SpO2: 98% on 2L NC   Labs are significant for: Leucocytosis 12K, Cr 1.8 (baseline 1), Trop 437>350, ProBNP 24K  ECG with no ischemia changes  Imaging: CT head non con: No acute intracranial pathology. No evidence of midline shift, mass effect or intracranial hemorrhage.  CT A/P: Mild fullness left renal pelvis. Mild left ureter with distal left ureteral stone. Stone within the urinary bladder on the left. Significant fecal retention. Moderate distention the urinary bladder. Small left pleural effusion with adjacent consolidation which may reflect atelectasis or infiltrate.    Hospital course:   Patient admitted to medicine for possible PNA and MARTINE. Patient is very hard of hearing. On ceftriaxone and azithromycin for possible PNA. Found to be Covid +ve, started on 3-day course RDV and dexmethasone. Patient had repeat Echo, noted to have improvement in EF from 20-25% to 69%; seen to have Romano's sign on Echo, concern for PE. Biomarkers elevated: trop 437 -> 350 -> 335; BNP in the 65435k. Patient started empirically on heparin drip, CTA PE ordered. Evaluated by pulm fellow, recommended upgrade to SDU.    Follow-up:   [ ] f/u CTA PE; started empirically on heparin drip given Echo findings   [ ] f/u duplex, d-dimer, CBC, BMP   [ ] trend biomarkers (trop and BNP)   [ ] IR consult if proximal PE seen on CTA PE for possible thrombectomy   [ ] will need to closely monitor creatinine following CTA PE as pt presented with MARTINE; creatinine 1.8 on admission, baseline 1.0; will start IVF hydration    [ ] ID consult pending for abx and Covid +ve; started on RDV and dexamethasone as patient requiring oxygen and noted to desaturate on RA   [ ] cardio consult pending for elevated trops    Assessment/Plan:   89 yo F with hx of HTN, HLD, DM II, HFrEF (LVEF 20-25% 2020), Hypothyroidism, CVA, Dementia and Recent PNA (dx 4 days ago) presents to ED for generalized weakness. Positive sick contact.     # Acute hypoxic respiratory failure 2/2 suspected PE and viral vs CAP (Covid +ve)   - CTA PE pending   - Echo demonstrated Romano's sign, biomarkers elevated, requiring oxygen supplementation with desaturation on RA at rest   - started empirically on heparin drip pending CTA PE   - CT abdomen: Small left pleural effusion with adjacent consolidation which may reflect atelectasis or infiltrate. Mild fullness left renal pelvis. Mild left ureter with distal left ureteral stone. Stone within the urinary bladder on the left.   - cont doxy and CTX for now   - RVP +ve for Covid -> on RDV and dexamethasone   -10/4 procal 0.08    #Elevated troponin likely demand ischemia iso suspected PE   # HFimpEF   - 1st troponin 437  --> 350 -> 335  - no ischemic changes on ECG  - c/w ASA, statin  - TTE in 2020: LVEF 20-25%  - TTE 10/2/2024: LVEF 69%, G1DD, LV Ejection Fraction by Abreu's Method with a biplane EF of 69 %. Right ventricular systolic function is reduced with apical sparing consistent with acute pulmonary embolism (Romano's sign). The RV appears moderately dilated. Moderate TR. Moderate MR. Borderline pulm HTN   - seen by Dr. Silver previously; not following with cardiology currently  - cardiology consult pending     #MARTINE likely prerenal   - serum Cr. 1.8 currently. Baseline Cr 1.0 in May 2024.   - CT abdomen neg for hydronephrosis. Moderate distention the urinary bladder.  - Bladder scan q8H  - monitor BMP closely  - start on IVF hydration following CTA PE    #Constipation with fecal retention  - CT abdomen shows Significant fecal retention.  - bowel regimen.     HTN - hold losartan due to MARTINE. Hold lopressor as BP borderline.   DM II - monitor FS AC HS. Insulin sliding scale.   Hypothyroidism - c/w synthroid. TSH 1.84.    Pt completed RDV course, was on rocephin 1gm iv qd, doxycycline 10 mg iv bid.  PE management via eliquis 10 mg bid, no new onset sob, chest pain.    Pending cardio recs from Dr. Silver. 89 yo F with hx of HTN, HLD, DM II, HFrEF (LVEF 20-25% 2020), Hypothyroidism, CVA, Dementia and Recent PNA (dx on CXR 4 days ago) presents to ED for generalized weakness and decreased PO intake. 4 days ago, patient started having cough and decreased PO intake. CXR was done and patient was diagnosed with PNA and was started on 5 days course of Azithromycin and Cefpodoxime with Prednisone 20mg OD. Patient had positive sick contact whereby both son and daughter developed URTI symptoms few days earleir. Patient is generally not very verbal at baseline and doesn't raise complains. She is mobile with a walker and 1 person assistance. Over past few days has been very week to ambulate. Family denies any reported chest pain, shortness of breath, fever, chills, abdominal pain, nausea, vomiting. They report loose BM over past 1 day.     ED course:   Vitals: T: 98.7F, BP: 176/84, HR: 76, RR: 21, SpO2: 98% on 2L NC   Labs are significant for: Leucocytosis 12K, Cr 1.8 (baseline 1), Trop 437>350, ProBNP 24K  ECG with no ischemia changes  Imaging: CT head non con: No acute intracranial pathology. No evidence of midline shift, mass effect or intracranial hemorrhage.  CT A/P: Mild fullness left renal pelvis. Mild left ureter with distal left ureteral stone. Stone within the urinary bladder on the left. Significant fecal retention. Moderate distention the urinary bladder. Small left pleural effusion with adjacent consolidation which may reflect atelectasis or infiltrate.    Hospital course:   Patient admitted to medicine for possible PNA and MARTINE. Patient is very hard of hearing. On ceftriaxone and azithromycin for possible PNA. Found to be Covid +ve, started on 3-day course RDV and dexmethasone. Patient had repeat Echo, noted to have improvement in EF from 20-25% to 69%; seen to have Romano's sign on Echo, concern for PE. Biomarkers elevated: trop 437 -> 350 -> 335; BNP in the 53283e. Patient started empirically on heparin drip, CTA PE ordered. Evaluated by pulm fellow, recommended upgrade to SDU.    SDU:  Pt successfully tapered off levophed, last dose 10/17 AM. VS, deemed stable for DGTF. Tapering off hydort (10/13 - 10/18).    patient downgraded to floor.   Patient was observed on AC and off antibiotics. Patient was made CMO on Oct 25, 2024. 89 yo Female with hx of HTN, HLD, DM II, HFrEF (LVEF 20-25% 2020), Hypothyroidism, CVA, Dementia and Recent PNA (dx on CXR 4 days ago) presents to ED for generalized weakness and decreased PO intake. 4 days ago, patient started having cough and decreased PO intake. CXR was done and patient was diagnosed with PNA and was started on 5 days course of Azithromycin and Cefpodoxime with Prednisone 20mg OD. Patient had positive sick contact whereby both son and daughter developed URTI symptoms few days earleir. Patient is generally not very verbal at baseline and doesn't raise complains. She is mobile with a walker and 1 person assistance. Over past few days has been very week to ambulate. Family denies any reported chest pain, shortness of breath, fever, chills, abdominal pain, nausea, vomiting. They report loose BM over past 1 day.     ED course:   Vitals: T: 98.7F, BP: 176/84, HR: 76, RR: 21, SpO2: 98% on 2L NC   Labs are significant for: Leucocytosis 12K, Cr 1.8 (baseline 1), Trop 437>350, ProBNP 24K  ECG with no ischemia changes  Imaging: CT head non con: No acute intracranial pathology. No evidence of midline shift, mass effect or intracranial hemorrhage.  CT A/P: Mild fullness left renal pelvis. Mild left ureter with distal left ureteral stone. Stone within the urinary bladder on the left. Significant fecal retention. Moderate distention the urinary bladder. Small left pleural effusion with adjacent consolidation which may reflect atelectasis or infiltrate.    Hospital course:   Patient admitted to medicine for possible PNA and MARTINE. Patient is very hard of hearing. On ceftriaxone and azithromycin for possible PNA. Found to be Covid +ve, started on 3-day course RDV and dexmethasone. Patient had repeat Echo, noted to have improvement in EF from 20-25% to 69%; seen to have Romano's sign on Echo, concern for PE. Biomarkers elevated: trop 437 -> 350 -> 335; BNP in the 64882s. Patient started empirically on heparin drip, CTA PE ordered. Evaluated by pulm fellow, recommended upgrade to SDU.    SDU:  Pt successfully tapered off levophed, last dose 10/17 AM. VS, deemed stable for DGTF.     patient downgraded to floor.   Patient was observed on AC and off antibiotics. Patient was made CMO on Oct 25, 2024.

## 2024-10-05 NOTE — DISCHARGE NOTE PROVIDER - NSDCCPCAREPLAN_GEN_ALL_CORE_FT
PRINCIPAL DISCHARGE DIAGNOSIS  Diagnosis: Pneumonia  Assessment and Plan of Treatment: You were admitted for COVID pneumonia. You were given a 5 day course of antivirals as well as a few days of antibiotics to ensure bacterial coverage for possible aspiration/post-obstructive pneumonia. Please be mindful of sick contacts around you, take necessary precautions (mask, hand washing, avoiding large gatherings) to prevent COVID re-infection.  If you experience any worsening of shortness of breath, chest pain, changes in mental status, changes in symptoms from baseline please call 911 immediately and go to hospital.   Please follow up with your family doctor in 1 week and your cardiologist in 2 weeks.      SECONDARY DISCHARGE DIAGNOSES  Diagnosis: NSTEMI (non-ST elevation myocardial infarction)  Assessment and Plan of Treatment:     Diagnosis: MARTINE (acute kidney injury)  Assessment and Plan of Treatment:     Diagnosis: Kidney stones  Assessment and Plan of Treatment:      PRINCIPAL DISCHARGE DIAGNOSIS  Diagnosis: Pneumonia  Assessment and Plan of Treatment: You were admitted for COVID pneumonia. You were given a 5 day course of antivirals as well as a few days of antibiotics to ensure bacterial coverage for possible aspiration/post-obstructive pneumonia. Please be mindful of sick contacts around you, take necessary precautions (mask, hand washing, avoiding large gatherings) to prevent COVID re-infection.  Due to COVID, clotting of blood can occur and you were found to have a clot which was managed with blood thinners. You will complete 10 mg 2x day Eliquis course 10/11/2024, then take Eliquis 5mg 2x/day for 3 months. Please take medications as prescribed.   If you experience any worsening of shortness of breath, chest pain, changes in mental status, changes in symptoms from baseline please call 911 immediately and go to hospital.   Please follow up with your family doctor in 1 week and your cardiologist in 2 weeks.      SECONDARY DISCHARGE DIAGNOSES  Diagnosis: NSTEMI (non-ST elevation myocardial infarction)  Assessment and Plan of Treatment:     Diagnosis: MARTINE (acute kidney injury)  Assessment and Plan of Treatment:     Diagnosis: Kidney stones  Assessment and Plan of Treatment:

## 2024-10-05 NOTE — DISCHARGE NOTE PROVIDER - NSDCHHNEEDSERVICE_GEN_ALL_CORE
Observation and assessment/Teaching and training Medication teaching and assessment/Observation and assessment/Ostomy care and management/Teaching and training

## 2024-10-06 LAB
CULTURE RESULTS: SIGNIFICANT CHANGE UP
CULTURE RESULTS: SIGNIFICANT CHANGE UP
GLUCOSE BLDC GLUCOMTR-MCNC: 181 MG/DL — HIGH (ref 70–99)
GLUCOSE BLDC GLUCOMTR-MCNC: 205 MG/DL — HIGH (ref 70–99)
GLUCOSE BLDC GLUCOMTR-MCNC: 242 MG/DL — HIGH (ref 70–99)
SPECIMEN SOURCE: SIGNIFICANT CHANGE UP
SPECIMEN SOURCE: SIGNIFICANT CHANGE UP

## 2024-10-06 PROCEDURE — 99232 SBSQ HOSP IP/OBS MODERATE 35: CPT

## 2024-10-06 RX ORDER — LOSARTAN POTASSIUM 25 MG/1
25 TABLET ORAL DAILY
Refills: 0 | Status: DISCONTINUED | OUTPATIENT
Start: 2024-10-07 | End: 2024-10-12

## 2024-10-06 RX ADMIN — Medication 50 MICROGRAM(S): at 05:08

## 2024-10-06 RX ADMIN — DOXYCYCLINE HYCLATE 100 MILLIGRAM(S): 100 TABLET, FILM COATED ORAL at 18:33

## 2024-10-06 RX ADMIN — DOXYCYCLINE HYCLATE 100 MILLIGRAM(S): 100 TABLET, FILM COATED ORAL at 05:09

## 2024-10-06 RX ADMIN — Medication 100 MILLIGRAM(S): at 22:07

## 2024-10-06 RX ADMIN — Medication 2 TABLET(S): at 21:18

## 2024-10-06 RX ADMIN — APIXABAN 10 MILLIGRAM(S): 5 TABLET, FILM COATED ORAL at 05:08

## 2024-10-06 RX ADMIN — DEXAMETHASONE 1.5 MG 6 MILLIGRAM(S): 1.5 TABLET ORAL at 05:07

## 2024-10-06 RX ADMIN — APIXABAN 10 MILLIGRAM(S): 5 TABLET, FILM COATED ORAL at 18:32

## 2024-10-06 RX ADMIN — PANTOPRAZOLE SODIUM 40 MILLIGRAM(S): 40 TABLET, DELAYED RELEASE ORAL at 05:09

## 2024-10-06 RX ADMIN — Medication 2: at 18:32

## 2024-10-06 RX ADMIN — Medication 20 MILLIGRAM(S): at 21:18

## 2024-10-06 RX ADMIN — Medication 81 MILLIGRAM(S): at 12:48

## 2024-10-06 RX ADMIN — CHLORHEXIDINE GLUCONATE 1 APPLICATION(S): 40 SOLUTION TOPICAL at 12:49

## 2024-10-06 RX ADMIN — POLYETHYLENE GLYCOL 3350 17 GRAM(S): 17 POWDER, FOR SOLUTION ORAL at 05:07

## 2024-10-06 RX ADMIN — Medication 100 MILLIGRAM(S): at 00:05

## 2024-10-06 RX ADMIN — Medication 2: at 12:49

## 2024-10-06 RX ADMIN — Medication 1: at 08:49

## 2024-10-06 RX ADMIN — POLYETHYLENE GLYCOL 3350 17 GRAM(S): 17 POWDER, FOR SOLUTION ORAL at 18:33

## 2024-10-06 NOTE — PHYSICAL THERAPY INITIAL EVALUATION ADULT - LEVEL OF INDEPENDENCE: SUPINE/SIT, REHAB EVAL
pt was able to sit at the EOB and maintain her balance with supervision/CG/dependent (less than 25% patients effort)

## 2024-10-06 NOTE — PHYSICAL THERAPY INITIAL EVALUATION ADULT - GENERAL OBSERVATIONS, REHAB EVAL
Attempted to see Pt for b/s PT; however, Pt's daughter in law Jelly declined for PT at this time stating, "she is very tired, not be able to do PT, she is usually doesn't do much at home, I help her at home."  PT will come back once Pt & pt's family is agreeable for PT.
13:34-14:20 46 min  pt received sleeping in bed in NAD, +pulido, son and daughter in law at the b/s, pt woke to name, verbal/tactile cues, pt non verbal

## 2024-10-06 NOTE — PROGRESS NOTE ADULT - SUBJECTIVE AND OBJECTIVE BOX
SUBJ:No chest pain or shortness of breath      MEDICATIONS  (STANDING):  albuterol/ipratropium for Nebulization. 9 milliLiter(s) Nebulizer once  apixaban 10 milliGRAM(s) Oral two times a day  aspirin  chewable 81 milliGRAM(s) Oral daily  atorvastatin 20 milliGRAM(s) Oral at bedtime  bisacodyl Suppository 10 milliGRAM(s) Rectal once  cefTRIAXone   IVPB      cefTRIAXone   IVPB 1000 milliGRAM(s) IV Intermittent every 24 hours  chlorhexidine 2% Cloths 1 Application(s) Topical daily  dexAMETHasone  Injectable 6 milliGRAM(s) IV Push every 24 hours  dextrose 5%. 1000 milliLiter(s) (100 mL/Hr) IV Continuous <Continuous>  dextrose 5%. 1000 milliLiter(s) (50 mL/Hr) IV Continuous <Continuous>  dextrose 50% Injectable 12.5 Gram(s) IV Push once  dextrose 50% Injectable 25 Gram(s) IV Push once  dextrose 50% Injectable 25 Gram(s) IV Push once  doxycycline IVPB      doxycycline IVPB 100 milliGRAM(s) IV Intermittent every 12 hours  glucagon  Injectable 1 milliGRAM(s) IntraMuscular once  insulin lispro (ADMELOG) corrective regimen sliding scale   SubCutaneous three times a day before meals  lactated ringers. 1000 milliLiter(s) (75 mL/Hr) IV Continuous <Continuous>  levothyroxine 50 MICROGram(s) Oral daily  pantoprazole    Tablet 40 milliGRAM(s) Oral before breakfast  polyethylene glycol 3350 17 Gram(s) Oral two times a day  senna 2 Tablet(s) Oral at bedtime    MEDICATIONS  (PRN):  dextrose Oral Gel 15 Gram(s) Oral once PRN Blood Glucose LESS THAN 70 milliGRAM(s)/deciliter            Vital Signs Last 24 Hrs  T(C): 36.1 (06 Oct 2024 13:23), Max: 36.3 (05 Oct 2024 19:38)  T(F): 97 (06 Oct 2024 13:23), Max: 97.4 (05 Oct 2024 19:38)  HR: 103 (06 Oct 2024 13:23) (76 - 103)  BP: 121/91 (06 Oct 2024 13:23) (106/62 - 121/91)  BP(mean): --  RR: 18 (06 Oct 2024 13:23) (18 - 18)  SpO2: --          ECG:NML    TTE:    LABS:                        13.5   19.23 )-----------( 203      ( 05 Oct 2024 06:13 )             42.0     10-05    139  |  104  |  39[H]  ----------------------------<  147[H]  4.4   |  20  |  0.9    Ca    8.9      05 Oct 2024 06:13  Mg     1.8     10-05    TPro  5.3[L]  /  Alb  3.0[L]  /  TBili  0.3  /  DBili  x   /  AST  22  /  ALT  23  /  AlkPhos  137[H]  10-05            I&O's Summary    05 Oct 2024 07:01  -  06 Oct 2024 07:00  --------------------------------------------------------  IN: 760 mL / OUT: 1800 mL / NET: -1040 mL      BNP                 MEDICATIONS  (STANDING):  albuterol/ipratropium for Nebulization. 9 milliLiter(s) Nebulizer once  apixaban 10 milliGRAM(s) Oral two times a day  aspirin  chewable 81 milliGRAM(s) Oral daily  atorvastatin 20 milliGRAM(s) Oral at bedtime  bisacodyl Suppository 10 milliGRAM(s) Rectal once  cefTRIAXone   IVPB      cefTRIAXone   IVPB 1000 milliGRAM(s) IV Intermittent every 24 hours  chlorhexidine 2% Cloths 1 Application(s) Topical daily  dexAMETHasone  Injectable 6 milliGRAM(s) IV Push every 24 hours  dextrose 5%. 1000 milliLiter(s) (100 mL/Hr) IV Continuous <Continuous>  dextrose 5%. 1000 milliLiter(s) (50 mL/Hr) IV Continuous <Continuous>  dextrose 50% Injectable 12.5 Gram(s) IV Push once  dextrose 50% Injectable 25 Gram(s) IV Push once  dextrose 50% Injectable 25 Gram(s) IV Push once  doxycycline IVPB      doxycycline IVPB 100 milliGRAM(s) IV Intermittent every 12 hours  glucagon  Injectable 1 milliGRAM(s) IntraMuscular once  insulin lispro (ADMELOG) corrective regimen sliding scale   SubCutaneous three times a day before meals  lactated ringers. 1000 milliLiter(s) (75 mL/Hr) IV Continuous <Continuous>  levothyroxine 50 MICROGram(s) Oral daily  pantoprazole    Tablet 40 milliGRAM(s) Oral before breakfast  polyethylene glycol 3350 17 Gram(s) Oral two times a day  senna 2 Tablet(s) O    MEDICATIONS  (PRN):  dextrose Oral Gel 15 Gram(s) Oral once PRN Blood Glucose LESS THAN 70 milliGRAM(s)/deciliter            Vital Signs Last 24 Hrs  T(C): 36.1 (06 Oct 2024 13:23), Max: 36.3 (05 Oct 2024 19:38)  T(F): 97 (06 Oct 2024 13:23), Max: 97.4 (05 Oct 2024 19:38)  HR: 103 (06 Oct 2024 13:23) (76 - 103)  BP: 121/91 (06 Oct 2024 13:23) (106/62 - 121/91)  BP(mean): --  RR: 18 (06 Oct 2024 13:23) (18 - 18)  SpO2: --          ECG:NML    TTE:    LABS:                        13.5   19.23 )-----------( 203      ( 05 Oct 2024 06:13 )             42.0     10-05    139  |  104  |  39[H]  ----------------------------<  147[H]  4.4   |  20  |  0.9    Ca    8.9      05 Oct 2024 06:13  Mg     1.8     10-05    TPro  5.3[L]  /  Alb  3.0[L]  /  TBili  0.3  /  DBili  x   /  AST  22  /  ALT  23  /  A      BNP

## 2024-10-06 NOTE — PHYSICAL THERAPY INITIAL EVALUATION ADULT - ADDITIONAL COMMENTS
pt lives with her son and daughter in law in a private house, 7 steps to enter, pt stays on the first floor inside, per son, pt does not leave the house, doctors make home visits, PTA pt was able to amb with RW and assist, required assist with ADLs

## 2024-10-06 NOTE — CONSULT NOTE ADULT - SUBJECTIVE AND OBJECTIVE BOX
SUBJ:No chest pain or shortness of breath      MEDICATIONS  (STANDING):  albuterol/ipratropium for Nebulization. 9 milliLiter(s) Nebulizer once  apixaban 10 milliGRAM(s) Oral two times a day  aspirin  chewable 81 milliGRAM(s) Oral daily  atorvastatin 20 milliGRAM(s) Oral at bedtime  bisacodyl Suppository 10 milliGRAM(s) Rectal once  cefTRIAXone   IVPB      cefTRIAXone   IVPB 1000 milliGRAM(s) IV Intermittent every 24 hours  chlorhexidine 2% Cloths 1 Application(s) Topical daily  dexAMETHasone  Injectable 6 milliGRAM(s) IV Push every 24 hours  dextrose 5%. 1000 milliLiter(s) (50 mL/Hr) IV Continuous <Continuous>  dextrose 5%. 1000 milliLiter(s) (100 mL/Hr) IV Continuous <Continuous>  dextrose 50% Injectable 25 Gram(s) IV Push once  dextrose 50% Injectable 25 Gram(s) IV Push once  dextrose 50% Injectable 12.5 Gram(s) IV Push once  doxycycline IVPB      doxycycline IVPB 100 milliGRAM(s) IV Intermittent every 12 hours  glucagon  Injectable 1 milliGRAM(s) IntraMuscular once  insulin lispro (ADMELOG) corrective regimen sliding scale   SubCutaneous three times a day before meals  lactated ringers. 1000 milliLiter(s) (75 mL/Hr) IV Continuous <Continuous>  levothyroxine 50 MICROGram(s) Oral daily  pantoprazole    Tablet 40 milliGRAM(s) Oral before breakfast  polyethylene glycol 3350 17 Gram(s) Oral two times a day  senna 2 Tablet(s) Oral at bedtime    MEDICATIONS  (PRN):  dextrose Oral Gel 15 Gram(s) Oral once PRN Blood Glucose LESS THAN 70 milliGRAM(s)/deciliter            Vital Signs Last 24 Hrs  T(C): 36.1 (06 Oct 2024 13:23), Max: 36.3 (05 Oct 2024 19:38)  T(F): 97 (06 Oct 2024 13:23), Max: 97.4 (05 Oct 2024 19:38)  HR: 103 (06 Oct 2024 13:23) (76 - 103)  BP: 121/91 (06 Oct 2024 13:23) (106/62 - 121/91)  BP(mean): --  RR: 18 (06 Oct 2024 13:23) (18 - 18)  SpO2: --          ECG:NML    TTE:    LABS:                        13.5   19.23 )-----------( 203      ( 05 Oct 2024 06:13 )             42.0     10-05    139  |  104  |  39[H]  ----------------------------<  147[H]  4.4   |  20  |  0.9    Ca    8.9      05 Oct 2024 06:13  Mg     1.8     10-05    TPro  5.3[L]  /  Alb  3.0[L]  /  TBili  0.3  /  DBili  x   /  AST  22  /  ALT  23  /  AlkPhos  137[H]  10-05            I&O's Summary    05 Oct 2024 07:01  -  06 Oct 2024 07:00  --------------------------------------------------------  IN: 760 mL / OUT: 1800 mL / NET: -1040 mL      BNP

## 2024-10-06 NOTE — PROGRESS NOTE ADULT - ASSESSMENT
89 yo F with hx of HTN, HLD, DM II, HFrEF (LVEF 20-25% 2020), Hypothyroidism, CVA, Dementia and Recent PNA (dx 4 days ago) presents to ED for generalized weakness. Positive sick contact.     #Acute hypoxic respiratory failure 2/2 submassive PE with RV strain (trop, elevated bpnp, hemodynamically stable) and COVID pneumonia    - Echo demonstrated Romano's sign, biomarkers elevated, requiring oxygen supplementation with desaturation on RA at rest   - Pt is now on therapeutic eliquis   - CT abdomen: Small left pleural effusion with adjacent consolidation which may reflect atelectasis or infiltrate. Mild fullness left renal pelvis. Mild left ureter with distal left ureteral stone. Stone within the urinary bladder on the left.   - cont doxy and CTX  - RVP +ve for Covid -> completed RDV day 5/10 dexamethasone  -ID recs appreciated: c/w rocephin and doxycyline-to stop after 5 days 10/7  - Nasal MRSA negative  - 10/01 bcx negative   - IR consult appreciated: no thrombectomy for now, c/w full AC (Eliquis 10 BID)  - echo noted  - Palliative final recs appreciated    #Elevated troponin likely demand ischemia iso suspected PE and hypotension   #chronic  HFimpEF   - 1st troponin 437  --> 350 -> 335  - no ischemic changes on ECG  - c/w ASA, statin  - TTE in 2020: LVEF 20-25%  - TTE 10/2/2024: LVEF 69%, G1DD, LV Ejection Fraction by Abreu's Method with a biplane EF of 69 %. Right ventricular systolic function is reduced with apical sparing consistent with acute pulmonary embolism (Romano's sign). The RV appears moderately dilated. Moderate TR. Moderate MR. Borderline pulm HTN   - seen by Dr. Silver previously; not following with cardiology currently  - F/u cardiology recs from Dr. Silver- spoke to Dr Mata 10/5 and he stated he would see pt 10/6    #MARTINE likely prerenal - resolving   - serum Cr. 1.8 currently. Baseline Cr 1.0 in May 2024.   - Cr 1.1 now   - CT abdomen neg for hydronephrosis. Moderate distention the urinary bladder.  - Bladder scan q8H  - monitor BMP closely    #Constipation with fecal retention  - CT abdomen shows Significant fecal retention.  - bowel regimen.     HTN - 10/6 restarting losartan 25mg with parameters . Hold lopressor as BP borderline.   DM II - monitor FS AC HS. Insulin sliding scale.   Hypothyroidism - c/w synthroid. TSH-1.84 10/5    DVT PPX: eliquis  GI PPX: protonix  DIET: minced and moist  ACTIVITY: as tolerated  CODE STATUS: DNR/DNI  DISPOSITION: acute, 3c  Family: son updated at bedside 10/6  PENDING: cardio recs, oxygen weaning

## 2024-10-06 NOTE — PROGRESS NOTE ADULT - SUBJECTIVE AND OBJECTIVE BOX
Patient is a 89y old  Female who presents with a chief complaint of shortness of breath (05 Oct 2024 17:57)      Patient seen and examined at bedside.    ALLERGIES:  No Known Allergies    MEDICATIONS:  albuterol/ipratropium for Nebulization. 9 milliLiter(s) Nebulizer once  apixaban 10 milliGRAM(s) Oral two times a day  aspirin  chewable 81 milliGRAM(s) Oral daily  atorvastatin 20 milliGRAM(s) Oral at bedtime  bisacodyl Suppository 10 milliGRAM(s) Rectal once  cefTRIAXone   IVPB 1000 milliGRAM(s) IV Intermittent every 24 hours  cefTRIAXone   IVPB      chlorhexidine 2% Cloths 1 Application(s) Topical daily  dexAMETHasone  Injectable 6 milliGRAM(s) IV Push every 24 hours  dextrose 5%. 1000 milliLiter(s) IV Continuous <Continuous>  dextrose 5%. 1000 milliLiter(s) IV Continuous <Continuous>  dextrose 50% Injectable 25 Gram(s) IV Push once  dextrose 50% Injectable 25 Gram(s) IV Push once  dextrose 50% Injectable 12.5 Gram(s) IV Push once  dextrose Oral Gel 15 Gram(s) Oral once PRN  doxycycline IVPB      doxycycline IVPB 100 milliGRAM(s) IV Intermittent every 12 hours  glucagon  Injectable 1 milliGRAM(s) IntraMuscular once  insulin lispro (ADMELOG) corrective regimen sliding scale   SubCutaneous three times a day before meals  lactated ringers. 1000 milliLiter(s) IV Continuous <Continuous>  levothyroxine 50 MICROGram(s) Oral daily  pantoprazole    Tablet 40 milliGRAM(s) Oral before breakfast  polyethylene glycol 3350 17 Gram(s) Oral two times a day  senna 2 Tablet(s) Oral at bedtime    Vital Signs Last 24 Hrs  T(F): 95.9 (06 Oct 2024 05:12), Max: 97.4 (05 Oct 2024 19:38)  HR: 76 (06 Oct 2024 05:12) (76 - 111)  BP: 112/63 (06 Oct 2024 05:12) (106/62 - 180/77)  RR: 18 (06 Oct 2024 05:12) (18 - 18)  SpO2: --  I&O's Summary    05 Oct 2024 07:01  -  06 Oct 2024 07:00  --------------------------------------------------------  IN: 760 mL / OUT: 1800 mL / NET: -1040 mL        PHYSICAL EXAM:  General: NAD, alert   ENT: MMM  Neck: Supple, No JVD  Lungs: Clear to auscultation bilaterally  Cardio: RRR, S1/S2, No murmurs  Abdomen: Soft, Nontender, Nondistended; Bowel sounds present  Extremities: No cyanosis, No edema    LABS:                        13.5   19.23 )-----------( 203      ( 05 Oct 2024 06:13 )             42.0     10-05    139  |  104  |  39  ----------------------------<  147  4.4   |  20  |  0.9    Ca    8.9      05 Oct 2024 06:13  Mg     1.8     10-05    TPro  5.3  /  Alb  3.0  /  TBili  0.3  /  DBili  x   /  AST  22  /  ALT  23  /  AlkPhos  137  10-05                TSH 1.84   TSH with FT4 reflex --  Total T3 --              POCT Blood Glucose.: 205 mg/dL (06 Oct 2024 12:16)  POCT Blood Glucose.: 181 mg/dL (06 Oct 2024 08:16)  POCT Blood Glucose.: 206 mg/dL (05 Oct 2024 21:22)  POCT Blood Glucose.: 252 mg/dL (05 Oct 2024 16:51)      Urinalysis Basic - ( 05 Oct 2024 06:13 )    Color: x / Appearance: x / SG: x / pH: x  Gluc: 147 mg/dL / Ketone: x  / Bili: x / Urobili: x   Blood: x / Protein: x / Nitrite: x   Leuk Esterase: x / RBC: x / WBC x   Sq Epi: x / Non Sq Epi: x / Bacteria: x        Culture - Urine (collected 02 Oct 2024 00:45)  Source: Clean Catch None  Final Report (03 Oct 2024 11:59):    No growth    Culture - Blood (collected 01 Oct 2024 15:23)  Source: .Blood BLOOD  Preliminary Report (05 Oct 2024 22:00):    No growth at 4 days    Culture - Blood (collected 01 Oct 2024 15:23)  Source: .Blood BLOOD  Preliminary Report (05 Oct 2024 22:00):    No growth at 4 days          RADIOLOGY & ADDITIONAL TESTS:    Care Discussed with Consultants/Other Providers:

## 2024-10-07 LAB
ALBUMIN SERPL ELPH-MCNC: 2.8 G/DL — LOW (ref 3.5–5.2)
ALP SERPL-CCNC: 144 U/L — HIGH (ref 30–115)
ALT FLD-CCNC: 20 U/L — SIGNIFICANT CHANGE UP (ref 0–41)
ANION GAP SERPL CALC-SCNC: 8 MMOL/L — SIGNIFICANT CHANGE UP (ref 7–14)
AST SERPL-CCNC: 17 U/L — SIGNIFICANT CHANGE UP (ref 0–41)
BILIRUB SERPL-MCNC: 0.4 MG/DL — SIGNIFICANT CHANGE UP (ref 0.2–1.2)
BUN SERPL-MCNC: 32 MG/DL — HIGH (ref 10–20)
CALCIUM SERPL-MCNC: 8.5 MG/DL — SIGNIFICANT CHANGE UP (ref 8.4–10.5)
CHLORIDE SERPL-SCNC: 102 MMOL/L — SIGNIFICANT CHANGE UP (ref 98–110)
CO2 SERPL-SCNC: 22 MMOL/L — SIGNIFICANT CHANGE UP (ref 17–32)
CREAT SERPL-MCNC: 0.8 MG/DL — SIGNIFICANT CHANGE UP (ref 0.7–1.5)
EGFR: 65 ML/MIN/1.73M2 — SIGNIFICANT CHANGE UP
GLUCOSE BLDC GLUCOMTR-MCNC: 170 MG/DL — HIGH (ref 70–99)
GLUCOSE BLDC GLUCOMTR-MCNC: 190 MG/DL — HIGH (ref 70–99)
GLUCOSE BLDC GLUCOMTR-MCNC: 219 MG/DL — HIGH (ref 70–99)
GLUCOSE BLDC GLUCOMTR-MCNC: 229 MG/DL — HIGH (ref 70–99)
GLUCOSE SERPL-MCNC: 160 MG/DL — HIGH (ref 70–99)
HCT VFR BLD CALC: 40.4 % — SIGNIFICANT CHANGE UP (ref 37–47)
HGB BLD-MCNC: 13.1 G/DL — SIGNIFICANT CHANGE UP (ref 12–16)
MAGNESIUM SERPL-MCNC: 1.6 MG/DL — LOW (ref 1.8–2.4)
MCHC RBC-ENTMCNC: 30.6 PG — SIGNIFICANT CHANGE UP (ref 27–31)
MCHC RBC-ENTMCNC: 32.4 G/DL — SIGNIFICANT CHANGE UP (ref 32–37)
MCV RBC AUTO: 94.4 FL — SIGNIFICANT CHANGE UP (ref 81–99)
NRBC # BLD: 0 /100 WBCS — SIGNIFICANT CHANGE UP (ref 0–0)
PLATELET # BLD AUTO: 176 K/UL — SIGNIFICANT CHANGE UP (ref 130–400)
PMV BLD: 11.8 FL — HIGH (ref 7.4–10.4)
POTASSIUM SERPL-MCNC: 4.8 MMOL/L — SIGNIFICANT CHANGE UP (ref 3.5–5)
POTASSIUM SERPL-SCNC: 4.8 MMOL/L — SIGNIFICANT CHANGE UP (ref 3.5–5)
PROT SERPL-MCNC: 4.9 G/DL — LOW (ref 6–8)
RBC # BLD: 4.28 M/UL — SIGNIFICANT CHANGE UP (ref 4.2–5.4)
RBC # FLD: 13.5 % — SIGNIFICANT CHANGE UP (ref 11.5–14.5)
SODIUM SERPL-SCNC: 132 MMOL/L — LOW (ref 135–146)
WBC # BLD: 16.34 K/UL — HIGH (ref 4.8–10.8)
WBC # FLD AUTO: 16.34 K/UL — HIGH (ref 4.8–10.8)

## 2024-10-07 PROCEDURE — 99232 SBSQ HOSP IP/OBS MODERATE 35: CPT

## 2024-10-07 RX ORDER — MAGNESIUM SULFATE IN 0.9% NACL 2 G/50 ML
2 INTRAVENOUS SOLUTION, PIGGYBACK (ML) INTRAVENOUS ONCE
Refills: 0 | Status: COMPLETED | OUTPATIENT
Start: 2024-10-07 | End: 2024-10-07

## 2024-10-07 RX ADMIN — APIXABAN 10 MILLIGRAM(S): 5 TABLET, FILM COATED ORAL at 17:23

## 2024-10-07 RX ADMIN — POLYETHYLENE GLYCOL 3350 17 GRAM(S): 17 POWDER, FOR SOLUTION ORAL at 05:07

## 2024-10-07 RX ADMIN — Medication 50 MICROGRAM(S): at 05:08

## 2024-10-07 RX ADMIN — Medication 1: at 08:04

## 2024-10-07 RX ADMIN — DEXAMETHASONE 1.5 MG 6 MILLIGRAM(S): 1.5 TABLET ORAL at 05:07

## 2024-10-07 RX ADMIN — DOXYCYCLINE HYCLATE 100 MILLIGRAM(S): 100 TABLET, FILM COATED ORAL at 05:07

## 2024-10-07 RX ADMIN — LOSARTAN POTASSIUM 25 MILLIGRAM(S): 25 TABLET ORAL at 05:08

## 2024-10-07 RX ADMIN — APIXABAN 10 MILLIGRAM(S): 5 TABLET, FILM COATED ORAL at 05:08

## 2024-10-07 RX ADMIN — Medication 2 TABLET(S): at 22:03

## 2024-10-07 RX ADMIN — Medication 1: at 12:44

## 2024-10-07 RX ADMIN — Medication 25 GRAM(S): at 12:16

## 2024-10-07 RX ADMIN — CHLORHEXIDINE GLUCONATE 1 APPLICATION(S): 40 SOLUTION TOPICAL at 12:15

## 2024-10-07 RX ADMIN — Medication 81 MILLIGRAM(S): at 12:15

## 2024-10-07 RX ADMIN — DOXYCYCLINE HYCLATE 100 MILLIGRAM(S): 100 TABLET, FILM COATED ORAL at 17:22

## 2024-10-07 RX ADMIN — POLYETHYLENE GLYCOL 3350 17 GRAM(S): 17 POWDER, FOR SOLUTION ORAL at 17:23

## 2024-10-07 RX ADMIN — Medication 2: at 17:23

## 2024-10-07 RX ADMIN — PANTOPRAZOLE SODIUM 40 MILLIGRAM(S): 40 TABLET, DELAYED RELEASE ORAL at 05:09

## 2024-10-07 RX ADMIN — Medication 20 MILLIGRAM(S): at 22:03

## 2024-10-07 NOTE — SWALLOW BEDSIDE ASSESSMENT ADULT - SLP PERTINENT HISTORY OF CURRENT PROBLEM
89 yo F with hx of HTN, HLD, DM II, HFrEF (LVEF 20-25% 2020), Hypothyroidism, CVA, Dementia and Recent PNA (dx on CXR 4 days ago) presents to ED for generalized weakness and decreased PO intake. 4 days ago, patient started having cough and decreased PO intake. CXR was done and patient was diagnosed with PNA and was started on 5 days course of Azithromycin and Cefpodoxime with Prednisone 20mg OD. Patient had positive sick contact whereby both son and daughter developed URTI symptoms few days earlier.
89 yo F with hx of HTN, HLD, DM II, HFrEF (LVEF 20-25% 2020), Hypothyroidism, CVA, Dementia and Recent PNA (dx on CXR 4 days ago) presents to ED for generalized weakness and decreased PO intake. 4 days ago, patient started having cough and decreased PO intake. CXR was done and patient was diagnosed with PNA and was started on 5 days course of Azithromycin and Cefpodoxime with Prednisone 20mg OD. Patient had positive sick contact whereby both son and daughter developed URTI symptoms few days earlier.
89 yo Female with hx of HTN, HLD, DM II, HFrEF (LVEF 20-25% 2020), Hypothyroidism, CVA, Dementia and Recent PNA (dx on CXR 4 days ago) presents to ED for generalized weakness and decreased PO intake. 4 days ago, patient started having cough and decreased PO intake. CXR was done and patient was diagnosed with PNA and was started on 5 days course of Azithromycin and Cefpodoxime with Prednisone 20mg OD. Patient had positive sick contact whereby both son and daughter developed URTI symptoms few days earlier. CT chest (10/2)->Bibasilar atelectasis. Left lower lobe lobar pneumonia.
89 yo Female with hx of HTN, HLD, DM II, HFrEF (LVEF 20-25% 2020), Hypothyroidism, CVA, Dementia and Recent PNA (dx on CXR 4 days ago) presents to ED for generalized weakness and decreased PO intake. 4 days ago, patient started having cough and decreased PO intake. CXR was done and patient was diagnosed with PNA and was started on 5 days course of Azithromycin and Cefpodoxime with Prednisone 20mg OD. Patient had positive sick contact whereby both son and daughter developed URTI symptoms few days earlier. CT chest (10/2)->Bibasilar atelectasis. Left lower lobe lobar pneumonia.

## 2024-10-07 NOTE — PROGRESS NOTE ADULT - SUBJECTIVE AND OBJECTIVE BOX
BRIJESH POWER  89y, Female    All available historical data reviewed    OVERNIGHT EVENTS:  no fevers  alert  nc 2 lit  pulido in    ROS:  not reliable    VITALS:  T(F): 97.9, Max: 97.9 (10-07-24 @ 14:19)  HR: 76  BP: 113/54  RR: 18Vital Signs Last 24 Hrs  T(C): 36.6 (07 Oct 2024 14:19), Max: 36.6 (07 Oct 2024 14:19)  T(F): 97.9 (07 Oct 2024 14:19), Max: 97.9 (07 Oct 2024 14:19)  HR: 76 (07 Oct 2024 14:19) (76 - 87)  BP: 113/54 (07 Oct 2024 14:19) (110/56 - 114/77)  BP(mean): --  RR: 18 (07 Oct 2024 17:39) (18 - 18)  SpO2: 95% (07 Oct 2024 17:39) (95% - 98%)    Parameters below as of 07 Oct 2024 17:39  Patient On (Oxygen Delivery Method): room air        TESTS & MEASUREMENTS:                        13.1   16.34 )-----------( 176      ( 07 Oct 2024 08:41 )             40.4     10-07    132[L]  |  102  |  32[H]  ----------------------------<  160[H]  4.8   |  22  |  0.8    Ca    8.5      07 Oct 2024 08:41  Mg     1.6     10-07    TPro  4.9[L]  /  Alb  2.8[L]  /  TBili  0.4  /  DBili  x   /  AST  17  /  ALT  20  /  AlkPhos  144[H]  10-07    LIVER FUNCTIONS - ( 07 Oct 2024 08:41 )  Alb: 2.8 g/dL / Pro: 4.9 g/dL / ALK PHOS: 144 U/L / ALT: 20 U/L / AST: 17 U/L / GGT: x             Culture - Blood (collected 10-05-24 @ 06:13)  Source: .Blood BLOOD  Preliminary Report (10-07-24 @ 17:00):    No growth at 48 Hours    Culture - Urine (collected 10-02-24 @ 00:45)  Source: Clean Catch None  Final Report (10-03-24 @ 11:59):    No growth    Urinalysis with Rflx Culture (collected 10-02-24 @ 00:45)    Culture - Blood (collected 10-01-24 @ 15:23)  Source: .Blood BLOOD  Final Report (10-06-24 @ 22:00):    No growth at 5 days    Culture - Blood (collected 10-01-24 @ 15:23)  Source: .Blood BLOOD  Final Report (10-06-24 @ 22:00):    No growth at 5 days      Urinalysis Basic - ( 07 Oct 2024 08:41 )    Color: x / Appearance: x / SG: x / pH: x  Gluc: 160 mg/dL / Ketone: x  / Bili: x / Urobili: x   Blood: x / Protein: x / Nitrite: x   Leuk Esterase: x / RBC: x / WBC x   Sq Epi: x / Non Sq Epi: x / Bacteria: x          Social History:  Tobacco Use: No  Alcohol Use: No  Drug Use: No    RADIOLOGY & ADDITIONAL TESTS:  Personal review of radiological diagnostics performed  Echo and EKG results noted when applicable.     MEDICATIONS:  albuterol/ipratropium for Nebulization. 9 milliLiter(s) Nebulizer once  apixaban 10 milliGRAM(s) Oral two times a day  aspirin  chewable 81 milliGRAM(s) Oral daily  atorvastatin 20 milliGRAM(s) Oral at bedtime  bisacodyl Suppository 10 milliGRAM(s) Rectal once  chlorhexidine 2% Cloths 1 Application(s) Topical daily  dexAMETHasone  Injectable 6 milliGRAM(s) IV Push every 24 hours  dextrose 5%. 1000 milliLiter(s) IV Continuous <Continuous>  dextrose 5%. 1000 milliLiter(s) IV Continuous <Continuous>  dextrose 50% Injectable 25 Gram(s) IV Push once  dextrose 50% Injectable 25 Gram(s) IV Push once  dextrose 50% Injectable 12.5 Gram(s) IV Push once  dextrose Oral Gel 15 Gram(s) Oral once PRN  glucagon  Injectable 1 milliGRAM(s) IntraMuscular once  insulin lispro (ADMELOG) corrective regimen sliding scale   SubCutaneous three times a day before meals  lactated ringers. 1000 milliLiter(s) IV Continuous <Continuous>  levothyroxine 50 MICROGram(s) Oral daily  losartan 25 milliGRAM(s) Oral daily  pantoprazole    Tablet 40 milliGRAM(s) Oral before breakfast  polyethylene glycol 3350 17 Gram(s) Oral two times a day  senna 2 Tablet(s) Oral at bedtime      ANTIBIOTICS:

## 2024-10-07 NOTE — PROGRESS NOTE ADULT - SUBJECTIVE AND OBJECTIVE BOX
24H events:    Patient is a 89y old Female who presents with a chief complaint of shortness of breath (06 Oct 2024 12:21)    Primary diagnosis of Pneumonia    Today is 6d of hospitalization. This morning patient was seen and examined at bedside, resting comfortably in bed. Weaning oxygen today, off O2 noted to be 93-94%      PAST MEDICAL & SURGICAL HISTORY  Dementia    Hypertension    Hyperlipidemia    Hypothyroidism    Diabetes      ALLERGIES:  No Known Allergies    MEDICATIONS:  STANDING MEDICATIONS  albuterol/ipratropium for Nebulization. 9 milliLiter(s) Nebulizer once  apixaban 10 milliGRAM(s) Oral two times a day  aspirin  chewable 81 milliGRAM(s) Oral daily  atorvastatin 20 milliGRAM(s) Oral at bedtime  bisacodyl Suppository 10 milliGRAM(s) Rectal once  chlorhexidine 2% Cloths 1 Application(s) Topical daily  dexAMETHasone  Injectable 6 milliGRAM(s) IV Push every 24 hours  dextrose 5%. 1000 milliLiter(s) IV Continuous <Continuous>  dextrose 5%. 1000 milliLiter(s) IV Continuous <Continuous>  dextrose 50% Injectable 25 Gram(s) IV Push once  dextrose 50% Injectable 25 Gram(s) IV Push once  dextrose 50% Injectable 12.5 Gram(s) IV Push once  doxycycline IVPB      doxycycline IVPB 100 milliGRAM(s) IV Intermittent every 12 hours  glucagon  Injectable 1 milliGRAM(s) IntraMuscular once  insulin lispro (ADMELOG) corrective regimen sliding scale   SubCutaneous three times a day before meals  lactated ringers. 1000 milliLiter(s) IV Continuous <Continuous>  levothyroxine 50 MICROGram(s) Oral daily  losartan 25 milliGRAM(s) Oral daily  pantoprazole    Tablet 40 milliGRAM(s) Oral before breakfast  polyethylene glycol 3350 17 Gram(s) Oral two times a day  senna 2 Tablet(s) Oral at bedtime    PRN MEDICATIONS  dextrose Oral Gel 15 Gram(s) Oral once PRN    VITALS:   T(F): 97.9  HR: 76  BP: 113/54  RR: 18  SpO2: 98%    PHYSICAL EXAM:  General: NAD, alert   ENT: MMM  Neck: Supple, No JVD  Lungs: Clear to auscultation bilaterally  Cardio: RRR, S1/S2, No murmurs  Abdomen: Soft, Nontender, Nondistended; Bowel sounds present  Extremities: No cyanosis, No edema     LABS:                        13.1   16.34 )-----------( 176      ( 07 Oct 2024 08:41 )             40.4     10-07    132[L]  |  102  |  32[H]  ----------------------------<  160[H]  4.8   |  22  |  0.8    Ca    8.5      07 Oct 2024 08:41  Mg     1.6     10-07    TPro  4.9[L]  /  Alb  2.8[L]  /  TBili  0.4  /  DBili  x   /  AST  17  /  ALT  20  /  AlkPhos  144[H]  10-07      Urinalysis Basic - ( 07 Oct 2024 08:41 )    Color: x / Appearance: x / SG: x / pH: x  Gluc: 160 mg/dL / Ketone: x  / Bili: x / Urobili: x   Blood: x / Protein: x / Nitrite: x   Leuk Esterase: x / RBC: x / WBC x   Sq Epi: x / Non Sq Epi: x / Bacteria: x            Culture - Blood (collected 05 Oct 2024 06:13)  Source: .Blood BLOOD  Preliminary Report (06 Oct 2024 17:01):    No growth at 24 hours

## 2024-10-07 NOTE — ADVANCED PRACTICE NURSE CONSULT - ASSESSMENT
History of Present Illness:   89 yo F with hx of HTN, HLD, DM II, HFrEF (LVEF 20-25% 2020), Hypothyroidism, CVA, Dementia and Recent PNA (dx on CXR 4 days ago) presents to ED for generalized weakness and decreased PO intake. 4 days ago, patient started having cough and decreased PO intake. CXR was done and patient was diagnosed with PNA and was started on 5 days course of Azithromycin and Cefpodoxime with Prednisone 20mg OD. Patient had positive sick contact whereby both son and daughter developed URTI symptoms few days earleir. Patient is generally not very verbal at baseline and doesn't raise complains. She is mobile with a walker and 1 person assistance. Over past few days has been very week to ambulate. Family denies any reported chest pain, shortness of breath, fever, chills, abdominal pain, nausea, vomiting. They report loose BM over past 1 day. Patient admitted to medicine for management of suspected PNA and MARTINE.       Patient received lying in bed. Awake. Limited mobility. Incontinent of stool. Rodriges in place. High risk for pressure injury development and progression.    Type of Wound: Evolving Deep Tissue Injury  Location:  Sacrococcygeal region extending to bilateral buttocks  Wound bed: Dark purple with areas of epidermal skin loss  Wound edges: Irregular  Periwound: Intact  Wound exudate: None  Wound odor: No  Induration, erythema, warmth: No  Wound pain: No

## 2024-10-07 NOTE — SWALLOW BEDSIDE ASSESSMENT ADULT - SWALLOW EVAL: FUNCTIONAL LEVEL AT TIME OF EVAL
Awake, non verbal, o2 NC, +bolus holding
pt received in deep sleep, not waking to verbal or tactile cues. Son at bedside reports pt was up a lot overnight. Son also reports good tolerance of minced& moist/thin diet for dinner.
Awake, non verbal, o2 NC, +bolus holding

## 2024-10-07 NOTE — SWALLOW BEDSIDE ASSESSMENT ADULT - SLP GENERAL OBSERVATIONS
Pt received at bedside, non verbal, 3L NC, poor command following. Daughter in law at bedside
Pt received at bedside, non verbal, 3L NC, poor command following. Son at bedside
Pt awake in bed, Nursing trying to take blood , o2 via RA , family at bedside

## 2024-10-07 NOTE — SWALLOW BEDSIDE ASSESSMENT ADULT - SWALLOW EVAL: DIAGNOSIS
[address] [facility]NAGEMENT  [address] [facility]  [address]  Dept: [phone]  Dept Fax: [phone]: [phone]     Visit Date:  10/18/2021    Patient:  Rhiannon Garcia  YOB: 1988    HPI:     Chief Complaint   Patient presents with    Drug Problem     Christine Moran presents today for medical evaluation of severe opioid use disorder.     I last seen her 2 weeks ago.      Urges and cravings better controlled with Suboxone 6 mg BID.      Urine positive for buprenorphine only      She denies any use    Is active in counseling    Hep C negative    Medications    Current Outpatient Medications:     buprenorphine-naloxone (SUBOXONE) 8-2 MG FILM SL film, Place 1.5 Film under the tongue daily for 28 days. , Disp: 42 Film, Rfl: 0    fluticasone (FLONASE) 50 MCG/ACT nasal spray, 1 spray by Each Nostril route daily, Disp: 1 Bottle, Rfl: 0    cloNIDine (CATAPRES) 0.1 MG tablet, Take 1 tablet by mouth 2 times daily as needed for High Blood Pressure, Disp: 60 tablet, Rfl: 3    buPROPion (WELLBUTRIN SR) 200 MG extended release tablet, Take 1 tablet by mouth 2 times daily, Disp: 60 tablet, Rfl: 1    The patient has No Known Allergies. Past Medical History  Christine Moran  has a past medical history of MRSA (methicillin resistant staph aureus) culture positive. Past Surgical History  The patient  has a past surgical history that includes Ankle surgery. Family History  This patient's family history includes Cancer in her paternal grandmother; Emphysema in her maternal grandmother; Heart Attack in her paternal grandfather; Seizures in her father. Social History  Christine Moran  reports that she has been smoking cigarettes. She has a 0.25 pack-year smoking history. She has never used smokeless tobacco. She reports previous alcohol use. She reports previous drug use.     Health Maintenance:    Health Maintenance   Topic Date Due
 Hepatitis C screen  Never done    Varicella vaccine (1 of 2 - 2-dose childhood series) Never done    Pneumococcal 0-64 years Vaccine (1 of 2 - PPSV23) Never done    COVID-19 Vaccine (1) Never done    HIV screen  Never done    DTaP/Tdap/Td vaccine (1 - Tdap) Never done    Cervical cancer screen  Never done    Flu vaccine (1) Never done    Hepatitis A vaccine  Aged Out    Hepatitis B vaccine  Aged Out    Hib vaccine  Aged Out    Meningococcal (ACWY) vaccine  Aged Out       Subjective:      Review of Systems   Constitutional: Negative for chills. Gastrointestinal: Negative for nausea. Psychiatric/Behavioral: Negative for sleep disturbance. All other systems reviewed and are negative. Objective:     /85 (Site: Left Lower Arm, Position: Sitting, Cuff Size: Medium Adult)   Pulse 86   Temp 98 °F (36.7 °C) (Temporal)   Ht 5' 7\" (1.702 m)   Wt 128 lb (58.1 kg)   BMI 20.05 kg/m²     Physical Exam  Vitals reviewed. Constitutional:       General: She is not in acute distress. Appearance: Normal appearance. She is not ill-appearing. HENT:      Head: Normocephalic and atraumatic. Right Ear: External ear normal.      Left Ear: External ear normal.      Nose: Nose normal. No congestion or rhinorrhea. Mouth/Throat:      Mouth: Mucous membranes are moist.   Eyes:      Extraocular Movements: Extraocular movements intact. Conjunctiva/sclera: Conjunctivae normal.      Pupils: Pupils are equal, round, and reactive to light. Pulmonary:      Effort: Pulmonary effort is normal. No respiratory distress. Musculoskeletal:         General: Normal range of motion. Cervical back: Normal range of motion and neck supple. Right lower leg: No edema. Left lower leg: No edema. Skin:     General: Skin is warm and dry. Neurological:      General: No focal deficit present. Mental Status: She is alert and oriented to person, place, and time.    Psychiatric:         Mood
and Affect: Mood normal.         Behavior: Behavior normal.         Thought Content: Thought content normal.         Judgment: Judgment normal.         Labs Reviewed 10/18/2021:    No results found for: WBC, HGB, HCT, PLT, CHOL, TRIG, HDL, LDLDIRECT, ALT, AST, NA, K, CL, CREATININE, BUN, CO2, TSH, PSA, INR, GLUF, LABA1C, LABMICR    Assessment/Plan      1. Severe opioid use disorder (HCC)    - POCT Rapid Drug Screen  - buprenorphine-naloxone (SUBOXONE) 8-2 MG FILM SL film; Place 1.5 Film under the tongue daily for 28 days. Dispense: 42 Film; Refill: 0  - OARRS reviewed, no discrepancies  - Narcan at home  - Continue counseling as scheduled      Return in about 4 weeks (around 11/15/2021). Patient given educational materials - see patient instructions. Discussed use, benefit, and side effects of prescribed medications. All patient questions answered. Pt voiced understanding. Reviewed health maintenance.        Electronically signed JAQUELINE Neil - CNP on 10/18/21 at 11:38 AM EDT
limited assessment 2'2 decreased acceptance. Pt w/ baseline rhoncus sounds new from COVID , Toleration of small sips of thin liquids and minced and moist textures.
Moderate oral dysphagia with minced & moist, puree, mildly thick and thin liquids likely 2/2 cognition in the setting of dementia. +grossly overt s/s of aspiration with thin liquids. Toleration observed for puree and mildly thick liquids without overt s/s of penetration/ aspiration
Mild/moderate oral dysphagia with puree, mildly thick likely 2/2 cognition in the setting of dementia. Toleration observed for puree and mildly thick liquids without overt s/s of penetration/ aspiration

## 2024-10-07 NOTE — PHARMACOTHERAPY INTERVENTION NOTE - NSPHARMCOMMASP
ASP - Therapy recommended/ Alternative therapy
ASP - Duration of therapy
ASP - Therapy recommended/ Alternative therapy

## 2024-10-07 NOTE — SWALLOW BEDSIDE ASSESSMENT ADULT - SWALLOW EVAL: RECOMMENDED DIET
Puree diet w/mildly thick liquids.
minced and moist and thin liquids 1:1 feed
minced and moist and thin liquids 1:1 feed
Puree diet w/mildly thick liquids.

## 2024-10-07 NOTE — SWALLOW BEDSIDE ASSESSMENT ADULT - NS SPL SWALLOW CLINIC TRIAL FT
mild oral dysphagia _ pt distracted by blood draw. tolerating minced and moist  and small sips of thin liquids
Toleration observed for puree and mildly thick liquids without overt s/s of penetration/ aspiration
+grossly overt s/s of aspiration with thin liquids. Toleration observed for puree and mildly thick liquids without overt s/s of penetration/ aspiration

## 2024-10-07 NOTE — PROGRESS NOTE ADULT - SUBJECTIVE AND OBJECTIVE BOX
Patient is a 89y old  Female who presents with a chief complaint of shortness of breath (06 Oct 2024 12:21)      Patient seen and examined at bedside.    ALLERGIES:  No Known Allergies    MEDICATIONS:  albuterol/ipratropium for Nebulization. 9 milliLiter(s) Nebulizer once  apixaban 10 milliGRAM(s) Oral two times a day  aspirin  chewable 81 milliGRAM(s) Oral daily  atorvastatin 20 milliGRAM(s) Oral at bedtime  bisacodyl Suppository 10 milliGRAM(s) Rectal once  chlorhexidine 2% Cloths 1 Application(s) Topical daily  dexAMETHasone  Injectable 6 milliGRAM(s) IV Push every 24 hours  dextrose 5%. 1000 milliLiter(s) IV Continuous <Continuous>  dextrose 5%. 1000 milliLiter(s) IV Continuous <Continuous>  dextrose 50% Injectable 25 Gram(s) IV Push once  dextrose 50% Injectable 25 Gram(s) IV Push once  dextrose 50% Injectable 12.5 Gram(s) IV Push once  dextrose Oral Gel 15 Gram(s) Oral once PRN  doxycycline IVPB      doxycycline IVPB 100 milliGRAM(s) IV Intermittent every 12 hours  glucagon  Injectable 1 milliGRAM(s) IntraMuscular once  insulin lispro (ADMELOG) corrective regimen sliding scale   SubCutaneous three times a day before meals  lactated ringers. 1000 milliLiter(s) IV Continuous <Continuous>  levothyroxine 50 MICROGram(s) Oral daily  losartan 25 milliGRAM(s) Oral daily  pantoprazole    Tablet 40 milliGRAM(s) Oral before breakfast  polyethylene glycol 3350 17 Gram(s) Oral two times a day  senna 2 Tablet(s) Oral at bedtime    Vital Signs Last 24 Hrs  T(F): 97.9 (07 Oct 2024 14:19), Max: 97.9 (07 Oct 2024 14:19)  HR: 76 (07 Oct 2024 14:19) (76 - 87)  BP: 113/54 (07 Oct 2024 14:19) (110/56 - 114/77)  RR: 18 (07 Oct 2024 16:26) (18 - 18)  SpO2: 97% (07 Oct 2024 16:26) (97% - 98%)  I&O's Summary    06 Oct 2024 07:01  -  07 Oct 2024 07:00  --------------------------------------------------------  IN: 200 mL / OUT: 800 mL / NET: -600 mL    07 Oct 2024 07:01  -  07 Oct 2024 16:53  --------------------------------------------------------  IN: 118 mL / OUT: 250 mL / NET: -132 mL        PHYSICAL EXAM:  General: NAD, Alert  ENT: MMM  Neck: Supple, No JVD  Lungs: diminished breath sounds, no wheezing   Cardio: RRR, S1/S2, 2/6 systolic murmur   Abdomen: Soft, Nontender, Nondistended; Bowel sounds present  Extremities: No cyanosis, No edema    LABS:                        13.1   16.34 )-----------( 176      ( 07 Oct 2024 08:41 )             40.4     10-07    132  |  102  |  32  ----------------------------<  160  4.8   |  22  |  0.8    Ca    8.5      07 Oct 2024 08:41  Mg     1.6     10-07    TPro  4.9  /  Alb  2.8  /  TBili  0.4  /  DBili  x   /  AST  17  /  ALT  20  /  AlkPhos  144  10-07                TSH 1.84   TSH with FT4 reflex --  Total T3 --              POCT Blood Glucose.: 190 mg/dL (07 Oct 2024 12:29)  POCT Blood Glucose.: 170 mg/dL (07 Oct 2024 07:50)  POCT Blood Glucose.: 242 mg/dL (06 Oct 2024 18:07)      Urinalysis Basic - ( 07 Oct 2024 08:41 )    Color: x / Appearance: x / SG: x / pH: x  Gluc: 160 mg/dL / Ketone: x  / Bili: x / Urobili: x   Blood: x / Protein: x / Nitrite: x   Leuk Esterase: x / RBC: x / WBC x   Sq Epi: x / Non Sq Epi: x / Bacteria: x        Culture - Blood (collected 05 Oct 2024 06:13)  Source: .Blood BLOOD  Preliminary Report (06 Oct 2024 17:01):    No growth at 24 hours    Culture - Urine (collected 02 Oct 2024 00:45)  Source: Clean Catch None  Final Report (03 Oct 2024 11:59):    No growth    Culture - Blood (collected 01 Oct 2024 15:23)  Source: .Blood BLOOD  Final Report (06 Oct 2024 22:00):    No growth at 5 days    Culture - Blood (collected 01 Oct 2024 15:23)  Source: .Blood BLOOD  Final Report (06 Oct 2024 22:00):    No growth at 5 days          RADIOLOGY & ADDITIONAL TESTS:    Care Discussed with Consultants/Other Providers:

## 2024-10-07 NOTE — SWALLOW BEDSIDE ASSESSMENT ADULT - ADDITIONAL RECOMMENDATIONS
SLP will plan to f/u  Consider GOC conversations to further assess caregiver goals.
Reconsult ST services PRN.   Consider GOC conversations to further assess caregiver goals.

## 2024-10-07 NOTE — SWALLOW BEDSIDE ASSESSMENT ADULT - SWALLOW EVAL: RECOMMENDED FEEDING/EATING TECHNIQUES
1:1 feed/maintain upright posture during/after eating for 30 mins/small sips/bites
1:1 feed/maintain upright posture during/after eating for 30 mins/small sips/bites
allow for swallow between intakes/small sips/bites
allow for swallow between intakes/small sips/bites

## 2024-10-07 NOTE — PHARMACOTHERAPY INTERVENTION NOTE - OUTCOME
Symptomatic treatment - Encouraged fluids, salt water gargles, honey (only if greater than 1 year in age due to risk of botulism), elevation, humidifier, sinus rinse/netti pot, lozenges, tea, topical vapor rub, popsicles, rest, etc     May use over-the-counter Tylenol or ibuprofen PRN    May continue OTC medicine as desired if needed for pain.    No concern of strep.     No antibiotics are indicated for what seems most consistent with a viral upper respiratory infection. Lungs are clear. No ear infection.   
accepted

## 2024-10-07 NOTE — PROGRESS NOTE ADULT - ASSESSMENT
· Assessment	  89 yo F with hx of HTN, HLD, DM II, HFrEF (LVEF 20-25% 2020), Hypothyroidism, CVA, Dementia and Recent PNA (dx on CXR 4 days ago) presents to ED for generalized weakness and decreased PO intake. 4 days ago, patient started having cough and decreased PO intake. CXR was done and patient was diagnosed with PNA and was started on 5 days course of Azithromycin and Cefpodoxime with Prednisone 20mg OD. Patient had positive sick contact whereby both son and daughter developed URTI symptoms few days earleir. Patient is generally not very verbal at baseline and doesn't raise complains. She is mobile with a walker and 1 person assistance. Over past few days has been very week to ambulate. Family denies any reported chest pain, shortness of breath, fever, chills, abdominal pain, nausea, vomiting. They report loose BM over past 1 day.     IMPRESSION/RECOMMENDATIONS  Immunosuppression/Immunosenescence ( above age 60 yrs there is a exponential decline in immunity which could result in poor clinical outcomes.   COVID 19 with mild illness- pt has physiological /non pulmonary complaints  10/2 CT with no GGO  Pt is in the early viral replicative phase based on the timeline/onset of symptoms. ( as per son Eliseo at the bedside he was NG and his wife who had a URI also tested NG ? )  S/p vaccination  LLL bacterial PNA  10/2 CT with LLL : lobar consolidation. No pleural effusion. No cavitation  No risk factors for ORSA/ P aeruginosa  Nares ORSA NG  10/2 urine for legionella ag NG  10/1,5 BCx NG  WBc 16.3  BNP 59142    - mg iv on Day 1, then 100 mg iv D2 and D3.  -Rocephin 1 gm iv q24h  -Doxycycline 10 mg iv q12h  -Off loading to prevent pressure sores and preventive measures to avoid aspiration

## 2024-10-07 NOTE — SWALLOW BEDSIDE ASSESSMENT ADULT - COMMENTS
known to ST from Jan 2020 - recs for dysphagia 3 and thin
known to ST from Jan 2020 - recs for dysphagia 3 and thin
known to ST from Jan 2020 - recs for dysphagia 3 and thin  CT chest: Bibasilar atelectasis. Left lower lobe lobar pneumonia.
known to ST, seen this admission with recs for a puree diet w/mildly thick liquids.     CT chest: Bibasilar atelectasis. Left lower lobe lobar pneumonia.

## 2024-10-07 NOTE — ADVANCED PRACTICE NURSE CONSULT - RECOMMEDATIONS
Cleanse wound to sacrococcygeal region/bilateral buttocks with soap and water.   Pat dry, apply triad cover with abdominal pad twice a day and prn for soiling.     Maintain pressure injury prevention.   Keep skin clean.   Maintain incontinence care.   Monitor skin for changes and notify provider   Case discussed with primary RN

## 2024-10-07 NOTE — SWALLOW BEDSIDE ASSESSMENT ADULT - SWALLOW EVAL: PATIENT/FAMILY GOALS STATEMENT
Difficulty waking pt- SLP and son agree to let her rest and continue current diet of minced & moist/thins
Pt, her son and daughter  educated on diet recs
Pt's son at bedside reports worsening dysphagia
Pt's daughter in law at bedside was educated on diet recs, use of mildly thick liquids.

## 2024-10-07 NOTE — PHARMACOTHERAPY INTERVENTION NOTE - COMMENTS
Recommended continuing ceftriaxone 1g IV q24h as per ID consult recommendations. 
Recommended continuing doxycycline 100mg IV q12h as per ID consult recommendations. 
Consistent with hospitalist note, recommended to discontinue the doxycycline order since today is the last day of doxycycline therapy.    Margy Owen, PharmD, [facility]  Clinical Pharmacy Specialist, Infectious Diseases  Tele-Antimicrobial Stewardship Program (Tele-ASP)  Tele-ASP Phone: [phone]

## 2024-10-07 NOTE — SWALLOW BEDSIDE ASSESSMENT ADULT - ASR SWALLOW ASPIRATION MONITOR
change of breathing pattern/cough/pneumonia
change of breathing pattern/oral hygiene/cough/gurgly voice/fever/pneumonia/throat clearing/upper respiratory infection
change of breathing pattern/oral hygiene/cough/gurgly voice/fever/pneumonia/throat clearing/upper respiratory infection
change of breathing pattern/cough/pneumonia

## 2024-10-07 NOTE — PROGRESS NOTE ADULT - ASSESSMENT
89 yo F with hx of HTN, HLD, DM II, HFrEF (LVEF 20-25% 2020), Hypothyroidism, CVA, Dementia and Recent PNA (dx 4 days ago) presents to ED for generalized weakness. Positive sick contact.     #Acute hypoxic respiratory failure 2/2 submassive PE with RV strain (trop, elevated bpnp, hemodynamically stable) and COVID pneumonia    - Echo demonstrated Romano's sign, biomarkers elevated, requiring oxygen supplementation with desaturation on RA at rest   - Pt is now on therapeutic eliquis   - CT abdomen: Small left pleural effusion with adjacent consolidation which may reflect atelectasis or infiltrate. Mild fullness left renal pelvis. Mild left ureter with distal left ureteral stone. Stone within the urinary bladder on the left.   - cont doxy and CTX  - RVP +ve for Covid -> completed RDV day 5/10 dexamethasone  -ID recs appreciated: c/w rocephin and doxycyline-to stop after 5 days 10/7  - Nasal MRSA negative  - 10/01 bcx negative   - IR consult appreciated: no thrombectomy for now, c/w full AC (Eliquis 10 BID)  - echo noted  - Palliative final recs appreciated    #Elevated troponin likely demand ischemia iso suspected PE and hypotension   #chronic  HFimpEF   - 1st troponin 437  --> 350 -> 335  - no ischemic changes on ECG  - c/w ASA, statin  - TTE in 2020: LVEF 20-25%  - TTE 10/2/2024: LVEF 69%, G1DD, LV Ejection Fraction by Abreu's Method with a biplane EF of 69 %. Right ventricular systolic function is reduced with apical sparing consistent with acute pulmonary embolism (Romano's sign). The RV appears moderately dilated. Moderate TR. Moderate MR. Borderline pulm HTN   - seen by Dr. Silver previously; not following with cardiology currently  - cardiology recs appreciated     #MARTINE likely prerenal - resolved   - serum Cr. 1.8 on admission. Baseline Cr 1.0 in May 2024.   - CT abdomen neg for hydronephrosis. Moderate distention the urinary bladder.  - Bladder scan q8H  - monitor BMP closely  - TOV in progress     #Constipation with fecal retention-resolved   - CT abdomen shows Significant fecal retention.  - bowel regimen.     HTN - 10/6 restarting losartan 25mg with parameters . Hold lopressor as BP borderline.   DM II - monitor FS AC HS. Insulin sliding scale.   Hypothyroidism - c/w synthroid. TSH-1.84 10/5    DVT PPX: eliquis  GI PPX: protonix  DIET: minced and moist  ACTIVITY: as tolerated  CODE STATUS: DNR/DNI  DISPOSITION: acute, 3c  Family: son updated at bedside 10/6  PENDING: tov, oxygen weaning

## 2024-10-07 NOTE — SWALLOW BEDSIDE ASSESSMENT ADULT - SWALLOW EVAL: ORAL MUSCULATURE
no trials offered/unable to assess due to poor participation/comprehension
Generalized weakness
unable to assess due to poor participation/comprehension
Generalized weakness

## 2024-10-07 NOTE — SWALLOW BEDSIDE ASSESSMENT ADULT - ORAL PHASE
+Bolus holding all consistencies./Decreased anterior-posterior movement of the bolus/Delayed oral transit time/Lingual stasis
Within functional limits
+Bolus holding all consistencies./Decreased anterior-posterior movement of the bolus/Delayed oral transit time/Lingual stasis

## 2024-10-07 NOTE — SWALLOW BEDSIDE ASSESSMENT ADULT - CONSISTENCIES ADMINISTERED
thin liquid
thin liquid/mildly thick/pureed/minced & moist
thin liquid/mildly thick/pureed/minced & moist

## 2024-10-07 NOTE — SWALLOW BEDSIDE ASSESSMENT ADULT - NS ASR SWALLOW FINDINGS DISCUS
RN and MD made aware./Physician/Nursing
RN and MD made aware./Physician/Nursing
GAYATRI Vance/Physician/Nursing/Patient/Family
GAYATRI Causey/Physician/Nursing/Patient/Family

## 2024-10-07 NOTE — PROGRESS NOTE ADULT - ASSESSMENT
89 yo F with hx of HTN, HLD, DM II, HFrEF (LVEF 20-25% 2020), Hypothyroidism, CVA, Dementia and Recent PNA (dx 4 days ago) presents to ED for generalized weakness. Positive sick contact.     # Acute hypoxic respiratory failure 2/2 suspected PE and viral vs CAP (Covid +ve)   - CTA PE pending   - Echo demonstrated Romano's sign, biomarkers elevated, requiring oxygen supplementation with desaturation on RA at rest   - started empirically on heparin drip pending CTA PE   - CT abdomen: Small left pleural effusion with adjacent consolidation which may reflect atelectasis or infiltrate. Mild fullness left renal pelvis. Mild left ureter with distal left ureteral stone. Stone within the urinary bladder on the left.   - cont doxy and CTX for now   - RVP +ve for Covid ->completed RDV course  -10/4 procal 0.08    #Elevated troponin likely demand ischemia iso suspected PE   # HFimpEF   - 1st troponin 437  --> 350 -> 335  - no ischemic changes on ECG  - c/w ASA, statin  - TTE in 2020: LVEF 20-25%  - TTE 10/2/2024: LVEF 69%, G1DD, LV Ejection Fraction by Abreu's Method with a biplane EF of 69 %. Right ventricular systolic function is reduced with apical sparing consistent with acute pulmonary embolism (Romano's sign). The RV appears moderately dilated. Moderate TR. Moderate MR. Borderline pulm HTN   - seen by Dr. Silver previously; not following with cardiology currently  - cardiology consult pending     #MARTINE likely prerenal   - serum Cr. 1.8 currently. Baseline Cr 1.0 in May 2024.   - CT abdomen neg for hydronephrosis. Moderate distention the urinary bladder.  - Bladder scan q8H  - monitor BMP closely  - start on IVF hydration following CTA PE    #Constipation with fecal retention  - CT abdomen shows Significant fecal retention.  - bowel regimen.     HTN - hold losartan due to MARTINE. Hold lopressor as BP borderline.   DM II - monitor FS AC HS. Insulin sliding scale.   Hypothyroidism - c/w synthroid. TSH 1.84.    PENDING: Weaning O2, pending DC tmm 89 yo F with hx of HTN, HLD, DM II, HFrEF (LVEF 20-25% 2020), Hypothyroidism, CVA, Dementia and Recent PNA (dx 4 days ago) presents to ED for generalized weakness. Positive sick contact.     # Acute hypoxic respiratory failure 2/2 suspected PE and viral vs CAP (Covid +ve)   - CTA PE pending   - Echo demonstrated Romano's sign, biomarkers elevated, requiring oxygen supplementation with desaturation on RA at rest   - started empirically on heparin drip pending CTA PE   - CT abdomen: Small left pleural effusion with adjacent consolidation which may reflect atelectasis or infiltrate. Mild fullness left renal pelvis. Mild left ureter with distal left ureteral stone. Stone within the urinary bladder on the left.   - cont doxy and CTX for now   - RVP +ve for Covid ->completed RDV course  -10/4 procal 0.08    #Elevated troponin likely demand ischemia iso suspected PE   # HFimpEF   - 1st troponin 437  --> 350 -> 335  - no ischemic changes on ECG  - c/w ASA, statin  - TTE in 2020: LVEF 20-25%  - TTE 10/2/2024: LVEF 69%, G1DD, LV Ejection Fraction by Abreu's Method with a biplane EF of 69 %. Right ventricular systolic function is reduced with apical sparing consistent with acute pulmonary embolism (Romano's sign). The RV appears moderately dilated. Moderate TR. Moderate MR. Borderline pulm HTN   - seen by Dr. Silver previously; not following with cardiology currently  - cardiology consult pending     #MARTINE likely prerenal   - serum Cr. 1.8 currently. Baseline Cr 1.0 in May 2024.   - CT abdomen neg for hydronephrosis. Moderate distention the urinary bladder.  - Bladder scan q8H  - monitor BMP closely  - start on IVF hydration following CTA PE    #Constipation with fecal retention  - CT abdomen shows Significant fecal retention.  - bowel regimen.     HTN - hold losartan due to MARTINE. Hold lopressor as BP borderline.   DM II - monitor FS AC HS. Insulin sliding scale.   Hypothyroidism - c/w synthroid. TSH 1.84.    PENDING: Weaning O2, pending DC tmm, undergoing TOV

## 2024-10-07 NOTE — SWALLOW BEDSIDE ASSESSMENT ADULT - PHARYNGEAL PHASE
Grossly overt s/s of aspiration w/thins/Cough post oral intake
Within functional limits
Delayed pharyngeal swallow

## 2024-10-08 LAB
GLUCOSE BLDC GLUCOMTR-MCNC: 137 MG/DL — HIGH (ref 70–99)
GLUCOSE BLDC GLUCOMTR-MCNC: 181 MG/DL — HIGH (ref 70–99)
GLUCOSE BLDC GLUCOMTR-MCNC: 210 MG/DL — HIGH (ref 70–99)
GLUCOSE BLDC GLUCOMTR-MCNC: 245 MG/DL — HIGH (ref 70–99)

## 2024-10-08 PROCEDURE — 99232 SBSQ HOSP IP/OBS MODERATE 35: CPT

## 2024-10-08 RX ORDER — TAMSULOSIN HCL 0.4 MG
0.4 CAPSULE ORAL AT BEDTIME
Refills: 0 | Status: DISCONTINUED | OUTPATIENT
Start: 2024-10-08 | End: 2024-10-15

## 2024-10-08 RX ADMIN — Medication 2 TABLET(S): at 21:39

## 2024-10-08 RX ADMIN — Medication 2: at 17:00

## 2024-10-08 RX ADMIN — POLYETHYLENE GLYCOL 3350 17 GRAM(S): 17 POWDER, FOR SOLUTION ORAL at 17:01

## 2024-10-08 RX ADMIN — Medication 1: at 11:56

## 2024-10-08 RX ADMIN — DEXAMETHASONE 1.5 MG 6 MILLIGRAM(S): 1.5 TABLET ORAL at 06:29

## 2024-10-08 RX ADMIN — Medication 20 MILLIGRAM(S): at 21:39

## 2024-10-08 RX ADMIN — Medication 0.4 MILLIGRAM(S): at 21:39

## 2024-10-08 RX ADMIN — APIXABAN 10 MILLIGRAM(S): 5 TABLET, FILM COATED ORAL at 17:00

## 2024-10-08 RX ADMIN — CHLORHEXIDINE GLUCONATE 1 APPLICATION(S): 40 SOLUTION TOPICAL at 12:08

## 2024-10-08 RX ADMIN — Medication 81 MILLIGRAM(S): at 11:56

## 2024-10-08 NOTE — PROGRESS NOTE ADULT - ASSESSMENT
· Assessment	  89 yo F with hx of HTN, HLD, DM II, HFrEF (LVEF 20-25% 2020), Hypothyroidism, CVA, Dementia and Recent PNA (dx on CXR 4 days ago) presents to ED for generalized weakness and decreased PO intake. 4 days ago, patient started having cough and decreased PO intake. CXR was done and patient was diagnosed with PNA and was started on 5 days course of Azithromycin and Cefpodoxime with Prednisone 20mg OD. Patient had positive sick contact whereby both son and daughter developed URTI symptoms few days earleir. Patient is generally not very verbal at baseline and doesn't raise complains. She is mobile with a walker and 1 person assistance. Over past few days has been very week to ambulate. Family denies any reported chest pain, shortness of breath, fever, chills, abdominal pain, nausea, vomiting. They report loose BM over past 1 day.     IMPRESSION/RECOMMENDATIONS  Immunosuppression/Immunosenescence ( above age 60 yrs there is a exponential decline in immunity which could result in poor clinical outcomes.   COVID 19 with mild illness- pt has physiological /non pulmonary complaints  10/2 CT with no GGO  Pt is in the early viral replicative phase based on the timeline/onset of symptoms. ( as per son Eliseo at the bedside he was NG and his wife who had a URI also tested NG ? )  S/p vaccination  s/p RDV for 3 days  LLL bacterial PNA  10/2 CT with LLL : lobar consolidation. No pleural effusion. No cavitation  No risk factors for ORSA/ P aeruginosa  Nares ORSA NG  10/2 urine for legionella ag NG  10/1,5 BCx NG  WBc 16.3  BNP 47034    -Rocephin 1 gm iv q24h  -Doxycycline 10 mg iv q12h  -Off loading to prevent pressure sores and preventive measures to avoid aspiration

## 2024-10-08 NOTE — CHART NOTE - NSCHARTNOTEFT_GEN_A_CORE
This patient will require a hospital bed with gel overlay for discharge to home in order to keep patients head elevated greater than 30 degrees due to multiple medical issues including HF, PE, SOB.  Pt is not able to make position changes without assistance.   Patient is at high risk for aspiration due to multiple medical problems.  Patient is bedbound and cannot make position changes. Pillows and wedges have been tried and failed.  Patient is at risk for skin breakdown.

## 2024-10-08 NOTE — PROGRESS NOTE ADULT - SUBJECTIVE AND OBJECTIVE BOX
BRIJESH POWER  89y, Female    All available historical data reviewed    OVERNIGHT EVENTS:  no fevers      ROS:  unable to obtain history secondary to patient's mental status and/or sedation     VITALS:  T(F): 97.4, Max: 97.4 (10-07-24 @ 20:30)  HR: 105  BP: 124/94  RR: 18Vital Signs Last 24 Hrs  T(C): 36.3 (07 Oct 2024 20:30), Max: 36.3 (07 Oct 2024 20:30)  T(F): 97.4 (07 Oct 2024 20:30), Max: 97.4 (07 Oct 2024 20:30)  HR: 105 (08 Oct 2024 12:57) (82 - 105)  BP: 124/94 (08 Oct 2024 12:57) (115/74 - 130/75)  BP(mean): 97 (08 Oct 2024 04:33) (88 - 97)  RR: 18 (08 Oct 2024 12:57) (18 - 18)  SpO2: 94% (08 Oct 2024 10:00) (94% - 98%)    Parameters below as of 08 Oct 2024 10:00  Patient On (Oxygen Delivery Method): room air        TESTS & MEASUREMENTS:                        13.1   16.34 )-----------( 176      ( 07 Oct 2024 08:41 )             40.4     10-07    132[L]  |  102  |  32[H]  ----------------------------<  160[H]  4.8   |  22  |  0.8    Ca    8.5      07 Oct 2024 08:41  Mg     1.6     10-07    TPro  4.9[L]  /  Alb  2.8[L]  /  TBili  0.4  /  DBili  x   /  AST  17  /  ALT  20  /  AlkPhos  144[H]  10-07    LIVER FUNCTIONS - ( 07 Oct 2024 08:41 )  Alb: 2.8 g/dL / Pro: 4.9 g/dL / ALK PHOS: 144 U/L / ALT: 20 U/L / AST: 17 U/L / GGT: x             Culture - Blood (collected 10-05-24 @ 06:13)  Source: .Blood BLOOD  Preliminary Report (10-07-24 @ 17:00):    No growth at 48 Hours    Urinalysis with Rflx Culture (collected 10-02-24 @ 00:45)    Culture - Urine (collected 10-02-24 @ 00:45)  Source: Clean Catch None  Final Report (10-03-24 @ 11:59):    No growth    Culture - Blood (collected 10-01-24 @ 15:23)  Source: .Blood BLOOD  Final Report (10-06-24 @ 22:00):    No growth at 5 days    Culture - Blood (collected 10-01-24 @ 15:23)  Source: .Blood BLOOD  Final Report (10-06-24 @ 22:00):    No growth at 5 days      Urinalysis Basic - ( 07 Oct 2024 08:41 )    Color: x / Appearance: x / SG: x / pH: x  Gluc: 160 mg/dL / Ketone: x  / Bili: x / Urobili: x   Blood: x / Protein: x / Nitrite: x   Leuk Esterase: x / RBC: x / WBC x   Sq Epi: x / Non Sq Epi: x / Bacteria: x          Social History:  Tobacco Use: No  Alcohol Use: No  Drug Use: No    RADIOLOGY & ADDITIONAL TESTS:  Personal review of radiological diagnostics performed  Echo and EKG results noted when applicable.     MEDICATIONS:  albuterol/ipratropium for Nebulization. 9 milliLiter(s) Nebulizer once  apixaban 10 milliGRAM(s) Oral two times a day  aspirin  chewable 81 milliGRAM(s) Oral daily  atorvastatin 20 milliGRAM(s) Oral at bedtime  bisacodyl Suppository 10 milliGRAM(s) Rectal once  chlorhexidine 2% Cloths 1 Application(s) Topical daily  dexAMETHasone  Injectable 6 milliGRAM(s) IV Push every 24 hours  dextrose 5%. 1000 milliLiter(s) IV Continuous <Continuous>  dextrose 5%. 1000 milliLiter(s) IV Continuous <Continuous>  dextrose 50% Injectable 25 Gram(s) IV Push once  dextrose 50% Injectable 25 Gram(s) IV Push once  dextrose 50% Injectable 12.5 Gram(s) IV Push once  dextrose Oral Gel 15 Gram(s) Oral once PRN  glucagon  Injectable 1 milliGRAM(s) IntraMuscular once  insulin lispro (ADMELOG) corrective regimen sliding scale   SubCutaneous three times a day before meals  lactated ringers. 1000 milliLiter(s) IV Continuous <Continuous>  levothyroxine 50 MICROGram(s) Oral daily  losartan 25 milliGRAM(s) Oral daily  pantoprazole    Tablet 40 milliGRAM(s) Oral before breakfast  polyethylene glycol 3350 17 Gram(s) Oral two times a day  senna 2 Tablet(s) Oral at bedtime  tamsulosin 0.4 milliGRAM(s) Oral at bedtime      ANTIBIOTICS:

## 2024-10-08 NOTE — CHART NOTE - NSCHARTNOTEFT_GEN_A_CORE
Patient requires a bedside 3:1 commode for d/c home due to patient is at risk to fall and cannot walk safely to the bathroom.

## 2024-10-08 NOTE — PROGRESS NOTE ADULT - SUBJECTIVE AND OBJECTIVE BOX
Patient is a 89y old  Female who presents with a chief complaint of breath (07 Oct 2024 16:52)      Patient seen and examined at bedside.    ALLERGIES:  No Known Allergies    MEDICATIONS:  albuterol/ipratropium for Nebulization. 9 milliLiter(s) Nebulizer once  apixaban 10 milliGRAM(s) Oral two times a day  aspirin  chewable 81 milliGRAM(s) Oral daily  atorvastatin 20 milliGRAM(s) Oral at bedtime  bisacodyl Suppository 10 milliGRAM(s) Rectal once  chlorhexidine 2% Cloths 1 Application(s) Topical daily  dexAMETHasone  Injectable 6 milliGRAM(s) IV Push every 24 hours  dextrose 5%. 1000 milliLiter(s) IV Continuous <Continuous>  dextrose 5%. 1000 milliLiter(s) IV Continuous <Continuous>  dextrose 50% Injectable 12.5 Gram(s) IV Push once  dextrose 50% Injectable 25 Gram(s) IV Push once  dextrose 50% Injectable 25 Gram(s) IV Push once  dextrose Oral Gel 15 Gram(s) Oral once PRN  glucagon  Injectable 1 milliGRAM(s) IntraMuscular once  insulin lispro (ADMELOG) corrective regimen sliding scale   SubCutaneous three times a day before meals  lactated ringers. 1000 milliLiter(s) IV Continuous <Continuous>  levothyroxine 50 MICROGram(s) Oral daily  losartan 25 milliGRAM(s) Oral daily  pantoprazole    Tablet 40 milliGRAM(s) Oral before breakfast  polyethylene glycol 3350 17 Gram(s) Oral two times a day  senna 2 Tablet(s) Oral at bedtime  tamsulosin 0.4 milliGRAM(s) Oral at bedtime    Vital Signs Last 24 Hrs  T(F): 97.4 (07 Oct 2024 20:30), Max: 97.9 (07 Oct 2024 14:19)  HR: 105 (08 Oct 2024 12:57) (76 - 105)  BP: 124/94 (08 Oct 2024 12:57) (113/54 - 130/75)  RR: 18 (08 Oct 2024 12:57) (18 - 18)  SpO2: 94% (08 Oct 2024 10:00) (94% - 98%)  I&O's Summary    07 Oct 2024 07:01  -  08 Oct 2024 07:00  --------------------------------------------------------  IN: 118 mL / OUT: 700 mL / NET: -582 mL    08 Oct 2024 07:01  -  08 Oct 2024 13:32  --------------------------------------------------------  IN: 110 mL / OUT: 0 mL / NET: 110 mL        PHYSICAL EXAM:  General: NAD, A/O x 3  ENT: MMM  Neck: Supple, No JVD  Lungs: Clear to auscultation bilaterally  Cardio: RRR, S1/S2, No murmurs  Abdomen: Soft, Nontender, Nondistended; Bowel sounds present  Extremities: No cyanosis, No edema    LABS:                        13.1   16.34 )-----------( 176      ( 07 Oct 2024 08:41 )             40.4     10-07    132  |  102  |  32  ----------------------------<  160  4.8   |  22  |  0.8    Ca    8.5      07 Oct 2024 08:41  Mg     1.6     10-07    TPro  4.9  /  Alb  2.8  /  TBili  0.4  /  DBili  x   /  AST  17  /  ALT  20  /  AlkPhos  144  10-07                            POCT Blood Glucose.: 181 mg/dL (08 Oct 2024 11:39)  POCT Blood Glucose.: 137 mg/dL (08 Oct 2024 08:07)  POCT Blood Glucose.: 229 mg/dL (07 Oct 2024 21:39)  POCT Blood Glucose.: 219 mg/dL (07 Oct 2024 16:56)      Urinalysis Basic - ( 07 Oct 2024 08:41 )    Color: x / Appearance: x / SG: x / pH: x  Gluc: 160 mg/dL / Ketone: x  / Bili: x / Urobili: x   Blood: x / Protein: x / Nitrite: x   Leuk Esterase: x / RBC: x / WBC x   Sq Epi: x / Non Sq Epi: x / Bacteria: x        Culture - Blood (collected 05 Oct 2024 06:13)  Source: .Blood BLOOD  Preliminary Report (07 Oct 2024 17:00):    No growth at 48 Hours    Culture - Urine (collected 02 Oct 2024 00:45)  Source: Clean Catch None  Final Report (03 Oct 2024 11:59):    No growth    Culture - Blood (collected 01 Oct 2024 15:23)  Source: .Blood BLOOD  Final Report (06 Oct 2024 22:00):    No growth at 5 days    Culture - Blood (collected 01 Oct 2024 15:23)  Source: .Blood BLOOD  Final Report (06 Oct 2024 22:00):    No growth at 5 days          RADIOLOGY & ADDITIONAL TESTS:    Care Discussed with Consultants/Other Providers:

## 2024-10-08 NOTE — PROGRESS NOTE ADULT - ASSESSMENT
89 yo F with hx of HTN, HLD, DM II, HFrEF (LVEF 20-25% 2020), Hypothyroidism, CVA, Dementia and Recent PNA (dx 4 days ago) presents to ED for generalized weakness. Positive sick contact.     # Acute hypoxic respiratory failure 2/2 suspected PE and viral vs CAP (Covid +ve)   - CTA PE pending   - Echo demonstrated Romano's sign, biomarkers elevated, requiring oxygen supplementation with desaturation on RA at rest   - started empirically on heparin drip pending CTA PE   - CT abdomen: Small left pleural effusion with adjacent consolidation which may reflect atelectasis or infiltrate. Mild fullness left renal pelvis. Mild left ureter with distal left ureteral stone. Stone within the urinary bladder on the left.   - cont doxy and CTX for now   - RVP +ve for Covid ->completed RDV course  -10/4 procal 0.08    #Elevated troponin likely demand ischemia iso suspected PE   # HFimpEF   - 1st troponin 437  --> 350 -> 335  - no ischemic changes on ECG  - c/w ASA, statin  - TTE in 2020: LVEF 20-25%  - TTE 10/2/2024: LVEF 69%, G1DD, LV Ejection Fraction by Abreu's Method with a biplane EF of 69 %. Right ventricular systolic function is reduced with apical sparing consistent with acute pulmonary embolism (Romano's sign). The RV appears moderately dilated. Moderate TR. Moderate MR. Borderline pulm HTN   - seen by Dr. Silver previously; not following with cardiology currently  - cardiology consult pending     #MARTINE likely prerenal   - serum Cr. 1.8 currently. Baseline Cr 1.0 in May 2024.   - CT abdomen neg for hydronephrosis. Moderate distention the urinary bladder.  - Bladder scan q8H  - monitor BMP closely  - start on IVF hydration following CTA PE    #Constipation with fecal retention  - CT abdomen shows Significant fecal retention.  - bowel regimen.     HTN - hold losartan due to MARTINE. Hold lopressor as BP borderline.   DM II - monitor FS AC HS. Insulin sliding scale.   Hypothyroidism - c/w synthroid. TSH 1.84.    PENDING: TOV following tamulosin, can be dc'd if patient voids and equipment is set up

## 2024-10-08 NOTE — PROGRESS NOTE ADULT - ASSESSMENT
89 yo F with hx of HTN, HLD, DM II, HFrEF (LVEF 20-25% 2020), Hypothyroidism, CVA, Dementia and Recent PNA (dx 4 days ago) presents to ED for generalized weakness. Positive sick contact.     #Acute hypoxic respiratory failure 2/2 submassive PE with RV strain (trop, elevated bpnp, hemodynamically stable) and COVID pneumonia    - Echo demonstrated Romano's sign, biomarkers elevated, requiring oxygen supplementation with desaturation on RA at rest   - Pt is now on therapeutic eliquis   - CT abdomen: Small left pleural effusion with adjacent consolidation which may reflect atelectasis or infiltrate. Mild fullness left renal pelvis. Mild left ureter with distal left ureteral stone. Stone within the urinary bladder on the left.   - cont doxy and CTX  - RVP +ve for Covid -> completed RDV day 5/10 dexamethasone  -ID recs appreciated: c/w rocephin and doxycyline-completed 5 days  - Nasal MRSA negative  - 10/01 bcx negative   - IR consult appreciated: no thrombectomy for now, c/w full AC (Eliquis 10 BID)-10mg throught 10/10  - echo noted  - Palliative final recs appreciated    #Elevated troponin likely demand ischemia iso suspected PE and hypotension   #chronic  HFimpEF   - 1st troponin 437  --> 350 -> 335  - no ischemic changes on ECG  - c/w ASA, statin  - TTE in 2020: LVEF 20-25%  - TTE 10/2/2024: LVEF 69%, G1DD, LV Ejection Fraction by Abreu's Method with a biplane EF of 69 %. Right ventricular systolic function is reduced with apical sparing consistent with acute pulmonary embolism (Romano's sign). The RV appears moderately dilated. Moderate TR. Moderate MR. Borderline pulm HTN   - seen by Dr. Silver previously; not following with cardiology currently  - cardiology recs appreciated     #MARTINE likely prerenal - resolved   - serum Cr. 1.8 on admission. Baseline Cr 1.0 in May 2024.   - CT abdomen neg for hydronephrosis. Moderate distention the urinary bladder.  - Bladder scan q8H  - monitor BMP closely  - TOV in progress - 10/8 starting flomax     #Constipation with fecal retention-resolved   - CT abdomen shows Significant fecal retention.  - bowel regimen.     HTN - 10/6 restarting losartan 25mg with parameters . Hold lopressor as BP borderline.   DM II - monitor FS AC HS. Insulin sliding scale.   Hypothyroidism - c/w synthroid. TSH-1.84 10/5    DVT PPX: eliquis  GI PPX: protonix  DIET: minced and moist  ACTIVITY: as tolerated  CODE STATUS: DNR/DNI  DISPOSITION: plan dc home 10/9  Family: son updated at bedside 10/8  PENDING: tov, DME delivery

## 2024-10-08 NOTE — PROGRESS NOTE ADULT - SUBJECTIVE AND OBJECTIVE BOX
SUBJECTIVE/OVERNIGHT EVENTS    Patient is a 89y old Female who presents with a chief complaint of shortness of breath    Today is hospital day 7d. This morning patient was seen and examined at bedside, resting comfortably in bed.     Overnight: Patient was weaned off pulido yesterday but did not void and needed to be straight cathed (550 cc output) early this morning.    VITALS  T(F): 97.4 (10-07-24 @ 20:30), Max: 97.9 (10-07-24 @ 14:19)  HR: 82 (10-08-24 @ 04:33) (76 - 82)  BP: 130/75 (10-08-24 @ 04:33) (113/54 - 130/75)  RR: 18 (10-08-24 @ 10:00) (18 - 18)  SpO2: 94% (10-08-24 @ 10:00) (94% - 98%)  POCT Blood Glucose.: 137 mg/dL (10-08-24 @ 08:07)  POCT Blood Glucose.: 229 mg/dL (10-07-24 @ 21:39)  POCT Blood Glucose.: 219 mg/dL (10-07-24 @ 16:56)  POCT Blood Glucose.: 190 mg/dL (10-07-24 @ 12:29)      PHYSICAL EXAM:  GENERAL: NAD, lying in bed comfortably  HEAD:  Atraumatic, Normocephalic  EYES: EOMI, PERRLA, conjunctiva and sclera clear  ENT: Moist mucous membranes  NECK: Supple, No JVD  CHEST/LUNG: Clear to auscultation bilaterally; No rales, rhonchi, wheezing, or rubs. Unlabored respirations  HEART: Regular rate and rhythm; No murmurs, rubs, or gallops  ABDOMEN: Bowel sounds present; Soft, Nontender, Nondistended. No hepatomegaly  EXTREMITIES:  2+ Peripheral Pulses, brisk capillary refill. No clubbing, cyanosis, or edema  NERVOUS SYSTEM:  Alert & Oriented X3, speech clear. No deficits   MSK: FROM all 4 extremities, full and equal strength  SKIN: No rashes or lesions    LABS             13.1   16.34 )-----------( 176      ( 10-07-24 @ 08:41 )             40.4     132  |  102  |  32  -------------------------<  160   10-07-24 @ 08:41  4.8  |  22  |  0.8    Ca      8.5     10-07-24 @ 08:41  Mg     1.6     10-07-24 @ 08:41    TPro  4.9  /  Alb  2.8  /  TBili  0.4  /  DBili  x   /  AST  17  /  ALT  20  /  AlkPhos  144  /  GGT  x     10-07-24 @ 08:41        Urinalysis Basic - ( 07 Oct 2024 08:41 )    Color: x / Appearance: x / SG: x / pH: x  Gluc: 160 mg/dL / Ketone: x  / Bili: x / Urobili: x   Blood: x / Protein: x / Nitrite: x   Leuk Esterase: x / RBC: x / WBC x   Sq Epi: x / Non Sq Epi: x / Bacteria: x          IMAGING    MEDICATIONS  STANDING MEDICATIONS  albuterol/ipratropium for Nebulization. 9 milliLiter(s) Nebulizer once  apixaban 10 milliGRAM(s) Oral two times a day  aspirin  chewable 81 milliGRAM(s) Oral daily  atorvastatin 20 milliGRAM(s) Oral at bedtime  bisacodyl Suppository 10 milliGRAM(s) Rectal once  chlorhexidine 2% Cloths 1 Application(s) Topical daily  dexAMETHasone  Injectable 6 milliGRAM(s) IV Push every 24 hours  dextrose 5%. 1000 milliLiter(s) IV Continuous <Continuous>  dextrose 5%. 1000 milliLiter(s) IV Continuous <Continuous>  dextrose 50% Injectable 25 Gram(s) IV Push once  dextrose 50% Injectable 25 Gram(s) IV Push once  dextrose 50% Injectable 12.5 Gram(s) IV Push once  glucagon  Injectable 1 milliGRAM(s) IntraMuscular once  insulin lispro (ADMELOG) corrective regimen sliding scale   SubCutaneous three times a day before meals  lactated ringers. 1000 milliLiter(s) IV Continuous <Continuous>  levothyroxine 50 MICROGram(s) Oral daily  losartan 25 milliGRAM(s) Oral daily  pantoprazole    Tablet 40 milliGRAM(s) Oral before breakfast  polyethylene glycol 3350 17 Gram(s) Oral two times a day  senna 2 Tablet(s) Oral at bedtime  tamsulosin 0.4 milliGRAM(s) Oral at bedtime    PRN MEDICATIONS  dextrose Oral Gel 15 Gram(s) Oral once PRN

## 2024-10-09 LAB
GLUCOSE BLDC GLUCOMTR-MCNC: 197 MG/DL — HIGH (ref 70–99)
GLUCOSE BLDC GLUCOMTR-MCNC: 260 MG/DL — HIGH (ref 70–99)
GLUCOSE BLDC GLUCOMTR-MCNC: 281 MG/DL — HIGH (ref 70–99)
HCT VFR BLD CALC: 41.5 % — SIGNIFICANT CHANGE UP (ref 37–47)
HGB BLD-MCNC: 13.6 G/DL — SIGNIFICANT CHANGE UP (ref 12–16)
MCHC RBC-ENTMCNC: 30.7 PG — SIGNIFICANT CHANGE UP (ref 27–31)
MCHC RBC-ENTMCNC: 32.8 G/DL — SIGNIFICANT CHANGE UP (ref 32–37)
MCV RBC AUTO: 93.7 FL — SIGNIFICANT CHANGE UP (ref 81–99)
NRBC # BLD: 0 /100 WBCS — SIGNIFICANT CHANGE UP (ref 0–0)
PLATELET # BLD AUTO: 215 K/UL — SIGNIFICANT CHANGE UP (ref 130–400)
PMV BLD: 12 FL — HIGH (ref 7.4–10.4)
RBC # BLD: 4.43 M/UL — SIGNIFICANT CHANGE UP (ref 4.2–5.4)
RBC # FLD: 13.6 % — SIGNIFICANT CHANGE UP (ref 11.5–14.5)
WBC # BLD: 22.1 K/UL — HIGH (ref 4.8–10.8)
WBC # FLD AUTO: 22.1 K/UL — HIGH (ref 4.8–10.8)

## 2024-10-09 PROCEDURE — 99232 SBSQ HOSP IP/OBS MODERATE 35: CPT

## 2024-10-09 RX ORDER — DEXAMETHASONE 1.5 MG 1.5 MG/1
6 TABLET ORAL DAILY
Refills: 0 | Status: COMPLETED | OUTPATIENT
Start: 2024-10-10 | End: 2024-10-11

## 2024-10-09 RX ADMIN — APIXABAN 10 MILLIGRAM(S): 5 TABLET, FILM COATED ORAL at 17:56

## 2024-10-09 RX ADMIN — POLYETHYLENE GLYCOL 3350 17 GRAM(S): 17 POWDER, FOR SOLUTION ORAL at 06:17

## 2024-10-09 RX ADMIN — Medication 3: at 17:57

## 2024-10-09 RX ADMIN — Medication 50 MICROGRAM(S): at 06:16

## 2024-10-09 RX ADMIN — Medication 1: at 11:55

## 2024-10-09 RX ADMIN — Medication 81 MILLIGRAM(S): at 11:56

## 2024-10-09 RX ADMIN — Medication 0.4 MILLIGRAM(S): at 21:16

## 2024-10-09 RX ADMIN — Medication 20 MILLIGRAM(S): at 21:16

## 2024-10-09 RX ADMIN — POLYETHYLENE GLYCOL 3350 17 GRAM(S): 17 POWDER, FOR SOLUTION ORAL at 17:57

## 2024-10-09 RX ADMIN — DEXAMETHASONE 1.5 MG 6 MILLIGRAM(S): 1.5 TABLET ORAL at 06:16

## 2024-10-09 RX ADMIN — LOSARTAN POTASSIUM 25 MILLIGRAM(S): 25 TABLET ORAL at 06:16

## 2024-10-09 RX ADMIN — Medication 2 TABLET(S): at 21:16

## 2024-10-09 RX ADMIN — PANTOPRAZOLE SODIUM 40 MILLIGRAM(S): 40 TABLET, DELAYED RELEASE ORAL at 06:16

## 2024-10-09 RX ADMIN — APIXABAN 10 MILLIGRAM(S): 5 TABLET, FILM COATED ORAL at 06:16

## 2024-10-09 NOTE — PROGRESS NOTE ADULT - SUBJECTIVE AND OBJECTIVE BOX
SUBJECTIVE/OVERNIGHT EVENTS    Patient is a 89y old Female who presents with a chief complaint of shortness of breath    Today is hospital day 8d. This morning patient was seen and examined at bedside, agitated with me/nurses when helping adjust her in bed but otherwise not in acute distress. On room air, breathing comfortably. Unable to obtain ROS or history from patient directly due to AMS however no signs of accessory muscle use. B/l inspiratory and expiratory wheezes heard on bilateral lower lobes.      VITALS  T(F): 97.4 (10-07-24 @ 20:30), Max: 97.9 (10-07-24 @ 14:19)  HR: 82 (10-08-24 @ 04:33) (76 - 82)  BP: 130/75 (10-08-24 @ 04:33) (113/54 - 130/75)  RR: 18 (10-08-24 @ 10:00) (18 - 18)  SpO2: 94% (10-08-24 @ 10:00) (94% - 98%)  POCT Blood Glucose.: 137 mg/dL (10-08-24 @ 08:07)  POCT Blood Glucose.: 229 mg/dL (10-07-24 @ 21:39)  POCT Blood Glucose.: 219 mg/dL (10-07-24 @ 16:56)  POCT Blood Glucose.: 190 mg/dL (10-07-24 @ 12:29)      PHYSICAL EXAM:  GENERAL: NAD, lying in bed comfortably  HEAD:  Atraumatic, Normocephalic  EYES: EOMI, PERRLA, conjunctiva and sclera clear  ENT: Moist mucous membranes  NECK: Supple, No JVD  CHEST/LUNG: Clear to auscultation bilaterally; No rales, rhonchi, wheezing, or rubs. Unlabored respirations  HEART: Regular rate and rhythm; No murmurs, rubs, or gallops  ABDOMEN: Bowel sounds present; Soft, Nontender, Nondistended. No hepatomegaly  EXTREMITIES:  2+ Peripheral Pulses, brisk capillary refill. No clubbing, cyanosis, or edema  NERVOUS SYSTEM:  Alert & Oriented X3, speech clear. No deficits   MSK: FROM all 4 extremities, full and equal strength  SKIN: No rashes or lesions    LABS             13.1   16.34 )-----------( 176      ( 10-07-24 @ 08:41 )             40.4     132  |  102  |  32  -------------------------<  160   10-07-24 @ 08:41  4.8  |  22  |  0.8    Ca      8.5     10-07-24 @ 08:41  Mg     1.6     10-07-24 @ 08:41    TPro  4.9  /  Alb  2.8  /  TBili  0.4  /  DBili  x   /  AST  17  /  ALT  20  /  AlkPhos  144  /  GGT  x     10-07-24 @ 08:41        Urinalysis Basic - ( 07 Oct 2024 08:41 )    Color: x / Appearance: x / SG: x / pH: x  Gluc: 160 mg/dL / Ketone: x  / Bili: x / Urobili: x   Blood: x / Protein: x / Nitrite: x   Leuk Esterase: x / RBC: x / WBC x   Sq Epi: x / Non Sq Epi: x / Bacteria: x          IMAGING    MEDICATIONS  STANDING MEDICATIONS  albuterol/ipratropium for Nebulization. 9 milliLiter(s) Nebulizer once  apixaban 10 milliGRAM(s) Oral two times a day  aspirin  chewable 81 milliGRAM(s) Oral daily  atorvastatin 20 milliGRAM(s) Oral at bedtime  bisacodyl Suppository 10 milliGRAM(s) Rectal once  chlorhexidine 2% Cloths 1 Application(s) Topical daily  dexAMETHasone  Injectable 6 milliGRAM(s) IV Push every 24 hours  dextrose 5%. 1000 milliLiter(s) IV Continuous <Continuous>  dextrose 5%. 1000 milliLiter(s) IV Continuous <Continuous>  dextrose 50% Injectable 25 Gram(s) IV Push once  dextrose 50% Injectable 25 Gram(s) IV Push once  dextrose 50% Injectable 12.5 Gram(s) IV Push once  glucagon  Injectable 1 milliGRAM(s) IntraMuscular once  insulin lispro (ADMELOG) corrective regimen sliding scale   SubCutaneous three times a day before meals  lactated ringers. 1000 milliLiter(s) IV Continuous <Continuous>  levothyroxine 50 MICROGram(s) Oral daily  losartan 25 milliGRAM(s) Oral daily  pantoprazole    Tablet 40 milliGRAM(s) Oral before breakfast  polyethylene glycol 3350 17 Gram(s) Oral two times a day  senna 2 Tablet(s) Oral at bedtime  tamsulosin 0.4 milliGRAM(s) Oral at bedtime    PRN MEDICATIONS  dextrose Oral Gel 15 Gram(s) Oral once PRN   SUBJECTIVE/OVERNIGHT EVENTS    Patient is a 89y old Female who presents with a chief complaint of shortness of breath    Today is hospital day 8d. This morning patient was seen and examined at bedside, agitated with me/nurses when helping adjust her in bed but otherwise not in acute distress. On room air, breathing comfortably. Unable to obtain ROS or history from patient directly due to AMS however no signs of accessory muscle use. B/l inspiratory and expiratory wheezes heard on bilateral lower lobes.      MEDICATIONS:  STANDING MEDICATIONS  albuterol/ipratropium for Nebulization. 9 milliLiter(s) Nebulizer once  apixaban 10 milliGRAM(s) Oral two times a day  aspirin  chewable 81 milliGRAM(s) Oral daily  atorvastatin 20 milliGRAM(s) Oral at bedtime  bisacodyl Suppository 10 milliGRAM(s) Rectal once  chlorhexidine 2% Cloths 1 Application(s) Topical daily  dextrose 5%. 1000 milliLiter(s) IV Continuous <Continuous>  dextrose 5%. 1000 milliLiter(s) IV Continuous <Continuous>  dextrose 50% Injectable 25 Gram(s) IV Push once  dextrose 50% Injectable 25 Gram(s) IV Push once  dextrose 50% Injectable 12.5 Gram(s) IV Push once  glucagon  Injectable 1 milliGRAM(s) IntraMuscular once  insulin lispro (ADMELOG) corrective regimen sliding scale   SubCutaneous three times a day before meals  lactated ringers. 1000 milliLiter(s) IV Continuous <Continuous>  levothyroxine 50 MICROGram(s) Oral daily  losartan 25 milliGRAM(s) Oral daily  pantoprazole    Tablet 40 milliGRAM(s) Oral before breakfast  polyethylene glycol 3350 17 Gram(s) Oral two times a day  senna 2 Tablet(s) Oral at bedtime  tamsulosin 0.4 milliGRAM(s) Oral at bedtime    PRN MEDICATIONS  dextrose Oral Gel 15 Gram(s) Oral once PRN    VITALS:   T(F): 97.6  HR: 92  BP: 154/92  RR: 20  SpO2: 95%      PHYSICAL EXAM:  GENERAL: NAD, lying in bed comfortably  HEAD:  Atraumatic, Normocephalic  EYES: EOMI, PERRLA, conjunctiva and sclera clear  ENT: Moist mucous membranes  NECK: Supple, No JVD  CHEST/LUNG: Clear to auscultation bilaterally; No rales, rhonchi, wheezing, or rubs. Unlabored respirations  HEART: Regular rate and rhythm; No murmurs, rubs, or gallops  ABDOMEN: Bowel sounds present; Soft, Nontender, Nondistended. No hepatomegaly  EXTREMITIES:  2+ Peripheral Pulses, brisk capillary refill. No clubbing, cyanosis, or edema  NERVOUS SYSTEM:  Alert & Oriented X0, No focal deficits   MSK: FROM all 4 extremities, full and equal strength  SKIN: No rashes or lesions      LABS:                        13.6   22.10 )-----------( 215      ( 09 Oct 2024 07:10 )             41.5

## 2024-10-09 NOTE — PROGRESS NOTE ADULT - ASSESSMENT
89 yo F with hx of HTN, HLD, DM II, HFrEF (LVEF 20-25% 2020), Hypothyroidism, CVA, Dementia and Recent PNA (dx 4 days ago) presents to ED for generalized weakness. Positive sick contact.     # Acute hypoxic respiratory failure 2/2 suspected PE and viral vs CAP (Covid +ve)   - CTA PE pending   - Echo demonstrated Romano's sign, biomarkers elevated, requiring oxygen supplementation with desaturation on RA at rest   - started empirically on heparin drip pending CTA PE   - CT abdomen: Small left pleural effusion with adjacent consolidation which may reflect atelectasis or infiltrate. Mild fullness left renal pelvis. Mild left ureter with distal left ureteral stone. Stone within the urinary bladder on the left.   - cont doxy and CTX for now   - RVP +ve for Covid ->completed RDV course  -10/4 procal 0.08    #Elevated troponin likely demand ischemia iso suspected PE   # HFimpEF   - 1st troponin 437  --> 350 -> 335  - no ischemic changes on ECG  - c/w ASA, statin  - TTE in 2020: LVEF 20-25%  - TTE 10/2/2024: LVEF 69%, G1DD, LV Ejection Fraction by Abreu's Method with a biplane EF of 69 %. Right ventricular systolic function is reduced with apical sparing consistent with acute pulmonary embolism (Romano's sign). The RV appears moderately dilated. Moderate TR. Moderate MR. Borderline pulm HTN   - seen by Dr. iSlver previously; not following with cardiology currently  - cardiology consult pending     #MARTINE likely prerenal   - serum Cr. 1.8 currently. Baseline Cr 1.0 in May 2024.   - CT abdomen neg for hydronephrosis. Moderate distention the urinary bladder.  - Bladder scan q8H  - monitor BMP closely  - start on IVF hydration following CTA PE    #Constipation with fecal retention  - CT abdomen shows Significant fecal retention.  - bowel regimen.     HTN - hold losartan due to MARTINE. Hold lopressor as BP borderline.   DM II - monitor FS AC HS. Insulin sliding scale.   Hypothyroidism - c/w synthroid. TSH 1.84.    PENDING: TOV following tamulosin, can be dc'd if patient voids and equipment is set up    89 yo F with hx of HTN, HLD, DM II, HFrEF (LVEF 20-25% 2020), Hypothyroidism, CVA, Dementia and Recent PNA (dx 4 days ago) presents to ED for generalized weakness. Positive sick contact.     # Acute hypoxic respiratory failure 2/2 suspected PE and viral vs CAP (Covid +ve)   - Echo demonstrated Romano's sign, biomarkers elevated, requiring oxygen supplementation with desaturation on RA at rest   - Pt is now on therapeutic eliquis   - CT abdomen: Small left pleural effusion with adjacent consolidation which may reflect atelectasis or infiltrate. Mild fullness left renal pelvis. Mild left ureter with distal left ureteral stone. Stone within the urinary bladder on the left.   - cont doxy and CTX  - RVP +ve for Covid -> completed RDV day 5/10 dexamethasone  -ID recs appreciated: s/p Rocephin and doxycyline   - Nasal MRSA negative  - 10/01 bcx negative   - IR consult appreciated: no thrombectomy for now, On full AC eliquis 10mg end date 10/10.   - Palliative recs appreciated    #Elevated troponin likely demand ischemia iso suspected PE   # HFimpEF   - 1st troponin 437  --> 350 -> 335  - no ischemic changes on ECG  - c/w ASA, statin  - TTE in 2020: LVEF 20-25%  - TTE 10/2/2024: LVEF 69%, G1DD, LV Ejection Fraction by Abreu's Method with a biplane EF of 69 %. Right ventricular systolic function is reduced with apical sparing consistent with acute pulmonary embolism (Romano's sign). The RV appears moderately dilated. Moderate TR. Moderate MR. Borderline pulm HTN   - seen by Dr. Silver previously; not following with cardiology currently  - cardiology recs appreciated   - Off tele 10/9    #MARTINE likely prerenal resolved  - serum Cr. 1.8, resolved to 0.8. Baseline Cr 1.0 in May 2024.   - CT abdomen neg for hydronephrosis. Moderate distention the urinary bladder.  - Bladder scan q8H  - monitor BMP closely  - On flomax  - Failed bladder scan this AM, required straight cath of >450cc of residual urine, will place pulido as can be dc'd with pulido if going to NH    #Constipation with fecal retention  - CT abdomen shows Significant fecal retention.  - bowel regimen.     #HTN   - hold losartan due to MARTINE. Hold lopressor as BP borderline.     #DM II   - monitor FS AC HS. Insulin sliding scale.     #Hypothyroidism   - c/w synthroid. TSH 1.84      DVT PPX: eliquis (fully anticoagulated)  GI PPX: protonix  DIET: minced and moist  ACTIVITY: AAT  CODE STATUS: DNR/DNI    PENDING: Failed TOV following tamulosin, pulido placement, can dc tele, ok for dc once placement is complete/equipment set up (NH discussions ongoing)

## 2024-10-09 NOTE — PROGRESS NOTE ADULT - SUBJECTIVE AND OBJECTIVE BOX
BRIJESH POWER  89y, Female    All available historical data reviewed    OVERNIGHT EVENTS:    no fevers  on RA  does not follow commands  ROS:  unable to obtain history secondary to patient's mental status and/or sedation     VITALS:  T(F): 97.6, Max: 97.6 (10-08-24 @ 20:45)  HR: 92  BP: 154/92  RR: 20Vital Signs Last 24 Hrs  T(C): 36.4 (08 Oct 2024 20:45), Max: 36.4 (08 Oct 2024 20:45)  T(F): 97.6 (08 Oct 2024 20:45), Max: 97.6 (08 Oct 2024 20:45)  HR: 92 (09 Oct 2024 13:33) (87 - 92)  BP: 154/92 (09 Oct 2024 13:33) (104/71 - 154/92)  BP(mean): 97 (08 Oct 2024 20:45) (97 - 97)  RR: 20 (09 Oct 2024 13:33) (18 - 20)  SpO2: 95% (09 Oct 2024 10:15) (95% - 95%)    Parameters below as of 09 Oct 2024 10:15  Patient On (Oxygen Delivery Method): room air        TESTS & MEASUREMENTS:                        13.6   22.10 )-----------( 215      ( 09 Oct 2024 07:10 )             41.5               Culture - Blood (collected 10-05-24 @ 06:13)  Source: .Blood BLOOD  Preliminary Report (10-09-24 @ 17:00):    No growth at 4 days            Social History:  Tobacco Use: No  Alcohol Use: No  Drug Use: No    RADIOLOGY & ADDITIONAL TESTS:  Personal review of radiological diagnostics performed  Echo and EKG results noted when applicable.     MEDICATIONS:  albuterol/ipratropium for Nebulization. 9 milliLiter(s) Nebulizer once  apixaban 10 milliGRAM(s) Oral two times a day  aspirin  chewable 81 milliGRAM(s) Oral daily  atorvastatin 20 milliGRAM(s) Oral at bedtime  bisacodyl Suppository 10 milliGRAM(s) Rectal once  chlorhexidine 2% Cloths 1 Application(s) Topical daily  dextrose 5%. 1000 milliLiter(s) IV Continuous <Continuous>  dextrose 5%. 1000 milliLiter(s) IV Continuous <Continuous>  dextrose 50% Injectable 25 Gram(s) IV Push once  dextrose 50% Injectable 25 Gram(s) IV Push once  dextrose 50% Injectable 12.5 Gram(s) IV Push once  dextrose Oral Gel 15 Gram(s) Oral once PRN  glucagon  Injectable 1 milliGRAM(s) IntraMuscular once  insulin lispro (ADMELOG) corrective regimen sliding scale   SubCutaneous three times a day before meals  lactated ringers. 1000 milliLiter(s) IV Continuous <Continuous>  levothyroxine 50 MICROGram(s) Oral daily  losartan 25 milliGRAM(s) Oral daily  pantoprazole    Tablet 40 milliGRAM(s) Oral before breakfast  polyethylene glycol 3350 17 Gram(s) Oral two times a day  senna 2 Tablet(s) Oral at bedtime  tamsulosin 0.4 milliGRAM(s) Oral at bedtime      ANTIBIOTICS:

## 2024-10-09 NOTE — PROGRESS NOTE ADULT - SUBJECTIVE AND OBJECTIVE BOX
Patient is a 89y old  Female who presents with a chief complaint of breath (07 Oct 2024 16:52)      Patient seen this am   -no overnight events notified   -check bladder scan - may need Rodriges placement     ALLERGIES:  No Known Allergies    Vital Signs Last 24 Hrs  T(C): 36.4 (08 Oct 2024 20:45), Max: 36.4 (08 Oct 2024 20:45)  T(F): 97.6 (08 Oct 2024 20:45), Max: 97.6 (08 Oct 2024 20:45)  HR: 87 (09 Oct 2024 05:14) (87 - 89)  BP: 104/71 (09 Oct 2024 05:14) (104/71 - 138/73)  BP(mean): 97 (08 Oct 2024 20:45) (97 - 97)  RR: 18 (09 Oct 2024 05:14) (18 - 18)  SpO2: 95% (09 Oct 2024 10:15) (95% - 95%)    Parameters below as of 09 Oct 2024 10:15  Patient On (Oxygen Delivery Method): room air    PHYSICAL EXAM:  General: Not in distress   ENT: MMM  Neck: Supple, No JVD  Lungs: Clear to auscultation bilaterally  Cardio: RRR, S1/S2, No murmurs  Abdomen: Soft abdomen       LABS:                        13.1   16.34 )-----------( 176      ( 07 Oct 2024 08:41 )             40.4     10-07    132  |  102  |  32  ----------------------------<  160  4.8   |  22  |  0.8    Ca    8.5      07 Oct 2024 08:41  Mg     1.6     10-07    TPro  4.9  /  Alb  2.8  /  TBili  0.4  /  DBili  x   /  AST  17  /  ALT  20  /  AlkPhos  144  10-07    MEDICATIONS  (STANDING):  albuterol/ipratropium for Nebulization. 9 milliLiter(s) Nebulizer once  apixaban 10 milliGRAM(s) Oral two times a day  aspirin  chewable 81 milliGRAM(s) Oral daily  atorvastatin 20 milliGRAM(s) Oral at bedtime  bisacodyl Suppository 10 milliGRAM(s) Rectal once  chlorhexidine 2% Cloths 1 Application(s) Topical daily  dextrose 5%. 1000 milliLiter(s) (100 mL/Hr) IV Continuous <Continuous>  dextrose 5%. 1000 milliLiter(s) (50 mL/Hr) IV Continuous <Continuous>  dextrose 50% Injectable 12.5 Gram(s) IV Push once  dextrose 50% Injectable 25 Gram(s) IV Push once  dextrose 50% Injectable 25 Gram(s) IV Push once  glucagon  Injectable 1 milliGRAM(s) IntraMuscular once  insulin lispro (ADMELOG) corrective regimen sliding scale   SubCutaneous three times a day before meals  lactated ringers. 1000 milliLiter(s) (75 mL/Hr) IV Continuous <Continuous>  levothyroxine 50 MICROGram(s) Oral daily  losartan 25 milliGRAM(s) Oral daily  pantoprazole    Tablet 40 milliGRAM(s) Oral before breakfast  polyethylene glycol 3350 17 Gram(s) Oral two times a day  senna 2 Tablet(s) Oral at bedtime  tamsulosin 0.4 milliGRAM(s) Oral at bedtime    MEDICATIONS  (PRN):  dextrose Oral Gel 15 Gram(s) Oral once PRN Blood Glucose LESS THAN 70 milliGRAM(s)/deciliter     Patient is a 89y old  Female who presents with a chief complaint of breath (07 Oct 2024 16:52)      Patient seen this am   -no overnight events notified   -check bladder scan - may need Rodriges placement     ALLERGIES:  No Known Allergies    Vital Signs Last 24 Hrs  T(C): 36.4 (08 Oct 2024 20:45), Max: 36.4 (08 Oct 2024 20:45)  T(F): 97.6 (08 Oct 2024 20:45), Max: 97.6 (08 Oct 2024 20:45)  HR: 87 (09 Oct 2024 05:14) (87 - 89)  BP: 104/71 (09 Oct 2024 05:14) (104/71 - 138/73)  BP(mean): 97 (08 Oct 2024 20:45) (97 - 97)  RR: 18 (09 Oct 2024 05:14) (18 - 18)  SpO2: 95% (09 Oct 2024 10:15) (95% - 95%)    Parameters below as of 09 Oct 2024 10:15  Patient On (Oxygen Delivery Method): room air    PHYSICAL EXAM:  General: Not in distress - resting comfortably in bed   ENT: MMM  Neck: Supple, No JVD  Lungs: Clear air entry   Cardio: RRR, S1/S2, No murmurs  Abdomen: Soft abdomen       LABS:                        13.1   16.34 )-----------( 176      ( 07 Oct 2024 08:41 )             40.4     10-07    132  |  102  |  32  ----------------------------<  160  4.8   |  22  |  0.8    Ca    8.5      07 Oct 2024 08:41  Mg     1.6     10-07    TPro  4.9  /  Alb  2.8  /  TBili  0.4  /  DBili  x   /  AST  17  /  ALT  20  /  AlkPhos  144  10-07    MEDICATIONS  (STANDING):  albuterol/ipratropium for Nebulization. 9 milliLiter(s) Nebulizer once  apixaban 10 milliGRAM(s) Oral two times a day  aspirin  chewable 81 milliGRAM(s) Oral daily  atorvastatin 20 milliGRAM(s) Oral at bedtime  bisacodyl Suppository 10 milliGRAM(s) Rectal once  chlorhexidine 2% Cloths 1 Application(s) Topical daily  dextrose 5%. 1000 milliLiter(s) (100 mL/Hr) IV Continuous <Continuous>  dextrose 5%. 1000 milliLiter(s) (50 mL/Hr) IV Continuous <Continuous>  dextrose 50% Injectable 12.5 Gram(s) IV Push once  dextrose 50% Injectable 25 Gram(s) IV Push once  dextrose 50% Injectable 25 Gram(s) IV Push once  glucagon  Injectable 1 milliGRAM(s) IntraMuscular once  insulin lispro (ADMELOG) corrective regimen sliding scale   SubCutaneous three times a day before meals  lactated ringers. 1000 milliLiter(s) (75 mL/Hr) IV Continuous <Continuous>  levothyroxine 50 MICROGram(s) Oral daily  losartan 25 milliGRAM(s) Oral daily  pantoprazole    Tablet 40 milliGRAM(s) Oral before breakfast  polyethylene glycol 3350 17 Gram(s) Oral two times a day  senna 2 Tablet(s) Oral at bedtime  tamsulosin 0.4 milliGRAM(s) Oral at bedtime    MEDICATIONS  (PRN):  dextrose Oral Gel 15 Gram(s) Oral once PRN Blood Glucose LESS THAN 70 milliGRAM(s)/deciliter

## 2024-10-09 NOTE — PROGRESS NOTE ADULT - ASSESSMENT
89 yo F with hx of HTN, HLD, DM II, HFrEF (LVEF 20-25% 2020), Hypothyroidism, CVA, Dementia and Recent PNA (dx 4 days ago) presents to ED for generalized weakness. Positive sick contact.     #Acute hypoxic respiratory failure 2/2 submassive PE with RV strain (trop, elevated bpnp, hemodynamically stable) and COVID pneumonia    - Echo demonstrated Romano's sign, biomarkers elevated, requiring oxygen supplementation with desaturation on RA at rest   - Pt is now on therapeutic eliquis   - CT abdomen: Small left pleural effusion with adjacent consolidation which may reflect atelectasis or infiltrate. Mild fullness left renal pelvis. Mild left ureter with distal left ureteral stone. Stone within the urinary bladder on the left.   - cont doxy and CTX  - RVP +ve for Covid -> completed RDV day 5/10 dexamethasone  -ID recs appreciated: s/p Rocephin and doxycyline - completed   - Nasal MRSA negative  - 10/01 bcx negative   - IR consult appreciated: no thrombectomy for now, c/w full AC (Eliquis 10 BID)-10mg through 10/10  - echo noted  - Palliative final recs appreciated    #Elevated troponin likely demand ischemia iso suspected PE and hypotension   #chronic  HFimpEF   - 1st troponin 437  --> 350 -> 335  - no ischemic changes on ECG  - c/w ASA, statin  - TTE in 2020: LVEF 20-25%  - TTE 10/2/2024: LVEF 69%, G1DD, LV Ejection Fraction by Abreu's Method with a biplane EF of 69 %. Right ventricular systolic function is reduced with apical sparing consistent with acute pulmonary embolism (Romano's sign). The RV appears moderately dilated. Moderate TR. Moderate MR. Borderline pulm HTN   - seen by Dr. Silver previously; not following with cardiology currently  - cardiology recs appreciated     #MARTINE likely prerenal - resolved   - serum Cr. 1.8 on admission. Baseline Cr 1.0 in May 2024.   - CT abdomen neg for hydronephrosis. Moderate distention the urinary bladder.  - Bladder scan q8H  - monitor BMP closely  - started Flomax  - am bladder scan and straight cath > 450cc residual urine - pulido placement if > 3 failed straight cath attempts    #Constipation with fecal retention-resolved   - CT abdomen shows Significant fecal retention.  - bowel regimen.     HTN - 10/6 restarting losartan 25mg with parameters . Hold lopressor as BP borderline.   DM II - monitor FS AC HS. Insulin sliding scale.   Hypothyroidism - c/w synthroid. TSH-1.84 10/5    DVT PPX: eliquis  GI PPX: protonix  DIET: minced and moist  ACTIVITY: as tolerated  CODE STATUS: DNR/DNI  DISPOSITION: plan dc home 10/9  Family: staff updated family   PENDING: DME delivery before discharge as per CM rounds     Attending Physician Dr. Elma Arredondo # [id number]    89 yo F with hx of HTN, HLD, DM II, HFrEF (LVEF 20-25% 2020), Hypothyroidism, CVA, Dementia and Recent PNA (dx 4 days ago) presents to ED for generalized weakness. Positive sick contact.     #Acute hypoxic respiratory failure 2/2 submassive PE with RV strain (trop, elevated bpnp, hemodynamically stable) and COVID pneumonia    - Echo demonstrated Romano's sign, biomarkers elevated, requiring oxygen supplementation with desaturation on RA at rest   - Pt is now on therapeutic eliquis   - CT abdomen: Small left pleural effusion with adjacent consolidation which may reflect atelectasis or infiltrate. Mild fullness left renal pelvis. Mild left ureter with distal left ureteral stone. Stone within the urinary bladder on the left.   - cont doxy and CTX  - RVP +ve for Covid -> completed RDV day 5/10 dexamethasone  -ID recs appreciated: s/p Rocephin and doxycyline - completed   - Nasal MRSA negative  - 10/01 bcx negative   - IR consult appreciated: no thrombectomy for now, c/w full AC (Eliquis 10 BID)-10mg through 10/10  - echo noted  - Palliative final recs appreciated    #Elevated troponin likely demand ischemia iso suspected PE and hypotension   #chronic  HFimpEF   - 1st troponin 437  --> 350 -> 335  - no ischemic changes on ECG  - c/w ASA, statin  - TTE in 2020: LVEF 20-25%  - TTE 10/2/2024: LVEF 69%, G1DD, LV Ejection Fraction by Abreu's Method with a biplane EF of 69 %. Right ventricular systolic function is reduced with apical sparing consistent with acute pulmonary embolism (Romano's sign). The RV appears moderately dilated. Moderate TR. Moderate MR. Borderline pulm HTN   - seen by Dr. Silver previously; not following with cardiology currently  - cardiology recs appreciated     #MARTINE likely prerenal - resolved   - serum Cr. 1.8 on admission. Baseline Cr 1.0 in May 2024.   - CT abdomen neg for hydronephrosis. Moderate distention the urinary bladder.  - Bladder scan q8H  - monitor BMP closely  - started Flomax  - am bladder scan and straight cath > 450cc residual urine - pulido placement if > 3 failed straight cath attempts    #Constipation with fecal retention-resolved   - CT abdomen shows Significant fecal retention.  - bowel regimen.     HTN - 10/6 restarting losartan 25mg with parameters . Hold lopressor as BP borderline.   DM II - monitor FS AC HS. Insulin sliding scale.   Hypothyroidism - c/w synthroid. TSH-1.84 10/5    DVT PPX: eliquis  GI PPX: protonix  DIET: minced and moist  ACTIVITY: as tolerated  CODE STATUS: DNR/DNI  Family: staff updated family   DISPO & PENDING: DME delivery at home before discharge as per CM rounds   -updated son at bedside (wants to discuss NH option with VICTOR M Li)    Attending Physician Dr. Elma Arredondo # [id number]    89 yo F with hx of HTN, HLD, DM II, HFrEF (LVEF 20-25% 2020), Hypothyroidism, CVA, Dementia and Recent PNA (dx 4 days ago) presents to ED for generalized weakness. Positive sick contact.     #Acute hypoxic respiratory failure 2/2 submassive PE with RV strain (trop, elevated bpnp, hemodynamically stable) and COVID pneumonia    - Echo demonstrated Romano's sign, biomarkers elevated, requiring oxygen supplementation with desaturation on RA at rest   - Pt is now on therapeutic Eliquis   - CT abdomen: Small left pleural effusion with adjacent consolidation which may reflect atelectasis or infiltrate. Mild fullness left renal pelvis. Mild left ureter with distal left ureteral stone. Stone within the urinary bladder on the left.   - cont doxy and CTX  - RVP +ve for Covid -> completed RDV day 5/10 dexamethasone  -ID recs appreciated: s/p Rocephin and doxycyline - completed   - Nasal MRSA negative  - 10/01 bcx negative   - IR consult appreciated: no thrombectomy for now, c/w full AC (Eliquis 10 BID)-10mg through 10/10  - echo noted  - Palliative final recs appreciated    #Elevated troponin likely demand ischemia iso suspected PE and hypotension   #chronic  HFimpEF   - 1st troponin 437  --> 350 -> 335  - no ischemic changes on ECG  - c/w ASA, statin  - TTE in 2020: LVEF 20-25%  - TTE 10/2/2024: LVEF 69%, G1DD, LV Ejection Fraction by Abreu's Method with a biplane EF of 69 %. Right ventricular systolic function is reduced with apical sparing consistent with acute pulmonary embolism (Romano's sign). The RV appears moderately dilated. Moderate TR. Moderate MR. Borderline pulm HTN   - seen by Dr. Silver previously; not following with cardiology currently  - cardiology recs appreciated     #MARTINE likely prerenal - resolved   - serum Cr. 1.8 on admission. Baseline Cr 1.0 in May 2024.   - CT abdomen neg for hydronephrosis. Moderate distention the urinary bladder.  - started Flomax  - am bladder scan and straight cath > 450cc residual urine - pulido placement if > 3 failed straight cath attempts    #Constipation with fecal retention-resolved   - CT abdomen shows Significant fecal retention.  - bowel regimen.     HTN - 10/6 restarting losartan 25mg with parameters . Hold lopressor as BP borderline.   DM II - monitor FS AC HS. Insulin sliding scale.   Hypothyroidism - c/w synthroid. TSH-1.84 10/5    DVT PPX: Eliquis  GI PPX: Protonix  DIET: minced and moist  ACTIVITY: as tolerated  CODE STATUS: DNR/DNI  Family: staff updated family   DISPO & PENDING: DME delivery at home before discharge as per CM rounds   -updated son at bedside (wants to discuss NH option with VICTOR M Li)  -downgrade to regular medical floor - d/c tele     Attending Physician Dr. Elma Arredondo # [id number]

## 2024-10-09 NOTE — PROGRESS NOTE ADULT - ASSESSMENT
· Assessment	  89 yo F with hx of HTN, HLD, DM II, HFrEF (LVEF 20-25% 2020), Hypothyroidism, CVA, Dementia and Recent PNA (dx on CXR 4 days ago) presents to ED for generalized weakness and decreased PO intake. 4 days ago, patient started having cough and decreased PO intake. CXR was done and patient was diagnosed with PNA and was started on 5 days course of Azithromycin and Cefpodoxime with Prednisone 20mg OD. Patient had positive sick contact whereby both son and daughter developed URTI symptoms few days earleir. Patient is generally not very verbal at baseline and doesn't raise complains. She is mobile with a walker and 1 person assistance. Over past few days has been very week to ambulate. Family denies any reported chest pain, shortness of breath, fever, chills, abdominal pain, nausea, vomiting. They report loose BM over past 1 day.     IMPRESSION/RECOMMENDATIONS  Immunosuppression/Immunosenescence ( above age 60 yrs there is a exponential decline in immunity which could result in poor clinical outcomes.   COVID 19 with mild illness- pt has physiological /non pulmonary complaints  10/2 CT with no GGO  Pt is in the early viral replicative phase based on the timeline/onset of symptoms. ( as per son Eliseo at the bedside he was NG and his wife who had a URI also tested NG ? )  S/p vaccination  s/p RDV for 3 days  LLL bacterial PNA  10/2 CT with LLL : lobar consolidation. No pleural effusion. No cavitation  No risk factors for ORSA/ P aeruginosa  Nares ORSA NG  10/2 urine for legionella ag NG  10/1,5 BCx NG  WBc 16.3>22.1  BNP 96728    -Rocephin 1 gm iv q24h  -Doxycycline 10 mg iv q12h  -will monitor WBC . If increases will broaden coverage  -Off loading to prevent pressure sores and preventive measures to avoid aspiration

## 2024-10-10 LAB
CULTURE RESULTS: SIGNIFICANT CHANGE UP
GLUCOSE BLDC GLUCOMTR-MCNC: 136 MG/DL — HIGH (ref 70–99)
GLUCOSE BLDC GLUCOMTR-MCNC: 184 MG/DL — HIGH (ref 70–99)
GLUCOSE BLDC GLUCOMTR-MCNC: 266 MG/DL — HIGH (ref 70–99)
SPECIMEN SOURCE: SIGNIFICANT CHANGE UP

## 2024-10-10 PROCEDURE — 99232 SBSQ HOSP IP/OBS MODERATE 35: CPT

## 2024-10-10 PROCEDURE — 71045 X-RAY EXAM CHEST 1 VIEW: CPT | Mod: 26

## 2024-10-10 RX ORDER — CEFTRIAXONE SODIUM 10 G
VIAL (EA) INJECTION
Refills: 0 | Status: DISCONTINUED | OUTPATIENT
Start: 2024-10-10 | End: 2024-10-11

## 2024-10-10 RX ORDER — CEFTRIAXONE SODIUM 10 G
1000 VIAL (EA) INJECTION ONCE
Refills: 0 | Status: COMPLETED | OUTPATIENT
Start: 2024-10-10 | End: 2024-10-10

## 2024-10-10 RX ORDER — DOXYCYCLINE HYCLATE 100 MG/1
100 TABLET, FILM COATED ORAL EVERY 12 HOURS
Refills: 0 | Status: DISCONTINUED | OUTPATIENT
Start: 2024-10-10 | End: 2024-10-11

## 2024-10-10 RX ORDER — APIXABAN 5 MG/1
5 TABLET, FILM COATED ORAL EVERY 12 HOURS
Refills: 0 | Status: DISCONTINUED | OUTPATIENT
Start: 2024-10-10 | End: 2024-10-25

## 2024-10-10 RX ORDER — CEFTRIAXONE SODIUM 10 G
1000 VIAL (EA) INJECTION EVERY 24 HOURS
Refills: 0 | Status: DISCONTINUED | OUTPATIENT
Start: 2024-10-11 | End: 2024-10-11

## 2024-10-10 RX ADMIN — APIXABAN 10 MILLIGRAM(S): 5 TABLET, FILM COATED ORAL at 05:18

## 2024-10-10 RX ADMIN — POLYETHYLENE GLYCOL 3350 17 GRAM(S): 17 POWDER, FOR SOLUTION ORAL at 05:10

## 2024-10-10 RX ADMIN — LOSARTAN POTASSIUM 25 MILLIGRAM(S): 25 TABLET ORAL at 05:11

## 2024-10-10 RX ADMIN — Medication 3: at 17:40

## 2024-10-10 RX ADMIN — DOXYCYCLINE HYCLATE 100 MILLIGRAM(S): 100 TABLET, FILM COATED ORAL at 08:25

## 2024-10-10 RX ADMIN — Medication 100 MILLIGRAM(S): at 08:25

## 2024-10-10 RX ADMIN — Medication 20 MILLIGRAM(S): at 22:24

## 2024-10-10 RX ADMIN — Medication 0.4 MILLIGRAM(S): at 22:24

## 2024-10-10 RX ADMIN — DEXAMETHASONE 1.5 MG 6 MILLIGRAM(S): 1.5 TABLET ORAL at 05:11

## 2024-10-10 RX ADMIN — DOXYCYCLINE HYCLATE 100 MILLIGRAM(S): 100 TABLET, FILM COATED ORAL at 17:39

## 2024-10-10 RX ADMIN — POLYETHYLENE GLYCOL 3350 17 GRAM(S): 17 POWDER, FOR SOLUTION ORAL at 17:39

## 2024-10-10 RX ADMIN — Medication 81 MILLIGRAM(S): at 12:44

## 2024-10-10 RX ADMIN — CHLORHEXIDINE GLUCONATE 1 APPLICATION(S): 40 SOLUTION TOPICAL at 12:47

## 2024-10-10 RX ADMIN — Medication 50 MICROGRAM(S): at 05:10

## 2024-10-10 RX ADMIN — Medication 2 TABLET(S): at 22:24

## 2024-10-10 RX ADMIN — PANTOPRAZOLE SODIUM 40 MILLIGRAM(S): 40 TABLET, DELAYED RELEASE ORAL at 05:11

## 2024-10-10 RX ADMIN — APIXABAN 5 MILLIGRAM(S): 5 TABLET, FILM COATED ORAL at 17:39

## 2024-10-10 NOTE — PROGRESS NOTE ADULT - SUBJECTIVE AND OBJECTIVE BOX
SUBJECTIVE/OVERNIGHT EVENTS    Patient is a 89y old Female who presents with a chief complaint of shortness of breath    Today is hospital day 8d. This morning patient was seen and examined at bedside, agitated when disturbed, otherwise not in acute distress. Failed TOV and has pulido inserted, will be continued. Otherwise no overnight events.     MEDICATIONS:  STANDING MEDICATIONS  albuterol/ipratropium for Nebulization. 9 milliLiter(s) Nebulizer once  apixaban 5 milliGRAM(s) Oral every 12 hours  aspirin  chewable 81 milliGRAM(s) Oral daily  atorvastatin 20 milliGRAM(s) Oral at bedtime  bisacodyl Suppository 10 milliGRAM(s) Rectal once  cefTRIAXone   IVPB      chlorhexidine 2% Cloths 1 Application(s) Topical daily  dexAMETHasone     Tablet 6 milliGRAM(s) Oral daily  dextrose 5%. 1000 milliLiter(s) IV Continuous <Continuous>  dextrose 5%. 1000 milliLiter(s) IV Continuous <Continuous>  dextrose 50% Injectable 25 Gram(s) IV Push once  dextrose 50% Injectable 25 Gram(s) IV Push once  dextrose 50% Injectable 12.5 Gram(s) IV Push once  doxycycline IVPB 100 milliGRAM(s) IV Intermittent every 12 hours  glucagon  Injectable 1 milliGRAM(s) IntraMuscular once  insulin lispro (ADMELOG) corrective regimen sliding scale   SubCutaneous three times a day before meals  lactated ringers. 1000 milliLiter(s) IV Continuous <Continuous>  levothyroxine 50 MICROGram(s) Oral daily  losartan 25 milliGRAM(s) Oral daily  pantoprazole    Tablet 40 milliGRAM(s) Oral before breakfast  polyethylene glycol 3350 17 Gram(s) Oral two times a day  senna 2 Tablet(s) Oral at bedtime  tamsulosin 0.4 milliGRAM(s) Oral at bedtime    PRN MEDICATIONS  dextrose Oral Gel 15 Gram(s) Oral once PRN    VITALS:   T(F): 97.2  HR: 80  BP: 97/53  RR: 18  SpO2: 94%      PHYSICAL EXAM:  GENERAL: NAD, lying in bed comfortably  HEAD:  Atraumatic, Normocephalic  EYES: EOMI, PERRLA, conjunctiva and sclera clear  ENT: Moist mucous membranes  NECK: Supple, No JVD  CHEST/LUNG: Clear to auscultation bilaterally; No rales, rhonchi, wheezing, or rubs. Unlabored respirations  HEART: Regular rate and rhythm; No murmurs, rubs, or gallops  ABDOMEN: Bowel sounds present; Soft, Nontender, Nondistended. No hepatomegaly  EXTREMITIES:  2+ Peripheral Pulses, brisk capillary refill. No clubbing, cyanosis, or edema  NERVOUS SYSTEM:  Alert & Oriented X0, No focal deficits   MSK: FROM all 4 extremities, full and equal strength  SKIN: No rashes or lesions      LABS:                        13.6   22.10 )-----------( 215      ( 09 Oct 2024 07:10 )             41.5         RADIOLOGY:  CXR  Xray Chest 1 View- PORTABLE-Urgent:   ACC: [id number] EXAM:  XR CHEST PORTABLE URGENT 1V   ORDERED BY: CORBIN ROBLES     PROCEDURE DATE:  10/10/2024          INTERPRETATION:  Dyspnea  Frontal view  Previous exam 10/5/2024  There is no acute pulmonary disease.    IMPRESSION: No acute pulmonary disease    --- End of Report ---            MARIANELA FRAGOSO MD; Attending Radiologist  This document has been electronically signed. Oct 10 2024  3:48PM (10-10-24 @ 15:44)      CT

## 2024-10-10 NOTE — PROGRESS NOTE ADULT - SUBJECTIVE AND OBJECTIVE BOX
BRIJESH POWER  89y, Female    All available historical data reviewed    OVERNIGHT EVENTS:  no fevers  on RA  pulido placed 10/9    ROS:  unable to obtain history secondary to patient's mental status and/or sedation     VITALS:  T(F): 97.2, Max: 97.5 (10-09-24 @ 21:24)  HR: 80  BP: 97/53  RR: 18Vital Signs Last 24 Hrs  T(C): 36.2 (10 Oct 2024 12:44), Max: 36.4 (09 Oct 2024 21:24)  T(F): 97.2 (10 Oct 2024 12:44), Max: 97.5 (09 Oct 2024 21:24)  HR: 80 (10 Oct 2024 12:44) (62 - 91)  BP: 97/53 (10 Oct 2024 12:44) (97/53 - 136/71)  BP(mean): --  RR: 18 (10 Oct 2024 12:44) (18 - 18)  SpO2: 94% (10 Oct 2024 12:44) (93% - 94%)    Parameters below as of 10 Oct 2024 04:08  Patient On (Oxygen Delivery Method): room air        TESTS & MEASUREMENTS:                        13.6   22.10 )-----------( 215      ( 09 Oct 2024 07:10 )             41.5               Culture - Blood (collected 10-05-24 @ 06:13)  Source: .Blood BLOOD  Preliminary Report (10-09-24 @ 17:00):    No growth at 4 days            Social History:  Tobacco Use: No  Alcohol Use: No  Drug Use: No    RADIOLOGY & ADDITIONAL TESTS:  Personal review of radiological diagnostics performed  Echo and EKG results noted when applicable.     MEDICATIONS:  albuterol/ipratropium for Nebulization. 9 milliLiter(s) Nebulizer once  aspirin  chewable 81 milliGRAM(s) Oral daily  atorvastatin 20 milliGRAM(s) Oral at bedtime  bisacodyl Suppository 10 milliGRAM(s) Rectal once  cefTRIAXone   IVPB      chlorhexidine 2% Cloths 1 Application(s) Topical daily  dexAMETHasone     Tablet 6 milliGRAM(s) Oral daily  dextrose 5%. 1000 milliLiter(s) IV Continuous <Continuous>  dextrose 5%. 1000 milliLiter(s) IV Continuous <Continuous>  dextrose 50% Injectable 12.5 Gram(s) IV Push once  dextrose 50% Injectable 25 Gram(s) IV Push once  dextrose 50% Injectable 25 Gram(s) IV Push once  dextrose Oral Gel 15 Gram(s) Oral once PRN  doxycycline IVPB 100 milliGRAM(s) IV Intermittent every 12 hours  glucagon  Injectable 1 milliGRAM(s) IntraMuscular once  insulin lispro (ADMELOG) corrective regimen sliding scale   SubCutaneous three times a day before meals  lactated ringers. 1000 milliLiter(s) IV Continuous <Continuous>  levothyroxine 50 MICROGram(s) Oral daily  losartan 25 milliGRAM(s) Oral daily  pantoprazole    Tablet 40 milliGRAM(s) Oral before breakfast  polyethylene glycol 3350 17 Gram(s) Oral two times a day  senna 2 Tablet(s) Oral at bedtime  tamsulosin 0.4 milliGRAM(s) Oral at bedtime      ANTIBIOTICS:  cefTRIAXone   IVPB      doxycycline IVPB 100 milliGRAM(s) IV Intermittent every 12 hours

## 2024-10-10 NOTE — PROGRESS NOTE ADULT - SUBJECTIVE AND OBJECTIVE BOX
Patient is a 89y old  Female who presents with a chief complaint of breath (07 Oct 2024 16:52)      Patient seen this am   -no overnight events notified   -failed TOV - pulido reinserted and to be continued   -repeat chest xray   -c/w IV abx as per ID   -prognosis guarded -immunocompromised state; advanced age, active illness     ALLERGIES:  No Known Allergies    Vital Signs Last 24 Hrs  T(C): 36.2 (10 Oct 2024 12:44), Max: 36.4 (09 Oct 2024 21:24)  T(F): 97.2 (10 Oct 2024 12:44), Max: 97.5 (09 Oct 2024 21:24)  HR: 80 (10 Oct 2024 12:44) (62 - 91)  BP: 97/53 (10 Oct 2024 12:44) (97/53 - 136/71)  BP(mean): --  RR: 18 (10 Oct 2024 12:44) (18 - 18)  SpO2: 94% (10 Oct 2024 12:44) (93% - 94%)    Parameters below as of 10 Oct 2024 04:08  Patient On (Oxygen Delivery Method): room air        PHYSICAL EXAM:  General: Not in distress - resting comfortably in bed   ENT: MMM  Neck: Supple, No JVD  Lungs: decreased BS at bases   Cardio: RRR, S1/S2, No murmurs  Abdomen: Soft abdomen       LABS:                                 13.6   22.10 )-----------( 215      ( 09 Oct 2024 07:10 )             41.5                 Ca    8.5      07 Oct 2024 08:41  Mg     1.6     10-07    TPro  4.9  /  Alb  2.8  /  TBili  0.4  /  DBili  x   /  AST  17  /  ALT  20  /  AlkPhos  144  10-07      MEDICATIONS  (STANDING):  albuterol/ipratropium for Nebulization. 9 milliLiter(s) Nebulizer once  aspirin  chewable 81 milliGRAM(s) Oral daily  atorvastatin 20 milliGRAM(s) Oral at bedtime  bisacodyl Suppository 10 milliGRAM(s) Rectal once  cefTRIAXone   IVPB      chlorhexidine 2% Cloths 1 Application(s) Topical daily  dexAMETHasone     Tablet 6 milliGRAM(s) Oral daily  dextrose 5%. 1000 milliLiter(s) (100 mL/Hr) IV Continuous <Continuous>  dextrose 5%. 1000 milliLiter(s) (50 mL/Hr) IV Continuous <Continuous>  dextrose 50% Injectable 12.5 Gram(s) IV Push once  dextrose 50% Injectable 25 Gram(s) IV Push once  dextrose 50% Injectable 25 Gram(s) IV Push once  doxycycline IVPB 100 milliGRAM(s) IV Intermittent every 12 hours  glucagon  Injectable 1 milliGRAM(s) IntraMuscular once  insulin lispro (ADMELOG) corrective regimen sliding scale   SubCutaneous three times a day before meals  lactated ringers. 1000 milliLiter(s) (75 mL/Hr) IV Continuous <Continuous>  levothyroxine 50 MICROGram(s) Oral daily  losartan 25 milliGRAM(s) Oral daily  pantoprazole    Tablet 40 milliGRAM(s) Oral before breakfast  polyethylene glycol 3350 17 Gram(s) Oral two times a day  senna 2 Tablet(s) Oral at bedtime  tamsulosin 0.4 milliGRAM(s) Oral at bedtime    MEDICATIONS  (PRN):  dextrose Oral Gel 15 Gram(s) Oral once PRN Blood Glucose LESS THAN 70 milliGRAM(s)/deciliter

## 2024-10-10 NOTE — PROGRESS NOTE ADULT - ASSESSMENT
89 yo F with hx of HTN, HLD, DM II, HFrEF (LVEF 20-25% 2020), Hypothyroidism, CVA, Dementia and Recent PNA (dx 4 days ago) presents to ED for generalized weakness. Positive sick contact.     # Acute hypoxic respiratory failure 2/2 suspected PE and viral vs CAP (Covid +ve)   - Echo demonstrated Romano's sign, biomarkers elevated, requiring oxygen supplementation with desaturation on RA at rest   - Pt is now on therapeutic eliquis   - CT abdomen: Small left pleural effusion with adjacent consolidation which may reflect atelectasis or infiltrate. Mild fullness left renal pelvis. Mild left ureter with distal left ureteral stone. Stone within the urinary bladder on the left.   - cont doxy and CTX  - RVP +ve for Covid -> completed RDV day 5/10 dexamethasone  -ID recs appreciated: restarted Rocephin and doxycyline   - Per ID, repeat CXR ordered  - Nasal MRSA negative  - 10/01 bcx negative   - IR consult appreciated: no thrombectomy for now, On full AC eliquis 10mg end date 10/10. now on eliquis 5mg q12  - Palliative recs appreciated    #Elevated troponin likely demand ischemia iso suspected PE   # HFimpEF   - 1st troponin 437  --> 350 -> 335  - no ischemic changes on ECG  - c/w ASA, statin  - TTE in 2020: LVEF 20-25%  - TTE 10/2/2024: LVEF 69%, G1DD, LV Ejection Fraction by Abreu's Method with a biplane EF of 69 %. Right ventricular systolic function is reduced with apical sparing consistent with acute pulmonary embolism (Romano's sign). The RV appears moderately dilated. Moderate TR. Moderate MR. Borderline pulm HTN   - seen by Dr. Silver previously; not following with cardiology currently  - cardiology recs appreciated   - Off tele 10/9    #MARTINE likely prerenal resolved  - serum Cr. 1.8, resolved to 0.8. Baseline Cr 1.0 in May 2024.   - CT abdomen neg for hydronephrosis. Moderate distention the urinary bladder.  - Bladder scan q8H  - monitor BMP closely  - On flomax  - Failed bladder scan this AM, required straight cath of >450cc of residual urine,   - Continue pulido    #Constipation with fecal retention resolved  - CT abdomen shows Significant fecal retention.  - bowel regimen.     #HTN   - hold losartan due to MARTINE. Hold lopressor as BP borderline.     #DM II   - monitor FS AC HS. Insulin sliding scale.     #Hypothyroidism   - c/w synthroid. TSH 1.84      DVT PPX: eliquis (fully anticoagulated)  GI PPX: protonix  DIET: minced and moist  ACTIVITY: AAT  CODE STATUS: DNR/DNI    PENDING: downgrade to regular floor, no need for tele ok for dc once placement is complete/equipment set up (NH discussions ongoing)

## 2024-10-10 NOTE — PROGRESS NOTE ADULT - ASSESSMENT
· Assessment	  89 yo F with hx of HTN, HLD, DM II, HFrEF (LVEF 20-25% 2020), Hypothyroidism, CVA, Dementia and Recent PNA (dx on CXR 4 days ago) presents to ED for generalized weakness and decreased PO intake. 4 days ago, patient started having cough and decreased PO intake. CXR was done and patient was diagnosed with PNA and was started on 5 days course of Azithromycin and Cefpodoxime with Prednisone 20mg OD. Patient had positive sick contact whereby both son and daughter developed URTI symptoms few days earleir. Patient is generally not very verbal at baseline and doesn't raise complains. She is mobile with a walker and 1 person assistance. Over past few days has been very week to ambulate. Family denies any reported chest pain, shortness of breath, fever, chills, abdominal pain, nausea, vomiting. They report loose BM over past 1 day.     IMPRESSION/RECOMMENDATIONS  Immunosuppression/Immunosenescence ( above age 60 yrs there is a exponential decline in immunity which could result in poor clinical outcomes.   COVID 19 with mild illness- pt has physiological /non pulmonary complaints  10/2 CT with no GGO  Pt was in the early viral replicative phase based on the timeline/onset of symptoms. ( as per son Eliseo at the bedside he was NG and his wife who had a URI also tested NG ? )  S/p vaccination  s/p RDV for 3 days  LLL bacterial PNA  10/2 CT with LLL : lobar consolidation. No pleural effusion. No cavitation  No risk factors for ORSA/ P aeruginosa  Nares ORSA NG  10/2 urine for legionella ag NG  10/1,5 BCx NG  WBc 16.3>22.1  BNP 24917    -repeat CXR  -Rocephin 1 gm iv q24h  -Doxycycline 10 mg iv q12h  -Monitor WBC .   -Off loading to prevent pressure sores and preventive measures to avoid aspiration

## 2024-10-10 NOTE — PROGRESS NOTE ADULT - ASSESSMENT
89 yo F with hx of HTN, HLD, DM II, HFrEF (LVEF 20-25% 2020), Hypothyroidism, CVA, Dementia and Recent PNA (dx 4 days ago) presents to ED for generalized weakness. Positive sick contact.     #Acute hypoxic respiratory failure 2/2 submassive PE with RV strain (trop, elevated bpnp, hemodynamically stable) and COVID pneumonia    - Echo demonstrated Romano's sign, biomarkers elevated, requiring oxygen supplementation with desaturation on RA at rest   - Pt is now on therapeutic Eliquis   - CT abdomen: Small left pleural effusion with adjacent consolidation which may reflect atelectasis or infiltrate. Mild fullness left renal pelvis. Mild left ureter with distal left ureteral stone. Stone within the urinary bladder on the left.   - cont doxy and CTX  - RVP +ve for Covid -> completed RDV day 5/10 dexamethasone  -ID follow up appreciated - c/w IV Rocephin and doxy - repeat chest xray   - Nasal MRSA negative  - 10/01 bcx negative   - IR consult appreciated: no thrombectomy for now, c/w full AC (Eliquis 10 BID)-10mg through 10/10 - 5mg q 12hrs thereafter    #Elevated troponin likely demand ischemia iso suspected PE and hypotension   #chronic  HFmpEF   - 1st troponin 437  --> 350 -> 335  - no ischemic changes on ECG  - c/w ASA, statin  - TTE in 2020: LVEF 20-25%  - TTE 10/2/2024: LVEF 69%, G1DD, LV Ejection Fraction by Abreu's Method with a biplane EF of 69 %. Right ventricular systolic function is reduced with apical sparing consistent with acute pulmonary embolism (Romano's sign). The RV appears moderately dilated. Moderate TR. Moderate MR. Borderline pulm HTN   - seen by Dr. Silver previously; not following with cardiology currently  - cardiology recs appreciated     #MARTINE likely prerenal - resolved   - serum Cr. 1.8 on admission. Baseline Cr 1.0 in May 2024.   - CT abdomen neg for hydronephrosis. Moderate distention the urinary bladder.  - started Flomax  - 10/09 am bladder scan and straight cath > 450cc residual urine - Rodriges placed and to be continued (failed TOV)    #Constipation with fecal retention-resolved   - CT abdomen shows Significant fecal retention.  - bowel regimen.     HTN - 10/6 restarting losartan 25mg with parameters . Hold lopressor as BP borderline.   DM II - monitor FS AC HS. Insulin sliding scale.   Hypothyroidism - c/w synthroid. TSH-1.84 10/5    DVT PPX: Eliquis  GI PPX: Protonix  DIET: pureed with thickened liquid  ACTIVITY: as tolerated  CODE STATUS: DNR/DNI  DISPO & PENDING: DME delivery at home + CM working on NH dispo as per son's request, ID f/u for IV abx duration; repeat cxr today  -updated son at bedside 10/09  -downgrade to regular medical floor - no need for tele     Attending Physician Dr. Elma Arredondo # [id number]

## 2024-10-11 LAB
BASOPHILS # BLD AUTO: 0.05 K/UL — SIGNIFICANT CHANGE UP (ref 0–0.2)
BASOPHILS NFR BLD AUTO: 0.2 % — SIGNIFICANT CHANGE UP (ref 0–1)
EOSINOPHIL # BLD AUTO: 0.01 K/UL — SIGNIFICANT CHANGE UP (ref 0–0.7)
EOSINOPHIL NFR BLD AUTO: 0 % — SIGNIFICANT CHANGE UP (ref 0–8)
GLUCOSE BLDC GLUCOMTR-MCNC: 149 MG/DL — HIGH (ref 70–99)
GLUCOSE BLDC GLUCOMTR-MCNC: 185 MG/DL — HIGH (ref 70–99)
GLUCOSE BLDC GLUCOMTR-MCNC: 219 MG/DL — HIGH (ref 70–99)
HCT VFR BLD CALC: 39.4 % — SIGNIFICANT CHANGE UP (ref 37–47)
HGB BLD-MCNC: 12.8 G/DL — SIGNIFICANT CHANGE UP (ref 12–16)
IMM GRANULOCYTES NFR BLD AUTO: 2.2 % — HIGH (ref 0.1–0.3)
LYMPHOCYTES # BLD AUTO: 11.2 % — LOW (ref 20.5–51.1)
LYMPHOCYTES # BLD AUTO: 2.63 K/UL — SIGNIFICANT CHANGE UP (ref 1.2–3.4)
MCHC RBC-ENTMCNC: 30.5 PG — SIGNIFICANT CHANGE UP (ref 27–31)
MCHC RBC-ENTMCNC: 32.5 G/DL — SIGNIFICANT CHANGE UP (ref 32–37)
MCV RBC AUTO: 93.8 FL — SIGNIFICANT CHANGE UP (ref 81–99)
MONOCYTES # BLD AUTO: 2.12 K/UL — HIGH (ref 0.1–0.6)
MONOCYTES NFR BLD AUTO: 9.1 % — SIGNIFICANT CHANGE UP (ref 1.7–9.3)
NEUTROPHILS # BLD AUTO: 18.08 K/UL — HIGH (ref 1.4–6.5)
NEUTROPHILS NFR BLD AUTO: 77.3 % — HIGH (ref 42.2–75.2)
NRBC # BLD: 0 /100 WBCS — SIGNIFICANT CHANGE UP (ref 0–0)
PLATELET # BLD AUTO: 236 K/UL — SIGNIFICANT CHANGE UP (ref 130–400)
PMV BLD: 12.1 FL — HIGH (ref 7.4–10.4)
RBC # BLD: 4.2 M/UL — SIGNIFICANT CHANGE UP (ref 4.2–5.4)
RBC # FLD: 13.7 % — SIGNIFICANT CHANGE UP (ref 11.5–14.5)
WBC # BLD: 23.41 K/UL — HIGH (ref 4.8–10.8)
WBC # FLD AUTO: 23.41 K/UL — HIGH (ref 4.8–10.8)

## 2024-10-11 PROCEDURE — 99232 SBSQ HOSP IP/OBS MODERATE 35: CPT

## 2024-10-11 RX ADMIN — Medication 1: at 11:55

## 2024-10-11 RX ADMIN — Medication 0.4 MILLIGRAM(S): at 21:50

## 2024-10-11 RX ADMIN — Medication 100 MILLIGRAM(S): at 08:05

## 2024-10-11 RX ADMIN — PANTOPRAZOLE SODIUM 40 MILLIGRAM(S): 40 TABLET, DELAYED RELEASE ORAL at 06:25

## 2024-10-11 RX ADMIN — Medication 81 MILLIGRAM(S): at 11:55

## 2024-10-11 RX ADMIN — POLYETHYLENE GLYCOL 3350 17 GRAM(S): 17 POWDER, FOR SOLUTION ORAL at 06:26

## 2024-10-11 RX ADMIN — DEXAMETHASONE 1.5 MG 6 MILLIGRAM(S): 1.5 TABLET ORAL at 07:00

## 2024-10-11 RX ADMIN — DOXYCYCLINE HYCLATE 100 MILLIGRAM(S): 100 TABLET, FILM COATED ORAL at 06:25

## 2024-10-11 RX ADMIN — APIXABAN 5 MILLIGRAM(S): 5 TABLET, FILM COATED ORAL at 17:39

## 2024-10-11 RX ADMIN — Medication 2 TABLET(S): at 21:50

## 2024-10-11 RX ADMIN — LOSARTAN POTASSIUM 25 MILLIGRAM(S): 25 TABLET ORAL at 06:25

## 2024-10-11 RX ADMIN — Medication 50 MICROGRAM(S): at 06:25

## 2024-10-11 RX ADMIN — APIXABAN 5 MILLIGRAM(S): 5 TABLET, FILM COATED ORAL at 06:25

## 2024-10-11 RX ADMIN — Medication 20 MILLIGRAM(S): at 21:50

## 2024-10-11 RX ADMIN — Medication 2: at 17:38

## 2024-10-11 RX ADMIN — CHLORHEXIDINE GLUCONATE 1 APPLICATION(S): 40 SOLUTION TOPICAL at 17:41

## 2024-10-11 NOTE — PROGRESS NOTE ADULT - ASSESSMENT
· Assessment	  89 yo F with hx of HTN, HLD, DM II, HFrEF (LVEF 20-25% 2020), Hypothyroidism, CVA, Dementia and Recent PNA (dx on CXR 4 days ago) presents to ED for generalized weakness and decreased PO intake. 4 days ago, patient started having cough and decreased PO intake. CXR was done and patient was diagnosed with PNA and was started on 5 days course of Azithromycin and Cefpodoxime with Prednisone 20mg OD. Patient had positive sick contact whereby both son and daughter developed URTI symptoms few days earleir. Patient is generally not very verbal at baseline and doesn't raise complains. She is mobile with a walker and 1 person assistance. Over past few days has been very week to ambulate. Family denies any reported chest pain, shortness of breath, fever, chills, abdominal pain, nausea, vomiting. They report loose BM over past 1 day.     IMPRESSION/RECOMMENDATIONS  Immunosuppression/Immunosenescence ( above age 60 yrs there is a exponential decline in immunity which could result in poor clinical outcomes.   COVID 19 with mild illness- pt has physiological /non pulmonary complaints  10/2 CT with no GGO  Pt was in the early viral replicative phase based on the timeline/onset of symptoms. ( as per son Eliseo at the bedside he was NG and his wife who had a URI also tested NG ? )  S/p vaccination  s/p RDV for 3 days  LLL bacterial PNA  Presently comfortable on RA  10/10 CXR no opacities ( reviewed )  10/2 CT with LLL : lobar consolidation. No pleural effusion. No cavitation  No risk factors for ORSA/ P aeruginosa  Nares ORSA NG  10/2 urine for legionella ag NG  10/1,5 BCx NG  WBc 16.3>22.1>23.4 ( steroid induced . On decadron )  BNP 64691    -Rocephin 1 gm iv q24h  -Doxycycline 10 mg iv q12h  -will based on CXR 10/10 with no opacities hold ABx   -leucocytosis steroid induced  -Monitor WBC .   -Off loading to prevent pressure sores and preventive measures to avoid aspiration

## 2024-10-11 NOTE — PROVIDER CONTACT NOTE (OTHER) - SITUATION
Patient with MODESTO Midline, noted with Swelling, hematoma to site and painful to touch on assessment

## 2024-10-11 NOTE — PROGRESS NOTE ADULT - SUBJECTIVE AND OBJECTIVE BOX
SUBJECTIVE/OVERNIGHT EVENTS    Patient is a 89y old Female who presents with a chief complaint of shortness of breath    Today is hospital day 9d. This morning patient was seen and examined at bedside, resting comfortably. Mildly confused when awoken, but calmer. No overnight events.     MEDICATIONS:  STANDING MEDICATIONS  albuterol/ipratropium for Nebulization. 9 milliLiter(s) Nebulizer once  apixaban 5 milliGRAM(s) Oral every 12 hours  aspirin  chewable 81 milliGRAM(s) Oral daily  atorvastatin 20 milliGRAM(s) Oral at bedtime  bisacodyl Suppository 10 milliGRAM(s) Rectal once  chlorhexidine 2% Cloths 1 Application(s) Topical daily  dextrose 5%. 1000 milliLiter(s) IV Continuous <Continuous>  dextrose 5%. 1000 milliLiter(s) IV Continuous <Continuous>  dextrose 50% Injectable 25 Gram(s) IV Push once  dextrose 50% Injectable 25 Gram(s) IV Push once  dextrose 50% Injectable 12.5 Gram(s) IV Push once  glucagon  Injectable 1 milliGRAM(s) IntraMuscular once  insulin lispro (ADMELOG) corrective regimen sliding scale   SubCutaneous three times a day before meals  lactated ringers. 1000 milliLiter(s) IV Continuous <Continuous>  levothyroxine 50 MICROGram(s) Oral daily  losartan 25 milliGRAM(s) Oral daily  pantoprazole    Tablet 40 milliGRAM(s) Oral before breakfast  polyethylene glycol 3350 17 Gram(s) Oral two times a day  senna 2 Tablet(s) Oral at bedtime  tamsulosin 0.4 milliGRAM(s) Oral at bedtime    PRN MEDICATIONS  dextrose Oral Gel 15 Gram(s) Oral once PRN    VITALS:   T(F): 97  HR: 77  BP: 114/55  RR: 18  SpO2: 95%        PHYSICAL EXAM:  GENERAL: NAD, lying in bed comfortably  HEAD:  Atraumatic, Normocephalic  EYES: EOMI, PERRLA, conjunctiva and sclera clear  ENT: Moist mucous membranes  NECK: Supple, No JVD  CHEST/LUNG: Clear to auscultation bilaterally; No rales, rhonchi, wheezing, or rubs. Unlabored respirations  HEART: Regular rate and rhythm; No murmurs, rubs, or gallops  ABDOMEN: Bowel sounds present; Soft, Nontender, Nondistended. No hepatomegaly  EXTREMITIES:  2+ Peripheral Pulses, brisk capillary refill. No clubbing, cyanosis, or edema  NERVOUS SYSTEM:  Alert & Oriented X0, No focal deficits   MSK: FROM all 4 extremities, full and equal strength  SKIN: No rashes or lesions      LABS:                        13.6   22.10 )-----------( 215      ( 09 Oct 2024 07:10 )             41.5         RADIOLOGY:  CXR  Xray Chest 1 View- PORTABLE-Urgent:   ACC: [id number] EXAM:  XR CHEST PORTABLE URGENT 1V   ORDERED BY: CORBIN ROBLES     PROCEDURE DATE:  10/10/2024          INTERPRETATION:  Dyspnea  Frontal view  Previous exam 10/5/2024  There is no acute pulmonary disease.    IMPRESSION: No acute pulmonary disease    --- End of Report ---            MARIANELA FRAGOSO MD; Attending Radiologist  This document has been electronically signed. Oct 10 2024  3:48PM (10-10-24 @ 15:44)      CT

## 2024-10-11 NOTE — PROGRESS NOTE ADULT - SUBJECTIVE AND OBJECTIVE BOX
Patient is a 89y old  Female who presents with a chief complaint of breath (07 Oct 2024 16:52)      Patient seen this am   -no overnight events notified   -failed TOV this admission - pulido reinserted and to be continued   -repeat chest xray noted - monitor off abx   -prognosis guarded -immunocompromised state; advanced age, active illness   -d/c planning to NH     ALLERGIES:  No Known Allergies    Vital Signs Last 24 Hrs  T(C): 36.3 (10 Oct 2024 20:27), Max: 36.3 (10 Oct 2024 20:27)  T(F): 97.3 (10 Oct 2024 20:27), Max: 97.3 (10 Oct 2024 20:27)  HR: 71 (11 Oct 2024 04:48) (71 - 80)  BP: 93/67 (11 Oct 2024 04:48) (93/67 - 98/62)  BP(mean): --  RR: 18 (11 Oct 2024 04:48) (17 - 18)  SpO2: 95% (10 Oct 2024 20:30) (94% - 95%)    Parameters below as of 10 Oct 2024 20:30  Patient On (Oxygen Delivery Method): room air        PHYSICAL EXAM:  General: Not in distress - resting comfortably in bed   ENT: MMM  Neck: Supple, No JVD  Lungs: decreased BS at bases   Cardio: RRR, S1/S2, No murmurs  Abdomen: Soft abdomen       LABS:                                  12.8   23.41 )-----------( 236      ( 11 Oct 2024 01:22 )             39.4                       MEDICATIONS  (STANDING):  albuterol/ipratropium for Nebulization. 9 milliLiter(s) Nebulizer once  apixaban 5 milliGRAM(s) Oral every 12 hours  aspirin  chewable 81 milliGRAM(s) Oral daily  atorvastatin 20 milliGRAM(s) Oral at bedtime  bisacodyl Suppository 10 milliGRAM(s) Rectal once  chlorhexidine 2% Cloths 1 Application(s) Topical daily  dextrose 5%. 1000 milliLiter(s) (100 mL/Hr) IV Continuous <Continuous>  dextrose 5%. 1000 milliLiter(s) (50 mL/Hr) IV Continuous <Continuous>  dextrose 50% Injectable 25 Gram(s) IV Push once  dextrose 50% Injectable 25 Gram(s) IV Push once  dextrose 50% Injectable 12.5 Gram(s) IV Push once  glucagon  Injectable 1 milliGRAM(s) IntraMuscular once  insulin lispro (ADMELOG) corrective regimen sliding scale   SubCutaneous three times a day before meals  lactated ringers. 1000 milliLiter(s) (75 mL/Hr) IV Continuous <Continuous>  levothyroxine 50 MICROGram(s) Oral daily  losartan 25 milliGRAM(s) Oral daily  pantoprazole    Tablet 40 milliGRAM(s) Oral before breakfast  polyethylene glycol 3350 17 Gram(s) Oral two times a day  senna 2 Tablet(s) Oral at bedtime  tamsulosin 0.4 milliGRAM(s) Oral at bedtime    MEDICATIONS  (PRN):  dextrose Oral Gel 15 Gram(s) Oral once PRN Blood Glucose LESS THAN 70 milliGRAM(s)/deciliter

## 2024-10-11 NOTE — PROGRESS NOTE ADULT - ASSESSMENT
89 yo F with hx of HTN, HLD, DM II, HFrEF (LVEF 20-25% 2020), Hypothyroidism, CVA, Dementia and Recent PNA (dx 4 days ago) presents to ED for generalized weakness. Positive sick contact.     #Acute hypoxic respiratory failure 2/2 submassive PE with RV strain (trop, elevated bpnp, hemodynamically stable) and COVID pneumonia    - Echo demonstrated Romano's sign, biomarkers elevated, requiring oxygen supplementation with desaturation on RA at rest   - Pt is now on therapeutic Eliquis   - CT abdomen: Small left pleural effusion with adjacent consolidation which may reflect atelectasis or infiltrate. Mild fullness left renal pelvis. Mild left ureter with distal left ureteral stone. Stone within the urinary bladder on the left.   - cont doxy and CTX  - RVP +ve for Covid -> s/p RDV and decadron   - ID follow up appreciated - repeat cxr noted - monitor off abx as leukocytosis is steroid induced (received course of rocephin and doxy)  - Nasal MRSA negative  - 10/01 bcx negative   - IR consult appreciated: no thrombectomy for now, received Eliquis 10mg BID through 10/10 - now on 5mg q 12hrs    #Elevated troponin likely demand ischemia iso suspected PE and hypotension   #chronic  HFmpEF   - 1st troponin 437  --> 350 -> 335  - no ischemic changes on ECG  - c/w ASA, statin  - TTE in 2020: LVEF 20-25%  - TTE 10/2/2024: LVEF 69%, G1DD, LV Ejection Fraction by Abreu's Method with a biplane EF of 69 %. Right ventricular systolic function is reduced with apical sparing consistent with acute pulmonary embolism (Romano's sign). The RV appears moderately dilated. Moderate TR. Moderate MR. Borderline pulm HTN   - seen by Dr. Silver previously; not following with cardiology currently  - cardiology recs appreciated     #MARTINE likely prerenal - resolved   - serum Cr. 1.8 on admission. Baseline Cr 1.0 in May 2024.   - CT abdomen neg for hydronephrosis. Moderate distention the urinary bladder.  - started Flomax  - 10/09 am bladder scan and straight cath > 450cc residual urine - Rodriges placed and to be continued (failed TOV)    #Constipation with fecal retention-resolved   - CT abdomen shows Significant fecal retention.  - bowel regimen.     HTN - 10/6 restarting losartan 25mg with parameters . Hold lopressor as BP borderline.   DM II - monitor FS AC HS. Insulin sliding scale.   Hypothyroidism - c/w synthroid. TSH-1.84 10/5    DVT PPX: Eliquis  GI PPX: Protonix  DIET: pureed with thickened liquid  ACTIVITY: as tolerated  CODE STATUS: DNR/DNI  DISPO & PENDING: CM working on NH dispo as per son's request  -staff updated family   -downgrade to regular medical floor - no need for tele     Attending Physician Dr. Elma Arredondo # [id number]

## 2024-10-11 NOTE — PROGRESS NOTE ADULT - SUBJECTIVE AND OBJECTIVE BOX
BRIJESH POWER  89y, Female    All available historical data reviewed    OVERNIGHT EVENTS:  no fevers  alert, not communicative  pulido in   RA    ROS:  unable to obtain history secondary to patient's mental status and/or sedation     VITALS:  T(F): 97.3, Max: 97.3 (10-10-24 @ 20:27)  HR: 71  BP: 93/67  RR: 18Vital Signs Last 24 Hrs  T(C): 36.3 (10 Oct 2024 20:27), Max: 36.3 (10 Oct 2024 20:27)  T(F): 97.3 (10 Oct 2024 20:27), Max: 97.3 (10 Oct 2024 20:27)  HR: 71 (11 Oct 2024 04:48) (71 - 80)  BP: 93/67 (11 Oct 2024 04:48) (93/67 - 98/62)  BP(mean): --  RR: 18 (11 Oct 2024 04:48) (17 - 18)  SpO2: 95% (10 Oct 2024 20:30) (94% - 95%)    Parameters below as of 10 Oct 2024 20:30  Patient On (Oxygen Delivery Method): room air        TESTS & MEASUREMENTS:                        12.8   23.41 )-----------( 236      ( 11 Oct 2024 01:22 )             39.4               Culture - Blood (collected 10-05-24 @ 06:13)  Source: .Blood BLOOD  Final Report (10-10-24 @ 17:00):    No growth at 5 days            Social History:  Tobacco Use: No  Alcohol Use: No  Drug Use: No    RADIOLOGY & ADDITIONAL TESTS:  Personal review of radiological diagnostics performed  Echo and EKG results noted when applicable.     MEDICATIONS:  albuterol/ipratropium for Nebulization. 9 milliLiter(s) Nebulizer once  apixaban 5 milliGRAM(s) Oral every 12 hours  aspirin  chewable 81 milliGRAM(s) Oral daily  atorvastatin 20 milliGRAM(s) Oral at bedtime  bisacodyl Suppository 10 milliGRAM(s) Rectal once  cefTRIAXone   IVPB      cefTRIAXone   IVPB 1000 milliGRAM(s) IV Intermittent every 24 hours  chlorhexidine 2% Cloths 1 Application(s) Topical daily  dextrose 5%. 1000 milliLiter(s) IV Continuous <Continuous>  dextrose 5%. 1000 milliLiter(s) IV Continuous <Continuous>  dextrose 50% Injectable 25 Gram(s) IV Push once  dextrose 50% Injectable 25 Gram(s) IV Push once  dextrose 50% Injectable 12.5 Gram(s) IV Push once  dextrose Oral Gel 15 Gram(s) Oral once PRN  doxycycline IVPB 100 milliGRAM(s) IV Intermittent every 12 hours  glucagon  Injectable 1 milliGRAM(s) IntraMuscular once  insulin lispro (ADMELOG) corrective regimen sliding scale   SubCutaneous three times a day before meals  lactated ringers. 1000 milliLiter(s) IV Continuous <Continuous>  levothyroxine 50 MICROGram(s) Oral daily  losartan 25 milliGRAM(s) Oral daily  pantoprazole    Tablet 40 milliGRAM(s) Oral before breakfast  polyethylene glycol 3350 17 Gram(s) Oral two times a day  senna 2 Tablet(s) Oral at bedtime  tamsulosin 0.4 milliGRAM(s) Oral at bedtime      ANTIBIOTICS:  cefTRIAXone   IVPB 1000 milliGRAM(s) IV Intermittent every 24 hours  cefTRIAXone   IVPB      doxycycline IVPB 100 milliGRAM(s) IV Intermittent every 12 hours

## 2024-10-11 NOTE — PROGRESS NOTE ADULT - ASSESSMENT
89 yo F with hx of HTN, HLD, DM II, HFrEF (LVEF 20-25% 2020), Hypothyroidism, CVA, Dementia and Recent PNA (dx 4 days ago) presents to ED for generalized weakness. Positive sick contact.     # Acute hypoxic respiratory failure 2/2 suspected PE and viral vs CAP (Covid +ve, resolved)   - Echo demonstrated Romano's sign, biomarkers elevated, requiring oxygen supplementation with desaturation on RA at rest   - Pt is now on therapeutic eliquis   - CT abdomen: Small left pleural effusion with adjacent consolidation which may reflect atelectasis or infiltrate. Mild fullness left renal pelvis. Mild left ureter with distal left ureteral stone. Stone within the urinary bladder on the left.   - cont doxy and CTX  - RVP +ve for Covid -> completed RDV day 5/10 dexamethasone  -ID recs appreciated: off IV abx.   - Nasal MRSA negative  - 10/01 bcx negative   - IR consult appreciated: no thrombectomy for now, On full AC eliquis 10mg end date 10/10. now on eliquis 5mg q12  - Palliative recs appreciated  - Off isolation per infx control    #Elevated troponin likely demand ischemia iso suspected PE   # HFimpEF   - 1st troponin 437  --> 350 -> 335  - no ischemic changes on ECG  - c/w ASA, statin  - TTE in 2020: LVEF 20-25%  - TTE 10/2/2024: LVEF 69%, G1DD, LV Ejection Fraction by Abreu's Method with a biplane EF of 69 %. Right ventricular systolic function is reduced with apical sparing consistent with acute pulmonary embolism (Romano's sign). The RV appears moderately dilated. Moderate TR. Moderate MR. Borderline pulm HTN   - seen by Dr. Silver previously; not following with cardiology currently  - cardiology recs appreciated   - Off tele 10/9    #MARTINE likely prerenal resolved  - serum Cr. 1.8, resolved to 0.8. Baseline Cr 1.0 in May 2024.   - CT abdomen neg for hydronephrosis. Moderate distention the urinary bladder.  - Bladder scan q8H  - monitor BMP closely  - On flomax  - Failed bladder scan this AM, required straight cath of >450cc of residual urine,   - Continue pulido    #Constipation with fecal retention resolved  - CT abdomen shows Significant fecal retention.  - bowel regimen.     #HTN   - hold losartan due to MARTINE. Hold lopressor as BP borderline.     #DM II   - monitor FS AC HS. Insulin sliding scale.     #Hypothyroidism   - c/w synthroid. TSH 1.84      DVT PPX: eliquis (fully anticoagulated)  GI PPX: protonix  DIET: minced and moist  ACTIVITY: AAT  CODE STATUS: DNR/DNI    PENDING: downgrade to regular floor, no need for tele ok for dc once placement is complete/equipment set up. [employer].

## 2024-10-12 LAB
ALBUMIN SERPL ELPH-MCNC: 2.8 G/DL — LOW (ref 3.5–5.2)
ALP SERPL-CCNC: 147 U/L — HIGH (ref 30–115)
ALT FLD-CCNC: 14 U/L — SIGNIFICANT CHANGE UP (ref 0–41)
ANION GAP SERPL CALC-SCNC: 14 MMOL/L — SIGNIFICANT CHANGE UP (ref 7–14)
AST SERPL-CCNC: 15 U/L — SIGNIFICANT CHANGE UP (ref 0–41)
BASOPHILS # BLD AUTO: 0.04 K/UL — SIGNIFICANT CHANGE UP (ref 0–0.2)
BASOPHILS NFR BLD AUTO: 0.2 % — SIGNIFICANT CHANGE UP (ref 0–1)
BILIRUB SERPL-MCNC: 0.8 MG/DL — SIGNIFICANT CHANGE UP (ref 0.2–1.2)
BUN SERPL-MCNC: 33 MG/DL — HIGH (ref 10–20)
CALCIUM SERPL-MCNC: 8.6 MG/DL — SIGNIFICANT CHANGE UP (ref 8.4–10.5)
CHLORIDE SERPL-SCNC: 95 MMOL/L — LOW (ref 98–110)
CO2 SERPL-SCNC: 21 MMOL/L — SIGNIFICANT CHANGE UP (ref 17–32)
CREAT SERPL-MCNC: 0.9 MG/DL — SIGNIFICANT CHANGE UP (ref 0.7–1.5)
EGFR: 56 ML/MIN/1.73M2 — LOW
EOSINOPHIL # BLD AUTO: 0.03 K/UL — SIGNIFICANT CHANGE UP (ref 0–0.7)
EOSINOPHIL NFR BLD AUTO: 0.1 % — SIGNIFICANT CHANGE UP (ref 0–8)
GAS PNL BLDA: SIGNIFICANT CHANGE UP
GLUCOSE BLDC GLUCOMTR-MCNC: 105 MG/DL — HIGH (ref 70–99)
GLUCOSE BLDC GLUCOMTR-MCNC: 122 MG/DL — HIGH (ref 70–99)
GLUCOSE BLDC GLUCOMTR-MCNC: 154 MG/DL — HIGH (ref 70–99)
GLUCOSE BLDC GLUCOMTR-MCNC: 155 MG/DL — HIGH (ref 70–99)
GLUCOSE SERPL-MCNC: 99 MG/DL — SIGNIFICANT CHANGE UP (ref 70–99)
HCT VFR BLD CALC: 36.4 % — LOW (ref 37–47)
HCT VFR BLD CALC: 36.9 % — LOW (ref 37–47)
HGB BLD-MCNC: 12.1 G/DL — SIGNIFICANT CHANGE UP (ref 12–16)
HGB BLD-MCNC: 12.3 G/DL — SIGNIFICANT CHANGE UP (ref 12–16)
IMM GRANULOCYTES NFR BLD AUTO: 1.6 % — HIGH (ref 0.1–0.3)
LACTATE SERPL-SCNC: 1.6 MMOL/L — SIGNIFICANT CHANGE UP (ref 0.7–2)
LYMPHOCYTES # BLD AUTO: 17.6 % — LOW (ref 20.5–51.1)
LYMPHOCYTES # BLD AUTO: 3.64 K/UL — HIGH (ref 1.2–3.4)
MAGNESIUM SERPL-MCNC: 1.9 MG/DL — SIGNIFICANT CHANGE UP (ref 1.8–2.4)
MCHC RBC-ENTMCNC: 31.1 PG — HIGH (ref 27–31)
MCHC RBC-ENTMCNC: 31.3 PG — HIGH (ref 27–31)
MCHC RBC-ENTMCNC: 33.2 G/DL — SIGNIFICANT CHANGE UP (ref 32–37)
MCHC RBC-ENTMCNC: 33.3 G/DL — SIGNIFICANT CHANGE UP (ref 32–37)
MCV RBC AUTO: 93.4 FL — SIGNIFICANT CHANGE UP (ref 81–99)
MCV RBC AUTO: 94.1 FL — SIGNIFICANT CHANGE UP (ref 81–99)
MONOCYTES # BLD AUTO: 1.71 K/UL — HIGH (ref 0.1–0.6)
MONOCYTES NFR BLD AUTO: 8.3 % — SIGNIFICANT CHANGE UP (ref 1.7–9.3)
NEUTROPHILS # BLD AUTO: 14.88 K/UL — HIGH (ref 1.4–6.5)
NEUTROPHILS NFR BLD AUTO: 72.2 % — SIGNIFICANT CHANGE UP (ref 42.2–75.2)
NRBC # BLD: 0 /100 WBCS — SIGNIFICANT CHANGE UP (ref 0–0)
NRBC # BLD: 0 /100 WBCS — SIGNIFICANT CHANGE UP (ref 0–0)
PLATELET # BLD AUTO: 126 K/UL — LOW (ref 130–400)
PLATELET # BLD AUTO: 192 K/UL — SIGNIFICANT CHANGE UP (ref 130–400)
PMV BLD: 12.4 FL — HIGH (ref 7.4–10.4)
PMV BLD: 12.9 FL — HIGH (ref 7.4–10.4)
POTASSIUM SERPL-MCNC: 4.5 MMOL/L — SIGNIFICANT CHANGE UP (ref 3.5–5)
POTASSIUM SERPL-SCNC: 4.5 MMOL/L — SIGNIFICANT CHANGE UP (ref 3.5–5)
PROT SERPL-MCNC: 4.8 G/DL — LOW (ref 6–8)
RBC # BLD: 3.87 M/UL — LOW (ref 4.2–5.4)
RBC # BLD: 3.95 M/UL — LOW (ref 4.2–5.4)
RBC # FLD: 13.6 % — SIGNIFICANT CHANGE UP (ref 11.5–14.5)
RBC # FLD: 13.8 % — SIGNIFICANT CHANGE UP (ref 11.5–14.5)
SODIUM SERPL-SCNC: 130 MMOL/L — LOW (ref 135–146)
WBC # BLD: 19.71 K/UL — HIGH (ref 4.8–10.8)
WBC # BLD: 20.64 K/UL — HIGH (ref 4.8–10.8)
WBC # FLD AUTO: 19.71 K/UL — HIGH (ref 4.8–10.8)
WBC # FLD AUTO: 20.64 K/UL — HIGH (ref 4.8–10.8)

## 2024-10-12 PROCEDURE — 93010 ELECTROCARDIOGRAM REPORT: CPT

## 2024-10-12 PROCEDURE — 71045 X-RAY EXAM CHEST 1 VIEW: CPT | Mod: 26

## 2024-10-12 PROCEDURE — 99233 SBSQ HOSP IP/OBS HIGH 50: CPT

## 2024-10-12 RX ORDER — NOREPINEPHRINE BITARTRATE 1 MG/ML
0.05 INJECTION, SOLUTION, CONCENTRATE INTRAVENOUS
Qty: 8 | Refills: 0 | Status: DISCONTINUED | OUTPATIENT
Start: 2024-10-12 | End: 2024-10-17

## 2024-10-12 RX ORDER — SODIUM CHLORIDE 9 MG/ML
250 INJECTION, SOLUTION INTRAMUSCULAR; INTRAVENOUS; SUBCUTANEOUS ONCE
Refills: 0 | Status: COMPLETED | OUTPATIENT
Start: 2024-10-12 | End: 2024-10-12

## 2024-10-12 RX ADMIN — PANTOPRAZOLE SODIUM 40 MILLIGRAM(S): 40 TABLET, DELAYED RELEASE ORAL at 05:16

## 2024-10-12 RX ADMIN — Medication 50 MICROGRAM(S): at 05:16

## 2024-10-12 RX ADMIN — Medication 1: at 17:27

## 2024-10-12 RX ADMIN — POLYETHYLENE GLYCOL 3350 17 GRAM(S): 17 POWDER, FOR SOLUTION ORAL at 17:27

## 2024-10-12 RX ADMIN — APIXABAN 5 MILLIGRAM(S): 5 TABLET, FILM COATED ORAL at 17:26

## 2024-10-12 RX ADMIN — SODIUM CHLORIDE 250 MILLILITER(S): 9 INJECTION, SOLUTION INTRAMUSCULAR; INTRAVENOUS; SUBCUTANEOUS at 23:25

## 2024-10-12 RX ADMIN — APIXABAN 5 MILLIGRAM(S): 5 TABLET, FILM COATED ORAL at 05:15

## 2024-10-12 RX ADMIN — LOSARTAN POTASSIUM 25 MILLIGRAM(S): 25 TABLET ORAL at 05:16

## 2024-10-12 RX ADMIN — CHLORHEXIDINE GLUCONATE 1 APPLICATION(S): 40 SOLUTION TOPICAL at 12:00

## 2024-10-12 RX ADMIN — Medication 81 MILLIGRAM(S): at 13:11

## 2024-10-12 NOTE — PROGRESS NOTE ADULT - ASSESSMENT
89 yo F with hx of HTN, HLD, DM II, HFrEF (LVEF 20-25% 2020), Hypothyroidism, CVA, Dementia and Recent PNA (dx 4 days ago) presents to ED for generalized weakness. Positive sick contact.     #Acute hypoxic respiratory failure 2/2 submassive PE with RV strain (trop, elevated bpnp, hemodynamically stable) and COVID pneumonia    - Echo demonstrated Romano's sign, biomarkers elevated, requiring oxygen supplementation with desaturation on RA at rest   - Pt is now on therapeutic Eliquis   - CT abdomen: Small left pleural effusion with adjacent consolidation which may reflect atelectasis or infiltrate. Mild fullness left renal pelvis. Mild left ureter with distal left ureteral stone. Stone within the urinary bladder on the left.   - cont doxy and CTX  - RVP +ve for Covid -> s/p RDV and decadron   - ID follow up appreciated - repeat cxr noted - monitor off abx as leukocytosis is steroid induced (received course of rocephin and doxy)  - Nasal MRSA negative  - 10/01 bcx negative   - IR consult appreciated: no thrombectomy for now, received Eliquis 10mg BID through 10/10 - now on 5mg q 12hrs    #Elevated troponin likely demand ischemia iso suspected PE and hypotension   #chronic  HFmpEF   - 1st troponin 437  --> 350 -> 335  - no ischemic changes on ECG  - c/w ASA, statin  - TTE in 2020: LVEF 20-25%  - TTE 10/2/2024: LVEF 69%, G1DD, LV Ejection Fraction by Abreu's Method with a biplane EF of 69 %. Right ventricular systolic function is reduced with apical sparing consistent with acute pulmonary embolism (Romano's sign). The RV appears moderately dilated. Moderate TR. Moderate MR. Borderline pulm HTN   - seen by Dr. Silver previously; not following with cardiology currently  - cardiology recs appreciated     #MARTINE likely prerenal - resolved   - serum Cr. 1.8 on admission. Baseline Cr 1.0 in May 2024.   - CT abdomen neg for hydronephrosis. Moderate distention the urinary bladder.  - started Flomax  - 10/09 am bladder scan and straight cath > 450cc residual urine - Rodriges placed and to be continued (failed TOV)    #Constipation with fecal retention-resolved   - CT abdomen shows Significant fecal retention.  - bowel regimen.     HTN - 10/6 restarting losartan 25mg with parameters . Hold lopressor as BP borderline.   DM II - monitor FS AC HS. Insulin sliding scale.   Hypothyroidism - c/w synthroid. TSH-1.84 10/5    DVT PPX: Eliquis  GI PPX: Protonix  DIET: pureed with thickened liquid  ACTIVITY: as tolerated    CODE STATUS: DNR/DNI    Family: discussed updated with Marcia who is [phone] / HCP is her  pt's son Kentrell Lee [phone]    Pending: Insurance auth/bed at [facility]   Dispo: stable for discharge to [facility] when Insurance Auth and Bed available

## 2024-10-12 NOTE — PROGRESS NOTE ADULT - SUBJECTIVE AND OBJECTIVE BOX
Patient is a 89y old  Female who presents with a chief complaint of breath (07 Oct 2024 16:52)      Patient seen this this morning with Daughter in law Marcia at bedside to whom update was given and HCP info obtained. Marcia is care taker her  is HCP   -no overnight events notified   -failed TOV this admission - pulido reinserted and to be continued   -prognosis guarded -immunocompromised state; advanced age, active illness   -d/c planning to NH     ALLERGIES:  No Known Allergies    Vital Signs Last 24 Hrs  T(C): 36.2 (12 Oct 2024 13:50), Max: 36.4 (11 Oct 2024 22:35)  T(F): 97.2 (12 Oct 2024 13:50), Max: 97.5 (11 Oct 2024 22:35)  HR: 85 (12 Oct 2024 13:50) (77 - 90)  BP: 102/67 (12 Oct 2024 13:50) (92/55 - 115/55)  BP(mean): 79 (12 Oct 2024 13:50) (67 - 79)  RR: 18 (12 Oct 2024 13:50) (18 - 18)  SpO2: 96% (12 Oct 2024 13:50) (96% - 97%)    Parameters below as of 12 Oct 2024 13:50  Patient On (Oxygen Delivery Method): room air    PHYSICAL EXAM:  General: Not in distress - resting comfortably in bed   ENT: MMM  Neck: Supple, No JVD  Lungs: decreased BS at bases   Cardio: RRR, S1/S2, No murmurs  Abdomen: Soft abdomen       LABS:                          12.3   20.64 )-----------( 192      ( 12 Oct 2024 08:09 )             36.9     10-12    130[L]  |  95[L]  |  33[H]  ----------------------------<  99  4.5   |  21  |  0.9    Ca    8.6      12 Oct 2024 08:09  Mg     1.9     10-12    TPro  4.8[L]  /  Alb  2.8[L]  /  TBili  0.8  /  DBili  x   /  AST  15  /  ALT  14  /  AlkPhos  147[H]  10-12                                    12.8   23.41 )-----------( 236      ( 11 Oct 2024 01:22 )             39.4                       MEDICATIONS  (STANDING):  albuterol/ipratropium for Nebulization. 9 milliLiter(s) Nebulizer once  apixaban 5 milliGRAM(s) Oral every 12 hours  aspirin  chewable 81 milliGRAM(s) Oral daily  atorvastatin 20 milliGRAM(s) Oral at bedtime  bisacodyl Suppository 10 milliGRAM(s) Rectal once  chlorhexidine 2% Cloths 1 Application(s) Topical daily  dextrose 5%. 1000 milliLiter(s) (100 mL/Hr) IV Continuous <Continuous>  dextrose 5%. 1000 milliLiter(s) (50 mL/Hr) IV Continuous <Continuous>  dextrose 50% Injectable 25 Gram(s) IV Push once  dextrose 50% Injectable 25 Gram(s) IV Push once  dextrose 50% Injectable 12.5 Gram(s) IV Push once  glucagon  Injectable 1 milliGRAM(s) IntraMuscular once  insulin lispro (ADMELOG) corrective regimen sliding scale   SubCutaneous three times a day before meals  lactated ringers. 1000 milliLiter(s) (75 mL/Hr) IV Continuous <Continuous>  levothyroxine 50 MICROGram(s) Oral daily  losartan 25 milliGRAM(s) Oral daily  pantoprazole    Tablet 40 milliGRAM(s) Oral before breakfast  polyethylene glycol 3350 17 Gram(s) Oral two times a day  senna 2 Tablet(s) Oral at bedtime  tamsulosin 0.4 milliGRAM(s) Oral at bedtime    MEDICATIONS  (PRN):  dextrose Oral Gel 15 Gram(s) Oral once PRN Blood Glucose LESS THAN 70 milliGRAM(s)/deciliter

## 2024-10-12 NOTE — PROVIDER CONTACT NOTE (OTHER) - BACKGROUND
Patient is a 89 female with history of hypertension, DM ll, HF, Hypothyroidism, CVA, dementia and pneumonia. Presented to the emergency room with generalized weakness and decreased PO intake.

## 2024-10-12 NOTE — RAPID RESPONSE TEAM SUMMARY - NSSITUATIONBACKGROUNDRRT_GEN_ALL_CORE
Rapid response called for hypotension BP 70/40 with MAP 50, O2 Sat 84% on RA. Given 250ml NS bolus. BP improved 102/55 MAP 71. Levophed ordered for MAP <65. Pt now on NRBT mask saturating 94. Sepsis w/u drawn and ABG, EKG, CXR ordered.  Rapid response called for hypotension BP 70/40 with MAP 50, O2 Sat 84% on RA. Given 250ml NS bolus. BP improved 102/55 MAP 71. Patient became normotensive in the middle of 250 mL bolus so fluids were stopped (patient has known RV strain however was likely dry initially). Pt now on NRBT mask saturating 94. Sepsis w/u drawn and ABG, EKG, CXR ordered.  ABG confirming hypoxia (was done on NRB).  Xray essentially no change from last.  Will change patient to high flow and discussed with pulm for possible worsening PE.  Recommending BNP, trop, and repeat CTA for now.  Family aware of patient's change in status, patient is DNR/DNI. Rapid response called for hypotension BP 70/40 with MAP 50, O2 Sat 84% on RA. Given 250ml NS bolus. BP improved 102/55 MAP 71. Patient became normotensive in the middle of 250 mL bolus so fluids were stopped (patient has known RV strain however was likely dry initially). Pt now on NRBT mask saturating 94. Sepsis w/u drawn and ABG, EKG, CXR ordered.  ABG confirming hypoxia (was done on NRB).  Xray essentially no change from last.  Will change patient to high flow and discussed with pulm for possible worsening PE.  Recommending BNP, trop, and repeat CTA for now.  Family aware of patient's change in status, patient is DNR/DNI. Discussed with pulm for possible worsening PE. CT Angio PE ordered per pulm recommendation.

## 2024-10-13 LAB
ALBUMIN SERPL ELPH-MCNC: 2.7 G/DL — LOW (ref 3.5–5.2)
ALBUMIN SERPL ELPH-MCNC: 2.8 G/DL — LOW (ref 3.5–5.2)
ALP SERPL-CCNC: 143 U/L — HIGH (ref 30–115)
ALP SERPL-CCNC: 143 U/L — HIGH (ref 30–115)
ALT FLD-CCNC: 14 U/L — SIGNIFICANT CHANGE UP (ref 0–41)
ALT FLD-CCNC: 15 U/L — SIGNIFICANT CHANGE UP (ref 0–41)
ANION GAP SERPL CALC-SCNC: 17 MMOL/L — HIGH (ref 7–14)
ANION GAP SERPL CALC-SCNC: 9 MMOL/L — SIGNIFICANT CHANGE UP (ref 7–14)
AST SERPL-CCNC: 16 U/L — SIGNIFICANT CHANGE UP (ref 0–41)
AST SERPL-CCNC: 22 U/L — SIGNIFICANT CHANGE UP (ref 0–41)
BASOPHILS # BLD AUTO: 0.06 K/UL — SIGNIFICANT CHANGE UP (ref 0–0.2)
BASOPHILS NFR BLD AUTO: 0.2 % — SIGNIFICANT CHANGE UP (ref 0–1)
BILIRUB SERPL-MCNC: 0.8 MG/DL — SIGNIFICANT CHANGE UP (ref 0.2–1.2)
BILIRUB SERPL-MCNC: 0.9 MG/DL — SIGNIFICANT CHANGE UP (ref 0.2–1.2)
BUN SERPL-MCNC: 32 MG/DL — HIGH (ref 10–20)
BUN SERPL-MCNC: 34 MG/DL — HIGH (ref 10–20)
CALCIUM SERPL-MCNC: 8.4 MG/DL — SIGNIFICANT CHANGE UP (ref 8.4–10.5)
CALCIUM SERPL-MCNC: 8.4 MG/DL — SIGNIFICANT CHANGE UP (ref 8.4–10.5)
CHLORIDE SERPL-SCNC: 94 MMOL/L — LOW (ref 98–110)
CHLORIDE SERPL-SCNC: 97 MMOL/L — LOW (ref 98–110)
CO2 SERPL-SCNC: 17 MMOL/L — SIGNIFICANT CHANGE UP (ref 17–32)
CO2 SERPL-SCNC: 25 MMOL/L — SIGNIFICANT CHANGE UP (ref 17–32)
CREAT SERPL-MCNC: 1 MG/DL — SIGNIFICANT CHANGE UP (ref 0.7–1.5)
CREAT SERPL-MCNC: 1 MG/DL — SIGNIFICANT CHANGE UP (ref 0.7–1.5)
EGFR: 50 ML/MIN/1.73M2 — LOW
EGFR: 50 ML/MIN/1.73M2 — LOW
EOSINOPHIL # BLD AUTO: 0.02 K/UL — SIGNIFICANT CHANGE UP (ref 0–0.7)
EOSINOPHIL NFR BLD AUTO: 0.1 % — SIGNIFICANT CHANGE UP (ref 0–8)
GLUCOSE BLDC GLUCOMTR-MCNC: 136 MG/DL — HIGH (ref 70–99)
GLUCOSE BLDC GLUCOMTR-MCNC: 155 MG/DL — HIGH (ref 70–99)
GLUCOSE BLDC GLUCOMTR-MCNC: 203 MG/DL — HIGH (ref 70–99)
GLUCOSE BLDC GLUCOMTR-MCNC: 221 MG/DL — HIGH (ref 70–99)
GLUCOSE SERPL-MCNC: 128 MG/DL — HIGH (ref 70–99)
GLUCOSE SERPL-MCNC: 80 MG/DL — SIGNIFICANT CHANGE UP (ref 70–99)
HCT VFR BLD CALC: 36.7 % — LOW (ref 37–47)
HGB BLD-MCNC: 12 G/DL — SIGNIFICANT CHANGE UP (ref 12–16)
IMM GRANULOCYTES NFR BLD AUTO: 1.2 % — HIGH (ref 0.1–0.3)
LYMPHOCYTES # BLD AUTO: 12.4 % — LOW (ref 20.5–51.1)
LYMPHOCYTES # BLD AUTO: 3.16 K/UL — SIGNIFICANT CHANGE UP (ref 1.2–3.4)
MAGNESIUM SERPL-MCNC: 1.9 MG/DL — SIGNIFICANT CHANGE UP (ref 1.8–2.4)
MAGNESIUM SERPL-MCNC: 2 MG/DL — SIGNIFICANT CHANGE UP (ref 1.8–2.4)
MCHC RBC-ENTMCNC: 31.1 PG — HIGH (ref 27–31)
MCHC RBC-ENTMCNC: 32.7 G/DL — SIGNIFICANT CHANGE UP (ref 32–37)
MCV RBC AUTO: 95.1 FL — SIGNIFICANT CHANGE UP (ref 81–99)
MONOCYTES # BLD AUTO: 2.12 K/UL — HIGH (ref 0.1–0.6)
MONOCYTES NFR BLD AUTO: 8.3 % — SIGNIFICANT CHANGE UP (ref 1.7–9.3)
NEUTROPHILS # BLD AUTO: 19.87 K/UL — HIGH (ref 1.4–6.5)
NEUTROPHILS NFR BLD AUTO: 77.8 % — HIGH (ref 42.2–75.2)
NRBC # BLD: 0 /100 WBCS — SIGNIFICANT CHANGE UP (ref 0–0)
PLATELET # BLD AUTO: 179 K/UL — SIGNIFICANT CHANGE UP (ref 130–400)
PMV BLD: 12.6 FL — HIGH (ref 7.4–10.4)
POTASSIUM SERPL-MCNC: 4.4 MMOL/L — SIGNIFICANT CHANGE UP (ref 3.5–5)
POTASSIUM SERPL-MCNC: 4.9 MMOL/L — SIGNIFICANT CHANGE UP (ref 3.5–5)
POTASSIUM SERPL-SCNC: 4.4 MMOL/L — SIGNIFICANT CHANGE UP (ref 3.5–5)
POTASSIUM SERPL-SCNC: 4.9 MMOL/L — SIGNIFICANT CHANGE UP (ref 3.5–5)
PROT SERPL-MCNC: 4.7 G/DL — LOW (ref 6–8)
PROT SERPL-MCNC: 4.7 G/DL — LOW (ref 6–8)
RBC # BLD: 3.86 M/UL — LOW (ref 4.2–5.4)
RBC # FLD: 14 % — SIGNIFICANT CHANGE UP (ref 11.5–14.5)
SODIUM SERPL-SCNC: 128 MMOL/L — LOW (ref 135–146)
SODIUM SERPL-SCNC: 131 MMOL/L — LOW (ref 135–146)
WBC # BLD: 25.54 K/UL — HIGH (ref 4.8–10.8)
WBC # FLD AUTO: 25.54 K/UL — HIGH (ref 4.8–10.8)

## 2024-10-13 PROCEDURE — 71275 CT ANGIOGRAPHY CHEST: CPT | Mod: 26

## 2024-10-13 PROCEDURE — 99291 CRITICAL CARE FIRST HOUR: CPT

## 2024-10-13 RX ORDER — MEROPENEM 1 G/30ML
INJECTION INTRAVENOUS
Refills: 0 | Status: DISCONTINUED | OUTPATIENT
Start: 2024-10-13 | End: 2024-10-13

## 2024-10-13 RX ORDER — MEROPENEM 1 G/30ML
1000 INJECTION INTRAVENOUS ONCE
Refills: 0 | Status: COMPLETED | OUTPATIENT
Start: 2024-10-13 | End: 2024-10-13

## 2024-10-13 RX ORDER — CEFEPIME 2 G/1
INJECTION, POWDER, FOR SOLUTION INTRAVENOUS
Refills: 0 | Status: DISCONTINUED | OUTPATIENT
Start: 2024-10-13 | End: 2024-10-13

## 2024-10-13 RX ORDER — MEROPENEM 1 G/30ML
1000 INJECTION INTRAVENOUS EVERY 8 HOURS
Refills: 0 | Status: DISCONTINUED | OUTPATIENT
Start: 2024-10-13 | End: 2024-10-13

## 2024-10-13 RX ORDER — VANCOMYCIN HYDROCHLORIDE 50 MG/ML
1000 KIT ORAL EVERY 24 HOURS
Refills: 0 | Status: DISCONTINUED | OUTPATIENT
Start: 2024-10-13 | End: 2024-10-14

## 2024-10-13 RX ORDER — SODIUM CHLORIDE 9 MG/ML
500 INJECTION, SOLUTION INTRAMUSCULAR; INTRAVENOUS; SUBCUTANEOUS ONCE
Refills: 0 | Status: COMPLETED | OUTPATIENT
Start: 2024-10-13 | End: 2024-10-13

## 2024-10-13 RX ORDER — MEROPENEM 1 G/30ML
1000 INJECTION INTRAVENOUS EVERY 12 HOURS
Refills: 0 | Status: DISCONTINUED | OUTPATIENT
Start: 2024-10-13 | End: 2024-10-18

## 2024-10-13 RX ORDER — VANCOMYCIN HYDROCHLORIDE 50 MG/ML
KIT ORAL
Refills: 0 | Status: DISCONTINUED | OUTPATIENT
Start: 2024-10-13 | End: 2024-10-13

## 2024-10-13 RX ORDER — IPRATROPIUM BROMIDE AND ALBUTEROL SULFATE .5; 2.5 MG/3ML; MG/3ML
3 SOLUTION RESPIRATORY (INHALATION) EVERY 6 HOURS
Refills: 0 | Status: DISCONTINUED | OUTPATIENT
Start: 2024-10-13 | End: 2024-10-25

## 2024-10-13 RX ORDER — HYDROCORTISONE 10 MG/1
50 TABLET ORAL EVERY 8 HOURS
Refills: 0 | Status: DISCONTINUED | OUTPATIENT
Start: 2024-10-13 | End: 2024-10-16

## 2024-10-13 RX ADMIN — MEROPENEM 100 MILLIGRAM(S): 1 INJECTION INTRAVENOUS at 17:51

## 2024-10-13 RX ADMIN — MEROPENEM 100 MILLIGRAM(S): 1 INJECTION INTRAVENOUS at 05:26

## 2024-10-13 RX ADMIN — IPRATROPIUM BROMIDE AND ALBUTEROL SULFATE 3 MILLILITER(S): .5; 2.5 SOLUTION RESPIRATORY (INHALATION) at 20:16

## 2024-10-13 RX ADMIN — NOREPINEPHRINE BITARTRATE 4.68 MICROGRAM(S)/KG/MIN: 1 INJECTION, SOLUTION, CONCENTRATE INTRAVENOUS at 00:34

## 2024-10-13 RX ADMIN — HYDROCORTISONE 50 MILLIGRAM(S): 10 TABLET ORAL at 13:47

## 2024-10-13 RX ADMIN — CHLORHEXIDINE GLUCONATE 1 APPLICATION(S): 40 SOLUTION TOPICAL at 12:10

## 2024-10-13 RX ADMIN — NOREPINEPHRINE BITARTRATE 4.68 MICROGRAM(S)/KG/MIN: 1 INJECTION, SOLUTION, CONCENTRATE INTRAVENOUS at 17:51

## 2024-10-13 RX ADMIN — Medication 1: at 12:10

## 2024-10-13 RX ADMIN — SODIUM CHLORIDE 1000 MILLILITER(S): 9 INJECTION, SOLUTION INTRAMUSCULAR; INTRAVENOUS; SUBCUTANEOUS at 00:37

## 2024-10-13 RX ADMIN — VANCOMYCIN HYDROCHLORIDE 250 MILLIGRAM(S): KIT at 14:49

## 2024-10-13 RX ADMIN — HYDROCORTISONE 50 MILLIGRAM(S): 10 TABLET ORAL at 21:55

## 2024-10-13 NOTE — PROGRESS NOTE ADULT - SUBJECTIVE AND OBJECTIVE BOX
BRIJESH POWER  89y, Female    All available historical data reviewed    OVERNIGHT EVENTS:  no fevers  HF  lethargic  does not follow command  on pressors  2 loose BMs    ROS:  unable to obtain history secondary to patient's mental status     VITALS:  T(F): 95, Max: 98 (10-12-24 @ 22:55)  HR: 77  BP: 86/55  RR: 15Vital Signs Last 24 Hrs  T(C): 35 (13 Oct 2024 07:56), Max: 36.7 (12 Oct 2024 22:55)  T(F): 95 (13 Oct 2024 07:56), Max: 98 (12 Oct 2024 22:55)  HR: 77 (13 Oct 2024 08:48) (60 - 93)  BP: 86/55 (13 Oct 2024 08:48) (54/45 - 118/81)  BP(mean): 65 (13 Oct 2024 08:48) (50 - 94)  RR: 15 (13 Oct 2024 07:56) (12 - 18)  SpO2: 100% (13 Oct 2024 08:48) (96% - 100%)    Parameters below as of 13 Oct 2024 08:48  Patient On (Oxygen Delivery Method): nasal cannula, high flow        TESTS & MEASUREMENTS:                        12.1   19.71 )-----------( 126      ( 12 Oct 2024 23:14 )             36.4     10-12    131[L]  |  97[L]  |  34[H]  ----------------------------<  80  4.4   |  25  |  1.0    Ca    8.4      12 Oct 2024 23:40  Mg     2.0     10-12    TPro  4.7[L]  /  Alb  2.8[L]  /  TBili  0.8  /  DBili  x   /  AST  16  /  ALT  15  /  AlkPhos  143[H]  10-12    LIVER FUNCTIONS - ( 12 Oct 2024 23:40 )  Alb: 2.8 g/dL / Pro: 4.7 g/dL / ALK PHOS: 143 U/L / ALT: 15 U/L / AST: 16 U/L / GGT: x             Urinalysis Basic - ( 12 Oct 2024 23:40 )    Color: x / Appearance: x / SG: x / pH: x  Gluc: 80 mg/dL / Ketone: x  / Bili: x / Urobili: x   Blood: x / Protein: x / Nitrite: x   Leuk Esterase: x / RBC: x / WBC x   Sq Epi: x / Non Sq Epi: x / Bacteria: x          Social History:  Tobacco Use: No  Alcohol Use: No  Drug Use: No    RADIOLOGY & ADDITIONAL TESTS:  Personal review of radiological diagnostics performed  Echo and EKG results noted when applicable.     MEDICATIONS:  albuterol/ipratropium for Nebulization. 9 milliLiter(s) Nebulizer once  apixaban 5 milliGRAM(s) Oral every 12 hours  aspirin  chewable 81 milliGRAM(s) Oral daily  atorvastatin 20 milliGRAM(s) Oral at bedtime  bisacodyl Suppository 10 milliGRAM(s) Rectal once  chlorhexidine 2% Cloths 1 Application(s) Topical daily  dextrose 5%. 1000 milliLiter(s) IV Continuous <Continuous>  dextrose 5%. 1000 milliLiter(s) IV Continuous <Continuous>  dextrose 50% Injectable 12.5 Gram(s) IV Push once  dextrose 50% Injectable 25 Gram(s) IV Push once  dextrose 50% Injectable 25 Gram(s) IV Push once  dextrose Oral Gel 15 Gram(s) Oral once PRN  glucagon  Injectable 1 milliGRAM(s) IntraMuscular once  insulin lispro (ADMELOG) corrective regimen sliding scale   SubCutaneous three times a day before meals  levothyroxine 50 MICROGram(s) Oral daily  norepinephrine Infusion 0.05 MICROgram(s)/kG/Min IV Continuous <Continuous>  pantoprazole    Tablet 40 milliGRAM(s) Oral before breakfast  polyethylene glycol 3350 17 Gram(s) Oral two times a day  senna 2 Tablet(s) Oral at bedtime  tamsulosin 0.4 milliGRAM(s) Oral at bedtime      ANTIBIOTICS:

## 2024-10-13 NOTE — PROGRESS NOTE ADULT - ASSESSMENT
· Assessment	  89 yo F with hx of HTN, HLD, DM II, HFrEF (LVEF 20-25% 2020), Hypothyroidism, CVA, Dementia and Recent PNA (dx on CXR 4 days ago) presents to ED for generalized weakness and decreased PO intake. 4 days ago, patient started having cough and decreased PO intake. CXR was done and patient was diagnosed with PNA and was started on 5 days course of Azithromycin and Cefpodoxime with Prednisone 20mg OD. Patient had positive sick contact whereby both son and daughter developed URTI symptoms few days earleir. Patient is generally not very verbal at baseline and doesn't raise complains. She is mobile with a walker and 1 person assistance. Over past few days has been very week to ambulate. Family denies any reported chest pain, shortness of breath, fever, chills, abdominal pain, nausea, vomiting. They report loose BM over past 1 day.     IMPRESSION/RECOMMENDATIONS  Immunosuppression/Immunosenescence ( above age 60 yrs there is a exponential decline in immunity which could result in poor clinical outcomes.   Acute  illness  which poses a threat to life or bodily function without treatment   Hypoxic respiratory failure leading to HF  Severe sepsis ( BP 54/45, Hypothermic 36, on pressors, toxic encephalopathy, WBC 19.7 )  10/13 CT chest : no PE, worsening LLL opacity  WBC 19.7  Worsening bacterial PNA with severe sepsis    COVID 19 with mild illness- pt has physiological /non pulmonary complaints  10/2 CT with no GGO  Pt was in the early viral replicative phase based on the timeline/onset of symptoms. ( as per son Eliseo at the bedside he was NG and his wife who had a URI also tested NG ? )  S/p vaccination  s/p RDV for 3 days    Nares ORSA NG  10/2 urine for legionella ag NG  10/1,5 BCx NG  WBc 16.3>22.1>23.4 ( steroid induced . On decadron )  BNP 90623    -repeat BCx  -Monitor WBC .   -Off loading to prevent pressure sores and preventive measures to avoid aspiration  -agree with Meropenem 1 gm iv q8h for now    Prognosis is extremely poor

## 2024-10-13 NOTE — PROGRESS NOTE ADULT - SUBJECTIVE AND OBJECTIVE BOX
SUBJECTIVE/OVERNIGHT EVENTS  Today is hospital day 12d.     Rapid response called for hypotension BP 70/40 with MAP 50, O2 Sat 84% on RA. Given 250ml NS bolus. BP improved 102/55 MAP 71. Patient became normotensive in the middle of 250 mL bolus so fluids were stopped (patient has known RV strain however was likely dry initially). Pt now on NRBT mask saturating 94. Sepsis w/u drawn and ABG, EKG, CXR ordered.  ABG confirming hypoxia (was done on NRB).  Xray essentially no change from last.  Will change patient to high flow and discussed with pulm for possible worsening PE.  Recommending BNP, trop, and repeat CTA for now.  Family aware of patient's change in status, patient is DNR/DNI. Discussed with pulm for possible worsening PE. CT Angio PE ordered per pulm recommendation.    ABG, O2 mask/nasal cannula      MEDICATIONS  STANDING MEDICATIONS  albuterol/ipratropium for Nebulization. 9 milliLiter(s) Nebulizer once  apixaban 5 milliGRAM(s) Oral every 12 hours  aspirin  chewable 81 milliGRAM(s) Oral daily  atorvastatin 20 milliGRAM(s) Oral at bedtime  bisacodyl Suppository 10 milliGRAM(s) Rectal once  chlorhexidine 2% Cloths 1 Application(s) Topical daily  dextrose 5%. 1000 milliLiter(s) IV Continuous <Continuous>  dextrose 5%. 1000 milliLiter(s) IV Continuous <Continuous>  dextrose 50% Injectable 12.5 Gram(s) IV Push once  dextrose 50% Injectable 25 Gram(s) IV Push once  dextrose 50% Injectable 25 Gram(s) IV Push once  glucagon  Injectable 1 milliGRAM(s) IntraMuscular once  insulin lispro (ADMELOG) corrective regimen sliding scale   SubCutaneous three times a day before meals  levothyroxine 50 MICROGram(s) Oral daily  norepinephrine Infusion 0.05 MICROgram(s)/kG/Min IV Continuous <Continuous>  pantoprazole    Tablet 40 milliGRAM(s) Oral before breakfast  polyethylene glycol 3350 17 Gram(s) Oral two times a day  senna 2 Tablet(s) Oral at bedtime  tamsulosin 0.4 milliGRAM(s) Oral at bedtime    PRN MEDICATIONS  dextrose Oral Gel 15 Gram(s) Oral once PRN    VITALS  T(F): 97.2 (10-13-24 @ 01:22), Max: 98 (10-12-24 @ 22:55)  HR: 79 (10-13-24 @ 01:22) (60 - 87)  BP: 109/70 (10-13-24 @ 01:22) (54/45 - 109/70)  RR: 14 (10-13-24 @ 01:22) (12 - 18)  SpO2: 99% (10-12-24 @ 22:33) (96% - 99%)  POCT Blood Glucose.: 105 mg/dL (10-12-24 @ 21:07)  POCT Blood Glucose.: 154 mg/dL (10-12-24 @ 16:55)  POCT Blood Glucose.: 155 mg/dL (10-12-24 @ 11:07)  POCT Blood Glucose.: 122 mg/dL (10-12-24 @ 07:51)    PHYSICAL EXAM    LABS             12.1   19.71 )-----------( 126      ( 10-12-24 @ 23:14 )             36.4     131  |  97  |  34  -------------------------<  80   10-12-24 @ 23:40  4.4  |  25  |  1.0    Ca      8.4     10-12-24 @ 23:40  Mg     2.0     10-12-24 @ 23:40    TPro  4.7  /  Alb  2.8  /  TBili  0.8  /  DBili  x   /  AST  16  /  ALT  15  /  AlkPhos  143  /  GGT  x     10-12-24 @ 23:40        Urinalysis Basic - ( 12 Oct 2024 23:40 )    Color: x / Appearance: x / SG: x / pH: x  Gluc: 80 mg/dL / Ketone: x  / Bili: x / Urobili: x   Blood: x / Protein: x / Nitrite: x   Leuk Esterase: x / RBC: x / WBC x   Sq Epi: x / Non Sq Epi: x / Bacteria: x      ABG - ( 12 Oct 2024 21:44 )  pH, Arterial: 7.48  pH, Blood: x     /  pCO2: 28    /  pO2: 66    / HCO3: 21    / Base Excess: -1.6  /  SaO2: 94.9                IMAGING

## 2024-10-13 NOTE — PROGRESS NOTE ADULT - SUBJECTIVE AND OBJECTIVE BOX
Patient is a 89y old  Female who presents with a chief complaint of breath (07 Oct 2024 16:52)      Pt seen and examined at bedside   She is critical at this time s/p RRT Overnight now on Levophed drip and HFNC  Family at bedside updated in detail and confirmed code status DNR/DNI however they would like medical therapy to continue for now   ICU RN coming to 3A for Levo titration   Discussed w/ ICU Team pt will go to SDU when bed available today     ALLERGIES:  No Known Allergies    Vital Signs Last 24 Hrs  T(C): 37.5 (13 Oct 2024 11:01), Max: 37.5 (13 Oct 2024 11:01)  T(F): 99.5 (13 Oct 2024 11:01), Max: 99.5 (13 Oct 2024 11:01)  HR: 91 (13 Oct 2024 12:28) (60 - 93)  BP: 90/54 (13 Oct 2024 12:28) (54/45 - 118/81)  BP(mean): 61 (13 Oct 2024 12:28) (50 - 94)  RR: 15 (13 Oct 2024 07:56) (12 - 18)  SpO2: 98% (13 Oct 2024 11:01) (96% - 100%)    Parameters below as of 13 Oct 2024 11:01  Patient On (Oxygen Delivery Method): nasal cannula, high flow      PHYSICAL EXAM:  General: On HFNC / Lethargic   ENT: MMM  Neck: Supple, No JVD  Lungs: Decreased BS at bases   Cardio: RRR, S1/S2, No murmurs  Abdomen: Soft abdomen   MS: Lethargic unable to assess       LABS:                          12.0   25.54 )-----------( 179      ( 13 Oct 2024 09:25 )             36.7     10-13    128[L]  |  94[L]  |  32[H]  ----------------------------<  128[H]  4.9   |  17  |  1.0    Ca    8.4      13 Oct 2024 09:25  Mg     1.9     10-13    TPro  4.7[L]  /  Alb  2.7[L]  /  TBili  0.9  /  DBili  x   /  AST  22  /  ALT  14  /  AlkPhos  143[H]  10-13                          12.3   20.64 )-----------( 192      ( 12 Oct 2024 08:09 )             36.9     10-12    130[L]  |  95[L]  |  33[H]  ----------------------------<  99  4.5   |  21  |  0.9    Ca    8.6      12 Oct 2024 08:09  Mg     1.9     10-12    TPro  4.8[L]  /  Alb  2.8[L]  /  TBili  0.8  /  DBili  x   /  AST  15  /  ALT  14  /  AlkPhos  147[H]  10-12                                    12.8   23.41 )-----------( 236      ( 11 Oct 2024 01:22 )             39.4                       MEDICATIONS  (STANDING):  albuterol/ipratropium for Nebulization. 9 milliLiter(s) Nebulizer once  apixaban 5 milliGRAM(s) Oral every 12 hours  aspirin  chewable 81 milliGRAM(s) Oral daily  atorvastatin 20 milliGRAM(s) Oral at bedtime  bisacodyl Suppository 10 milliGRAM(s) Rectal once  chlorhexidine 2% Cloths 1 Application(s) Topical daily  dextrose 5%. 1000 milliLiter(s) (100 mL/Hr) IV Continuous <Continuous>  dextrose 50% Injectable 12.5 Gram(s) IV Push once  glucagon  Injectable 1 milliGRAM(s) IntraMuscular once  insulin lispro (ADMELOG) corrective regimen sliding scale   SubCutaneous three times a day before meals  levothyroxine 50 MICROGram(s) Oral daily  meropenem  IVPB 1000 milliGRAM(s) IV Intermittent every 8 hours  norepinephrine Infusion 0.05 MICROgram(s)/kG/Min (4.68 mL/Hr) IV Continuous <Continuous>  pantoprazole    Tablet 40 milliGRAM(s) Oral before breakfast  polyethylene glycol 3350 17 Gram(s) Oral two times a day  senna 2 Tablet(s) Oral at bedtime  tamsulosin 0.4 milliGRAM(s) Oral at bedtime  vancomycin  IVPB        MEDICATIONS  (PRN):  dextrose Oral Gel 15 Gram(s) Oral once PRN Blood Glucose LESS THAN 70 milliGRAM(s)/deciliter

## 2024-10-13 NOTE — PROGRESS NOTE ADULT - ASSESSMENT
89 yo F with hx of HTN, HLD, DM II, HFrEF (LVEF 20-25% 2020), Hypothyroidism, CVA, Dementia and Recent PNA (dx 4 days ago) presents to ED for generalized weakness. Positive sick contact.     #Acute hypoxic respiratory failure 2/2 submassive PE with RV strain (trop, elevated bpnp, hemodynamically stable) and COVID pneumonia    - Echo demonstrated Romano's sign, biomarkers elevated, requiring oxygen supplementation with desaturation on RA at rest   - Pt is now on therapeutic Eliquis   - CT abdomen: Small left pleural effusion with adjacent consolidation which may reflect atelectasis or infiltrate. Mild fullness left renal pelvis. Mild left ureter with distal left ureteral stone. Stone within the urinary bladder on the left.   - cont doxy and CTX  - RVP +ve for Covid -> s/p RDV and decadron   - ID follow up appreciated - repeat cxr noted - monitor off abx as leukocytosis is steroid induced (received course of rocephin and doxy)  - Nasal MRSA negative  - 10/01 bcx negative   - IR consult appreciated: no thrombectomy for now, received Eliquis 10mg BID through 10/10 - now on 5mg q 12hrs    #Elevated troponin likely demand ischemia iso suspected PE and hypotension   #chronic  HFmpEF   - 1st troponin 437  --> 350 -> 335  - no ischemic changes on ECG  - c/w ASA, statin  - TTE in 2020: LVEF 20-25%  - TTE 10/2/2024: LVEF 69%, G1DD, LV Ejection Fraction by Abreu's Method with a biplane EF of 69 %. Right ventricular systolic function is reduced with apical sparing consistent with acute pulmonary embolism (Romano's sign). The RV appears moderately dilated. Moderate TR. Moderate MR. Borderline pulm HTN   - seen by Dr. Silver previously; not following with cardiology currently  - cardiology recs appreciated     #MARTINE likely prerenal - resolved   - serum Cr. 1.8 on admission. Baseline Cr 1.0 in May 2024.   - CT abdomen neg for hydronephrosis. Moderate distention the urinary bladder.  - started Flomax  - 10/09 am bladder scan and straight cath > 450cc residual urine - Rodriges placed and to be continued (failed TOV)    #Constipation with fecal retention-resolved   - CT abdomen shows Significant fecal retention.  - bowel regimen.     HTN - 10/6 restarting losartan 25mg with parameters . Hold lopressor as BP borderline.   DM II - monitor FS AC HS. Insulin sliding scale.   Hypothyroidism - c/w synthroid. TSH-1.84 10/5    DVT PPX: Eliquis  GI PPX: Protonix  DIET: pureed with thickened liquid  ACTIVITY: as tolerated    CODE STATUS: DNR/DNI   89 yo F with hx of HTN, HLD, DM II, HFrEF (LVEF 20-25% 2020), Hypothyroidism, CVA, Dementia and Recent PNA (dx 4 days ago) presents to ED for generalized weakness. Positive sick contact.     #Acute hypoxic respiratory failure 2/2 submassive PE with RV strain (trop, elevated bpnp, hemodynamically stable) and COVID pneumonia    - Echo demonstrated Romano's sign, biomarkers elevated, requiring oxygen supplementation with desaturation on RA at rest   - Pt is now on therapeutic Eliquis   - CT abdomen: Small left pleural effusion with adjacent consolidation which may reflect atelectasis or infiltrate. Mild fullness left renal pelvis. Mild left ureter with distal left ureteral stone. Stone within the urinary bladder on the left.   - cont doxy and CTX  - RVP +ve for Covid -> s/p RDV and decadron   - ID follow up appreciated - repeat cxr noted - monitor off abx as leukocytosis is steroid induced (received course of rocephin and doxy)  - Nasal MRSA negative  - 10/01 bcx negative   - IR consult appreciated: no thrombectomy for now, received Eliquis 10mg BID through 10/10 - now on 5mg q 12hrs\  - f/u CTPE  - pt on levophed 0.1 mcg/kg/min  - BP improved, mental status improved  - f/u sepsis w/u  - 1g meropenem state given  - pulm and ID consulted    #Elevated troponin likely demand ischemia iso suspected PE and hypotension   #chronic  HFmpEF   - 1st troponin 437  --> 350 -> 335  - no ischemic changes on ECG  - c/w ASA, statin  - TTE in 2020: LVEF 20-25%  - TTE 10/2/2024: LVEF 69%, G1DD, LV Ejection Fraction by Abreu's Method with a biplane EF of 69 %. Right ventricular systolic function is reduced with apical sparing consistent with acute pulmonary embolism (Romano's sign). The RV appears moderately dilated. Moderate TR. Moderate MR. Borderline pulm HTN   - seen by Dr. Silver previously; not following with cardiology currently  - cardiology recs appreciated     #MARTINE likely prerenal - resolved   - serum Cr. 1.8 on admission. Baseline Cr 1.0 in May 2024.   - CT abdomen neg for hydronephrosis. Moderate distention the urinary bladder.  - started Flomax  - 10/09 am bladder scan and straight cath > 450cc residual urine - Rodriges placed and to be continued (failed TOV)    #Constipation with fecal retention-resolved   - CT abdomen shows Significant fecal retention.  - bowel regimen.     HTN - 10/6 restarting losartan 25mg with parameters . Hold lopressor as BP borderline.   DM II - monitor FS AC HS. Insulin sliding scale.   Hypothyroidism - c/w synthroid. TSH-1.84 10/5    DVT PPX: Eliquis  GI PPX: Protonix  DIET: pureed with thickened liquid  ACTIVITY: as tolerated    CODE STATUS: DNR/DNI    pending: sepsis w/u, CT/PE

## 2024-10-13 NOTE — CHART NOTE - NSCHARTNOTEFT_GEN_A_CORE
Levophed uptitrated to 0.1 mcg/kg. BP now 114/60, pt now more awake and alert. Spoke to pulm fellow for possible upgrade to SDU who said we can go up to 0.5 of levophed on the floor.    Pt given 1g meropenem empirically for sepsis. ID consult ordered for AM.

## 2024-10-13 NOTE — PROGRESS NOTE ADULT - ASSESSMENT
89 yo F with hx of HTN, HLD, DM II, HFrEF (LVEF 20-25% 2020), Hypothyroidism, CVA, Dementia and Recent PNA (dx 4 days ago) presents to ED for generalized weakness. Positive sick contact.     #RRT Overnight for Hypotension and AHRF   #Acute Hyoxic Respiratory Failure 2/2 Shock - Septic Shock vs Adrenal Insufficiency vs both (Matt Oct 13th)   #Acute hypoxic respiratory failure 2/2 submassive PE with RV strain (trop, elevated bpnp, hemodynamically stable) and COVID pneumonia  POA   - Echo demonstrated Romano's sign, biomarkers elevated, requiring oxygen supplementation with desaturation on RA at rest   - Pt is now on therapeutic Eliquis   - CT abdomen: Small left pleural effusion with adjacent consolidation which may reflect atelectasis or infiltrate. Mild fullness left renal pelvis. Mild left ureter with distal left ureteral stone. Stone within the urinary bladder on the left.   - cont doxy and CTX  - RVP +ve for Covid -> s/p RDV and decadron   - s/p Rocephin and Doxy  - Start Meropenem/Vancomycin   - Start Stress dose steroids as well and rule out Adrenal Insufficiency - check cosyntropin stimulation test and Endocrine consult if needed   - Hydrocortisone 50mg IV q8  - Levophed support for target MAP 65   - d/w family grim prognosis   - CTA repeat reviewed which shows improvement in terms of PE hence this is not saddle embolus causing hypotension   - Discussed w/ ID and CCM this am   - Palliative care consult if not improving   - Nasal MRSA negative  - 10/01 bcx negative   - IR consult appreciated: no thrombectomy for now, received Eliquis 10mg BID through 10/10 - now on 5mg q 12hrs    #Elevated troponin likely demand ischemia iso suspected PE and hypotension   #Chronic  HFmpEF   - 1st troponin 437  --> 350 -> 335  - no ischemic changes on ECG  - c/w ASA, statin  - TTE in 2020: LVEF 20-25%  - TTE 10/2/2024: LVEF 69%, G1DD, LV Ejection Fraction by Abreu's Method with a biplane EF of 69 %. Right ventricular systolic function is reduced with apical sparing consistent with acute pulmonary embolism (Romano's sign). The RV appears moderately dilated. Moderate TR. Moderate MR. Borderline pulm HTN   - seen by Dr. Silver previously; not following with cardiology currently  - cardiology recs appreciated     #MARTINE likely prerenal - resolved   - serum Cr. 1.8 on admission. Baseline Cr 1.0 in May 2024.   - CT abdomen neg for hydronephrosis. Moderate distention the urinary bladder.  - started Flomax  - 10/09 am bladder scan and straight cath > 450cc residual urine - Rodriges placed and to be continued (failed TOV)    #Constipation with fecal retention-resolved   - CT abdomen shows Significant fecal retention.  - bowel regimen.     HTN - 10/6 restarting losartan 25mg with parameters . Hold lopressor as BP borderline.   DM II - monitor FS AC HS. Insulin sliding scale.   Hypothyroidism - c/w synthroid. TSH-1.84 10/5    DVT PPX: Eliquis  GI PPX: Protonix  DIET: pureed with thickened liquid  ACTIVITY: as tolerated    CODE STATUS: DNR/DNI    Family:   - Discussed updated with Marcia who is [phone] / HCP is her  pt's son Kentrell Lee [phone] - Saturday Oct 12th 11am   - Discussed case with son Kentrell and his wife this morning in detail they will communicate with their family members - Matt Oct 13th 10am   - Aware and open to SDU Monitoring however poor prognosis discussed     Pending: Clinical Improvement / Insurance auth/bed at [facility]   Dispo: Acute - Guarded Prognosis / SDU Bed Needed

## 2024-10-13 NOTE — CHART NOTE - NSCHARTNOTEFT_GEN_A_CORE
Transfer from: Medical Floor    Transfer to:  SDU    Accepting Physician: Dr Neely      HPI:  89 yo F with hx of HTN, HLD, DM II, HFrEF (LVEF 20-25% 2020), Hypothyroidism, CVA, Dementia and Recent PNA (dx on CXR 4 days ago) presents to ED for generalized weakness and decreased PO intake. 4 days ago, patient started having cough and decreased PO intake. CXR was done and patient was diagnosed with PNA and was started on 5 days course of Azithromycin and Cefpodoxime with Prednisone 20mg OD. Patient had positive sick contact whereby both son and daughter developed URTI symptoms few days earleir. Patient is generally not very verbal at baseline and doesn't raise complains. She is mobile with a walker and 1 person assistance. Over past few days has been very week to ambulate. Family denies any reported chest pain, shortness of breath, fever, chills, abdominal pain, nausea, vomiting. They report loose BM over past 1 day.     On presentation vitals:   · BP Systolic	 176 mm Hg  · BP Diastolic	84 mm Hg  · Heart Rate	76 /min  · Respiration Rate (breaths/min)	 21 /min  · Temp (F)	98.7 Degrees F  · Temp (C)	37.1 Degrees C  · Temp site	oral  · SpO2 (%)	98 %  · O2 Delivery/Oxygen Delivery Method	nasal cannula  · Oxygen Therapy Flow (L/min)	2 L/min     Labs are significant for: Leucocytosis 12K, Cr 1.8 (baseline 1), Trop 437>350, ProBNP 24K.   ECG with no ischemia changes    Imaging: CT head non con: No acute intracranial pathology. No evidence of midline shift, mass effect or intracranial hemorrhage.  CT A/P:  Mild fullness left renal pelvis. Mild left ureter with distal left ureteral stone. Stone within the urinary bladder on the left.  Significant fecal retention. Moderate distention the urinary bladder.  Small left pleural effusion with adjacent consolidation which may reflect atelectasis or infiltrate.    Patient admitted to medicine for management of suspected PNA and MARTINE.  (01 Oct 2024 23:12)      Hospital course:  Pt admitted for pna & MARTINE, developed resp failure with desat at rest, 2/2 submassive PE found on CTA, with RV strain (trop, elevated bpnp, hemodynamically stable) and COVID pneumonia POA (s/p s/p RDV and decadron, cefepime & doxy). IR consulted: no thrombectomy for now, received Eliquis 10mg BID through 10/10 - now on 5mg q 12hrs.    on 10/12, RR called for hypotension BP 70/40 with MAP 50, O2 Sat 84% on RA. Given 250ml NS bolus. BP improved 102/55 MAP 71. Patient became normotensive in the middle of 250 mL bolus so fluids were stopped (patient has known RV strain however was likely dry initially). Pt now on NRBT mask saturating 94. Sepsis w/u drawn and ABG, EKG, CXR ordered.  ABG confirming hypoxia (was done on NRB).  Xray essentially no change from last.  patient changed to high flow and discussed with pulm for possible worsening PE.  Recommend BNP, trop, and repeat CTA Family aware of patient's change in status, patient is DNR/DNI.  CT Angio PE ordered per pulm recommendation>> results showed no acute PE    on 10/13 Levophed uptitrated, Pt given 1g meropenem empirically for sepsis. ID consult ordered.   Patient willl be upgraded to SDU for increasing levophed requirements.    Plan:  -start on vanc & uriel  -f/u MRSA, blood cxs, U/A, 4pm lactate, abgs, sputum cx  -start nebs   -get chest physiotherapy            LABS:                             12.0   25.54 )-----------( 179      ( 13 Oct 2024 09:25 )             36.7       10-13    128[L]  |  94[L]  |  32[H]  ----------------------------<  128[H]  4.9   |  17  |  1.0    Ca    8.4      13 Oct 2024 09:25  Mg     1.9     10-13    TPro  4.7[L]  /  Alb  2.7[L]  /  TBili  0.9  /  DBili  x   /  AST  22  /  ALT  14  /  AlkPhos  143[H]  10-13          ABG - ( 12 Oct 2024 21:44 )  pH, Arterial: 7.48  pH, Blood: x     /  pCO2: 28    /  pO2: 66    / HCO3: 21    / Base Excess: -1.6  /  SaO2: 94.9                ASSESSMENT & PLAN:     89 yo F with hx of HTN, HLD, DM II, HFrEF (LVEF 20-25% 2020), Hypothyroidism, CVA, Dementia and Recent PNA (dx 4 days ago) presents to ED for generalized weakness. Positive sick contact.     #RRT Overnight for Hypotension and AHRF   #Acute Hyoxic Respiratory Failure 2/2 Shock - Septic Shock vs Adrenal Insufficiency vs both (Matt Oct 13th)   #Acute hypoxic respiratory failure 2/2 submassive PE with RV strain (trop, elevated bpnp, hemodynamically stable) and COVID pneumonia  POA   - Echo demonstrated Romano's sign, biomarkers elevated, requiring oxygen supplementation with desaturation on RA at rest   - Pt is now on therapeutic Eliquis   - CT abdomen: Small left pleural effusion with adjacent consolidation which may reflect atelectasis or infiltrate. Mild fullness left renal pelvis. Mild left ureter with distal left ureteral stone. Stone within the urinary bladder on the left.   - cont doxy and CTX  - RVP +ve for Covid -> s/p RDV and decadron   - s/p Rocephin and Doxy  - Start Meropenem/Vancomycin   - Start Stress dose steroids as well and rule out Adrenal Insufficiency - check cosyntropin stimulation test and Endocrine consult if needed   - Hydrocortisone 50mg IV q8  - Levophed support for target MAP 65   - d/w family grim prognosis   - CTA repeat reviewed which shows improvement in terms of PE hence this is not saddle embolus causing hypotension   - Discussed w/ ID and CCM this am   - Palliative care consult if not improving   - Nasal MRSA negative  - 10/01 bcx negative   - IR consult appreciated: no thrombectomy for now, received Eliquis 10mg BID through 10/10 - now on 5mg q 12hrs    #Elevated troponin likely demand ischemia iso suspected PE and hypotension   #Chronic  HFmpEF   - 1st troponin 437  --> 350 -> 335  - no ischemic changes on ECG  - c/w ASA, statin  - TTE in 2020: LVEF 20-25%  - TTE 10/2/2024: LVEF 69%, G1DD, LV Ejection Fraction by Abreu's Method with a biplane EF of 69 %. Right ventricular systolic function is reduced with apical sparing consistent with acute pulmonary embolism (Romano's sign). The RV appears moderately dilated. Moderate TR. Moderate MR. Borderline pulm HTN   - seen by Dr. Silver previously; not following with cardiology currently  - cardiology recs appreciated     #MARTINE likely prerenal - resolved   - serum Cr. 1.8 on admission. Baseline Cr 1.0 in May 2024.   - CT abdomen neg for hydronephrosis. Moderate distention the urinary bladder.  - started Flomax  - 10/09 am bladder scan and straight cath > 450cc residual urine - Rodriges placed and to be continued (failed TOV)    #Constipation with fecal retention-resolved   - CT abdomen shows Significant fecal retention.  - bowel regimen.     HTN - 10/6 restarting losartan 25mg with parameters . Hold lopressor as BP borderline.   DM II - monitor FS AC HS. Insulin sliding scale.   Hypothyroidism - c/w synthroid. TSH-1.84 10/5    DVT PPX: Eliquis  GI PPX: Protonix  DIET: pureed with thickened liquid  ACTIVITY: as tolerated    CODE STATUS: DNR/DNI    Family:   - Discussed updated with Marcia who is [phone] / HCP is her  pt's son Kentrell Lee [phone] - Saturday Oct 12th 11am   - Discussed case with son Kentrell and his wife this morning in detail they will communicate with their family members - Matt Oct 13th 10am   - Aware and open to SDU Monitoring however poor prognosis discussed     Pending: Clinical Improvement / Insurance auth/bed at [facility]   Dispo: Acute - Guarded Prognosis / SDU Bed Needed

## 2024-10-14 LAB
ALBUMIN SERPL ELPH-MCNC: 3.1 G/DL — LOW (ref 3.5–5.2)
ALP SERPL-CCNC: 156 U/L — HIGH (ref 30–115)
ALT FLD-CCNC: 15 U/L — SIGNIFICANT CHANGE UP (ref 0–41)
ANION GAP SERPL CALC-SCNC: 15 MMOL/L — HIGH (ref 7–14)
AST SERPL-CCNC: 17 U/L — SIGNIFICANT CHANGE UP (ref 0–41)
BASOPHILS # BLD AUTO: 0.06 K/UL — SIGNIFICANT CHANGE UP (ref 0–0.2)
BASOPHILS NFR BLD AUTO: 0.3 % — SIGNIFICANT CHANGE UP (ref 0–1)
BILIRUB SERPL-MCNC: 0.8 MG/DL — SIGNIFICANT CHANGE UP (ref 0.2–1.2)
BUN SERPL-MCNC: 28 MG/DL — HIGH (ref 10–20)
CALCIUM SERPL-MCNC: 8.9 MG/DL — SIGNIFICANT CHANGE UP (ref 8.4–10.4)
CHLORIDE SERPL-SCNC: 95 MMOL/L — LOW (ref 98–110)
CO2 SERPL-SCNC: 17 MMOL/L — SIGNIFICANT CHANGE UP (ref 17–32)
CREAT SERPL-MCNC: 0.9 MG/DL — SIGNIFICANT CHANGE UP (ref 0.7–1.5)
EGFR: 56 ML/MIN/1.73M2 — LOW
EOSINOPHIL # BLD AUTO: 0.04 K/UL — SIGNIFICANT CHANGE UP (ref 0–0.7)
EOSINOPHIL NFR BLD AUTO: 0.2 % — SIGNIFICANT CHANGE UP (ref 0–8)
GLUCOSE BLDC GLUCOMTR-MCNC: 200 MG/DL — HIGH (ref 70–99)
GLUCOSE BLDC GLUCOMTR-MCNC: 206 MG/DL — HIGH (ref 70–99)
GLUCOSE BLDC GLUCOMTR-MCNC: 220 MG/DL — HIGH (ref 70–99)
GLUCOSE BLDC GLUCOMTR-MCNC: 253 MG/DL — HIGH (ref 70–99)
GLUCOSE SERPL-MCNC: 191 MG/DL — HIGH (ref 70–99)
HCT VFR BLD CALC: 41.5 % — SIGNIFICANT CHANGE UP (ref 37–47)
HGB BLD-MCNC: 13.3 G/DL — SIGNIFICANT CHANGE UP (ref 12–16)
IMM GRANULOCYTES NFR BLD AUTO: 1.4 % — HIGH (ref 0.1–0.3)
LYMPHOCYTES # BLD AUTO: 1.89 K/UL — SIGNIFICANT CHANGE UP (ref 1.2–3.4)
LYMPHOCYTES # BLD AUTO: 8 % — LOW (ref 20.5–51.1)
MAGNESIUM SERPL-MCNC: 2 MG/DL — SIGNIFICANT CHANGE UP (ref 1.8–2.4)
MCHC RBC-ENTMCNC: 31.1 PG — HIGH (ref 27–31)
MCHC RBC-ENTMCNC: 32 G/DL — SIGNIFICANT CHANGE UP (ref 32–37)
MCV RBC AUTO: 97 FL — SIGNIFICANT CHANGE UP (ref 81–99)
MONOCYTES # BLD AUTO: 1.09 K/UL — HIGH (ref 0.1–0.6)
MONOCYTES NFR BLD AUTO: 4.6 % — SIGNIFICANT CHANGE UP (ref 1.7–9.3)
NEUTROPHILS # BLD AUTO: 20.26 K/UL — HIGH (ref 1.4–6.5)
NEUTROPHILS NFR BLD AUTO: 85.5 % — HIGH (ref 42.2–75.2)
NRBC # BLD: 0 /100 WBCS — SIGNIFICANT CHANGE UP (ref 0–0)
PLATELET # BLD AUTO: 208 K/UL — SIGNIFICANT CHANGE UP (ref 130–400)
PMV BLD: SIGNIFICANT CHANGE UP (ref 7.4–10.4)
POTASSIUM SERPL-MCNC: 5.3 MMOL/L — HIGH (ref 3.5–5)
POTASSIUM SERPL-SCNC: 5.3 MMOL/L — HIGH (ref 3.5–5)
PROCALCITONIN SERPL-MCNC: 0.11 NG/ML — HIGH (ref 0.02–0.1)
PROT SERPL-MCNC: 5.2 G/DL — LOW (ref 6–8)
RBC # BLD: 4.28 M/UL — SIGNIFICANT CHANGE UP (ref 4.2–5.4)
RBC # FLD: 14 % — SIGNIFICANT CHANGE UP (ref 11.5–14.5)
SODIUM SERPL-SCNC: 127 MMOL/L — LOW (ref 135–146)
WBC # BLD: 23.66 K/UL — HIGH (ref 4.8–10.8)
WBC # FLD AUTO: 23.66 K/UL — HIGH (ref 4.8–10.8)

## 2024-10-14 PROCEDURE — 99233 SBSQ HOSP IP/OBS HIGH 50: CPT

## 2024-10-14 PROCEDURE — 99291 CRITICAL CARE FIRST HOUR: CPT

## 2024-10-14 RX ORDER — SODIUM ZIRCONIUM CYCLOSILICATE 10 G/10G
10 POWDER, FOR SUSPENSION ORAL ONCE
Refills: 0 | Status: COMPLETED | OUTPATIENT
Start: 2024-10-14 | End: 2024-10-14

## 2024-10-14 RX ORDER — INSULIN LISPRO 100/ML
VIAL (ML) SUBCUTANEOUS AT BEDTIME
Refills: 0 | Status: DISCONTINUED | OUTPATIENT
Start: 2024-10-14 | End: 2024-10-25

## 2024-10-14 RX ADMIN — APIXABAN 5 MILLIGRAM(S): 5 TABLET, FILM COATED ORAL at 17:47

## 2024-10-14 RX ADMIN — Medication 20 MILLIGRAM(S): at 21:37

## 2024-10-14 RX ADMIN — Medication 4: at 21:38

## 2024-10-14 RX ADMIN — SODIUM ZIRCONIUM CYCLOSILICATE 10 GRAM(S): 10 POWDER, FOR SUSPENSION ORAL at 12:13

## 2024-10-14 RX ADMIN — MEROPENEM 100 MILLIGRAM(S): 1 INJECTION INTRAVENOUS at 05:47

## 2024-10-14 RX ADMIN — CHLORHEXIDINE GLUCONATE 1 APPLICATION(S): 40 SOLUTION TOPICAL at 12:15

## 2024-10-14 RX ADMIN — HYDROCORTISONE 50 MILLIGRAM(S): 10 TABLET ORAL at 12:39

## 2024-10-14 RX ADMIN — Medication 2 TABLET(S): at 21:37

## 2024-10-14 RX ADMIN — HYDROCORTISONE 50 MILLIGRAM(S): 10 TABLET ORAL at 21:37

## 2024-10-14 RX ADMIN — IPRATROPIUM BROMIDE AND ALBUTEROL SULFATE 3 MILLILITER(S): .5; 2.5 SOLUTION RESPIRATORY (INHALATION) at 10:33

## 2024-10-14 RX ADMIN — Medication 2: at 17:45

## 2024-10-14 RX ADMIN — Medication 3: at 12:10

## 2024-10-14 RX ADMIN — Medication 81 MILLIGRAM(S): at 12:13

## 2024-10-14 RX ADMIN — POLYETHYLENE GLYCOL 3350 17 GRAM(S): 17 POWDER, FOR SOLUTION ORAL at 17:47

## 2024-10-14 RX ADMIN — IPRATROPIUM BROMIDE AND ALBUTEROL SULFATE 3 MILLILITER(S): .5; 2.5 SOLUTION RESPIRATORY (INHALATION) at 13:59

## 2024-10-14 RX ADMIN — MEROPENEM 100 MILLIGRAM(S): 1 INJECTION INTRAVENOUS at 17:47

## 2024-10-14 RX ADMIN — HYDROCORTISONE 50 MILLIGRAM(S): 10 TABLET ORAL at 05:48

## 2024-10-14 NOTE — PROGRESS NOTE ADULT - ASSESSMENT
89 yo F with hx of HTN, HLD, DM II, HFrEF (LVEF 20-25% 2020), Hypothyroidism, CVA, Dementia and Recent PNA (dx 4 days ago) presents to ED for generalized weakness. Positive sick contact.     A/P   #Acute hypoxic respiratory failure 2/2 submassive PE / suspected bacterial PNA  and COVID pneumonia  POA / septic shock / chronic diastolic CHF / elevated cardiac enzymes   - Echo demonstrated Romano's sign, biomarkers elevated, c/w therapeutic Eliquis   - monitor pulse Ox, supplement oxygen, pt is comfortable on RA now   - fluid restriction 1200 ml in 24 hours  - intake and output monitoring, daily weight, maintain fluid balance  - check IVC to evaluate volume status   - pt was started on stress dose steroids   - c/w Levophed, keep MAP above 65   - CTA repeat reviewed which shows improvement in terms of PE hence this is not saddle embolus causing hypotension   - ID consulted, recommendations noted:   - Monitor WBC .   - c/w  Meropenem 1 gm iv q8h for now  - check procalcitonin level   - supportive care, aspiration precautions ,pulmonary toilet   - c/w ASA, statin, not on BB ( on vasopressor now)    - pt was consulted by cardiology  - telemetry monitoring, monitor and replete electrolytes     # Prerenal MARTINE    - resolved   - c/w Flomax   - CT abdomen neg for hydronephrosis. Moderate distention the urinary bladder.  - Rodriges placed  (failed TOV)  - monitor BUN/cr and urine output   - avoid nephrotoxic medications     # Hyponatremia  - check urine osmolarity and urine Na  - check IVC, will give fluids if IVC collapsable   - monitor Na level     #Constipation with fecal retention  - resolved   - bowel regimen  - ensure daily BMs     #DM II   - CC diet   - monitor FS AC HS  - Insulin sliding scale    #Hypothyroidism   - c/w synthroid. TSH-1.84 10/5    DVT PPX: Eliquis  GI PPX: Protonix  DIET: pureed with thickened liquid  ACTIVITY: as tolerated    CODE STATUS: DNR/DNI    #Progress Note Handoff  Pending (specify): c/w Meropenem,  check IVC,  if collapsable will give fluids, monitor Na level,  taper off Levophed as tolerated, check procalcitonin level, trend WBC, fever curve, monitor BUN/cr and urine output,  supportive care   Family discussion: I spoke with pt's children at the bedside, they agreed with a plan of care   Disposition: SDU    89 yo F with hx of HTN, HLD, DM II, HFrEF (LVEF 20-25% 2020), Hypothyroidism, CVA, Dementia and Recent PNA (dx 4 days ago) presents to ED for generalized weakness. Positive sick contact.     A/P   #Acute hypoxic respiratory failure 2/2 submassive PE / suspected bacterial PNA  and COVID pneumonia  POA / septic shock / chronic diastolic CHF / elevated cardiac enzymes   - Echo demonstrated Romano's sign, biomarkers elevated, c/w therapeutic Eliquis   - monitor pulse Ox, supplement oxygen, pt is comfortable on high flow oxygen   - fluid restriction 1200 ml in 24 hours  - intake and output monitoring, daily weight, maintain fluid balance  - check IVC to evaluate volume status   - pt was started on stress dose steroids   - c/w Levophed, keep MAP above 65   - CTA repeat reviewed which shows improvement in terms of PE hence this is not saddle embolus causing hypotension   - ID consulted, recommendations noted:   - Monitor WBC .   - c/w  Meropenem 1 gm iv q8h for now  - check procalcitonin level   - supportive care, aspiration precautions ,pulmonary toilet   - c/w ASA, statin, not on BB ( on vasopressor now)    - pt was consulted by cardiology  - telemetry monitoring, monitor and replete electrolytes     # Prerenal MARTINE    - resolved   - c/w Flomax   - CT abdomen neg for hydronephrosis. Moderate distention the urinary bladder.  - Rodriges placed  (failed TOV)  - monitor BUN/cr and urine output   - avoid nephrotoxic medications     # Hyponatremia  - check urine osmolarity and urine Na  - check IVC, will give fluids if IVC collapsable   - monitor Na level     #Constipation with fecal retention  - resolved   - bowel regimen  - ensure daily BMs     #DM II   - CC diet   - monitor FS AC HS  - Insulin sliding scale    #Hypothyroidism   - c/w synthroid. TSH-1.84 10/5    DVT PPX: Eliquis  GI PPX: Protonix  DIET: pureed with thickened liquid  ACTIVITY: as tolerated    CODE STATUS: DNR/DNI    #Progress Note Handoff  Pending (specify): c/w Meropenem,  check IVC,  if collapsable will give fluids, monitor Na level,  taper off Levophed as tolerated, check procalcitonin level, trend WBC, fever curve, monitor BUN/cr and urine output,  supportive care   Family discussion: I spoke with pt's children at the bedside, they agreed with a plan of care   Disposition: SDU

## 2024-10-14 NOTE — PROGRESS NOTE ADULT - SUBJECTIVE AND OBJECTIVE BOX
89 yo F with hx of HTN, HLD, DM II, HFrEF (LVEF 20-25% 2020), Hypothyroidism, CVA, Dementia and Recent PNA presents to ED for generalized weakness and decreased PO intake. 4 days ago, patient started having cough and decreased PO intake. CXR was done and patient was diagnosed with PNA and was started on 5 days course of Azithromycin and Cefpodoxime with Prednisone 20mg OD. Patient had positive sick contact whereby both son and daughter developed URTI symptoms few days earleir. Patient is generally not very verbal at baseline and doesn't raise complains. She is mobile with a walker and 1 person assistance.  Patient admitted to medicine for management of suspected PNA and MARTINE, she  developed resp failure with desat at rest, 2/2 submassive PE found on CTA, with RV strain (trop, elevated bpnp, hemodynamically stable) and COVID pneumonia POA (s/p s/p RDV and decadron, cefepime & doxy). IR consulted: no thrombectomy for now, received Eliquis 10mg BID through 10/10 - now on 5mg q 12hrs.  on 10/12, RR called for hypotension BP 70/40 with MAP 50, O2 Sat 84% on RA. Given 250ml NS bolus. BP improved 102/55 MAP 71. Patient became normotensive in the middle of 250 mL bolus so fluids were stopped (patient has known RV strain however was likely dry initially). Pt now on NRBT mask saturating 94. Sepsis w/u drawn and ABG, EKG, CXR ordered.  ABG confirming hypoxia (was done on NRB).  Xray essentially no change from last.  patient changed to high flow and discussed with pulm for possible worsening PE.  Recommend BNP, trop, and repeat CTA Family aware of patient's change in status, patient is DNR/DNI.  CT Angio PE ordered per pulm recommendation>> results showed no acute PE  on 10/13 Levophed uptitrated, Pt given 1g meropenem empirically for sepsis. ID consult ordered.   Patient willl be upgraded to SDU for increasing levophed requirements.  Today pt is awake, minimally verbal, appears comfortable, family at the bedside.     PAST MEDICAL & SURGICAL HISTORY:  Dementia  Hypertension  Hyperlipidemia  Hypothyroidism  Diabetes    ALLERGIES:  No Known Allergies    Vital Signs Last 24 Hrs  T(C): 35.6 (14 Oct 2024 04:36), Max: 36.3 (13 Oct 2024 12:15)  T(F): 96.1 (14 Oct 2024 04:36), Max: 97.3 (13 Oct 2024 12:15)  HR: 72 (14 Oct 2024 08:00) (72 - 94)  BP: 107/49 (14 Oct 2024 08:00) (90/54 - 109/58)  BP(mean): 71 (14 Oct 2024 08:00) (61 - 77)  RR: 20 (14 Oct 2024 08:00) (18 - 20)  SpO2: 96% (14 Oct 2024 08:00) (95% - 100%)    Parameters below as of 13 Oct 2024 16:00  Patient On (Oxygen Delivery Method): nasal cannula, high flow      PHYSICAL EXAM:  General: awake, NAD, frail elderly female   ENT: MMM  Neck: Supple, No JVD  Lungs: Decreased BS at bases   Cardio: RRR, S1/S2, No murmurs  Abdomen: Soft abdomen, NT, ND, BS present   Extremities: no edema on LE, ecchymoses around left bit toe and above ankle, b/l wrist ecchymosis and edema     MS: awake, minimally verbal, eating with assistance, hard of hearing       LABS:                          13.3   23.66 )-----------( 208      ( 14 Oct 2024 04:56 )             41.5   10-14    127[L]  |  95[L]  |  28[H]  ----------------------------<  191[H]  5.3[H]   |  17  |  0.9    Ca    8.9      14 Oct 2024 04:56  Mg     2.0     10-14    TPro  5.2[L]  /  Alb  3.1[L]  /  TBili  0.8  /  DBili  x   /  AST  17  /  ALT  15  /  AlkPhos  156[H]  10-14    Culture - Blood (10.12.24 @ 21:42)   Specimen Source: .Blood BLOOD  Culture Results:   No growth at 24 hours          RADIOLOGY:   < from: CT Angio Chest PE Protocol w/ IV Cont (10.13.24 @ 04:59) >    IMPRESSION:    No definite acute pulmonary embolism. See discussion above.    Redemonstration of left lower lobe consolidation consistent with   pneumonia.    < end of copied text >  < from: Xray Chest 1 View-PORTABLE IMMEDIATE (Xray Chest 1 View-PORTABLE IMMEDIATE .) (10.12.24 @ 21:53) >    IMPRESSION:    Prominent pulmonary vessels may be seen with congestion.    Left basilar opacity.      < from: TTE Echo Complete w/o Contrast w/ Doppler (10.02.24 @ 07:47) >  Summary:   1. Normal global left ventricular systolic function.   2. LV Ejection Fraction by Abreu's Method with a biplane EF of 69 %.   3. Increased relative wall thickness with normal mass index consistent   with left ventricular concentric remodeling.   4. Spectral Doppler shows impaired relaxation pattern of left   ventricular myocardial filling (Grade I diastolic dysfunction).   5. Right ventricular systolic function is reduced with apical sparing   consistent with acute pulmonary embolism (Romano's sign). The RV   appears moderately dilated.   6. Sclerotic aortic valve with normal opening.   7. Mild aortic regurgitation.   8. Moderate mitral valve regurgitation.   9. Moderate tricuspid regurgitation.  10. Estimated pulmonary artery systolic pressure is 37.8 mmHg assuming a   right atrial pressure of 3 mmHg, which is consistent with borderline   pulmonary hypertension.  11. Compared to the previous study 1/27/2020, the LV function has   improved. RV function is reduced on this study.    MEDICATIONS  (STANDING):  albuterol/ipratropium for Nebulization 3 milliLiter(s) Nebulizer every 6 hours  albuterol/ipratropium for Nebulization. 9 milliLiter(s) Nebulizer once  apixaban 5 milliGRAM(s) Oral every 12 hours  aspirin  chewable 81 milliGRAM(s) Oral daily  atorvastatin 20 milliGRAM(s) Oral at bedtime  bisacodyl Suppository 10 milliGRAM(s) Rectal once  chlorhexidine 2% Cloths 1 Application(s) Topical daily  glucagon  Injectable 1 milliGRAM(s) IntraMuscular once  hydrocortisone sodium succinate Injectable 50 milliGRAM(s) IV Push every 8 hours  insulin lispro (ADMELOG) corrective regimen sliding scale   SubCutaneous three times a day before meals  levothyroxine 50 MICROGram(s) Oral daily  meropenem  IVPB 1000 milliGRAM(s) IV Intermittent every 12 hours  norepinephrine Infusion 0.05 MICROgram(s)/kG/Min (4.68 mL/Hr) IV Continuous <Continuous>  pantoprazole    Tablet 40 milliGRAM(s) Oral before breakfast  polyethylene glycol 3350 17 Gram(s) Oral two times a day  senna 2 Tablet(s) Oral at bedtime  sodium zirconium cyclosilicate 10 Gram(s) Oral once  tamsulosin 0.4 milliGRAM(s) Oral at bedtime

## 2024-10-14 NOTE — PROGRESS NOTE ADULT - SUBJECTIVE AND OBJECTIVE BOX
BRIJESH POWER 89y Female  MRN#: [id number]   Hospital Day: 13d    HPI:  89 yo F with hx of HTN, HLD, DM II, HFrEF (LVEF 20-25% 2020), Hypothyroidism, CVA, Dementia and Recent PNA (dx on CXR 4 days ago) presents to ED for generalized weakness and decreased PO intake. 4 days ago, patient started having cough and decreased PO intake. CXR was done and patient was diagnosed with PNA and was started on 5 days course of Azithromycin and Cefpodoxime with Prednisone 20mg OD. Patient had positive sick contact whereby both son and daughter developed URTI symptoms few days earleir. Patient is generally not very verbal at baseline and doesn't raise complains. She is mobile with a walker and 1 person assistance. Over past few days has been very week to ambulate. Family denies any reported chest pain, shortness of breath, fever, chills, abdominal pain, nausea, vomiting. They report loose BM over past 1 day.     On presentation vitals:   · BP Systolic	 176 mm Hg  · BP Diastolic	84 mm Hg  · Heart Rate	76 /min  · Respiration Rate (breaths/min)	 21 /min  · Temp (F)	98.7 Degrees F  · Temp (C)	37.1 Degrees C  · Temp site	oral  · SpO2 (%)	98 %  · O2 Delivery/Oxygen Delivery Method	nasal cannula  · Oxygen Therapy Flow (L/min)	2 L/min     Labs are significant for: Leucocytosis 12K, Cr 1.8 (baseline 1), Trop 437>350, ProBNP 24K.   ECG with no ischemia changes    Imaging: CT head non con: No acute intracranial pathology. No evidence of midline shift, mass effect or intracranial hemorrhage.  CT A/P:  Mild fullness left renal pelvis. Mild left ureter with distal left ureteral stone. Stone within the urinary bladder on the left.  Significant fecal retention. Moderate distention the urinary bladder.  Small left pleural effusion with adjacent consolidation which may reflect atelectasis or infiltrate.    Patient admitted to medicine for management of suspected PNA and MARTINE.  (01 Oct 2024 23:12)    SUBJECTIVE  Patient is a 89y old Female who presents with a chief complaint of shock (14 Oct 2024 12:04)  Currently admitted to medicine with the primary diagnosis of Pneumonia    INTERVAL HPI AND OVERNIGHT EVENTS:  Patient was examined and seen at bedside. This morning she is resting comfortably in bed and reports no issues or overnight events.    OBJECTIVE  PAST MEDICAL & SURGICAL HISTORY  Dementia    Hypertension    Hyperlipidemia    Hypothyroidism    Diabetes      ALLERGIES:  No Known Allergies    MEDICATIONS:  STANDING MEDICATIONS  albuterol/ipratropium for Nebulization 3 milliLiter(s) Nebulizer every 6 hours  albuterol/ipratropium for Nebulization. 9 milliLiter(s) Nebulizer once  apixaban 5 milliGRAM(s) Oral every 12 hours  aspirin  chewable 81 milliGRAM(s) Oral daily  atorvastatin 20 milliGRAM(s) Oral at bedtime  bisacodyl Suppository 10 milliGRAM(s) Rectal once  chlorhexidine 2% Cloths 1 Application(s) Topical daily  glucagon  Injectable 1 milliGRAM(s) IntraMuscular once  hydrocortisone sodium succinate Injectable 50 milliGRAM(s) IV Push every 8 hours  insulin lispro (ADMELOG) corrective regimen sliding scale   SubCutaneous three times a day before meals  levothyroxine 50 MICROGram(s) Oral daily  meropenem  IVPB 1000 milliGRAM(s) IV Intermittent every 12 hours  norepinephrine Infusion 0.05 MICROgram(s)/kG/Min IV Continuous <Continuous>  pantoprazole    Tablet 40 milliGRAM(s) Oral before breakfast  polyethylene glycol 3350 17 Gram(s) Oral two times a day  senna 2 Tablet(s) Oral at bedtime  tamsulosin 0.4 milliGRAM(s) Oral at bedtime    PRN MEDICATIONS      VITAL SIGNS: Last 24 Hours  T(C): 35.6 (14 Oct 2024 04:36), Max: 35.7 (13 Oct 2024 16:00)  T(F): 96.1 (14 Oct 2024 04:36), Max: 96.3 (13 Oct 2024 16:00)  HR: 81 (14 Oct 2024 11:00) (72 - 94)  BP: 108/70 (14 Oct 2024 11:00) (92/53 - 109/58)  BP(mean): 82 (14 Oct 2024 11:00) (71 - 82)  RR: 20 (14 Oct 2024 11:00) (18 - 20)  SpO2: 100% (14 Oct 2024 11:00) (95% - 100%)    LABS:                        13.3   23.66 )-----------( 208      ( 14 Oct 2024 04:56 )             41.5     10-14    127[L]  |  95[L]  |  28[H]  ----------------------------<  191[H]  5.3[H]   |  17  |  0.9    Ca    8.9      14 Oct 2024 04:56  Mg     2.0     10-14    TPro  5.2[L]  /  Alb  3.1[L]  /  TBili  0.8  /  DBili  x   /  AST  17  /  ALT  15  /  AlkPhos  156[H]  10-14      Urinalysis Basic - ( 14 Oct 2024 04:56 )    Color: x / Appearance: x / SG: x / pH: x  Gluc: 191 mg/dL / Ketone: x  / Bili: x / Urobili: x   Blood: x / Protein: x / Nitrite: x   Leuk Esterase: x / RBC: x / WBC x   Sq Epi: x / Non Sq Epi: x / Bacteria: x      ABG - ( 12 Oct 2024 21:44 )  pH, Arterial: 7.48  pH, Blood: x     /  pCO2: 28    /  pO2: 66    / HCO3: 21    / Base Excess: -1.6  /  SaO2: 94.9          Culture - Blood (collected 12 Oct 2024 21:42)  Source: .Blood BLOOD  Preliminary Report (14 Oct 2024 04:01):    No growth at 24 hours      PHYSICAL EXAM:  CONSTITUTIONAL: No acute distress, cooperative  HEAD: Atraumatic, normocephalic  EYES: EOM intact, conjunctiva and sclera clear  ENT: Supple, no masses, no thyromegaly, no bruits, no JVD; moist mucous membranes  PULMONARY: decreased b/l; no wheezes, rales, or rhonchi  CARDIOVASCULAR: Regular rate and rhythm; no murmurs, rubs, or gallops  GASTROINTESTINAL: Soft, non-tender, non-distended; bowel sounds present  MUSCULOSKELETAL: b/l hand edema  NEUROLOGY: non-focal  Skin: scattered ecchymoses    ASSESSMENT & PLAN    89 yo F with hx of HTN, HLD, DM II, HFrEF (LVEF 20-25% 2020), Hypothyroidism, CVA, Dementia and Recent PNA (dx 4 days ago) presents to ED for generalized weakness. Positive sick contact.     10/14/2024:  - CTA 10/13 noted, no PE, LLL consolidation consistent with pneumonia.  - WBC 23.66  - f/u UA, strep, legionella, sputum cx  - d/c vanc  ------------    #Acute hypoxic respiratory failure 2/2 submassive PE / suspected bacterial PNA  and COVID pneumonia  POA / septic shock / chronic diastolic CHF / elevated cardiac enzymes   - Echo demonstrated Romano's sign, biomarkers elevated, c/w therapeutic Eliquis   - monitor pulse Ox, supplement oxygen, pt is comfortable on RA now   - fluid restriction 1200 ml in 24 hours  - i&o, daily weight, maintain fluid balance  - received stress dose steroids   - c/w Levophed, keep MAP above 65   - CTA repeat reviewed which shows improvement in terms of PE hence this is not saddle embolus causing hypotension   - appreciate ID  - wbc 23.66 this am  - cw Meropenem 1 gm iv q8h  - d/c vanc  - fu procalcitonin level   - cw supportive care, aspiration precautions ,pulmonary toilet   - cw ASA, statin  - cw telemetry monitoring  - cw monitor and replete electrolytes   - Aspiration precautions  - Wean HNFC as tolerated, target SpO2 92-96%    # Prerenal MARTINE, resolved   - c/w Flomax   - CT abdomen neg for hydronephrosis. Moderate distention the urinary bladder.  - Rodriges placed  (failed TOV)  - monitor BUN/cr and urine output   - avoid nephrotoxic medications     # Hyponatremia  - check urine osmolarity and urine Na  - CW monitor Na level     #Constipation with fecal retention  - resolved   - bowel regimen  - ensure daily BMs     #DM II   - CC diet   - monitor FS AC HS  - Insulin sliding scale    #Hypothyroidism   - c/w synthroid. TSH-1.84 10/5    DVT PPX: Eliquis  GI PPX: Protonix  DIET: pureed with thickened liquid  ACTIVITY: as tolerated    CODE STATUS: DNR/DNI    Pt seen and staffed with attending physician Dr. Tan

## 2024-10-14 NOTE — PROGRESS NOTE ADULT - ASSESSMENT
IMPRESSION:    Septic shock on levophed  LLL PNA   Possible chronic PE   Positive COVID-19 Infection   Constipation  HO HTN/HLD  HO DM II  HO HFpEF  HO Hypothyroidism  HO CVA  HO Dementia    PLAN:    CNS: Avoid depressants, MS at baseline    HEENT: Oral care    PULMONARY:  HOB @ 45 degrees.  Aspiration precautions.  Wean HNFC as tolerated, target SpO2 92-96%. Repeat CTA noted, previously seen PE not evident, new LLL PNA.     CARDIOVASCULAR:   MAP adequate.  ECHO noted.  Goal directed fluid resuscitation.     GI: GI prophylaxis. Feeding per speech and swallow.  Bowel regimen     RENAL:  Follow up lytes.  Correct as needed.    INFECTIOUS DISEASE: Follow up cultures. SP Remdesivir. Meropenem per ID. Repeat procal. Sputum culture if possible. UA. Urine strep and legionella.      HEMATOLOGICAL:  On eliquis. LE duplex noted. Monitor CBC and coags.     ENDOCRINE:  Follow up FS.  Insulin protocol if needed. Stress dose steroids. Check TSH.     MUSCULOSKELETAL: bedrest Off loading;  PT OT  for now     GOC  DNR DNI   SDU for now.  IMPRESSION:    Septic shock on levophed  LLL PNA / atelectasis   Possible chronic PE   Positive COVID-19 Infection   Constipation  HO HTN/HLD  HO DM II  HO HFpEF  HO Hypothyroidism  HO CVA  HO Dementia    PLAN:    CNS: Avoid depressants, MS at baseline    HEENT: Oral care    PULMONARY:  HOB @ 45 degrees.  Aspiration precautions.  Wean HNFC as tolerated, target SpO2 92-96%. Repeat CTA noted, previously seen PE not evident, new LLL atelectasis / infiltrate     CARDIOVASCULAR:   MAP adequate.  ECHO noted.  Goal directed fluid resuscitation.     GI: GI prophylaxis. Feeding per speech and swallow.  Bowel regimen     RENAL:  Follow up lytes.  Correct as needed.    INFECTIOUS DISEASE: Follow up cultures. SP Remdesivir. Meropenem per ID. Repeat procal.     HEMATOLOGICAL:  On eliquis. LE duplex noted. Monitor CBC and coags.     ENDOCRINE:  Follow up FS.  Insulin protocol if needed. Stress dose steroids. Check TSH.     MUSCULOSKELETAL: bedrest Off loading;  PT OT  for now     GOC  DNR DNI   SDU for now.   Very poor prognosis overall

## 2024-10-14 NOTE — PROGRESS NOTE ADULT - SUBJECTIVE AND OBJECTIVE BOX
Patient is a 89y old  Female who presents with a chief complaint of shock (13 Oct 2024 12:36)        Over Night Events:  Upgraded to SDU for shock. On levophed 0.15. HNFC 50/50.       ROS:     All ROS are negative except HPI         PHYSICAL EXAM    ICU Vital Signs Last 24 Hrs  T(C): 35.6 (14 Oct 2024 04:36), Max: 37.5 (13 Oct 2024 11:01)  T(F): 96.1 (14 Oct 2024 04:36), Max: 99.5 (13 Oct 2024 11:01)  HR: 72 (14 Oct 2024 08:00) (72 - 94)  BP: 107/49 (14 Oct 2024 08:00) (90/54 - 109/58)  BP(mean): 71 (14 Oct 2024 08:00) (61 - 81)  ABP: --  ABP(mean): --  RR: 20 (14 Oct 2024 08:00) (18 - 20)  SpO2: 96% (14 Oct 2024 08:00) (95% - 100%)    O2 Parameters below as of 13 Oct 2024 16:00  Patient On (Oxygen Delivery Method): nasal cannula, high flow            CONSTITUTIONAL:  in NAD     ENT:   Airway patent,   Mouth with normal mucosa.     EYES:   Pupils equal,   Round and reactive to light.    CARDIAC:   Normal rate,   Regular rhythm.    No edema    RESPIRATORY:   No wheezing  Bilateral BS  Normal chest expansion  Not tachypneic,  No use of accessory muscles    GASTROINTESTINAL:  Abdomen soft,   Non-tender,   No guarding,   + BS    MUSCULOSKELETAL:   Range of motion is not limited,  No clubbing, cyanosis    NEUROLOGICAL:   Alert and oriented   No motor  deficits.    SKIN:   Skin normal color for race,   Warm and dry and intact.   No evidence of rash.    PSYCHIATRIC:   Normal mood and affect.   No apparent risk to self or others.    HEMATOLOGICAL:  No cervical  lymphadenopathy.  no inguinal lymphadenopathy      10-13-24 @ 07:01  -  10-14-24 @ 07:00  --------------------------------------------------------  IN:    Norepinephrine: 0.8 mL  Total IN: 0.8 mL    OUT:    Indwelling Catheter - Urethral (mL): 950 mL  Total OUT: 950 mL    Total NET: -949.2 mL          LABS:                            13.3   23.66 )-----------( 208      ( 14 Oct 2024 04:56 )             41.5                                               10-14    127[L]  |  95[L]  |  28[H]  ----------------------------<  191[H]  5.3[H]   |  17  |  0.9    Ca    8.9      14 Oct 2024 04:56  Mg     2.0     10-14    TPro  5.2[L]  /  Alb  3.1[L]  /  TBili  0.8  /  DBili  x   /  AST  17  /  ALT  15  /  AlkPhos  156[H]  10-14                                             Urinalysis Basic - ( 14 Oct 2024 04:56 )    Color: x / Appearance: x / SG: x / pH: x  Gluc: 191 mg/dL / Ketone: x  / Bili: x / Urobili: x   Blood: x / Protein: x / Nitrite: x   Leuk Esterase: x / RBC: x / WBC x   Sq Epi: x / Non Sq Epi: x / Bacteria: x                                                  LIVER FUNCTIONS - ( 14 Oct 2024 04:56 )  Alb: 3.1 g/dL / Pro: 5.2 g/dL / ALK PHOS: 156 U/L / ALT: 15 U/L / AST: 17 U/L / GGT: x                                                  Culture - Blood (collected 12 Oct 2024 21:42)  Source: .Blood BLOOD  Preliminary Report (14 Oct 2024 04:01):    No growth at 24 hours                                                                                       ABG - ( 12 Oct 2024 21:44 )  pH, Arterial: 7.48  pH, Blood: x     /  pCO2: 28    /  pO2: 66    / HCO3: 21    / Base Excess: -1.6  /  SaO2: 94.9                MEDICATIONS  (STANDING):  albuterol/ipratropium for Nebulization 3 milliLiter(s) Nebulizer every 6 hours  albuterol/ipratropium for Nebulization. 9 milliLiter(s) Nebulizer once  apixaban 5 milliGRAM(s) Oral every 12 hours  aspirin  chewable 81 milliGRAM(s) Oral daily  atorvastatin 20 milliGRAM(s) Oral at bedtime  bisacodyl Suppository 10 milliGRAM(s) Rectal once  chlorhexidine 2% Cloths 1 Application(s) Topical daily  dextrose 5%. 1000 milliLiter(s) (100 mL/Hr) IV Continuous <Continuous>  dextrose 5%. 1000 milliLiter(s) (50 mL/Hr) IV Continuous <Continuous>  dextrose 50% Injectable 25 Gram(s) IV Push once  dextrose 50% Injectable 25 Gram(s) IV Push once  dextrose 50% Injectable 12.5 Gram(s) IV Push once  glucagon  Injectable 1 milliGRAM(s) IntraMuscular once  hydrocortisone sodium succinate Injectable 50 milliGRAM(s) IV Push every 8 hours  insulin lispro (ADMELOG) corrective regimen sliding scale   SubCutaneous three times a day before meals  levothyroxine 50 MICROGram(s) Oral daily  meropenem  IVPB 1000 milliGRAM(s) IV Intermittent every 12 hours  norepinephrine Infusion 0.05 MICROgram(s)/kG/Min (4.68 mL/Hr) IV Continuous <Continuous>  pantoprazole    Tablet 40 milliGRAM(s) Oral before breakfast  polyethylene glycol 3350 17 Gram(s) Oral two times a day  senna 2 Tablet(s) Oral at bedtime  sodium zirconium cyclosilicate 10 Gram(s) Oral once  tamsulosin 0.4 milliGRAM(s) Oral at bedtime  vancomycin  IVPB 1000 milliGRAM(s) IV Intermittent every 24 hours    MEDICATIONS  (PRN):  dextrose Oral Gel 15 Gram(s) Oral once PRN Blood Glucose LESS THAN 70 milliGRAM(s)/deciliter      New X-rays reviewed:                                                                                  ECHO

## 2024-10-15 LAB
A1C WITH ESTIMATED AVERAGE GLUCOSE RESULT: 7.2 % — HIGH (ref 4–5.6)
ALBUMIN SERPL ELPH-MCNC: 2.6 G/DL — LOW (ref 3.5–5.2)
ALP SERPL-CCNC: 126 U/L — HIGH (ref 30–115)
ALT FLD-CCNC: 12 U/L — SIGNIFICANT CHANGE UP (ref 0–41)
ANION GAP SERPL CALC-SCNC: 10 MMOL/L — SIGNIFICANT CHANGE UP (ref 7–14)
APTT BLD: 26.9 SEC — LOW (ref 27–39.2)
AST SERPL-CCNC: 17 U/L — SIGNIFICANT CHANGE UP (ref 0–41)
BASOPHILS # BLD AUTO: 0.02 K/UL — SIGNIFICANT CHANGE UP (ref 0–0.2)
BASOPHILS NFR BLD AUTO: 0.1 % — SIGNIFICANT CHANGE UP (ref 0–1)
BILIRUB SERPL-MCNC: 0.7 MG/DL — SIGNIFICANT CHANGE UP (ref 0.2–1.2)
BUN SERPL-MCNC: 24 MG/DL — HIGH (ref 10–20)
CALCIUM SERPL-MCNC: 8.7 MG/DL — SIGNIFICANT CHANGE UP (ref 8.4–10.5)
CHLORIDE SERPL-SCNC: 99 MMOL/L — SIGNIFICANT CHANGE UP (ref 98–110)
CO2 SERPL-SCNC: 22 MMOL/L — SIGNIFICANT CHANGE UP (ref 17–32)
CREAT SERPL-MCNC: 0.7 MG/DL — SIGNIFICANT CHANGE UP (ref 0.7–1.5)
EGFR: 76 ML/MIN/1.73M2 — SIGNIFICANT CHANGE UP
EOSINOPHIL # BLD AUTO: 0.02 K/UL — SIGNIFICANT CHANGE UP (ref 0–0.7)
EOSINOPHIL NFR BLD AUTO: 0.1 % — SIGNIFICANT CHANGE UP (ref 0–8)
ESTIMATED AVERAGE GLUCOSE: 160 MG/DL — HIGH (ref 68–114)
GLUCOSE BLDC GLUCOMTR-MCNC: 154 MG/DL — HIGH (ref 70–99)
GLUCOSE BLDC GLUCOMTR-MCNC: 161 MG/DL — HIGH (ref 70–99)
GLUCOSE BLDC GLUCOMTR-MCNC: 169 MG/DL — HIGH (ref 70–99)
GLUCOSE BLDC GLUCOMTR-MCNC: 251 MG/DL — HIGH (ref 70–99)
GLUCOSE SERPL-MCNC: 156 MG/DL — HIGH (ref 70–99)
HCT VFR BLD CALC: 31.8 % — LOW (ref 37–47)
HCT VFR BLD CALC: 34.8 % — LOW (ref 37–47)
HGB BLD-MCNC: 10.3 G/DL — LOW (ref 12–16)
HGB BLD-MCNC: 11.1 G/DL — LOW (ref 12–16)
IMM GRANULOCYTES NFR BLD AUTO: 0.8 % — HIGH (ref 0.1–0.3)
INR BLD: 1.94 RATIO — HIGH (ref 0.65–1.3)
LYMPHOCYTES # BLD AUTO: 1.65 K/UL — SIGNIFICANT CHANGE UP (ref 1.2–3.4)
LYMPHOCYTES # BLD AUTO: 11.6 % — LOW (ref 20.5–51.1)
MCHC RBC-ENTMCNC: 30.6 PG — SIGNIFICANT CHANGE UP (ref 27–31)
MCHC RBC-ENTMCNC: 30.7 PG — SIGNIFICANT CHANGE UP (ref 27–31)
MCHC RBC-ENTMCNC: 31.9 G/DL — LOW (ref 32–37)
MCHC RBC-ENTMCNC: 32.4 G/DL — SIGNIFICANT CHANGE UP (ref 32–37)
MCV RBC AUTO: 94.4 FL — SIGNIFICANT CHANGE UP (ref 81–99)
MCV RBC AUTO: 96.1 FL — SIGNIFICANT CHANGE UP (ref 81–99)
MONOCYTES # BLD AUTO: 0.96 K/UL — HIGH (ref 0.1–0.6)
MONOCYTES NFR BLD AUTO: 6.7 % — SIGNIFICANT CHANGE UP (ref 1.7–9.3)
NEUTROPHILS # BLD AUTO: 11.52 K/UL — HIGH (ref 1.4–6.5)
NEUTROPHILS NFR BLD AUTO: 80.7 % — HIGH (ref 42.2–75.2)
NRBC # BLD: 0 /100 WBCS — SIGNIFICANT CHANGE UP (ref 0–0)
NRBC # BLD: 0 /100 WBCS — SIGNIFICANT CHANGE UP (ref 0–0)
PLATELET # BLD AUTO: 196 K/UL — SIGNIFICANT CHANGE UP (ref 130–400)
PLATELET # BLD AUTO: 211 K/UL — SIGNIFICANT CHANGE UP (ref 130–400)
PMV BLD: 12.2 FL — HIGH (ref 7.4–10.4)
PMV BLD: 12.5 FL — HIGH (ref 7.4–10.4)
POTASSIUM SERPL-MCNC: 4.6 MMOL/L — SIGNIFICANT CHANGE UP (ref 3.5–5)
POTASSIUM SERPL-SCNC: 4.6 MMOL/L — SIGNIFICANT CHANGE UP (ref 3.5–5)
PROT SERPL-MCNC: 4.6 G/DL — LOW (ref 6–8)
PROTHROM AB SERPL-ACNC: 22.3 SEC — HIGH (ref 9.95–12.87)
RBC # BLD: 3.37 M/UL — LOW (ref 4.2–5.4)
RBC # BLD: 3.62 M/UL — LOW (ref 4.2–5.4)
RBC # FLD: 13.7 % — SIGNIFICANT CHANGE UP (ref 11.5–14.5)
RBC # FLD: 13.8 % — SIGNIFICANT CHANGE UP (ref 11.5–14.5)
SODIUM SERPL-SCNC: 131 MMOL/L — LOW (ref 135–146)
WBC # BLD: 14.28 K/UL — HIGH (ref 4.8–10.8)
WBC # BLD: 15.77 K/UL — HIGH (ref 4.8–10.8)
WBC # FLD AUTO: 14.28 K/UL — HIGH (ref 4.8–10.8)
WBC # FLD AUTO: 15.77 K/UL — HIGH (ref 4.8–10.8)

## 2024-10-15 PROCEDURE — 99233 SBSQ HOSP IP/OBS HIGH 50: CPT

## 2024-10-15 RX ADMIN — PANTOPRAZOLE SODIUM 40 MILLIGRAM(S): 40 TABLET, DELAYED RELEASE ORAL at 05:11

## 2024-10-15 RX ADMIN — CHLORHEXIDINE GLUCONATE 1 APPLICATION(S): 40 SOLUTION TOPICAL at 12:39

## 2024-10-15 RX ADMIN — Medication 1: at 07:52

## 2024-10-15 RX ADMIN — IPRATROPIUM BROMIDE AND ALBUTEROL SULFATE 3 MILLILITER(S): .5; 2.5 SOLUTION RESPIRATORY (INHALATION) at 20:23

## 2024-10-15 RX ADMIN — HYDROCORTISONE 50 MILLIGRAM(S): 10 TABLET ORAL at 21:41

## 2024-10-15 RX ADMIN — POLYETHYLENE GLYCOL 3350 17 GRAM(S): 17 POWDER, FOR SOLUTION ORAL at 05:10

## 2024-10-15 RX ADMIN — APIXABAN 5 MILLIGRAM(S): 5 TABLET, FILM COATED ORAL at 05:11

## 2024-10-15 RX ADMIN — Medication 6: at 21:42

## 2024-10-15 RX ADMIN — HYDROCORTISONE 50 MILLIGRAM(S): 10 TABLET ORAL at 05:11

## 2024-10-15 RX ADMIN — Medication 2 TABLET(S): at 21:41

## 2024-10-15 RX ADMIN — Medication 20 MILLIGRAM(S): at 21:41

## 2024-10-15 RX ADMIN — IPRATROPIUM BROMIDE AND ALBUTEROL SULFATE 3 MILLILITER(S): .5; 2.5 SOLUTION RESPIRATORY (INHALATION) at 08:12

## 2024-10-15 RX ADMIN — IPRATROPIUM BROMIDE AND ALBUTEROL SULFATE 3 MILLILITER(S): .5; 2.5 SOLUTION RESPIRATORY (INHALATION) at 14:33

## 2024-10-15 RX ADMIN — HYDROCORTISONE 50 MILLIGRAM(S): 10 TABLET ORAL at 12:41

## 2024-10-15 RX ADMIN — Medication 50 MICROGRAM(S): at 05:10

## 2024-10-15 RX ADMIN — MEROPENEM 100 MILLIGRAM(S): 1 INJECTION INTRAVENOUS at 05:11

## 2024-10-15 NOTE — PROGRESS NOTE ADULT - SUBJECTIVE AND OBJECTIVE BOX
89 yo F with hx of HTN, HLD, DM II, HFrEF (LVEF 20-25% 2020), Hypothyroidism, CVA, Dementia and Recent PNA presents to ED for generalized weakness and decreased PO intake. 4 days ago, patient started having cough and decreased PO intake. CXR was done and patient was diagnosed with PNA and was started on 5 days course of Azithromycin and Cefpodoxime with Prednisone 20mg OD. Patient had positive sick contact whereby both son and daughter developed URTI symptoms few days earleir. Patient is generally not very verbal at baseline and doesn't raise complains. She is mobile with a walker and 1 person assistance.  Patient admitted to medicine for management of suspected PNA and MARTINE, she  developed resp failure with desat at rest, 2/2 submassive PE found on CTA, with RV strain (trop, elevated bpnp, hemodynamically stable) and COVID pneumonia POA (s/p s/p RDV and decadron, cefepime & doxy). IR consulted: no thrombectomy for now, received Eliquis 10mg BID through 10/10 - now on 5mg q 12hrs.  on 10/12, RR called for hypotension BP 70/40 with MAP 50, O2 Sat 84% on RA. Given 250ml NS bolus. BP improved 102/55 MAP 71. Patient became normotensive in the middle of 250 mL bolus so fluids were stopped (patient has known RV strain however was likely dry initially). Pt now on NRBT mask saturating 94. Sepsis w/u drawn and ABG, EKG, CXR ordered.  ABG confirming hypoxia (was done on NRB).  Xray essentially no change from last.  patient changed to high flow and discussed with pulm for possible worsening PE.  Recommend BNP, trop, and repeat CTA Family aware of patient's change in status, patient is DNR/DNI.  CT Angio PE ordered per pulm recommendation>> results showed no acute PE  on 10/13 Levophed uptitrated, Pt given 1g meropenem empirically for sepsis. ID consult ordered.   Patient will be upgraded to SDU for increasing levophed requirements.  Today pt is awake, minimally verbal, comfortable on NC, developed hypothermia overnight, son at the bedside.     PAST MEDICAL & SURGICAL HISTORY:  Dementia  Hypertension  Hyperlipidemia  Hypothyroidism  Diabetes    ALLERGIES:  No Known Allergies    Vital Signs Last 24 Hrs  T(C): 35.2 (15 Oct 2024 11:48), Max: 35.8 (14 Oct 2024 20:00)  T(F): 95.4 (15 Oct 2024 11:48), Max: 96.5 (14 Oct 2024 20:00)  HR: 76 (15 Oct 2024 11:48) (63 - 79)  BP: 90/51 (15 Oct 2024 11:48) (84/45 - 121/64)  BP(mean): 71 (15 Oct 2024 11:19) (60 - 89)  RR: 18 (15 Oct 2024 11:48) (18 - 20)  SpO2: 100% (15 Oct 2024 09:21) (92% - 100%)    Parameters below as of 15 Oct 2024 09:21  Patient On (Oxygen Delivery Method): nasal cannula  O2 Flow (L/min): 2      PHYSICAL EXAM:  General: awake, NAD, frail elderly female   ENT: MMM  Neck: Supple, No JVD  Lungs: Decreased BS at bases   Cardio: RRR, S1/S2, No murmurs  Abdomen: Soft abdomen, NT, ND, BS present   Extremities: no edema on LE, ecchymoses around left bit toe and above ankle, b/l wrist ecchymosis and edema     MS: awake, minimally verbal, eating with assistance, hard of hearing       LABS:                          11.1   15.77 )-----------( 211      ( 15 Oct 2024 05:59 )             34.8   10-15    131[L]  |  99  |  24[H]  ----------------------------<  156[H]  4.6   |  22  |  0.7    Ca    8.7      15 Oct 2024 05:59  Mg     2.0     10-14    TPro  4.6[L]  /  Alb  2.6[L]  /  TBili  0.7  /  DBili  x   /  AST  17  /  ALT  12  /  AlkPhos  126[H]  10-15  Culture - Blood (10.12.24 @ 21:42)   Specimen Source: .Blood BLOOD  Culture Results:   No growth at 24 hours            Procalcitonin: 0.11    Culture - Blood (10.12.24 @ 21:42)   Specimen Source: .Blood BLOOD  Culture Results:   No growth at 48 Hours  RADIOLOGY:     < from: CT Angio Chest PE Protocol w/ IV Cont (10.13.24 @ 04:59) >    IMPRESSION:    No definite acute pulmonary embolism. See discussion above.    Redemonstration of left lower lobe consolidation consistent with   pneumonia.    < end of copied text >  < from: Xray Chest 1 View-PORTABLE IMMEDIATE (Xray Chest 1 View-PORTABLE IMMEDIATE .) (10.12.24 @ 21:53) >    IMPRESSION:    Prominent pulmonary vessels may be seen with congestion.    Left basilar opacity.      < from: TTE Echo Complete w/o Contrast w/ Doppler (10.02.24 @ 07:47) >  Summary:   1. Normal global left ventricular systolic function.   2. LV Ejection Fraction by Abreu's Method with a biplane EF of 69 %.   3. Increased relative wall thickness with normal mass index consistent   with left ventricular concentric remodeling.   4. Spectral Doppler shows impaired relaxation pattern of left   ventricular myocardial filling (Grade I diastolic dysfunction).   5. Right ventricular systolic function is reduced with apical sparing   consistent with acute pulmonary embolism (Romano's sign). The RV   appears moderately dilated.   6. Sclerotic aortic valve with normal opening.   7. Mild aortic regurgitation.   8. Moderate mitral valve regurgitation.   9. Moderate tricuspid regurgitation.  10. Estimated pulmonary artery systolic pressure is 37.8 mmHg assuming a   right atrial pressure of 3 mmHg, which is consistent with borderline   pulmonary hypertension.  11. Compared to the previous study 1/27/2020, the LV function has   improved. RV function is reduced on this study.    MEDICATIONS  (STANDING):  albuterol/ipratropium for Nebulization 3 milliLiter(s) Nebulizer every 6 hours  albuterol/ipratropium for Nebulization. 9 milliLiter(s) Nebulizer once  apixaban 5 milliGRAM(s) Oral every 12 hours  aspirin  chewable 81 milliGRAM(s) Oral daily  atorvastatin 20 milliGRAM(s) Oral at bedtime  bisacodyl Suppository 10 milliGRAM(s) Rectal once  chlorhexidine 2% Cloths 1 Application(s) Topical daily  glucagon  Injectable 1 milliGRAM(s) IntraMuscular once  hydrocortisone sodium succinate Injectable 50 milliGRAM(s) IV Push every 8 hours  insulin lispro (ADMELOG) corrective regimen sliding scale   SubCutaneous at bedtime  insulin lispro (ADMELOG) corrective regimen sliding scale   SubCutaneous three times a day before meals  levothyroxine 50 MICROGram(s) Oral daily  meropenem  IVPB 1000 milliGRAM(s) IV Intermittent every 12 hours  norepinephrine Infusion 0.05 MICROgram(s)/kG/Min (4.68 mL/Hr) IV Continuous <Continuous>  pantoprazole    Tablet 40 milliGRAM(s) Oral before breakfast  polyethylene glycol 3350 17 Gram(s) Oral two times a day  senna 2 Tablet(s) Oral at bedtime

## 2024-10-15 NOTE — PROGRESS NOTE ADULT - ASSESSMENT
Anesthesia Evaluation     Patient summary reviewed   no history of anesthetic complications:   NPO Solid Status: > 8 hours  NPO Liquid Status: > 2 hours           Airway   Mallampati: II  TM distance: >3 FB  Neck ROM: full  No difficulty expected  Dental      Pulmonary     breath sounds clear to auscultation  (+) a smoker,  (-) shortness of breath, recent URI  Cardiovascular   Exercise tolerance: good (4-7 METS)    ECG reviewed  Rhythm: regular  Rate: normal    (+) hypertension  (-) past MI, dysrhythmias, angina      Neuro/Psych  (-) seizures, CVA  GI/Hepatic/Renal/Endo    (+) morbid obesity, diabetes mellitus (not monitored) type 2  (-) no renal disease    Musculoskeletal     (-) neck stiffness  Abdominal    Substance History      OB/GYN          Other   arthritis,                   Anesthesia Plan    ASA 3     MAC and regional     (MAC anesthesia discussed with patient and/or patient representative. Risks (including but not limited to intra-op awareness), benefits, and alternatives were discussed. Understanding was voiced with an agreement to proceed with a MAC technique and General as a backup option.     Digital block of long finger)    Anesthetic plan, risks, benefits, and alternatives have been provided, discussed and informed consent has been obtained with: patient.      CODE STATUS:          IMPRESSION:    Septic shock on levophed  LLL PNA / atelectasis   Possible chronic PE   Positive COVID-19 Infection   Constipation  HO HTN/HLD  HO DM II  HO HFpEF  HO Hypothyroidism  HO CVA  HO Dementia    PLAN:    CNS: Avoid depressants, MS at baseline    HEENT: Oral care    PULMONARY:  HOB @ 45 degrees.  Aspiration precautions.  Pulmonary toilet. On 2L nasal cannula, target SpO2 92-96%. Repeat CTA noted.     CARDIOVASCULAR:   MAP adequate.  ECHO noted.  Goal directed fluid resuscitation. Wean levophed as tolerated, target MAP > 60. DC flomax.     GI: GI prophylaxis. Feeding per speech and swallow.  Bowel regimen     RENAL:  Follow up lytes.  Correct as needed.    INFECTIOUS DISEASE: Follow up cultures. SP Remdesivir. Meropenem per ID. Repeat procal 0.11 noted.     HEMATOLOGICAL:  On eliquis. LE duplex noted. Monitor CBC and coags. Repeat CBC in PM.     ENDOCRINE:  Follow up FS.  Insulin protocol if needed. Stress dose steroids. TSH noted.     MUSCULOSKELETAL: bedrest Off loading;  PT OT  for now     GOC  DNR DNI   SDU for now.   Very poor prognosis overall

## 2024-10-15 NOTE — PROGRESS NOTE ADULT - SUBJECTIVE AND OBJECTIVE BOX
BRIJESH POWER  89y, Female    All available historical data reviewed    OVERNIGHT EVENTS:  no fevers  HF    ROS:  unable to obtain history secondary to patient's mental status and/or sedation     VITALS:  T(F): 95.4, Max: 96.5 (10-14-24 @ 20:00)  HR: 62  BP: 92/46  RR: 18Vital Signs Last 24 Hrs  T(C): 35.2 (15 Oct 2024 11:48), Max: 35.8 (14 Oct 2024 20:00)  T(F): 95.4 (15 Oct 2024 11:48), Max: 96.5 (14 Oct 2024 20:00)  HR: 62 (15 Oct 2024 12:36) (62 - 78)  BP: 92/46 (15 Oct 2024 12:36) (84/45 - 121/64)  BP(mean): 66 (15 Oct 2024 12:36) (60 - 89)  RR: 18 (15 Oct 2024 11:48) (18 - 20)  SpO2: 100% (15 Oct 2024 09:21) (92% - 100%)    Parameters below as of 15 Oct 2024 09:21  Patient On (Oxygen Delivery Method): nasal cannula  O2 Flow (L/min): 2      TESTS & MEASUREMENTS:                        11.1   15.77 )-----------( 211      ( 15 Oct 2024 05:59 )             34.8     10-15    131[L]  |  99  |  24[H]  ----------------------------<  156[H]  4.6   |  22  |  0.7    Ca    8.7      15 Oct 2024 05:59  Mg     2.0     10-14    TPro  4.6[L]  /  Alb  2.6[L]  /  TBili  0.7  /  DBili  x   /  AST  17  /  ALT  12  /  AlkPhos  126[H]  10-15    LIVER FUNCTIONS - ( 15 Oct 2024 05:59 )  Alb: 2.6 g/dL / Pro: 4.6 g/dL / ALK PHOS: 126 U/L / ALT: 12 U/L / AST: 17 U/L / GGT: x             Culture - Blood (collected 10-12-24 @ 21:42)  Source: .Blood BLOOD  Preliminary Report (10-15-24 @ 04:01):    No growth at 48 Hours      Urinalysis Basic - ( 15 Oct 2024 05:59 )    Color: x / Appearance: x / SG: x / pH: x  Gluc: 156 mg/dL / Ketone: x  / Bili: x / Urobili: x   Blood: x / Protein: x / Nitrite: x   Leuk Esterase: x / RBC: x / WBC x   Sq Epi: x / Non Sq Epi: x / Bacteria: x          Social History:  Tobacco Use: No  Alcohol Use: No  Drug Use: No    RADIOLOGY & ADDITIONAL TESTS:  Personal review of radiological diagnostics performed  Echo and EKG results noted when applicable.     MEDICATIONS:  albuterol/ipratropium for Nebulization 3 milliLiter(s) Nebulizer every 6 hours  albuterol/ipratropium for Nebulization. 9 milliLiter(s) Nebulizer once  apixaban 5 milliGRAM(s) Oral every 12 hours  aspirin  chewable 81 milliGRAM(s) Oral daily  atorvastatin 20 milliGRAM(s) Oral at bedtime  bisacodyl Suppository 10 milliGRAM(s) Rectal once  chlorhexidine 2% Cloths 1 Application(s) Topical daily  glucagon  Injectable 1 milliGRAM(s) IntraMuscular once  hydrocortisone sodium succinate Injectable 50 milliGRAM(s) IV Push every 8 hours  insulin lispro (ADMELOG) corrective regimen sliding scale   SubCutaneous three times a day before meals  insulin lispro (ADMELOG) corrective regimen sliding scale   SubCutaneous at bedtime  levothyroxine 50 MICROGram(s) Oral daily  meropenem  IVPB 1000 milliGRAM(s) IV Intermittent every 12 hours  norepinephrine Infusion 0.05 MICROgram(s)/kG/Min IV Continuous <Continuous>  pantoprazole    Tablet 40 milliGRAM(s) Oral before breakfast  polyethylene glycol 3350 17 Gram(s) Oral two times a day  senna 2 Tablet(s) Oral at bedtime      ANTIBIOTICS:  meropenem  IVPB 1000 milliGRAM(s) IV Intermittent every 12 hours

## 2024-10-15 NOTE — PROGRESS NOTE ADULT - ASSESSMENT
89 yo F with hx of HTN, HLD, DM II, HFrEF (LVEF 20-25% 2020), Hypothyroidism, CVA, Dementia and Recent PNA (dx 4 days ago) presents to ED for generalized weakness. Positive sick contact.     A/P   #Acute hypoxic respiratory failure 2/2 submassive PE / suspected bacterial PNA  and COVID pneumonia  POA / septic shock / chronic diastolic CHF / elevated cardiac enzymes   - Echo demonstrated Romano's sign, biomarkers elevated, c/w therapeutic Eliquis   - recent CTA is negative for PE   - monitor pulse Ox, supplement oxygen, on NC now   - fluid restriction 1200 ml in 24 hours  - intake and output monitoring, daily weight, maintain fluid balance  - c/w  stress dose steroids while on pressors   - taper off Levophed as tolerated , keep MAP above 65   - ID consulted, recommendations noted:   - leukocytosis is resolving .   - c/w  Meropenem 1 gm iv q8h for now,  procalcitonin level is low   - supportive care, aspiration precautions ,pulmonary toilet   - c/w ASA, statin, not on BB ( on vasopressor now)    - pt was consulted by cardiology  - telemetry monitoring, monitor and replete electrolytes     # Prerenal MARTINE   - resolved   - d/c  Flomax   - CT abdomen neg for hydronephrosis. Moderate distention the urinary bladder.  - Rodriges placed  (failed TOV)  - monitor BUN/cr and urine output   - avoid nephrotoxic medications     # Hyponatremia  - check urine osmolarity and urine Na  - check IVC, will give fluids if IVC collapsable   - monitor Na level     #Constipation with fecal retention  - resolved   - bowel regimen  - ensure daily BMs     #DM II   - CC diet   - monitor FS AC HS  - Insulin sliding scale    #Hypothyroidism   - c/w synthroid. TSH-1.84 10/5    DVT PPX: Eliquis  GI PPX: Protonix  DIET: pureed with thickened liquid  ACTIVITY: as tolerated    CODE STATUS: DNR/DNI    #Progress Note Handoff  Pending (specify):  c/w Meropenem,  monitor Na level,  taper off Levophed as tolerated, ID follow up, BUN/cr and urine output,  supportive care   Family discussion: I spoke with pt's children at the bedside, they agreed with a plan of care   Disposition: SDU

## 2024-10-15 NOTE — PROGRESS NOTE ADULT - ASSESSMENT
· Assessment	  89 yo F with hx of HTN, HLD, DM II, HFrEF (LVEF 20-25% 2020), Hypothyroidism, CVA, Dementia and Recent PNA (dx on CXR 4 days ago) presents to ED for generalized weakness and decreased PO intake. 4 days ago, patient started having cough and decreased PO intake. CXR was done and patient was diagnosed with PNA and was started on 5 days course of Azithromycin and Cefpodoxime with Prednisone 20mg OD. Patient had positive sick contact whereby both son and daughter developed URTI symptoms few days earleir. Patient is generally not very verbal at baseline and doesn't raise complains. She is mobile with a walker and 1 person assistance. Over past few days has been very week to ambulate. Family denies any reported chest pain, shortness of breath, fever, chills, abdominal pain, nausea, vomiting. They report loose BM over past 1 day.     IMPRESSION/RECOMMENDATIONS  Immunosuppression/Immunosenescence ( above age 60 yrs there is a exponential decline in immunity which could result in poor clinical outcomes.   Acute  illness  which poses a threat to life or bodily function without treatment   Hypoxic respiratory failure leading to HF  Severe sepsis ( on pressors, toxic encephalopathy, WBC 15.7 )  10/13 CT chest : no PE, worsening LLL opacity  WBC 15.7  Worsening bacterial PNA with severe sepsis  10/12 BCx NG    COVID 19 with mild illness- pt has physiological /non pulmonary complaints  10/2 CT with no GGO  Pt was in the early viral replicative phase based on the timeline/onset of symptoms. ( as per son Eliseo at the bedside he was NG and his wife who had a URI also tested NG ? )  S/p vaccination  s/p RDV for 3 days    Nares ORSA NG  10/2 urine for legionella ag NG  10/1,5 BCx NG  BNP 56537    -Monitor WBC .   -Off loading to prevent pressure sores and preventive measures to avoid aspiration  -Meropenem 1 gm iv q8h    Prognosis is extremely poor

## 2024-10-15 NOTE — PROGRESS NOTE ADULT - SUBJECTIVE AND OBJECTIVE BOX
BRIJESH POWER 89y Female  MRN#: [id number]   Hospital Day: 14d    HPI:  89 yo F with hx of HTN, HLD, DM II, HFrEF (LVEF 20-25% 2020), Hypothyroidism, CVA, Dementia and Recent PNA (dx on CXR 4 days ago) presents to ED for generalized weakness and decreased PO intake. 4 days ago, patient started having cough and decreased PO intake. CXR was done and patient was diagnosed with PNA and was started on 5 days course of Azithromycin and Cefpodoxime with Prednisone 20mg OD. Patient had positive sick contact whereby both son and daughter developed URTI symptoms few days earleir. Patient is generally not very verbal at baseline and doesn't raise complains. She is mobile with a walker and 1 person assistance. Over past few days has been very week to ambulate. Family denies any reported chest pain, shortness of breath, fever, chills, abdominal pain, nausea, vomiting. They report loose BM over past 1 day.     On presentation vitals:   · BP Systolic	 176 mm Hg  · BP Diastolic	84 mm Hg  · Heart Rate	76 /min  · Respiration Rate (breaths/min)	 21 /min  · Temp (F)	98.7 Degrees F  · Temp (C)	37.1 Degrees C  · Temp site	oral  · SpO2 (%)	98 %  · O2 Delivery/Oxygen Delivery Method	nasal cannula  · Oxygen Therapy Flow (L/min)	2 L/min     Labs are significant for: Leucocytosis 12K, Cr 1.8 (baseline 1), Trop 437>350, ProBNP 24K.   ECG with no ischemia changes    Imaging: CT head non con: No acute intracranial pathology. No evidence of midline shift, mass effect or intracranial hemorrhage.  CT A/P:  Mild fullness left renal pelvis. Mild left ureter with distal left ureteral stone. Stone within the urinary bladder on the left.  Significant fecal retention. Moderate distention the urinary bladder.  Small left pleural effusion with adjacent consolidation which may reflect atelectasis or infiltrate.    Patient admitted to medicine for management of suspected PNA and MARTINE.  (01 Oct 2024 23:12)      SUBJECTIVE  Patient is a 89y old Female who presents with a chief complaint of shock (14 Oct 2024 12:04)  Currently admitted to medicine with the primary diagnosis of Pneumonia      INTERVAL HPI AND OVERNIGHT EVENTS:  Patient was examined and seen at bedside. This morning she is resting in bed on 3L nc. BM yesterday.      OBJECTIVE  PAST MEDICAL & SURGICAL HISTORY  Dementia    Hypertension    Hyperlipidemia    Hypothyroidism    Diabetes      ALLERGIES:  No Known Allergies    MEDICATIONS:  STANDING MEDICATIONS  albuterol/ipratropium for Nebulization 3 milliLiter(s) Nebulizer every 6 hours  albuterol/ipratropium for Nebulization. 9 milliLiter(s) Nebulizer once  apixaban 5 milliGRAM(s) Oral every 12 hours  aspirin  chewable 81 milliGRAM(s) Oral daily  atorvastatin 20 milliGRAM(s) Oral at bedtime  bisacodyl Suppository 10 milliGRAM(s) Rectal once  chlorhexidine 2% Cloths 1 Application(s) Topical daily  glucagon  Injectable 1 milliGRAM(s) IntraMuscular once  hydrocortisone sodium succinate Injectable 50 milliGRAM(s) IV Push every 8 hours  insulin lispro (ADMELOG) corrective regimen sliding scale   SubCutaneous at bedtime  insulin lispro (ADMELOG) corrective regimen sliding scale   SubCutaneous three times a day before meals  levothyroxine 50 MICROGram(s) Oral daily  meropenem  IVPB 1000 milliGRAM(s) IV Intermittent every 12 hours  norepinephrine Infusion 0.05 MICROgram(s)/kG/Min IV Continuous <Continuous>  pantoprazole    Tablet 40 milliGRAM(s) Oral before breakfast  polyethylene glycol 3350 17 Gram(s) Oral two times a day  senna 2 Tablet(s) Oral at bedtime    PRN MEDICATIONS      VITAL SIGNS: Last 24 Hours  T(C): 35.2 (15 Oct 2024 11:48), Max: 35.8 (14 Oct 2024 20:00)  T(F): 95.4 (15 Oct 2024 11:48), Max: 96.5 (14 Oct 2024 20:00)  HR: 76 (15 Oct 2024 11:48) (63 - 79)  BP: 90/51 (15 Oct 2024 11:48) (84/45 - 121/64)  BP(mean): 71 (15 Oct 2024 11:19) (60 - 89)  RR: 18 (15 Oct 2024 11:48) (18 - 20)  SpO2: 100% (15 Oct 2024 09:21) (92% - 100%)    LABS:                        11.1   15.77 )-----------( 211      ( 15 Oct 2024 05:59 )             34.8     10-15    131[L]  |  99  |  24[H]  ----------------------------<  156[H]  4.6   |  22  |  0.7    Ca    8.7      15 Oct 2024 05:59  Mg     2.0     10-14    TPro  4.6[L]  /  Alb  2.6[L]  /  TBili  0.7  /  DBili  x   /  AST  17  /  ALT  12  /  AlkPhos  126[H]  10-15    PT/INR - ( 15 Oct 2024 05:59 )   PT: 22.30 sec;   INR: 1.94 ratio         PTT - ( 15 Oct 2024 05:59 )  PTT:26.9 sec  Urinalysis Basic - ( 15 Oct 2024 05:59 )    Color: x / Appearance: x / SG: x / pH: x  Gluc: 156 mg/dL / Ketone: x  / Bili: x / Urobili: x   Blood: x / Protein: x / Nitrite: x   Leuk Esterase: x / RBC: x / WBC x   Sq Epi: x / Non Sq Epi: x / Bacteria: x      Culture - Blood (collected 12 Oct 2024 21:42)  Source: .Blood BLOOD  Preliminary Report (15 Oct 2024 04:01):    No growth at 48 Hours      PHYSICAL EXAM:  CONSTITUTIONAL: No acute distress  HEAD: Atraumatic, normocephalic  EYES: EOM intact, conjunctiva and sclera clear  ENT: Supple, no masses, no thyromegaly, no bruits, no JVD; moist mucous membranes  PULMONARY: decreased b/l; no wheezes, rales, or rhonchi  CARDIOVASCULAR: Regular rate and rhythm; no murmurs, rubs, or gallops  GASTROINTESTINAL: Soft, non-tender, non-distended; bowel sounds present  MUSCULOSKELETAL: b/l hand edema  NEUROLOGY: non-focal  Skin: scattered ecchymoses    ASSESSMENT & PLAN    89 yo F with hx of HTN, HLD, DM II, HFrEF (LVEF 20-25% 2020), Hypothyroidism, CVA, Dementia and Recent PNA (dx 4 days ago) presents to ED for generalized weakness. Positive sick contact.     10/14/2024:  - WBC 15.77 from 23.66  - 3L nc  - procal 0.11  - f/u strep, legionella, sputum cx  - cw meropenem 1g iv q8h (10/13 -- )  - d/c Flomax   ------------    #Acute hypoxic respiratory failure 2/2 submassive PE / suspected bacterial PNA  and COVID pneumonia  POA / septic shock / chronic diastolic CHF / elevated cardiac enzymes   - Echo demonstrated Romano's sign, biomarkers elevated, c/w therapeutic Eliquis   - monitor pulse Ox, supplement oxygen, pt is comfortable on RA now   - fluid restriction 1200 ml in 24 hours  - i&o, daily weight, maintain fluid balance  - received stress dose steroids   - c/w Levophed, keep MAP above 65   - CTA repeat reviewed which shows improvement in terms of PE hence this is not saddle embolus causing hypotension   - appreciate ID  - wbc 23.66 > 15.77  - cw Meropenem 1 gm iv q8h (10/13 -- )  - d/c vanc  - fu procalcitonin level   - cw supportive care, aspiration precautions ,pulmonary toilet   - cw ASA, statin  - cw telemetry monitoring  - cw monitor and replete electrolytes   - Aspiration precautions  - Wean HNFC as tolerated, target SpO2 92-96%    # Prerenal MARTINE, resolved   - d/c Flomax   - CT abdomen neg for hydronephrosis. Moderate distention the urinary bladder.  - Rodriges placed  (failed TOV)  - monitor BUN/cr and urine output   - avoid nephrotoxic medications     # Hyponatremia  - check urine osmolarity and urine Na  - CW monitor Na level     #Constipation with fecal retention  - resolved   - bowel regimen  - ensure daily BMs     #DM II   - CC diet   - monitor FS AC HS  - Insulin sliding scale    #Hypothyroidism   - c/w synthroid. TSH-1.84 10/5    DVT PPX: Eliquis  GI PPX: Protonix  DIET: pureed with thickened liquid  ACTIVITY: as tolerated    CODE STATUS: DNR/DNI    Pt seen and staffed with attending physician Dr. Tan   BRIJESH POWER 89y Female  MRN#: [id number]   Hospital Day: 14d    HPI:  89 yo F with hx of HTN, HLD, DM II, HFrEF (LVEF 20-25% 2020), Hypothyroidism, CVA, Dementia and Recent PNA (dx on CXR 4 days ago) presents to ED for generalized weakness and decreased PO intake. 4 days ago, patient started having cough and decreased PO intake. CXR was done and patient was diagnosed with PNA and was started on 5 days course of Azithromycin and Cefpodoxime with Prednisone 20mg OD. Patient had positive sick contact whereby both son and daughter developed URTI symptoms few days earleir. Patient is generally not very verbal at baseline and doesn't raise complains. She is mobile with a walker and 1 person assistance. Over past few days has been very week to ambulate. Family denies any reported chest pain, shortness of breath, fever, chills, abdominal pain, nausea, vomiting. They report loose BM over past 1 day.     On presentation vitals:   · BP Systolic	 176 mm Hg  · BP Diastolic	84 mm Hg  · Heart Rate	76 /min  · Respiration Rate (breaths/min)	 21 /min  · Temp (F)	98.7 Degrees F  · Temp (C)	37.1 Degrees C  · Temp site	oral  · SpO2 (%)	98 %  · O2 Delivery/Oxygen Delivery Method	nasal cannula  · Oxygen Therapy Flow (L/min)	2 L/min     Labs are significant for: Leucocytosis 12K, Cr 1.8 (baseline 1), Trop 437>350, ProBNP 24K.   ECG with no ischemia changes    Imaging: CT head non con: No acute intracranial pathology. No evidence of midline shift, mass effect or intracranial hemorrhage.  CT A/P:  Mild fullness left renal pelvis. Mild left ureter with distal left ureteral stone. Stone within the urinary bladder on the left.  Significant fecal retention. Moderate distention the urinary bladder.  Small left pleural effusion with adjacent consolidation which may reflect atelectasis or infiltrate.    Patient admitted to medicine for management of suspected PNA and MARTINE.  (01 Oct 2024 23:12)      SUBJECTIVE  Patient is a 89y old Female who presents with a chief complaint of shock (14 Oct 2024 12:04)  Currently admitted to medicine with the primary diagnosis of Pneumonia      INTERVAL HPI AND OVERNIGHT EVENTS:  Patient was examined and seen at bedside. This morning she is resting in bed on 3L nc. BM yesterday.      OBJECTIVE  PAST MEDICAL & SURGICAL HISTORY  Dementia    Hypertension    Hyperlipidemia    Hypothyroidism    Diabetes      ALLERGIES:  No Known Allergies    MEDICATIONS:  STANDING MEDICATIONS  albuterol/ipratropium for Nebulization 3 milliLiter(s) Nebulizer every 6 hours  albuterol/ipratropium for Nebulization. 9 milliLiter(s) Nebulizer once  apixaban 5 milliGRAM(s) Oral every 12 hours  aspirin  chewable 81 milliGRAM(s) Oral daily  atorvastatin 20 milliGRAM(s) Oral at bedtime  bisacodyl Suppository 10 milliGRAM(s) Rectal once  chlorhexidine 2% Cloths 1 Application(s) Topical daily  glucagon  Injectable 1 milliGRAM(s) IntraMuscular once  hydrocortisone sodium succinate Injectable 50 milliGRAM(s) IV Push every 8 hours  insulin lispro (ADMELOG) corrective regimen sliding scale   SubCutaneous at bedtime  insulin lispro (ADMELOG) corrective regimen sliding scale   SubCutaneous three times a day before meals  levothyroxine 50 MICROGram(s) Oral daily  meropenem  IVPB 1000 milliGRAM(s) IV Intermittent every 12 hours  norepinephrine Infusion 0.05 MICROgram(s)/kG/Min IV Continuous <Continuous>  pantoprazole    Tablet 40 milliGRAM(s) Oral before breakfast  polyethylene glycol 3350 17 Gram(s) Oral two times a day  senna 2 Tablet(s) Oral at bedtime    PRN MEDICATIONS      VITAL SIGNS: Last 24 Hours  T(C): 35.2 (15 Oct 2024 11:48), Max: 35.8 (14 Oct 2024 20:00)  T(F): 95.4 (15 Oct 2024 11:48), Max: 96.5 (14 Oct 2024 20:00)  HR: 76 (15 Oct 2024 11:48) (63 - 79)  BP: 90/51 (15 Oct 2024 11:48) (84/45 - 121/64)  BP(mean): 71 (15 Oct 2024 11:19) (60 - 89)  RR: 18 (15 Oct 2024 11:48) (18 - 20)  SpO2: 100% (15 Oct 2024 09:21) (92% - 100%)    LABS:                        11.1   15.77 )-----------( 211      ( 15 Oct 2024 05:59 )             34.8     10-15    131[L]  |  99  |  24[H]  ----------------------------<  156[H]  4.6   |  22  |  0.7    Ca    8.7      15 Oct 2024 05:59  Mg     2.0     10-14    TPro  4.6[L]  /  Alb  2.6[L]  /  TBili  0.7  /  DBili  x   /  AST  17  /  ALT  12  /  AlkPhos  126[H]  10-15    PT/INR - ( 15 Oct 2024 05:59 )   PT: 22.30 sec;   INR: 1.94 ratio         PTT - ( 15 Oct 2024 05:59 )  PTT:26.9 sec  Urinalysis Basic - ( 15 Oct 2024 05:59 )    Color: x / Appearance: x / SG: x / pH: x  Gluc: 156 mg/dL / Ketone: x  / Bili: x / Urobili: x   Blood: x / Protein: x / Nitrite: x   Leuk Esterase: x / RBC: x / WBC x   Sq Epi: x / Non Sq Epi: x / Bacteria: x      Culture - Blood (collected 12 Oct 2024 21:42)  Source: .Blood BLOOD  Preliminary Report (15 Oct 2024 04:01):    No growth at 48 Hours      PHYSICAL EXAM:  CONSTITUTIONAL: No acute distress  HEAD: Atraumatic, normocephalic  EYES: EOM intact, conjunctiva and sclera clear  ENT: Supple, no masses, no thyromegaly, no bruits, no JVD; moist mucous membranes  PULMONARY: decreased b/l; no wheezes, rales, or rhonchi  CARDIOVASCULAR: Regular rate and rhythm; no murmurs, rubs, or gallops  GASTROINTESTINAL: Soft, non-tender, non-distended; bowel sounds present  MUSCULOSKELETAL: b/l hand edema  NEUROLOGY: non-focal  Skin: scattered ecchymoses    ASSESSMENT & PLAN    89 yo F with hx of HTN, HLD, DM II, HFrEF (LVEF 20-25% 2020), Hypothyroidism, CVA, Dementia and Recent PNA (dx 4 days ago) presents to ED for generalized weakness. Positive sick contact.     10/14/2024:  - WBC 15.77 from 23.66  - 3L nc  - procal 0.11  - f/u strep, legionella, sputum cx  - cw meropenem 1g iv q8h (10/13 -- )  - cw hydrocortisone 50 mg q8H (10/13 -- 17)  - d/c Flomax   ------------    #Acute hypoxic respiratory failure 2/2 submassive PE / suspected bacterial PNA  and COVID pneumonia  POA / septic shock / chronic diastolic CHF / elevated cardiac enzymes   - Echo demonstrated Romano's sign, biomarkers elevated, c/w therapeutic Eliquis   - monitor pulse Ox, supplement oxygen, pt is comfortable on RA now   - fluid restriction 1200 ml in 24 hours  - i&o, daily weight, maintain fluid balance  - received stress dose steroids  - cw hydrocortisone 50 mg q8H (10/13 -- 17)  - c/w Levophed, keep MAP above 65   - CTA repeat reviewed which shows improvement in terms of PE hence this is not saddle embolus causing hypotension   - appreciate ID  - wbc 23.66 > 15.77  - cw Meropenem 1 gm iv q8h (10/13 -- )  - d/c vanc  - fu procalcitonin level   - cw supportive care, aspiration precautions ,pulmonary toilet   - cw ASA, statin  - cw telemetry monitoring  - cw monitor and replete electrolytes   - Aspiration precautions  - Wean HNFC as tolerated, target SpO2 92-96%    # Prerenal MARTINE, resolved   - d/c Flomax   - CT abdomen neg for hydronephrosis. Moderate distention the urinary bladder.  - Rodriges placed  (failed TOV)  - monitor BUN/cr and urine output   - avoid nephrotoxic medications     # Hyponatremia  - check urine osmolarity and urine Na  - CW monitor Na level     #Constipation with fecal retention  - resolved   - bowel regimen  - ensure daily BMs     #DM II   - CC diet   - monitor FS AC HS  - Insulin sliding scale    #Hypothyroidism   - c/w synthroid. TSH-1.84 10/5    DVT PPX: Eliquis  GI PPX: Protonix  DIET: pureed with thickened liquid  ACTIVITY: as tolerated    CODE STATUS: DNR/DNI    Pt seen and staffed with attending physician Dr. Tan   BRIJESH POWER 89y Female  MRN#: [id number]   Hospital Day: 14d    HPI:  89 yo F with hx of HTN, HLD, DM II, HFrEF (LVEF 20-25% 2020), Hypothyroidism, CVA, Dementia and Recent PNA (dx on CXR 4 days ago) presents to ED for generalized weakness and decreased PO intake. 4 days ago, patient started having cough and decreased PO intake. CXR was done and patient was diagnosed with PNA and was started on 5 days course of Azithromycin and Cefpodoxime with Prednisone 20mg OD. Patient had positive sick contact whereby both son and daughter developed URTI symptoms few days earleir. Patient is generally not very verbal at baseline and doesn't raise complains. She is mobile with a walker and 1 person assistance. Over past few days has been very week to ambulate. Family denies any reported chest pain, shortness of breath, fever, chills, abdominal pain, nausea, vomiting. They report loose BM over past 1 day.     On presentation vitals:   · BP Systolic	 176 mm Hg  · BP Diastolic	84 mm Hg  · Heart Rate	76 /min  · Respiration Rate (breaths/min)	 21 /min  · Temp (F)	98.7 Degrees F  · Temp (C)	37.1 Degrees C  · Temp site	oral  · SpO2 (%)	98 %  · O2 Delivery/Oxygen Delivery Method	nasal cannula  · Oxygen Therapy Flow (L/min)	2 L/min     Labs are significant for: Leucocytosis 12K, Cr 1.8 (baseline 1), Trop 437>350, ProBNP 24K.   ECG with no ischemia changes    Imaging: CT head non con: No acute intracranial pathology. No evidence of midline shift, mass effect or intracranial hemorrhage.  CT A/P:  Mild fullness left renal pelvis. Mild left ureter with distal left ureteral stone. Stone within the urinary bladder on the left.  Significant fecal retention. Moderate distention the urinary bladder.  Small left pleural effusion with adjacent consolidation which may reflect atelectasis or infiltrate.    Patient admitted to medicine for management of suspected PNA and MARTINE.  (01 Oct 2024 23:12)      SUBJECTIVE  Patient is a 89y old Female who presents with a chief complaint of shock (14 Oct 2024 12:04)  Currently admitted to medicine with the primary diagnosis of Pneumonia      INTERVAL HPI AND OVERNIGHT EVENTS:  Patient was examined and seen at bedside. This morning she is resting in bed on 3L nc. BM yesterday.      OBJECTIVE  PAST MEDICAL & SURGICAL HISTORY  Dementia    Hypertension    Hyperlipidemia    Hypothyroidism    Diabetes      ALLERGIES:  No Known Allergies    MEDICATIONS:  STANDING MEDICATIONS  albuterol/ipratropium for Nebulization 3 milliLiter(s) Nebulizer every 6 hours  albuterol/ipratropium for Nebulization. 9 milliLiter(s) Nebulizer once  apixaban 5 milliGRAM(s) Oral every 12 hours  aspirin  chewable 81 milliGRAM(s) Oral daily  atorvastatin 20 milliGRAM(s) Oral at bedtime  bisacodyl Suppository 10 milliGRAM(s) Rectal once  chlorhexidine 2% Cloths 1 Application(s) Topical daily  glucagon  Injectable 1 milliGRAM(s) IntraMuscular once  hydrocortisone sodium succinate Injectable 50 milliGRAM(s) IV Push every 8 hours  insulin lispro (ADMELOG) corrective regimen sliding scale   SubCutaneous at bedtime  insulin lispro (ADMELOG) corrective regimen sliding scale   SubCutaneous three times a day before meals  levothyroxine 50 MICROGram(s) Oral daily  meropenem  IVPB 1000 milliGRAM(s) IV Intermittent every 12 hours  norepinephrine Infusion 0.05 MICROgram(s)/kG/Min IV Continuous <Continuous>  pantoprazole    Tablet 40 milliGRAM(s) Oral before breakfast  polyethylene glycol 3350 17 Gram(s) Oral two times a day  senna 2 Tablet(s) Oral at bedtime    PRN MEDICATIONS      VITAL SIGNS: Last 24 Hours  T(C): 35.2 (15 Oct 2024 11:48), Max: 35.8 (14 Oct 2024 20:00)  T(F): 95.4 (15 Oct 2024 11:48), Max: 96.5 (14 Oct 2024 20:00)  HR: 76 (15 Oct 2024 11:48) (63 - 79)  BP: 90/51 (15 Oct 2024 11:48) (84/45 - 121/64)  BP(mean): 71 (15 Oct 2024 11:19) (60 - 89)  RR: 18 (15 Oct 2024 11:48) (18 - 20)  SpO2: 100% (15 Oct 2024 09:21) (92% - 100%)    LABS:                        11.1   15.77 )-----------( 211      ( 15 Oct 2024 05:59 )             34.8     10-15    131[L]  |  99  |  24[H]  ----------------------------<  156[H]  4.6   |  22  |  0.7    Ca    8.7      15 Oct 2024 05:59  Mg     2.0     10-14    TPro  4.6[L]  /  Alb  2.6[L]  /  TBili  0.7  /  DBili  x   /  AST  17  /  ALT  12  /  AlkPhos  126[H]  10-15    PT/INR - ( 15 Oct 2024 05:59 )   PT: 22.30 sec;   INR: 1.94 ratio         PTT - ( 15 Oct 2024 05:59 )  PTT:26.9 sec  Urinalysis Basic - ( 15 Oct 2024 05:59 )    Color: x / Appearance: x / SG: x / pH: x  Gluc: 156 mg/dL / Ketone: x  / Bili: x / Urobili: x   Blood: x / Protein: x / Nitrite: x   Leuk Esterase: x / RBC: x / WBC x   Sq Epi: x / Non Sq Epi: x / Bacteria: x      Culture - Blood (collected 12 Oct 2024 21:42)  Source: .Blood BLOOD  Preliminary Report (15 Oct 2024 04:01):    No growth at 48 Hours      PHYSICAL EXAM:  CONSTITUTIONAL: No acute distress  HEAD: Atraumatic, normocephalic  EYES: EOM intact, conjunctiva and sclera clear  ENT: Supple, no masses, no thyromegaly, no bruits, no JVD; moist mucous membranes  PULMONARY: decreased b/l; no wheezes, rales, or rhonchi  CARDIOVASCULAR: Regular rate and rhythm; no murmurs, rubs, or gallops  GASTROINTESTINAL: Soft, non-tender, non-distended; bowel sounds present  MUSCULOSKELETAL: b/l hand edema  NEUROLOGY: non-focal  Skin: scattered ecchymoses    ASSESSMENT & PLAN    89 yo F with hx of HTN, HLD, DM II, HFrEF (LVEF 20-25% 2020), Hypothyroidism, CVA, Dementia and Recent PNA (dx 4 days ago) presents to ED for generalized weakness. Positive sick contact.     10/15/2024:  - WBC 15.77 from 23.66  - 3L nc  - procal 0.11  - f/u strep, legionella, sputum cx  - cw meropenem 1g iv q8h (10/13 -- )  - cw hydrocortisone 50 mg q8H (10/13 -- 17)  - d/c Flomax   ------------    #Acute hypoxic respiratory failure 2/2 submassive PE / suspected bacterial PNA  and COVID pneumonia  POA / septic shock / chronic diastolic CHF / elevated cardiac enzymes   - Echo demonstrated Romano's sign, biomarkers elevated, c/w therapeutic Eliquis   - monitor pulse Ox, supplement oxygen, pt is comfortable on RA now   - fluid restriction 1200 ml in 24 hours  - i&o, daily weight, maintain fluid balance  - received stress dose steroids  - cw hydrocortisone 50 mg q8H (10/13 -- 17)  - c/w Levophed, keep MAP above 65   - CTA repeat reviewed which shows improvement in terms of PE hence this is not saddle embolus causing hypotension   - appreciate ID  - wbc 23.66 > 15.77  - cw Meropenem 1 gm iv q8h (10/13 -- )  - d/c vanc  - fu procalcitonin level   - cw supportive care, aspiration precautions ,pulmonary toilet   - cw ASA, statin  - cw telemetry monitoring  - cw monitor and replete electrolytes   - Aspiration precautions  - Wean HNFC as tolerated, target SpO2 92-96%    # Prerenal MARTINE, resolved   - d/c Flomax   - CT abdomen neg for hydronephrosis. Moderate distention the urinary bladder.  - Rodriges placed  (failed TOV)  - monitor BUN/cr and urine output   - avoid nephrotoxic medications     # Hyponatremia  - check urine osmolarity and urine Na  - CW monitor Na level     #Constipation with fecal retention  - resolved   - bowel regimen  - ensure daily BMs     #DM II   - CC diet   - monitor FS AC HS  - Insulin sliding scale    #Hypothyroidism   - c/w synthroid. TSH-1.84 10/5    DVT PPX: Eliquis  GI PPX: Protonix  DIET: pureed with thickened liquid  ACTIVITY: as tolerated    CODE STATUS: DNR/DNI    Pt seen and staffed with attending physician Dr. Tan

## 2024-10-15 NOTE — PROGRESS NOTE ADULT - SUBJECTIVE AND OBJECTIVE BOX
Patient is a 89y old  Female who presents with a chief complaint of shock (14 Oct 2024 12:04)        Over Night Events:  On 2L nasal cannula. On 0.03 levophed. Afebrile.       ROS:     All ROS are negative except HPI         PHYSICAL EXAM    ICU Vital Signs Last 24 Hrs  T(C): 35.2 (15 Oct 2024 04:00), Max: 35.8 (14 Oct 2024 20:00)  T(F): 95.3 (15 Oct 2024 04:00), Max: 96.5 (14 Oct 2024 20:00)  HR: 70 (15 Oct 2024 09:21) (68 - 81)  BP: 118/75 (15 Oct 2024 08:00) (95/51 - 129/55)  BP(mean): 82 (15 Oct 2024 08:00) (68 - 89)  ABP: --  ABP(mean): --  RR: 18 (15 Oct 2024 08:00) (18 - 20)  SpO2: 100% (15 Oct 2024 09:21) (92% - 100%)    O2 Parameters below as of 15 Oct 2024 09:21  Patient On (Oxygen Delivery Method): nasal cannula  O2 Flow (L/min): 2          CONSTITUTIONAL:  ill appearing     ENT:   Airway patent,   Mouth with normal mucosa.     EYES:   Pupils equal,   Round and reactive to light.    CARDIAC:   Normal rate,   Regular rhythm.    No edema    RESPIRATORY:   Bilateral BS  Normal chest expansion  Not tachypneic,  No use of accessory muscles    GASTROINTESTINAL:  Abdomen soft,   Non-tender,   No guarding,   + BS    MUSCULOSKELETAL:   Range of motion is limited,  No clubbing, cyanosis    NEUROLOGICAL:   Alert   Follows commands intermittently    SKIN:   Skin normal color for race,   Warm and dry   UE ecchymosis     10-14-24 @ 07:01  -  10-15-24 @ 07:00  --------------------------------------------------------  IN:    Norepinephrine: 71.6 mL  Total IN: 71.6 mL    OUT:    Indwelling Catheter - Urethral (mL): 500 mL  Total OUT: 500 mL    Total NET: -428.4 mL          LABS:                            11.1   15.77 )-----------( 211      ( 15 Oct 2024 05:59 )             34.8                                               10-15    131[L]  |  99  |  24[H]  ----------------------------<  156[H]  4.6   |  22  |  0.7    Ca    8.7      15 Oct 2024 05:59  Mg     2.0     10-14    TPro  4.6[L]  /  Alb  2.6[L]  /  TBili  0.7  /  DBili  x   /  AST  17  /  ALT  12  /  AlkPhos  126[H]  10-15      PT/INR - ( 15 Oct 2024 05:59 )   PT: 22.30 sec;   INR: 1.94 ratio         PTT - ( 15 Oct 2024 05:59 )  PTT:26.9 sec                                       Urinalysis Basic - ( 15 Oct 2024 05:59 )    Color: x / Appearance: x / SG: x / pH: x  Gluc: 156 mg/dL / Ketone: x  / Bili: x / Urobili: x   Blood: x / Protein: x / Nitrite: x   Leuk Esterase: x / RBC: x / WBC x   Sq Epi: x / Non Sq Epi: x / Bacteria: x                                                  LIVER FUNCTIONS - ( 15 Oct 2024 05:59 )  Alb: 2.6 g/dL / Pro: 4.6 g/dL / ALK PHOS: 126 U/L / ALT: 12 U/L / AST: 17 U/L / GGT: x                                                  Culture - Blood (collected 12 Oct 2024 21:42)  Source: .Blood BLOOD  Preliminary Report (15 Oct 2024 04:01):    No growth at 48 Hours                                                                                           MEDICATIONS  (STANDING):  albuterol/ipratropium for Nebulization 3 milliLiter(s) Nebulizer every 6 hours  albuterol/ipratropium for Nebulization. 9 milliLiter(s) Nebulizer once  apixaban 5 milliGRAM(s) Oral every 12 hours  aspirin  chewable 81 milliGRAM(s) Oral daily  atorvastatin 20 milliGRAM(s) Oral at bedtime  bisacodyl Suppository 10 milliGRAM(s) Rectal once  chlorhexidine 2% Cloths 1 Application(s) Topical daily  glucagon  Injectable 1 milliGRAM(s) IntraMuscular once  hydrocortisone sodium succinate Injectable 50 milliGRAM(s) IV Push every 8 hours  insulin lispro (ADMELOG) corrective regimen sliding scale   SubCutaneous three times a day before meals  insulin lispro (ADMELOG) corrective regimen sliding scale   SubCutaneous at bedtime  levothyroxine 50 MICROGram(s) Oral daily  meropenem  IVPB 1000 milliGRAM(s) IV Intermittent every 12 hours  norepinephrine Infusion 0.05 MICROgram(s)/kG/Min (4.68 mL/Hr) IV Continuous <Continuous>  pantoprazole    Tablet 40 milliGRAM(s) Oral before breakfast  polyethylene glycol 3350 17 Gram(s) Oral two times a day  senna 2 Tablet(s) Oral at bedtime  tamsulosin 0.4 milliGRAM(s) Oral at bedtime    MEDICATIONS  (PRN):      New X-rays reviewed:                                                                                  ECHO

## 2024-10-16 LAB
ALBUMIN SERPL ELPH-MCNC: 2.9 G/DL — LOW (ref 3.5–5.2)
ALP SERPL-CCNC: 129 U/L — HIGH (ref 30–115)
ALT FLD-CCNC: 13 U/L — SIGNIFICANT CHANGE UP (ref 0–41)
ANION GAP SERPL CALC-SCNC: 15 MMOL/L — HIGH (ref 7–14)
ANION GAP SERPL CALC-SCNC: 8 MMOL/L — SIGNIFICANT CHANGE UP (ref 7–14)
AST SERPL-CCNC: 25 U/L — SIGNIFICANT CHANGE UP (ref 0–41)
BASOPHILS # BLD AUTO: 0.03 K/UL — SIGNIFICANT CHANGE UP (ref 0–0.2)
BASOPHILS NFR BLD AUTO: 0.2 % — SIGNIFICANT CHANGE UP (ref 0–1)
BILIRUB SERPL-MCNC: 0.8 MG/DL — SIGNIFICANT CHANGE UP (ref 0.2–1.2)
BUN SERPL-MCNC: 26 MG/DL — HIGH (ref 10–20)
BUN SERPL-MCNC: 27 MG/DL — HIGH (ref 10–20)
CALCIUM SERPL-MCNC: 7.5 MG/DL — LOW (ref 8.4–10.5)
CALCIUM SERPL-MCNC: 8.7 MG/DL — SIGNIFICANT CHANGE UP (ref 8.4–10.5)
CHLORIDE SERPL-SCNC: 100 MMOL/L — SIGNIFICANT CHANGE UP (ref 98–110)
CHLORIDE SERPL-SCNC: 101 MMOL/L — SIGNIFICANT CHANGE UP (ref 98–110)
CO2 SERPL-SCNC: 18 MMOL/L — SIGNIFICANT CHANGE UP (ref 17–32)
CO2 SERPL-SCNC: 22 MMOL/L — SIGNIFICANT CHANGE UP (ref 17–32)
CREAT SERPL-MCNC: 0.8 MG/DL — SIGNIFICANT CHANGE UP (ref 0.7–1.5)
CREAT SERPL-MCNC: 0.9 MG/DL — SIGNIFICANT CHANGE UP (ref 0.7–1.5)
EGFR: 56 ML/MIN/1.73M2 — LOW
EGFR: 65 ML/MIN/1.73M2 — SIGNIFICANT CHANGE UP
EOSINOPHIL # BLD AUTO: 0.02 K/UL — SIGNIFICANT CHANGE UP (ref 0–0.7)
EOSINOPHIL NFR BLD AUTO: 0.1 % — SIGNIFICANT CHANGE UP (ref 0–8)
GLUCOSE BLDC GLUCOMTR-MCNC: 175 MG/DL — HIGH (ref 70–99)
GLUCOSE BLDC GLUCOMTR-MCNC: 203 MG/DL — HIGH (ref 70–99)
GLUCOSE BLDC GLUCOMTR-MCNC: 250 MG/DL — HIGH (ref 70–99)
GLUCOSE BLDC GLUCOMTR-MCNC: 252 MG/DL — HIGH (ref 70–99)
GLUCOSE SERPL-MCNC: 149 MG/DL — HIGH (ref 70–99)
GLUCOSE SERPL-MCNC: 228 MG/DL — HIGH (ref 70–99)
HCT VFR BLD CALC: 34.9 % — LOW (ref 37–47)
HGB BLD-MCNC: 11.6 G/DL — LOW (ref 12–16)
IMM GRANULOCYTES NFR BLD AUTO: 1 % — HIGH (ref 0.1–0.3)
LYMPHOCYTES # BLD AUTO: 1.42 K/UL — SIGNIFICANT CHANGE UP (ref 1.2–3.4)
LYMPHOCYTES # BLD AUTO: 8 % — LOW (ref 20.5–51.1)
MCHC RBC-ENTMCNC: 31.4 PG — HIGH (ref 27–31)
MCHC RBC-ENTMCNC: 33.2 G/DL — SIGNIFICANT CHANGE UP (ref 32–37)
MCV RBC AUTO: 94.6 FL — SIGNIFICANT CHANGE UP (ref 81–99)
MONOCYTES # BLD AUTO: 1.12 K/UL — HIGH (ref 0.1–0.6)
MONOCYTES NFR BLD AUTO: 6.3 % — SIGNIFICANT CHANGE UP (ref 1.7–9.3)
NEUTROPHILS # BLD AUTO: 14.92 K/UL — HIGH (ref 1.4–6.5)
NEUTROPHILS NFR BLD AUTO: 84.4 % — HIGH (ref 42.2–75.2)
NRBC # BLD: 0 /100 WBCS — SIGNIFICANT CHANGE UP (ref 0–0)
PLATELET # BLD AUTO: 190 K/UL — SIGNIFICANT CHANGE UP (ref 130–400)
PMV BLD: 12.6 FL — HIGH (ref 7.4–10.4)
POTASSIUM SERPL-MCNC: 4.7 MMOL/L — SIGNIFICANT CHANGE UP (ref 3.5–5)
POTASSIUM SERPL-MCNC: 5.4 MMOL/L — HIGH (ref 3.5–5)
POTASSIUM SERPL-SCNC: 4.7 MMOL/L — SIGNIFICANT CHANGE UP (ref 3.5–5)
POTASSIUM SERPL-SCNC: 5.4 MMOL/L — HIGH (ref 3.5–5)
PROT SERPL-MCNC: 5.1 G/DL — LOW (ref 6–8)
RBC # BLD: 3.69 M/UL — LOW (ref 4.2–5.4)
RBC # FLD: 13.9 % — SIGNIFICANT CHANGE UP (ref 11.5–14.5)
SODIUM SERPL-SCNC: 131 MMOL/L — LOW (ref 135–146)
SODIUM SERPL-SCNC: 133 MMOL/L — LOW (ref 135–146)
WBC # BLD: 17.69 K/UL — HIGH (ref 4.8–10.8)
WBC # FLD AUTO: 17.69 K/UL — HIGH (ref 4.8–10.8)

## 2024-10-16 PROCEDURE — 71045 X-RAY EXAM CHEST 1 VIEW: CPT | Mod: 26

## 2024-10-16 PROCEDURE — 99233 SBSQ HOSP IP/OBS HIGH 50: CPT

## 2024-10-16 PROCEDURE — 99222 1ST HOSP IP/OBS MODERATE 55: CPT

## 2024-10-16 RX ORDER — HYDROCORTISONE 10 MG/1
50 TABLET ORAL EVERY 12 HOURS
Refills: 0 | Status: DISCONTINUED | OUTPATIENT
Start: 2024-10-16 | End: 2024-10-18

## 2024-10-16 RX ORDER — MIDODRINE HYDROCHLORIDE 2.5 MG/1
5 TABLET ORAL EVERY 8 HOURS
Refills: 0 | Status: DISCONTINUED | OUTPATIENT
Start: 2024-10-16 | End: 2024-10-25

## 2024-10-16 RX ORDER — SODIUM ZIRCONIUM CYCLOSILICATE 10 G/10G
10 POWDER, FOR SUSPENSION ORAL ONCE
Refills: 0 | Status: COMPLETED | OUTPATIENT
Start: 2024-10-16 | End: 2024-10-16

## 2024-10-16 RX ADMIN — PANTOPRAZOLE SODIUM 40 MILLIGRAM(S): 40 TABLET, DELAYED RELEASE ORAL at 05:07

## 2024-10-16 RX ADMIN — IPRATROPIUM BROMIDE AND ALBUTEROL SULFATE 3 MILLILITER(S): .5; 2.5 SOLUTION RESPIRATORY (INHALATION) at 09:38

## 2024-10-16 RX ADMIN — POLYETHYLENE GLYCOL 3350 17 GRAM(S): 17 POWDER, FOR SOLUTION ORAL at 05:07

## 2024-10-16 RX ADMIN — Medication 81 MILLIGRAM(S): at 11:41

## 2024-10-16 RX ADMIN — CHLORHEXIDINE GLUCONATE 1 APPLICATION(S): 40 SOLUTION TOPICAL at 11:42

## 2024-10-16 RX ADMIN — Medication 2: at 17:07

## 2024-10-16 RX ADMIN — Medication 2 TABLET(S): at 21:04

## 2024-10-16 RX ADMIN — Medication 4: at 21:05

## 2024-10-16 RX ADMIN — IPRATROPIUM BROMIDE AND ALBUTEROL SULFATE 3 MILLILITER(S): .5; 2.5 SOLUTION RESPIRATORY (INHALATION) at 20:38

## 2024-10-16 RX ADMIN — MIDODRINE HYDROCHLORIDE 5 MILLIGRAM(S): 2.5 TABLET ORAL at 14:33

## 2024-10-16 RX ADMIN — Medication 20 MILLIGRAM(S): at 21:05

## 2024-10-16 RX ADMIN — MIDODRINE HYDROCHLORIDE 5 MILLIGRAM(S): 2.5 TABLET ORAL at 09:42

## 2024-10-16 RX ADMIN — POLYETHYLENE GLYCOL 3350 17 GRAM(S): 17 POWDER, FOR SOLUTION ORAL at 17:09

## 2024-10-16 RX ADMIN — Medication 50 MICROGRAM(S): at 05:07

## 2024-10-16 RX ADMIN — MEROPENEM 100 MILLIGRAM(S): 1 INJECTION INTRAVENOUS at 05:06

## 2024-10-16 RX ADMIN — Medication 1: at 08:14

## 2024-10-16 RX ADMIN — APIXABAN 5 MILLIGRAM(S): 5 TABLET, FILM COATED ORAL at 05:07

## 2024-10-16 RX ADMIN — SODIUM ZIRCONIUM CYCLOSILICATE 10 GRAM(S): 10 POWDER, FOR SUSPENSION ORAL at 09:42

## 2024-10-16 RX ADMIN — APIXABAN 5 MILLIGRAM(S): 5 TABLET, FILM COATED ORAL at 17:08

## 2024-10-16 RX ADMIN — MIDODRINE HYDROCHLORIDE 5 MILLIGRAM(S): 2.5 TABLET ORAL at 21:04

## 2024-10-16 RX ADMIN — MEROPENEM 100 MILLIGRAM(S): 1 INJECTION INTRAVENOUS at 17:08

## 2024-10-16 RX ADMIN — HYDROCORTISONE 50 MILLIGRAM(S): 10 TABLET ORAL at 05:08

## 2024-10-16 RX ADMIN — Medication 3: at 11:42

## 2024-10-16 RX ADMIN — HYDROCORTISONE 50 MILLIGRAM(S): 10 TABLET ORAL at 17:08

## 2024-10-16 NOTE — PROGRESS NOTE ADULT - SUBJECTIVE AND OBJECTIVE BOX
Patient is a 89y old  Female who presents with a chief complaint of shock (15 Oct 2024 12:28)        Over Night Events:  On levophed 0.1. Afebrile. On 2L nasal cannula       ROS:     All ROS are negative except HPI         PHYSICAL EXAM    ICU Vital Signs Last 24 Hrs  T(C): 36.1 (16 Oct 2024 07:59), Max: 36.4 (16 Oct 2024 00:00)  T(F): 96.9 (16 Oct 2024 07:59), Max: 97.5 (16 Oct 2024 00:00)  HR: 82 (16 Oct 2024 07:59) (61 - 92)  BP: 140/63 (16 Oct 2024 07:59) (87/45 - 140/63)  BP(mean): 91 (16 Oct 2024 07:59) (63 - 91)  ABP: --  ABP(mean): --  RR: 20 (16 Oct 2024 07:59) (18 - 20)  SpO2: 100% (16 Oct 2024 07:59) (95% - 100%)    O2 Parameters below as of 16 Oct 2024 07:59  Patient On (Oxygen Delivery Method): nasal cannula            CONSTITUTIONAL:  in NAD    ENT:   Airway patent,   Mouth with normal mucosa.     EYES:   Pupils equal,   Round and reactive to light.    CARDIAC:   Normal rate,   Regular rhythm.    No edema    RESPIRATORY:   No wheezing  Bilateral BS  Normal chest expansion  Not tachypneic,  No use of accessory muscles    GASTROINTESTINAL:  Abdomen soft,   Non-tender,   No guarding,     MUSCULOSKELETAL:   Range of motion is limited,  No clubbing, cyanosis    NEUROLOGICAL:   Alert  intermittently follows commands     SKIN:   UE ecchymosis     10-15-24 @ 07:01  -  10-16-24 @ 07:00  --------------------------------------------------------  IN:    Norepinephrine: 91.6 mL  Total IN: 91.6 mL    OUT:    Indwelling Catheter - Urethral (mL): 320 mL  Total OUT: 320 mL    Total NET: -228.4 mL          LABS:                            11.6   17.69 )-----------( 190      ( 16 Oct 2024 04:54 )             34.9                                               10-16    133[L]  |  100  |  26[H]  ----------------------------<  149[H]  5.4[H]   |  18  |  0.9    Ca    8.7      16 Oct 2024 04:54    TPro  5.1[L]  /  Alb  2.9[L]  /  TBili  0.8  /  DBili  x   /  AST  25  /  ALT  13  /  AlkPhos  129[H]  10-16      PT/INR - ( 15 Oct 2024 05:59 )   PT: 22.30 sec;   INR: 1.94 ratio         PTT - ( 15 Oct 2024 05:59 )  PTT:26.9 sec                                       Urinalysis Basic - ( 16 Oct 2024 04:54 )    Color: x / Appearance: x / SG: x / pH: x  Gluc: 149 mg/dL / Ketone: x  / Bili: x / Urobili: x   Blood: x / Protein: x / Nitrite: x   Leuk Esterase: x / RBC: x / WBC x   Sq Epi: x / Non Sq Epi: x / Bacteria: x                                                  LIVER FUNCTIONS - ( 16 Oct 2024 04:54 )  Alb: 2.9 g/dL / Pro: 5.1 g/dL / ALK PHOS: 129 U/L / ALT: 13 U/L / AST: 25 U/L / GGT: x                                                                                                                                       MEDICATIONS  (STANDING):  albuterol/ipratropium for Nebulization 3 milliLiter(s) Nebulizer every 6 hours  albuterol/ipratropium for Nebulization. 9 milliLiter(s) Nebulizer once  apixaban 5 milliGRAM(s) Oral every 12 hours  aspirin  chewable 81 milliGRAM(s) Oral daily  atorvastatin 20 milliGRAM(s) Oral at bedtime  bisacodyl Suppository 10 milliGRAM(s) Rectal once  chlorhexidine 2% Cloths 1 Application(s) Topical daily  glucagon  Injectable 1 milliGRAM(s) IntraMuscular once  hydrocortisone sodium succinate Injectable 50 milliGRAM(s) IV Push every 8 hours  insulin lispro (ADMELOG) corrective regimen sliding scale   SubCutaneous three times a day before meals  insulin lispro (ADMELOG) corrective regimen sliding scale   SubCutaneous at bedtime  levothyroxine 50 MICROGram(s) Oral daily  meropenem  IVPB 1000 milliGRAM(s) IV Intermittent every 12 hours  norepinephrine Infusion 0.05 MICROgram(s)/kG/Min (4.68 mL/Hr) IV Continuous <Continuous>  pantoprazole    Tablet 40 milliGRAM(s) Oral before breakfast  polyethylene glycol 3350 17 Gram(s) Oral two times a day  senna 2 Tablet(s) Oral at bedtime  sodium zirconium cyclosilicate 10 Gram(s) Oral once    MEDICATIONS  (PRN):      New X-rays reviewed:                                                                                  ECHO

## 2024-10-16 NOTE — PROGRESS NOTE ADULT - ASSESSMENT
IMPRESSION:    Septic shock on levophed  LLL PNA / atelectasis   Possible chronic PE   Positive COVID-19 Infection   Constipation  HO HTN/HLD  HO DM II  HO HFpEF  HO Hypothyroidism  HO CVA  HO Dementia    PLAN:    CNS: Avoid depressants, MS at baseline    HEENT: Oral care    PULMONARY:  HOB @ 45 degrees.  Aspiration precautions.  Pulmonary toilet. On 2L nasal cannula, target SpO2 92-96%. Repeat CTA noted.     CARDIOVASCULAR:   MAP adequate.  ECHO noted.  Goal directed fluid resuscitation. Wean levophed as tolerated, target MAP > 60. Midodrine 5q8h     GI: GI prophylaxis. Feeding per speech and swallow.  Bowel regimen     RENAL:  Follow up lytes.  Correct as needed.    INFECTIOUS DISEASE: Follow up cultures. SP Remdesivir. Meropenem per ID. Repeat procal 0.11 noted.     HEMATOLOGICAL:  On eliquis. LE duplex noted. Monitor CBC and coags. Leukocytosis likely secondary to steroid use.     ENDOCRINE:  Follow up FS.  Insulin protocol if needed. Decrease HC 50Q12. TSH noted.     MUSCULOSKELETAL: Off loading;  PT OT    GOC  DNR DNI   SDU for now.   Very poor prognosis overall

## 2024-10-16 NOTE — CONSULT NOTE ADULT - CONSULT REQUESTED DATE/TIME
16-Oct-2024 12:44
02-Oct-2024 10:37
06-Oct-2024 16:50
02-Oct-2024 16:33
03-Oct-2024 08:59
03-Oct-2024 09:26

## 2024-10-16 NOTE — PROGRESS NOTE ADULT - ASSESSMENT
89 yo F with hx of HTN, HLD, DM II, HFrEF (LVEF 20-25% 2020), Hypothyroidism, CVA, Dementia and Recent PNA (dx 4 days PTP) presents to ED for generalized weakness. Admitted to SDU due to septic shock due to COVID PNA and submassive PE on pressors     Acute hypoxic respiratory failure 2/2 submassive PE   suspected bacterial PNA  and COVID pneumonia   with septic shock POA  Elevated trop  - currently on 2L NC, Levophed 0.1  - taper down supplemental 02 and levophed  - keep steroids while on pressors, add midodrine 5mg Q8H  - currently on meropenem, fu ID re duration  - s/p RDV     Prerenal MARTINE - resolved   - CT abdomen neg for hydronephrosis. Moderate distention the urinary bladder.  - off flomax   - Rodriges placed  (failed TOV)  - monitor BUN/cr and urine output   - avoid nephrotoxic medications      Hyponatremia - improving - encourage PO intake, monitor Na     Constipation - resolved , monitor BM, c/w bowel regimen     DM II - CC diet , monitor FS AC HS, c/w iss     Hypothyroidism - c/w synthroid. TSH-1.84 10/5    CODE STATUS: DNR/DNI. Overall prognosis is poor, continue monitoring in SDU     Patient seen at bedside, total time spent to evaluate and treat the patient's acute illness and chronic medical conditions as well as time spent reviewing prior records, labs, radiology, documenting in electronic medical records,  discussing medical plan with   medical team was more than 52 minutes with >50% of time spent face to face with patient, discussing with patient/family as well as coordination of care

## 2024-10-16 NOTE — PROGRESS NOTE ADULT - SUBJECTIVE AND OBJECTIVE BOX
BRIJESH POWER  89y Female    CHIEF COMPLAINT:    Patient is a 89y old  Female who presents with a chief complaint of shock (15 Oct 2024 12:28)    INTERVAL HPI/OVERNIGHT EVENTS:    Patient seen and examined. No acute events overnight. Remains on Levophed 0.1    ROS: All other systems are negative.    Vital Signs:    T(F): 97 (10-16-24 @ 11:27), Max: 97.5 (10-16-24 @ 00:00)  HR: 91 (10-16-24 @ 11:27) (61 - 92)  BP: 108/55 (10-16-24 @ 11:27) (87/45 - 140/63)  RR: 20 (10-16-24 @ 11:27) (18 - 20)  SpO2: 100% (10-16-24 @ 11:27) (95% - 100%)    15 Oct 2024 07:  -  16 Oct 2024 07:00  --------------------------------------------------------  IN: 91.6 mL / OUT: 320 mL / NET: -228.4 mL    16 Oct 2024 07:01  -  16 Oct 2024 12:53  --------------------------------------------------------  IN: 50.6 mL / OUT: 0 mL / NET: 50.6 mL    Daily Weight in k.5 (16 Oct 2024 00:00)    POCT Blood Glucose.: 252 mg/dL (16 Oct 2024 11:05)  POCT Blood Glucose.: 175 mg/dL (16 Oct 2024 07:44)  POCT Blood Glucose.: 251 mg/dL (15 Oct 2024 21:03)  POCT Blood Glucose.: 154 mg/dL (15 Oct 2024 16:47)    PHYSICAL EXAM:    GENERAL:  NAD chronically ill appearing    SKIN: No rashes or lesions  HEENT: Atraumatic. Normocephalic.    NECK: Supple, No JVD.    PULMONARY: Coarse breath sounds B/L. No wheezing. No rales  CVS: Normal S1, S2. Rate and Rhythm are regular.    ABDOMEN/GI: Soft, Nontender, Nondistended   MSK:  No clubbing or cyanosis   NEUROLOGIC:  moves all extremities   PSYCH: Awake, minimally verbal     Consultant(s) Notes Reviewed:  [x ] YES  [ ] NO  Care Discussed with Consultants/Other Providers [ x] YES  [ ] NO    LABS:                        11.6   17.69 )-----------( 190      ( 16 Oct 2024 04:54 )             34.9     133[L]  |  100  |  26[H]  ----------------------------<  149[H]  5.4[H]   |  18  |  0.9    Ca    8.7      16 Oct 2024 04:54    TPro  5.1[L]  /  Alb  2.9[L]  /  TBili  0.8  /  DBili  x   /  AST  25  /  ALT  13  /  AlkPhos  129[H]  10-16    PT/INR - ( 15 Oct 2024 05:59 )   PT: 22.30 sec;   INR: 1.94 ratio       PTT - ( 15 Oct 2024 05:59 )  PTT:26.9 sec    RADIOLOGY & ADDITIONAL TESTS:  Imaging or report Personally Reviewed:  [x] YES  [ ] NO  EKG reviewed: [x] YES  [ ] NO    Medications:  Standing  albuterol/ipratropium for Nebulization 3 milliLiter(s) Nebulizer every 6 hours  albuterol/ipratropium for Nebulization. 9 milliLiter(s) Nebulizer once  apixaban 5 milliGRAM(s) Oral every 12 hours  aspirin  chewable 81 milliGRAM(s) Oral daily  atorvastatin 20 milliGRAM(s) Oral at bedtime  bisacodyl Suppository 10 milliGRAM(s) Rectal once  chlorhexidine 2% Cloths 1 Application(s) Topical daily  glucagon  Injectable 1 milliGRAM(s) IntraMuscular once  hydrocortisone sodium succinate Injectable 50 milliGRAM(s) IV Push every 12 hours  insulin lispro (ADMELOG) corrective regimen sliding scale   SubCutaneous at bedtime  insulin lispro (ADMELOG) corrective regimen sliding scale   SubCutaneous three times a day before meals  levothyroxine 50 MICROGram(s) Oral daily  meropenem  IVPB 1000 milliGRAM(s) IV Intermittent every 12 hours  midodrine 5 milliGRAM(s) Oral every 8 hours  norepinephrine Infusion 0.05 MICROgram(s)/kG/Min IV Continuous <Continuous>  pantoprazole    Tablet 40 milliGRAM(s) Oral before breakfast  polyethylene glycol 3350 17 Gram(s) Oral two times a day  senna 2 Tablet(s) Oral at bedtime    PRN Meds

## 2024-10-16 NOTE — CONSULT NOTE ADULT - SUBJECTIVE AND OBJECTIVE BOX
HPI:   Patient is a 89 yo F with hx of HTN, HLD, DM II, HFrEF (LVEF 20-25% 2020), Hypothyroidism, CVA, Dementia and Recent PNA (dx on CXR 4 days ago) presents to ED for generalized weakness and decreased PO intake. 4 days ago, patient started having cough and decreased PO intake. CXR was done and patient was diagnosed with PNA and was started on 5 days course of Azithromycin and Cefpodoxime with Prednisone 20mg OD. Patient had positive sick contact whereby both son and daughter developed URTI symptoms few days earleir. Patient is generally not very verbal at baseline and doesn't raise complains. She is mobile with a walker and 1 person assistance. Over past few days has been very week to ambulate. Family denies any reported chest pain, shortness of breath, fever, chills, abdominal pain, nausea, vomiting. They report loose BM over past 1 day.     On presentation vitals:   · BP Systolic	 176 mm Hg  · BP Diastolic	84 mm Hg  · Heart Rate	76 /min  · Respiration Rate (breaths/min)	 21 /min  · Temp (F)	98.7 Degrees F  · Temp (C)	37.1 Degrees C  · Temp site	oral  · SpO2 (%)	98 %  · O2 Delivery/Oxygen Delivery Method	nasal cannula  · Oxygen Therapy Flow (L/min)	2 L/min     Labs are significant for: Leucocytosis 12K, Cr 1.8 (baseline 1), Trop 437>350, ProBNP 24K.   ECG with no ischemia changes    Imaging: CT head non con: No acute intracranial pathology. No evidence of midline shift, mass effect or intracranial hemorrhage.  CT A/P:  Mild fullness left renal pelvis. Mild left ureter with distal left ureteral stone. Stone within the urinary bladder on the left.  Significant fecal retention. Moderate distention the urinary bladder.  Small left pleural effusion with adjacent consolidation which may reflect atelectasis or infiltrate.    Patient admitted to medicine for management of suspected PNA and MARTINE.  (01 Oct 2024 23:12)    PAST MEDICAL & SURGICAL HISTORY:  Dementia  Hypertension  Hyperlipidemia  Hypothyroidism  Diabetes    REVIEW OF SYSTEMS: Pt unable to offer    MEDICATIONS  (STANDING):  albuterol/ipratropium for Nebulization 3 milliLiter(s) Nebulizer every 6 hours  albuterol/ipratropium for Nebulization. 9 milliLiter(s) Nebulizer once  apixaban 5 milliGRAM(s) Oral every 12 hours  aspirin  chewable 81 milliGRAM(s) Oral daily  atorvastatin 20 milliGRAM(s) Oral at bedtime  bisacodyl Suppository 10 milliGRAM(s) Rectal once  chlorhexidine 2% Cloths 1 Application(s) Topical daily  glucagon  Injectable 1 milliGRAM(s) IntraMuscular once  hydrocortisone sodium succinate Injectable 50 milliGRAM(s) IV Push every 12 hours  insulin lispro (ADMELOG) corrective regimen sliding scale   SubCutaneous at bedtime  insulin lispro (ADMELOG) corrective regimen sliding scale   SubCutaneous three times a day before meals  levothyroxine 50 MICROGram(s) Oral daily  meropenem  IVPB 1000 milliGRAM(s) IV Intermittent every 12 hours  midodrine 5 milliGRAM(s) Oral every 8 hours  norepinephrine Infusion 0.05 MICROgram(s)/kG/Min (4.68 mL/Hr) IV Continuous <Continuous>  pantoprazole    Tablet 40 milliGRAM(s) Oral before breakfast  polyethylene glycol 3350 17 Gram(s) Oral two times a day  senna 2 Tablet(s) Oral at bedtime    MEDICATIONS  (PRN):      Allergies  No Known Allergies  Intolerances    SOCIAL HISTORY:  SNF    FAMILY HISTORY:     PHYSICAL EXAM:  Vital Signs Last 24 Hrs  T(C): 36.1 (16 Oct 2024 11:27), Max: 36.4 (16 Oct 2024 00:00)  T(F): 97 (16 Oct 2024 11:27), Max: 97.5 (16 Oct 2024 00:00)  HR: 91 (16 Oct 2024 11:27) (61 - 92)  BP: 108/55 (16 Oct 2024 11:27) (87/45 - 140/63)  BP(mean): 76 (16 Oct 2024 11:27) (63 - 91)  RR: 20 (16 Oct 2024 11:27) (18 - 20)  SpO2: 100% (16 Oct 2024 11:27) (95% - 100%)    Parameters below as of 16 Oct 2024 11:27  Patient On (Oxygen Delivery Method): nasal cannula    General: awake, NAD, frail   ENT: MMM  Neck: Supple, No JVD  Lungs: Decreased BS at bases   Cardio: RRR, S1/S2, No murmurs  Abdomen: Soft abdomen, NT, ND, BS present   Extremities: no edema on LE, ecchymoses around left bit toe and above ankle, b/l wrist ecchymosis and edema     MS: awake, minimally verbal, eating with assistance, hard of hearing   Skin : pressure Injury     LABS/ CULTURES/ RADIOLOGY:                        11.6   17.69 )-----------( 190      ( 16 Oct 2024 04:54 )             34.9       133  |  100  |  26  ----------------------------<  149      [10-16-24 @ 04:54]  5.4   |  18  |  0.9        Ca     8.7     [10-16-24 @ 04:54]    TPro  5.1  /  Alb  2.9  /  TBili  0.8  /  DBili  x   /  AST  25  /  ALT  13  /  AlkPhos  129  [10-16-24 @ 04:54]    PT/INR: PT 22.30, INR 1.94       [10-15-24 @ 05:59]  PTT: 26.9       [10-15-24 @ 05:59]          Culture - Blood (collected 10-12-24 @ 21:42)  Source: .Blood BLOOD  Preliminary Report (10-16-24 @ 04:00):    No growth at 72 Hours

## 2024-10-16 NOTE — CONSULT NOTE ADULT - ASSESSMENT
High risk for pressure injury development or progression   Skin assessed- B/l heel blanchable redness   Wound #1  Type and location :  Evolving deep tissue pressure injury to sacrococcygeal   Size: 8x5  Tissue Description :  dark red skin tissue   Wound Exudate : None    Wound Edge:  irregular   Periwound Condition : Macerated             Woun/SKin Recs  Clean wound with soap and  water, pat dry apply triad with Allevyn foam dressing   Pressure  injury  preventive  measures  Skin  and incontince care   Assess wound and inform primary provider of any changes   Case discussed with primary Rn  Wound/ ostomy specialist  to f/u as needed     Offloading: [x ] Frequent position changes [ ] Devices/Equipment  Cleansing: [ ] Saline [x ] Soap/Water [ ] Other: ______  Topicals: [x ] Barrier Cream [ ] Antimicrobial [ ] Enzymatic Wound Debridement [x] Moist  wound Healig   Dressings: [ ] Dry, sterile [ ] Allevyn  Foam [ ] Absorbant Pads [ ] Collagenase    Other Recs.   Per Primary team   Total time for bedside assessment  , review of medical records  and  discussion of plan of care with primary team greater than 35 min

## 2024-10-16 NOTE — PROGRESS NOTE ADULT - SUBJECTIVE AND OBJECTIVE BOX
BRIJESH POWER 89y Female  MRN#: [id number]   Hospital Day: 15d    HPI:  89 yo F with hx of HTN, HLD, DM II, HFrEF (LVEF 20-25% 2020), Hypothyroidism, CVA, Dementia and Recent PNA (dx on CXR 4 days ago) presents to ED for generalized weakness and decreased PO intake. 4 days ago, patient started having cough and decreased PO intake. CXR was done and patient was diagnosed with PNA and was started on 5 days course of Azithromycin and Cefpodoxime with Prednisone 20mg OD. Patient had positive sick contact whereby both son and daughter developed URTI symptoms few days earleir. Patient is generally not very verbal at baseline and doesn't raise complains. She is mobile with a walker and 1 person assistance. Over past few days has been very week to ambulate. Family denies any reported chest pain, shortness of breath, fever, chills, abdominal pain, nausea, vomiting. They report loose BM over past 1 day.     On presentation vitals:   · BP Systolic	 176 mm Hg  · BP Diastolic	84 mm Hg  · Heart Rate	76 /min  · Respiration Rate (breaths/min)	 21 /min  · Temp (F)	98.7 Degrees F  · Temp (C)	37.1 Degrees C  · Temp site	oral  · SpO2 (%)	98 %  · O2 Delivery/Oxygen Delivery Method	nasal cannula  · Oxygen Therapy Flow (L/min)	2 L/min     Labs are significant for: Leucocytosis 12K, Cr 1.8 (baseline 1), Trop 437>350, ProBNP 24K.   ECG with no ischemia changes    Imaging: CT head non con: No acute intracranial pathology. No evidence of midline shift, mass effect or intracranial hemorrhage.  CT A/P:  Mild fullness left renal pelvis. Mild left ureter with distal left ureteral stone. Stone within the urinary bladder on the left.  Significant fecal retention. Moderate distention the urinary bladder.  Small left pleural effusion with adjacent consolidation which may reflect atelectasis or infiltrate.    Patient admitted to medicine for management of suspected PNA and MARTINE.  (01 Oct 2024 23:12)      SUBJECTIVE  Patient is a 89y old Female who presents with a chief complaint of shock (15 Oct 2024 12:28)  Currently admitted to medicine with the primary diagnosis of Pneumonia      INTERVAL HPI AND OVERNIGHT EVENTS:  Patient was examined and seen at bedside. This morning she is resting comfortably in bed and reports no issues or overnight events. On 2L nc this AM.    PAST MEDICAL & SURGICAL HISTORY  Dementia    Hypertension    Hyperlipidemia    Hypothyroidism    Diabetes      ALLERGIES:  No Known Allergies    MEDICATIONS:  STANDING MEDICATIONS  albuterol/ipratropium for Nebulization 3 milliLiter(s) Nebulizer every 6 hours  albuterol/ipratropium for Nebulization. 9 milliLiter(s) Nebulizer once  apixaban 5 milliGRAM(s) Oral every 12 hours  aspirin  chewable 81 milliGRAM(s) Oral daily  atorvastatin 20 milliGRAM(s) Oral at bedtime  bisacodyl Suppository 10 milliGRAM(s) Rectal once  chlorhexidine 2% Cloths 1 Application(s) Topical daily  glucagon  Injectable 1 milliGRAM(s) IntraMuscular once  hydrocortisone sodium succinate Injectable 50 milliGRAM(s) IV Push every 12 hours  insulin lispro (ADMELOG) corrective regimen sliding scale   SubCutaneous at bedtime  insulin lispro (ADMELOG) corrective regimen sliding scale   SubCutaneous three times a day before meals  levothyroxine 50 MICROGram(s) Oral daily  meropenem  IVPB 1000 milliGRAM(s) IV Intermittent every 12 hours  midodrine 5 milliGRAM(s) Oral every 8 hours  norepinephrine Infusion 0.05 MICROgram(s)/kG/Min IV Continuous <Continuous>  pantoprazole    Tablet 40 milliGRAM(s) Oral before breakfast  polyethylene glycol 3350 17 Gram(s) Oral two times a day  senna 2 Tablet(s) Oral at bedtime    PRN MEDICATIONS      VITAL SIGNS: Last 24 Hours  T(C): 36.1 (16 Oct 2024 11:27), Max: 36.4 (16 Oct 2024 00:00)  T(F): 97 (16 Oct 2024 11:27), Max: 97.5 (16 Oct 2024 00:00)  HR: 91 (16 Oct 2024 11:27) (61 - 92)  BP: 108/55 (16 Oct 2024 11:27) (87/45 - 140/63)  BP(mean): 76 (16 Oct 2024 11:27) (63 - 91)  RR: 20 (16 Oct 2024 11:27) (18 - 20)  SpO2: 100% (16 Oct 2024 11:27) (95% - 100%)    LABS:                        11.6   17.69 )-----------( 190      ( 16 Oct 2024 04:54 )             34.9     10-16    131[L]  |  101  |  27[H]  ----------------------------<  228[H]  4.7   |  22  |  0.8    Ca    7.5[L]      16 Oct 2024 12:25    TPro  5.1[L]  /  Alb  2.9[L]  /  TBili  0.8  /  DBili  x   /  AST  25  /  ALT  13  /  AlkPhos  129[H]  10-16    PT/INR - ( 15 Oct 2024 05:59 )   PT: 22.30 sec;   INR: 1.94 ratio         PTT - ( 15 Oct 2024 05:59 )  PTT:26.9 sec  Urinalysis Basic - ( 16 Oct 2024 12:25 )    Color: x / Appearance: x / SG: x / pH: x  Gluc: 228 mg/dL / Ketone: x  / Bili: x / Urobili: x   Blood: x / Protein: x / Nitrite: x   Leuk Esterase: x / RBC: x / WBC x   Sq Epi: x / Non Sq Epi: x / Bacteria: x        PHYSICAL EXAM:  CONSTITUTIONAL: No acute distress, comfortable.  HEAD: Atraumatic, normocephalic  EYES: EOM intact, conjunctiva and sclera clear  ENT: Supple, no masses, no thyromegaly, no bruits, no JVD; moist mucous membranes  PULMONARY: Clear to auscultation bilaterally; no wheezes, rales, or rhonchi  CARDIOVASCULAR: Regular rate and rhythm; no murmurs, rubs, or gallops  GASTROINTESTINAL: Soft, non-tender, non-distended; bowel sounds present  NEUROLOGY: non-focal    ASSESSMENT & PLAN    89 yo F with hx of HTN, HLD, DM II, HFrEF (LVEF 20-25% 2020), Hypothyroidism, CVA, Dementia and Recent PNA (dx 4 days ago) presents to ED for generalized weakness. Positive sick contact.     10/16/2024:  - 2l NC this AM  - start midodrine 5 q8H  - cw meropenem 1g iv q8h (10/13 -- )  - taper hydrocortisone to 50 mg q12H (started 10/13 -- end 10/17)  ------------    #Acute hypoxic respiratory failure 2/2 submassive PE / suspected bacterial PNA  and COVID pneumonia  POA / septic shock / chronic diastolic CHF / elevated cardiac enzymes   - Echo demonstrated Romano's sign, biomarkers elevated, c/w therapeutic Eliquis   - monitor pulse Ox, supplement oxygen, pt is comfortable on RA now   - fluid restriction 1200 ml in 24 hours  - i&o, daily weight, maintain fluid balance  - received stress dose steroids  - taper hydrocortisone to 50 mg q12H (started 10/13 -- end 10/17)  - wean down Levophed, keep MAP above 65   - CTA repeat reviewed which shows improvement in terms of PE hence this is not saddle embolus causing hypotension   - appreciate ID  - cw Meropenem 1 gm iv q8h (10/13 -- )  - fu procalcitonin level   - cw supportive care, aspiration precautions ,pulmonary toilet   - cw ASA, statin  - cw telemetry monitoring  - cw monitor and replete electrolytes   - Aspiration precautions  - Wean O2 as tolerated, target SpO2 92-96%  - start midodrine 5 q8H    # Prerenal MARTINE, resolved   - d/c Flomax   - CT abdomen neg for hydronephrosis. Moderate distention the urinary bladder.  - Rodriges placed  (failed TOV)  - monitor BUN/cr and urine output   - avoid nephrotoxic medications     # Hyponatremia  - check urine osmolarity and urine Na  - CW monitor Na level     #Constipation with fecal retention  - resolved   - bowel regimen  - ensure daily BMs     #DM II   - CC diet   - monitor FS AC HS  - Insulin sliding scale    #Hypothyroidism   - c/w synthroid. TSH-1.84 10/5    DVT PPX: Eliquis  GI PPX: Protonix  DIET: pureed with thickened liquid  ACTIVITY: as tolerated    CODE STATUS: DNR/DNI    Pt seen and staffed with attending physician Dr. Vences

## 2024-10-16 NOTE — PROGRESS NOTE ADULT - SUBJECTIVE AND OBJECTIVE BOX
BRIJESH POWER  89y, Female    All available historical data reviewed    OVERNIGHT EVENTS:  no fevers  lethargic  HF  on pressors  loose BM  opes eyes    ROS:  unable to obtain history secondary to patient's mental status     VITALS:  T(F): 97, Max: 97.5 (10-16-24 @ 00:00)  HR: 91  BP: 108/55  RR: 20Vital Signs Last 24 Hrs  T(C): 36.1 (16 Oct 2024 11:27), Max: 36.4 (16 Oct 2024 00:00)  T(F): 97 (16 Oct 2024 11:27), Max: 97.5 (16 Oct 2024 00:00)  HR: 91 (16 Oct 2024 11:27) (74 - 92)  BP: 108/55 (16 Oct 2024 11:27) (94/47 - 140/63)  BP(mean): 76 (16 Oct 2024 11:27) (70 - 91)  RR: 20 (16 Oct 2024 11:27) (18 - 20)  SpO2: 100% (16 Oct 2024 11:27) (95% - 100%)    Parameters below as of 16 Oct 2024 11:27  Patient On (Oxygen Delivery Method): nasal cannula        TESTS & MEASUREMENTS:                        11.6   17.69 )-----------( 190      ( 16 Oct 2024 04:54 )             34.9     10-16    131[L]  |  101  |  27[H]  ----------------------------<  228[H]  4.7   |  22  |  0.8    Ca    7.5[L]      16 Oct 2024 12:25    TPro  5.1[L]  /  Alb  2.9[L]  /  TBili  0.8  /  DBili  x   /  AST  25  /  ALT  13  /  AlkPhos  129[H]  10-16    LIVER FUNCTIONS - ( 16 Oct 2024 04:54 )  Alb: 2.9 g/dL / Pro: 5.1 g/dL / ALK PHOS: 129 U/L / ALT: 13 U/L / AST: 25 U/L / GGT: x             Culture - Blood (collected 10-12-24 @ 21:42)  Source: .Blood BLOOD  Preliminary Report (10-16-24 @ 04:00):    No growth at 72 Hours      Urinalysis Basic - ( 16 Oct 2024 12:25 )    Color: x / Appearance: x / SG: x / pH: x  Gluc: 228 mg/dL / Ketone: x  / Bili: x / Urobili: x   Blood: x / Protein: x / Nitrite: x   Leuk Esterase: x / RBC: x / WBC x   Sq Epi: x / Non Sq Epi: x / Bacteria: x          Social History:  Tobacco Use: No  Alcohol Use: No  Drug Use: No    RADIOLOGY & ADDITIONAL TESTS:  Personal review of radiological diagnostics performed  Echo and EKG results noted when applicable.     MEDICATIONS:  albuterol/ipratropium for Nebulization 3 milliLiter(s) Nebulizer every 6 hours  albuterol/ipratropium for Nebulization. 9 milliLiter(s) Nebulizer once  apixaban 5 milliGRAM(s) Oral every 12 hours  aspirin  chewable 81 milliGRAM(s) Oral daily  atorvastatin 20 milliGRAM(s) Oral at bedtime  bisacodyl Suppository 10 milliGRAM(s) Rectal once  chlorhexidine 2% Cloths 1 Application(s) Topical daily  glucagon  Injectable 1 milliGRAM(s) IntraMuscular once  hydrocortisone sodium succinate Injectable 50 milliGRAM(s) IV Push every 12 hours  insulin lispro (ADMELOG) corrective regimen sliding scale   SubCutaneous at bedtime  insulin lispro (ADMELOG) corrective regimen sliding scale   SubCutaneous three times a day before meals  levothyroxine 50 MICROGram(s) Oral daily  meropenem  IVPB 1000 milliGRAM(s) IV Intermittent every 12 hours  midodrine 5 milliGRAM(s) Oral every 8 hours  norepinephrine Infusion 0.05 MICROgram(s)/kG/Min IV Continuous <Continuous>  pantoprazole    Tablet 40 milliGRAM(s) Oral before breakfast  polyethylene glycol 3350 17 Gram(s) Oral two times a day  senna 2 Tablet(s) Oral at bedtime      ANTIBIOTICS:  meropenem  IVPB 1000 milliGRAM(s) IV Intermittent every 12 hours

## 2024-10-16 NOTE — PROGRESS NOTE ADULT - ASSESSMENT
· Assessment	  89 yo F with hx of HTN, HLD, DM II, HFrEF (LVEF 20-25% 2020), Hypothyroidism, CVA, Dementia and Recent PNA (dx on CXR 4 days ago) presents to ED for generalized weakness and decreased PO intake. 4 days ago, patient started having cough and decreased PO intake. CXR was done and patient was diagnosed with PNA and was started on 5 days course of Azithromycin and Cefpodoxime with Prednisone 20mg OD. Patient had positive sick contact whereby both son and daughter developed URTI symptoms few days earleir. Patient is generally not very verbal at baseline and doesn't raise complains. She is mobile with a walker and 1 person assistance. Over past few days has been very week to ambulate. Family denies any reported chest pain, shortness of breath, fever, chills, abdominal pain, nausea, vomiting. They report loose BM over past 1 day.     IMPRESSION/RECOMMENDATIONS  Immunosuppression/Immunosenescence ( above age 60 yrs there is a exponential decline in immunity which could result in poor clinical outcomes.   Acute  illness  which poses a threat to life or bodily function without treatment   Hypoxic respiratory failure leading to HF  Severe sepsis ( on pressors, toxic encephalopathy, WBC 17.6 )  10/13 CT chest : no PE, worsening LLL opacity  10/16 CXR opacity LLL  WBC 17.6  10/12 BCx NG    COVID 19 with mild illness- pt has physiological /non pulmonary complaints  10/2 CT with no GGO  Pt was in the early viral replicative phase based on the timeline/onset of symptoms. ( as per son Eliseo at the bedside he was NG and his wife who had a URI also tested NG ? )  S/p vaccination  s/p RDV for 3 days    Nares ORSA NG  10/2 urine for legionella ag NG  10/1,5 BCx NG  BNP 18533    -Monitor WBC .   -Off loading to prevent pressure sores and preventive measures to avoid aspiration  -Meropenem 1 gm iv q8h started 10/13 : possibly 7 days in all

## 2024-10-17 LAB
ALBUMIN SERPL ELPH-MCNC: 2.6 G/DL — LOW (ref 3.5–5.2)
ALP SERPL-CCNC: 104 U/L — SIGNIFICANT CHANGE UP (ref 30–115)
ALT FLD-CCNC: 10 U/L — SIGNIFICANT CHANGE UP (ref 0–41)
ANION GAP SERPL CALC-SCNC: 11 MMOL/L — SIGNIFICANT CHANGE UP (ref 7–14)
AST SERPL-CCNC: 16 U/L — SIGNIFICANT CHANGE UP (ref 0–41)
BASOPHILS # BLD AUTO: 0.02 K/UL — SIGNIFICANT CHANGE UP (ref 0–0.2)
BASOPHILS NFR BLD AUTO: 0.2 % — SIGNIFICANT CHANGE UP (ref 0–1)
BILIRUB SERPL-MCNC: 0.6 MG/DL — SIGNIFICANT CHANGE UP (ref 0.2–1.2)
BUN SERPL-MCNC: 26 MG/DL — HIGH (ref 10–20)
CALCIUM SERPL-MCNC: 8.3 MG/DL — LOW (ref 8.4–10.5)
CHLORIDE SERPL-SCNC: 102 MMOL/L — SIGNIFICANT CHANGE UP (ref 98–110)
CO2 SERPL-SCNC: 23 MMOL/L — SIGNIFICANT CHANGE UP (ref 17–32)
CREAT SERPL-MCNC: 0.7 MG/DL — SIGNIFICANT CHANGE UP (ref 0.7–1.5)
EGFR: 76 ML/MIN/1.73M2 — SIGNIFICANT CHANGE UP
EOSINOPHIL # BLD AUTO: 0.02 K/UL — SIGNIFICANT CHANGE UP (ref 0–0.7)
EOSINOPHIL NFR BLD AUTO: 0.2 % — SIGNIFICANT CHANGE UP (ref 0–8)
GLUCOSE BLDC GLUCOMTR-MCNC: 117 MG/DL — HIGH (ref 70–99)
GLUCOSE BLDC GLUCOMTR-MCNC: 120 MG/DL — HIGH (ref 70–99)
GLUCOSE BLDC GLUCOMTR-MCNC: 170 MG/DL — HIGH (ref 70–99)
GLUCOSE BLDC GLUCOMTR-MCNC: 215 MG/DL — HIGH (ref 70–99)
GLUCOSE BLDC GLUCOMTR-MCNC: 227 MG/DL — HIGH (ref 70–99)
GLUCOSE SERPL-MCNC: 91 MG/DL — SIGNIFICANT CHANGE UP (ref 70–99)
HCT VFR BLD CALC: 29.6 % — LOW (ref 37–47)
HCT VFR BLD CALC: 29.8 % — LOW (ref 37–47)
HGB BLD-MCNC: 9.6 G/DL — LOW (ref 12–16)
HGB BLD-MCNC: 9.8 G/DL — LOW (ref 12–16)
IMM GRANULOCYTES NFR BLD AUTO: 0.9 % — HIGH (ref 0.1–0.3)
LYMPHOCYTES # BLD AUTO: 1.19 K/UL — LOW (ref 1.2–3.4)
LYMPHOCYTES # BLD AUTO: 9.1 % — LOW (ref 20.5–51.1)
MCHC RBC-ENTMCNC: 31 PG — SIGNIFICANT CHANGE UP (ref 27–31)
MCHC RBC-ENTMCNC: 31.7 PG — HIGH (ref 27–31)
MCHC RBC-ENTMCNC: 32.4 G/DL — SIGNIFICANT CHANGE UP (ref 32–37)
MCHC RBC-ENTMCNC: 32.9 G/DL — SIGNIFICANT CHANGE UP (ref 32–37)
MCV RBC AUTO: 95.5 FL — SIGNIFICANT CHANGE UP (ref 81–99)
MCV RBC AUTO: 96.4 FL — SIGNIFICANT CHANGE UP (ref 81–99)
MONOCYTES # BLD AUTO: 0.66 K/UL — HIGH (ref 0.1–0.6)
MONOCYTES NFR BLD AUTO: 5 % — SIGNIFICANT CHANGE UP (ref 1.7–9.3)
NEUTROPHILS # BLD AUTO: 11.13 K/UL — HIGH (ref 1.4–6.5)
NEUTROPHILS NFR BLD AUTO: 84.6 % — HIGH (ref 42.2–75.2)
NRBC # BLD: 0 /100 WBCS — SIGNIFICANT CHANGE UP (ref 0–0)
NRBC # BLD: 0 /100 WBCS — SIGNIFICANT CHANGE UP (ref 0–0)
PLATELET # BLD AUTO: 128 K/UL — LOW (ref 130–400)
PLATELET # BLD AUTO: 147 K/UL — SIGNIFICANT CHANGE UP (ref 130–400)
PMV BLD: 11.9 FL — HIGH (ref 7.4–10.4)
PMV BLD: 12.2 FL — HIGH (ref 7.4–10.4)
POTASSIUM SERPL-MCNC: 4.2 MMOL/L — SIGNIFICANT CHANGE UP (ref 3.5–5)
POTASSIUM SERPL-SCNC: 4.2 MMOL/L — SIGNIFICANT CHANGE UP (ref 3.5–5)
PROT SERPL-MCNC: 4.1 G/DL — LOW (ref 6–8)
RBC # BLD: 3.09 M/UL — LOW (ref 4.2–5.4)
RBC # BLD: 3.1 M/UL — LOW (ref 4.2–5.4)
RBC # FLD: 14.1 % — SIGNIFICANT CHANGE UP (ref 11.5–14.5)
RBC # FLD: 14.2 % — SIGNIFICANT CHANGE UP (ref 11.5–14.5)
SODIUM SERPL-SCNC: 136 MMOL/L — SIGNIFICANT CHANGE UP (ref 135–146)
WBC # BLD: 12.82 K/UL — HIGH (ref 4.8–10.8)
WBC # BLD: 13.14 K/UL — HIGH (ref 4.8–10.8)
WBC # FLD AUTO: 12.82 K/UL — HIGH (ref 4.8–10.8)
WBC # FLD AUTO: 13.14 K/UL — HIGH (ref 4.8–10.8)

## 2024-10-17 PROCEDURE — 99233 SBSQ HOSP IP/OBS HIGH 50: CPT

## 2024-10-17 RX ADMIN — MIDODRINE HYDROCHLORIDE 5 MILLIGRAM(S): 2.5 TABLET ORAL at 21:35

## 2024-10-17 RX ADMIN — CHLORHEXIDINE GLUCONATE 1 APPLICATION(S): 40 SOLUTION TOPICAL at 11:07

## 2024-10-17 RX ADMIN — MEROPENEM 100 MILLIGRAM(S): 1 INJECTION INTRAVENOUS at 17:14

## 2024-10-17 RX ADMIN — HYDROCORTISONE 50 MILLIGRAM(S): 10 TABLET ORAL at 17:12

## 2024-10-17 RX ADMIN — APIXABAN 5 MILLIGRAM(S): 5 TABLET, FILM COATED ORAL at 17:12

## 2024-10-17 RX ADMIN — MIDODRINE HYDROCHLORIDE 5 MILLIGRAM(S): 2.5 TABLET ORAL at 05:13

## 2024-10-17 RX ADMIN — IPRATROPIUM BROMIDE AND ALBUTEROL SULFATE 3 MILLILITER(S): .5; 2.5 SOLUTION RESPIRATORY (INHALATION) at 09:27

## 2024-10-17 RX ADMIN — POLYETHYLENE GLYCOL 3350 17 GRAM(S): 17 POWDER, FOR SOLUTION ORAL at 05:13

## 2024-10-17 RX ADMIN — MIDODRINE HYDROCHLORIDE 5 MILLIGRAM(S): 2.5 TABLET ORAL at 13:05

## 2024-10-17 RX ADMIN — Medication 1: at 17:11

## 2024-10-17 RX ADMIN — IPRATROPIUM BROMIDE AND ALBUTEROL SULFATE 3 MILLILITER(S): .5; 2.5 SOLUTION RESPIRATORY (INHALATION) at 13:32

## 2024-10-17 RX ADMIN — Medication 50 MICROGRAM(S): at 05:13

## 2024-10-17 RX ADMIN — POLYETHYLENE GLYCOL 3350 17 GRAM(S): 17 POWDER, FOR SOLUTION ORAL at 17:14

## 2024-10-17 RX ADMIN — Medication 4: at 21:35

## 2024-10-17 RX ADMIN — MEROPENEM 100 MILLIGRAM(S): 1 INJECTION INTRAVENOUS at 05:14

## 2024-10-17 RX ADMIN — HYDROCORTISONE 50 MILLIGRAM(S): 10 TABLET ORAL at 05:13

## 2024-10-17 RX ADMIN — Medication 81 MILLIGRAM(S): at 11:07

## 2024-10-17 RX ADMIN — PANTOPRAZOLE SODIUM 40 MILLIGRAM(S): 40 TABLET, DELAYED RELEASE ORAL at 05:13

## 2024-10-17 RX ADMIN — Medication 20 MILLIGRAM(S): at 21:36

## 2024-10-17 RX ADMIN — Medication 2 TABLET(S): at 21:36

## 2024-10-17 RX ADMIN — APIXABAN 5 MILLIGRAM(S): 5 TABLET, FILM COATED ORAL at 05:14

## 2024-10-17 RX ADMIN — IPRATROPIUM BROMIDE AND ALBUTEROL SULFATE 3 MILLILITER(S): .5; 2.5 SOLUTION RESPIRATORY (INHALATION) at 20:42

## 2024-10-17 NOTE — PROGRESS NOTE ADULT - ASSESSMENT
IMPRESSION:    Septic shock on levophed  LLL PNA / atelectasis   Possible chronic PE   Positive COVID-19 Infection   Constipation  HO HTN/HLD  HO DM II  HO HFpEF  HO Hypothyroidism  HO CVA  HO Dementia    PLAN:    CNS: Avoid depressants, MS at baseline    HEENT: Oral care    PULMONARY:  HOB @ 45 degrees.  Aspiration precautions.  Pulmonary toilet. On 2L nasal cannula, target SpO2 92-96%. Repeat CTA noted.     CARDIOVASCULAR:   MAP adequate.  ECHO noted.  Goal directed fluid resuscitation. Off levophed, CW midodrine 5q8h. Target MAP > 60.     GI: GI prophylaxis. Feeding per speech and swallow.  Bowel regimen     RENAL:  Follow up lytes.  Correct as needed.    INFECTIOUS DISEASE: Follow up cultures. SP Remdesivir. Meropenem per ID. Repeat procal 0.11 noted.     HEMATOLOGICAL:  On eliquis. LE duplex noted. Monitor CBC and coags. Leukocytosis likely secondary to steroid use. Repeat CBC in PM.      ENDOCRINE:  Follow up FS.  Insulin protocol if needed. Decrease HC 50Q12. TSH noted.     MUSCULOSKELETAL: Off loading;  PT OT    GOC  DNR DNI   SDU for now; possible PM downgrade.   Very poor prognosis overall  IMPRESSION:    Septic shock off levophed  LLL PNA / atelectasis   Possible chronic PE   Positive COVID-19 Infection   Constipation  HO HTN/HLD  HO DM II  HO HFpEF  HO Hypothyroidism  HO CVA  HO Dementia    PLAN:    CNS: Avoid depressants, MS at baseline    HEENT: Oral care    PULMONARY:  HOB @ 45 degrees.  Aspiration precautions.  Pulmonary toilet. On 2L nasal cannula, target SpO2 92-96%. Repeat CTA noted.     CARDIOVASCULAR:   MAP adequate.  ECHO noted.  Goal directed fluid resuscitation. Off levophed, CW midodrine 5q8h. Target MAP > 60.     GI: GI prophylaxis. Feeding per speech and swallow.  Bowel regimen     RENAL:  Follow up lytes.  Correct as needed.    INFECTIOUS DISEASE: Follow up cultures. SP Remdesivir. Meropenem per ID. Repeat procal 0.11 noted.     HEMATOLOGICAL:  On eliquis. LE duplex noted. Monitor CBC and coags. Leukocytosis likely secondary to steroid use. Repeat CBC in PM.      ENDOCRINE:  Follow up FS.  Insulin protocol if needed. Decrease HC 50Q12. TSH noted.     MUSCULOSKELETAL: Off loading;  PT OT    GOC  DNR DNI   SDU for now; possible PM downgrade.   Very poor prognosis overall

## 2024-10-17 NOTE — PROGRESS NOTE ADULT - SUBJECTIVE AND OBJECTIVE BOX
BRIJESH POWER  89y Female    CHIEF COMPLAINT:    Patient is a 89y old  Female who presents with a chief complaint of shock (15 Oct 2024 12:28)    INTERVAL HPI/OVERNIGHT EVENTS:    Patient seen and examined. No acute events overnight. More sleepy this AM, off levophed     ROS: All other systems are negative.    Vital Signs:    T(F): 96.5 (10-17-24 @ 08:11), Max: 97 (10-16-24 @ 11:27)  HR: 64 (10-17-24 @ 08:11) (64 - 91)  BP: 100/54 (10-17-24 @ 08:11) (93/53 - 136/61)  RR: 20 (10-17-24 @ 08:11) (20 - 20)  SpO2: 100% (10-17-24 @ 08:11) (100% - 100%)    16 Oct 2024 07:01  -  17 Oct 2024 07:00  --------------------------------------------------------  IN: 90 mL / OUT: 365 mL / NET: -275 mL    17 Oct 2024 07:01  -  17 Oct 2024 09:06  --------------------------------------------------------  IN: 0 mL / OUT: 0 mL / NET: 0 mL    Daily Weight in k (17 Oct 2024 04:18)    POCT Blood Glucose.: 117 mg/dL (17 Oct 2024 07:49)  POCT Blood Glucose.: 203 mg/dL (16 Oct 2024 20:56)  POCT Blood Glucose.: 250 mg/dL (16 Oct 2024 15:53)  POCT Blood Glucose.: 252 mg/dL (16 Oct 2024 11:05)    PHYSICAL EXAM:    GENERAL:  NAD chronically ill appearing   SKIN: No rashes or lesions  HEENT: Atraumatic. Normocephalic.   NECK: Supple, No JVD.   PULMONARY: decreased breath sounds B/L. No wheezing.    CVS: Normal S1, S2. Rate and Rhythm are regular.   ABDOMEN/GI: Soft, Nontender, Nondistended   MSK:  No clubbing or cyanosis   NEUROLOGIC: intermittently follow commands   PSYCH: Lethargic arousable     Consultant(s) Notes Reviewed:  [x ] YES  [ ] NO  Care Discussed with Consultants/Other Providers [ x] YES  [ ] NO    LABS:                        9.6    12.82 )-----------( 147      ( 17 Oct 2024 05:37 )             29.6     136  |  102  |  26[H]  ----------------------------<  91  4.2   |  23  |  0.7    Ca    8.3[L]      17 Oct 2024 05:37    TPro  4.1[L]  /  Alb  2.6[L]  /  TBili  0.6  /  DBili  x   /  AST  16  /  ALT  10  /  AlkPhos  104  10-17    RADIOLOGY & ADDITIONAL TESTS:  Imaging or report Personally Reviewed:  [x] YES  [ ] NO  EKG reviewed: [x] YES  [ ] NO    Medications:  Standing  albuterol/ipratropium for Nebulization 3 milliLiter(s) Nebulizer every 6 hours  albuterol/ipratropium for Nebulization. 9 milliLiter(s) Nebulizer once  apixaban 5 milliGRAM(s) Oral every 12 hours  aspirin  chewable 81 milliGRAM(s) Oral daily  atorvastatin 20 milliGRAM(s) Oral at bedtime  bisacodyl Suppository 10 milliGRAM(s) Rectal once  chlorhexidine 2% Cloths 1 Application(s) Topical daily  glucagon  Injectable 1 milliGRAM(s) IntraMuscular once  hydrocortisone sodium succinate Injectable 50 milliGRAM(s) IV Push every 12 hours  insulin lispro (ADMELOG) corrective regimen sliding scale   SubCutaneous three times a day before meals  insulin lispro (ADMELOG) corrective regimen sliding scale   SubCutaneous at bedtime  levothyroxine 50 MICROGram(s) Oral daily  meropenem  IVPB 1000 milliGRAM(s) IV Intermittent every 12 hours  midodrine 5 milliGRAM(s) Oral every 8 hours  pantoprazole    Tablet 40 milliGRAM(s) Oral before breakfast  polyethylene glycol 3350 17 Gram(s) Oral two times a day  senna 2 Tablet(s) Oral at bedtime    PRN Meds

## 2024-10-17 NOTE — PROGRESS NOTE ADULT - SUBJECTIVE AND OBJECTIVE BOX
BRIJESH POWER  89y, Female    All available historical data reviewed    OVERNIGHT EVENTS:  no fevers  nc    ROS:  unable to obtain history secondary to patient's mental status     VITALS:  T(F): 96.5, Max: 96.9 (10-16-24 @ 16:00)  HR: 62  BP: 111/52  RR: 20Vital Signs Last 24 Hrs  T(C): 35.8 (17 Oct 2024 08:11), Max: 36.1 (16 Oct 2024 16:00)  T(F): 96.5 (17 Oct 2024 08:11), Max: 96.9 (16 Oct 2024 16:00)  HR: 62 (17 Oct 2024 11:49) (62 - 84)  BP: 111/52 (17 Oct 2024 11:49) (93/53 - 136/61)  BP(mean): 75 (17 Oct 2024 11:49) (71 - 88)  RR: 20 (17 Oct 2024 11:49) (20 - 20)  SpO2: 100% (17 Oct 2024 11:49) (100% - 100%)    Parameters below as of 17 Oct 2024 11:49  Patient On (Oxygen Delivery Method): nasal cannula        TESTS & MEASUREMENTS:                        9.6    12.82 )-----------( 147      ( 17 Oct 2024 05:37 )             29.6     10-17    136  |  102  |  26[H]  ----------------------------<  91  4.2   |  23  |  0.7    Ca    8.3[L]      17 Oct 2024 05:37    TPro  4.1[L]  /  Alb  2.6[L]  /  TBili  0.6  /  DBili  x   /  AST  16  /  ALT  10  /  AlkPhos  104  10-17    LIVER FUNCTIONS - ( 17 Oct 2024 05:37 )  Alb: 2.6 g/dL / Pro: 4.1 g/dL / ALK PHOS: 104 U/L / ALT: 10 U/L / AST: 16 U/L / GGT: x             Culture - Blood (collected 10-12-24 @ 21:42)  Source: .Blood BLOOD  Preliminary Report (10-17-24 @ 04:01):    No growth at 4 days      Urinalysis Basic - ( 17 Oct 2024 05:37 )    Color: x / Appearance: x / SG: x / pH: x  Gluc: 91 mg/dL / Ketone: x  / Bili: x / Urobili: x   Blood: x / Protein: x / Nitrite: x   Leuk Esterase: x / RBC: x / WBC x   Sq Epi: x / Non Sq Epi: x / Bacteria: x          Social History:  Tobacco Use: No  Alcohol Use: No  Drug Use: No    RADIOLOGY & ADDITIONAL TESTS:  Personal review of radiological diagnostics performed  Echo and EKG results noted when applicable.     MEDICATIONS:  albuterol/ipratropium for Nebulization 3 milliLiter(s) Nebulizer every 6 hours  albuterol/ipratropium for Nebulization. 9 milliLiter(s) Nebulizer once  apixaban 5 milliGRAM(s) Oral every 12 hours  aspirin  chewable 81 milliGRAM(s) Oral daily  atorvastatin 20 milliGRAM(s) Oral at bedtime  bisacodyl Suppository 10 milliGRAM(s) Rectal once  chlorhexidine 2% Cloths 1 Application(s) Topical daily  glucagon  Injectable 1 milliGRAM(s) IntraMuscular once  hydrocortisone sodium succinate Injectable 50 milliGRAM(s) IV Push every 12 hours  insulin lispro (ADMELOG) corrective regimen sliding scale   SubCutaneous three times a day before meals  insulin lispro (ADMELOG) corrective regimen sliding scale   SubCutaneous at bedtime  levothyroxine 50 MICROGram(s) Oral daily  meropenem  IVPB 1000 milliGRAM(s) IV Intermittent every 12 hours  midodrine 5 milliGRAM(s) Oral every 8 hours  pantoprazole    Tablet 40 milliGRAM(s) Oral before breakfast  polyethylene glycol 3350 17 Gram(s) Oral two times a day  senna 2 Tablet(s) Oral at bedtime      ANTIBIOTICS:  meropenem  IVPB 1000 milliGRAM(s) IV Intermittent every 12 hours

## 2024-10-17 NOTE — PROGRESS NOTE ADULT - SUBJECTIVE AND OBJECTIVE BOX
Patient is a 89y old  Female who presents with a chief complaint of shock (15 Oct 2024 12:28)        Over Night Events: Off pressors this morning. Afebrile. On 2L NC.       ROS:     All ROS are negative except HPI         PHYSICAL EXAM    ICU Vital Signs Last 24 Hrs  T(C): 35.8 (17 Oct 2024 08:11), Max: 36.1 (16 Oct 2024 11:27)  T(F): 96.5 (17 Oct 2024 08:11), Max: 97 (16 Oct 2024 11:27)  HR: 64 (17 Oct 2024 08:11) (64 - 91)  BP: 100/54 (17 Oct 2024 08:11) (93/53 - 136/61)  BP(mean): 76 (17 Oct 2024 08:11) (71 - 88)  ABP: --  ABP(mean): --  RR: 20 (17 Oct 2024 08:11) (20 - 20)  SpO2: 100% (17 Oct 2024 08:11) (100% - 100%)    O2 Parameters below as of 17 Oct 2024 08:11  Patient On (Oxygen Delivery Method): nasal cannula            CONSTITUTIONAL:  in NAD    ENT:   Airway patent,   Mouth with normal mucosa.   No thrush    EYES:   Pupils equal,   Round and reactive to light.    CARDIAC:   Normal rate,   Regular rhythm.    No edema    RESPIRATORY:   Bilateral BS  Normal chest expansion  Not tachypneic,  No use of accessory muscles    GASTROINTESTINAL:  Abdomen soft,   Non-tender,   No guarding,     MUSCULOSKELETAL:   Range of motion is limited,  No clubbing, cyanosis    NEUROLOGICAL:   Alert and oriented   No motor  deficits.    SKIN:   Skin normal color for race,   Warm and dry and intact.   No evidence of rash.      10-16-24 @ 07:01  -  10-17-24 @ 07:00  --------------------------------------------------------  IN:    Norepinephrine: 90 mL  Total IN: 90 mL    OUT:    Indwelling Catheter - Urethral (mL): 365 mL  Total OUT: 365 mL    Total NET: -275 mL      10-17-24 @ 07:01  -  10-17-24 @ 09:08  --------------------------------------------------------  IN:  Total IN: 0 mL    OUT:    Oral Fluid: 0 mL  Total OUT: 0 mL    Total NET: 0 mL          LABS:                            9.6    12.82 )-----------( 147      ( 17 Oct 2024 05:37 )             29.6                                               10-17    136  |  102  |  26[H]  ----------------------------<  91  4.2   |  23  |  0.7    Ca    8.3[L]      17 Oct 2024 05:37    TPro  4.1[L]  /  Alb  2.6[L]  /  TBili  0.6  /  DBili  x   /  AST  16  /  ALT  10  /  AlkPhos  104  10-17                                             Urinalysis Basic - ( 17 Oct 2024 05:37 )    Color: x / Appearance: x / SG: x / pH: x  Gluc: 91 mg/dL / Ketone: x  / Bili: x / Urobili: x   Blood: x / Protein: x / Nitrite: x   Leuk Esterase: x / RBC: x / WBC x   Sq Epi: x / Non Sq Epi: x / Bacteria: x                                                  LIVER FUNCTIONS - ( 17 Oct 2024 05:37 )  Alb: 2.6 g/dL / Pro: 4.1 g/dL / ALK PHOS: 104 U/L / ALT: 10 U/L / AST: 16 U/L / GGT: x                                                                                                                                       MEDICATIONS  (STANDING):  albuterol/ipratropium for Nebulization 3 milliLiter(s) Nebulizer every 6 hours  albuterol/ipratropium for Nebulization. 9 milliLiter(s) Nebulizer once  apixaban 5 milliGRAM(s) Oral every 12 hours  aspirin  chewable 81 milliGRAM(s) Oral daily  atorvastatin 20 milliGRAM(s) Oral at bedtime  bisacodyl Suppository 10 milliGRAM(s) Rectal once  chlorhexidine 2% Cloths 1 Application(s) Topical daily  glucagon  Injectable 1 milliGRAM(s) IntraMuscular once  hydrocortisone sodium succinate Injectable 50 milliGRAM(s) IV Push every 12 hours  insulin lispro (ADMELOG) corrective regimen sliding scale   SubCutaneous at bedtime  insulin lispro (ADMELOG) corrective regimen sliding scale   SubCutaneous three times a day before meals  levothyroxine 50 MICROGram(s) Oral daily  meropenem  IVPB 1000 milliGRAM(s) IV Intermittent every 12 hours  midodrine 5 milliGRAM(s) Oral every 8 hours  pantoprazole    Tablet 40 milliGRAM(s) Oral before breakfast  polyethylene glycol 3350 17 Gram(s) Oral two times a day  senna 2 Tablet(s) Oral at bedtime    MEDICATIONS  (PRN):      New X-rays reviewed:                                                                                  ECHO

## 2024-10-17 NOTE — PROGRESS NOTE ADULT - ASSESSMENT
89 yo F with hx of HTN, HLD, DM II, HFrEF (LVEF 20-25% 2020), Hypothyroidism, CVA, Dementia and Recent PNA (dx 4 days PTP) presents to ED for generalized weakness. Admitted to SDU due to septic shock due to COVID PNA and submassive PE on pressors     Acute hypoxic respiratory failure 2/2 submassive PE   suspected bacterial PNA  and COVID pneumonia   with septic shock POA  Elevated trop  - currently on 2L NC, tapered off levophed early AM  - taper down supplemental 02. CW midodrine 5 q8H  - decrease HC to 25 q12H  - currently on meropenem, fu ID re duration  - s/p RDV     Prerenal MARTINE - resolved   - CT abdomen neg for hydronephrosis. Moderate distention the urinary bladder.  - off flomax   - Rodriges placed  (failed TOV)  - monitor BUN/cr and urine output   - avoid nephrotoxic medications      Hyponatremia - resolved - encourage PO intake, monitor Na     Constipation - resolved , last BM 10/16. monitor BM, c/w bowel regimen     DM II - CC diet , monitor FS AC HS, c/w iss     Hypothyroidism - c/w synthroid. TSH-1.84 10/5    CODE STATUS: DNR/DNI. Overall prognosis is poor, continue monitoring in SDU   Family updated during rounds    Patient seen at bedside, total time spent to evaluate and treat the patient's acute illness and chronic medical conditions as well as time spent reviewing prior records, labs, radiology, documenting in electronic medical records,  discussing medical plan with   medical team was more than 50 minutes with >50% of time spent face to face with patient, discussing with patient/family as well as coordination of care

## 2024-10-17 NOTE — PROGRESS NOTE ADULT - SUBJECTIVE AND OBJECTIVE BOX
BRIJESH POWER 89y Female  MRN#: [id number]   Hospital Day: 16d    HPI:  89 yo F with hx of HTN, HLD, DM II, HFrEF (LVEF 20-25% 2020), Hypothyroidism, CVA, Dementia and Recent PNA (dx on CXR 4 days ago) presents to ED for generalized weakness and decreased PO intake. 4 days ago, patient started having cough and decreased PO intake. CXR was done and patient was diagnosed with PNA and was started on 5 days course of Azithromycin and Cefpodoxime with Prednisone 20mg OD. Patient had positive sick contact whereby both son and daughter developed URTI symptoms few days earleir. Patient is generally not very verbal at baseline and doesn't raise complains. She is mobile with a walker and 1 person assistance. Over past few days has been very week to ambulate. Family denies any reported chest pain, shortness of breath, fever, chills, abdominal pain, nausea, vomiting. They report loose BM over past 1 day.     On presentation vitals:   · BP Systolic	 176 mm Hg  · BP Diastolic	84 mm Hg  · Heart Rate	76 /min  · Respiration Rate (breaths/min)	 21 /min  · Temp (F)	98.7 Degrees F  · Temp (C)	37.1 Degrees C  · Temp site	oral  · SpO2 (%)	98 %  · O2 Delivery/Oxygen Delivery Method	nasal cannula  · Oxygen Therapy Flow (L/min)	2 L/min     Labs are significant for: Leucocytosis 12K, Cr 1.8 (baseline 1), Trop 437>350, ProBNP 24K.   ECG with no ischemia changes    Imaging: CT head non con: No acute intracranial pathology. No evidence of midline shift, mass effect or intracranial hemorrhage.  CT A/P:  Mild fullness left renal pelvis. Mild left ureter with distal left ureteral stone. Stone within the urinary bladder on the left.  Significant fecal retention. Moderate distention the urinary bladder.  Small left pleural effusion with adjacent consolidation which may reflect atelectasis or infiltrate.    Patient admitted to medicine for management of suspected PNA and MARTINE.  (01 Oct 2024 23:12)      SUBJECTIVE  Patient is a 89y old Female who presents with a chief complaint of shock (15 Oct 2024 12:28)  Currently admitted to medicine with the primary diagnosis of Pneumonia      INTERVAL HPI AND OVERNIGHT EVENTS:  Patient was examined and seen at bedside. This morning she is resting comfortably in bed and reports no issues or overnight events. Weaned off levophed.      OBJECTIVE  PAST MEDICAL & SURGICAL HISTORY  Dementia    Hypertension    Hyperlipidemia    Hypothyroidism    Diabetes      ALLERGIES:  No Known Allergies    MEDICATIONS:  STANDING MEDICATIONS  albuterol/ipratropium for Nebulization 3 milliLiter(s) Nebulizer every 6 hours  albuterol/ipratropium for Nebulization. 9 milliLiter(s) Nebulizer once  apixaban 5 milliGRAM(s) Oral every 12 hours  aspirin  chewable 81 milliGRAM(s) Oral daily  atorvastatin 20 milliGRAM(s) Oral at bedtime  bisacodyl Suppository 10 milliGRAM(s) Rectal once  chlorhexidine 2% Cloths 1 Application(s) Topical daily  glucagon  Injectable 1 milliGRAM(s) IntraMuscular once  hydrocortisone sodium succinate Injectable 50 milliGRAM(s) IV Push every 12 hours  insulin lispro (ADMELOG) corrective regimen sliding scale   SubCutaneous at bedtime  insulin lispro (ADMELOG) corrective regimen sliding scale   SubCutaneous three times a day before meals  levothyroxine 50 MICROGram(s) Oral daily  meropenem  IVPB 1000 milliGRAM(s) IV Intermittent every 12 hours  midodrine 5 milliGRAM(s) Oral every 8 hours  pantoprazole    Tablet 40 milliGRAM(s) Oral before breakfast  polyethylene glycol 3350 17 Gram(s) Oral two times a day  senna 2 Tablet(s) Oral at bedtime    PRN MEDICATIONS      VITAL SIGNS: Last 24 Hours  T(C): 36.1 (17 Oct 2024 11:49), Max: 36.1 (16 Oct 2024 16:00)  T(F): 97 (17 Oct 2024 11:49), Max: 97 (17 Oct 2024 11:49)  HR: 62 (17 Oct 2024 11:49) (62 - 84)  BP: 111/52 (17 Oct 2024 11:49) (93/53 - 136/61)  BP(mean): 75 (17 Oct 2024 11:49) (71 - 88)  RR: 20 (17 Oct 2024 11:49) (20 - 20)  SpO2: 100% (17 Oct 2024 11:49) (100% - 100%)    LABS:                        9.6    12.82 )-----------( 147      ( 17 Oct 2024 05:37 )             29.6     10-17    136  |  102  |  26[H]  ----------------------------<  91  4.2   |  23  |  0.7    Ca    8.3[L]      17 Oct 2024 05:37    TPro  4.1[L]  /  Alb  2.6[L]  /  TBili  0.6  /  DBili  x   /  AST  16  /  ALT  10  /  AlkPhos  104  10-17      Urinalysis Basic - ( 17 Oct 2024 05:37 )    Color: x / Appearance: x / SG: x / pH: x  Gluc: 91 mg/dL / Ketone: x  / Bili: x / Urobili: x   Blood: x / Protein: x / Nitrite: x   Leuk Esterase: x / RBC: x / WBC x   Sq Epi: x / Non Sq Epi: x / Bacteria: x        PHYSICAL EXAM:  CONSTITUTIONAL: No acute distress, comfortable.  HEAD: Atraumatic, normocephalic  EYES: EOM intact, conjunctiva and sclera clear  ENT: Supple, no masses, no thyromegaly, no bruits, no JVD; moist mucous membranes  PULMONARY: Clear to auscultation bilaterally; no wheezes, rales, or rhonchi  CARDIOVASCULAR: Regular rate and rhythm; no murmurs, rubs, or gallops  GASTROINTESTINAL: Soft, non-tender, non-distended; bowel sounds present  NEUROLOGY: non-focal    ASSESSMENT & PLAN  89 yo F with hx of HTN, HLD, DM II, HFrEF (LVEF 20-25% 2020), Hypothyroidism, CVA, Dementia and Recent PNA (dx 4 days ago) presents to ED for generalized weakness. Positive sick contact.     10/17/2024:  - weaned off levophed this AM  ------------    #Acute hypoxic respiratory failure 2/2 submassive PE / suspected bacterial PNA  and COVID pneumonia  POA / septic shock / chronic diastolic CHF / elevated cardiac enzymes   - Echo demonstrated Romano's sign, biomarkers elevated, c/w therapeutic Eliquis   - monitor pulse Ox, supplement oxygen, pt is comfortable on RA now   - fluid restriction 1200 ml in 24 hours  - i&o, daily weight, maintain fluid balance  - received stress dose steroids  - taper hydrocortisone to 50 mg q12H (started 10/13 -- end 10/17)  - wean down Levophed, keep MAP above 65   - CTA repeat reviewed which shows improvement in terms of PE hence this is not saddle embolus causing hypotension   - appreciate ID  - cw Meropenem 1 gm iv q8h (10/13 -- 10/18? )  - cw supportive care, aspiration precautions ,pulmonary toilet   - cw ASA, statin  - cw telemetry monitoring  - cw monitor and replete electrolytes   - Aspiration precautions  - Wean O2 as tolerated, target SpO2 92-96%  - waned off levophed this AM 10/17  - qw midodrine 5 q8H    # Prerenal MARTINE, resolved   - d/c Flomax   - CT abdomen neg for hydronephrosis. Moderate distention the urinary bladder.  - Rodriges placed  (failed TOV)  - monitor BUN/cr and urine output   - avoid nephrotoxic medications     # Hyponatremia  - check urine osmolarity and urine Na  - CW monitor Na level     #Constipation with fecal retention  - resolved   - bowel regimen  - ensure daily BMs     #DM II   - CC diet   - monitor FS AC HS  - Insulin sliding scale    #Hypothyroidism   - c/w synthroid. TSH-1.84 10/5    DVT PPX: Eliquis  GI PPX: Protonix  DIET: pureed with thickened liquid  ACTIVITY: as tolerated    CODE STATUS: DNR/DNI    Pt seen and staffed with attending physician Dr. Vences

## 2024-10-17 NOTE — PROGRESS NOTE ADULT - ASSESSMENT
· Assessment	  89 yo F with hx of HTN, HLD, DM II, HFrEF (LVEF 20-25% 2020), Hypothyroidism, CVA, Dementia and Recent PNA (dx on CXR 4 days ago) presents to ED for generalized weakness and decreased PO intake. 4 days ago, patient started having cough and decreased PO intake. CXR was done and patient was diagnosed with PNA and was started on 5 days course of Azithromycin and Cefpodoxime with Prednisone 20mg OD. Patient had positive sick contact whereby both son and daughter developed URTI symptoms few days earleir. Patient is generally not very verbal at baseline and doesn't raise complains. She is mobile with a walker and 1 person assistance. Over past few days has been very week to ambulate. Family denies any reported chest pain, shortness of breath, fever, chills, abdominal pain, nausea, vomiting. They report loose BM over past 1 day.     IMPRESSION/RECOMMENDATIONS  Immunosuppression/Immunosenescence ( above age 60 yrs there is a exponential decline in immunity which could result in poor clinical outcomes.   Acute  illness  which poses a threat to life or bodily function without treatment   Hypoxic respiratory failure leading to HF  Severe sepsis ( on pressors, toxic encephalopathy, WBC 12.8 ): resolving  10/13 CT chest : no PE, worsening LLL opacity  10/16 CXR opacity LLL  WBC 12.6  10/12 BCx NG    COVID 19 with mild illness- pt has physiological /non pulmonary complaints  10/2 CT with no GGO  Pt was in the early viral replicative phase based on the timeline/onset of symptoms. ( as per son Eliseo at the bedside he was NG and his wife who had a URI also tested NG ? )  S/p vaccination  s/p RDV for 3 days    Nares ORSA NG  10/2 urine for legionella ag NG  10/1,5 BCx NG  BNP 04373    -Monitor WBC .   -Off loading to prevent pressure sores and preventive measures to avoid aspiration  -Meropenem 1 gm iv q8h started 10/13 : possibly will hold in am

## 2024-10-18 LAB
CULTURE RESULTS: SIGNIFICANT CHANGE UP
GLUCOSE BLDC GLUCOMTR-MCNC: 151 MG/DL — HIGH (ref 70–99)
GLUCOSE BLDC GLUCOMTR-MCNC: 175 MG/DL — HIGH (ref 70–99)
GLUCOSE BLDC GLUCOMTR-MCNC: 261 MG/DL — HIGH (ref 70–99)
GLUCOSE BLDC GLUCOMTR-MCNC: 266 MG/DL — HIGH (ref 70–99)
SPECIMEN SOURCE: SIGNIFICANT CHANGE UP

## 2024-10-18 PROCEDURE — 99233 SBSQ HOSP IP/OBS HIGH 50: CPT

## 2024-10-18 PROCEDURE — 99232 SBSQ HOSP IP/OBS MODERATE 35: CPT

## 2024-10-18 RX ORDER — MEROPENEM 1 G/30ML
1000 INJECTION INTRAVENOUS EVERY 8 HOURS
Refills: 0 | Status: DISCONTINUED | OUTPATIENT
Start: 2024-10-18 | End: 2024-10-21

## 2024-10-18 RX ORDER — HYDROCORTISONE 10 MG/1
25 TABLET ORAL EVERY 12 HOURS
Refills: 0 | Status: COMPLETED | OUTPATIENT
Start: 2024-10-18 | End: 2024-10-20

## 2024-10-18 RX ADMIN — MEROPENEM 100 MILLIGRAM(S): 1 INJECTION INTRAVENOUS at 17:19

## 2024-10-18 RX ADMIN — Medication 6: at 22:17

## 2024-10-18 RX ADMIN — IPRATROPIUM BROMIDE AND ALBUTEROL SULFATE 3 MILLILITER(S): .5; 2.5 SOLUTION RESPIRATORY (INHALATION) at 09:07

## 2024-10-18 RX ADMIN — Medication 81 MILLIGRAM(S): at 11:17

## 2024-10-18 RX ADMIN — IPRATROPIUM BROMIDE AND ALBUTEROL SULFATE 3 MILLILITER(S): .5; 2.5 SOLUTION RESPIRATORY (INHALATION) at 17:16

## 2024-10-18 RX ADMIN — HYDROCORTISONE 25 MILLIGRAM(S): 10 TABLET ORAL at 17:19

## 2024-10-18 RX ADMIN — POLYETHYLENE GLYCOL 3350 17 GRAM(S): 17 POWDER, FOR SOLUTION ORAL at 17:21

## 2024-10-18 RX ADMIN — Medication 3: at 17:21

## 2024-10-18 RX ADMIN — MIDODRINE HYDROCHLORIDE 5 MILLIGRAM(S): 2.5 TABLET ORAL at 13:11

## 2024-10-18 RX ADMIN — MIDODRINE HYDROCHLORIDE 5 MILLIGRAM(S): 2.5 TABLET ORAL at 22:18

## 2024-10-18 RX ADMIN — Medication 2 TABLET(S): at 22:18

## 2024-10-18 RX ADMIN — CHLORHEXIDINE GLUCONATE 1 APPLICATION(S): 40 SOLUTION TOPICAL at 11:17

## 2024-10-18 RX ADMIN — APIXABAN 5 MILLIGRAM(S): 5 TABLET, FILM COATED ORAL at 06:11

## 2024-10-18 RX ADMIN — Medication 1: at 11:17

## 2024-10-18 RX ADMIN — MEROPENEM 100 MILLIGRAM(S): 1 INJECTION INTRAVENOUS at 06:10

## 2024-10-18 RX ADMIN — PANTOPRAZOLE SODIUM 40 MILLIGRAM(S): 40 TABLET, DELAYED RELEASE ORAL at 06:10

## 2024-10-18 RX ADMIN — APIXABAN 5 MILLIGRAM(S): 5 TABLET, FILM COATED ORAL at 17:19

## 2024-10-18 RX ADMIN — POLYETHYLENE GLYCOL 3350 17 GRAM(S): 17 POWDER, FOR SOLUTION ORAL at 06:10

## 2024-10-18 RX ADMIN — HYDROCORTISONE 50 MILLIGRAM(S): 10 TABLET ORAL at 06:10

## 2024-10-18 RX ADMIN — MIDODRINE HYDROCHLORIDE 5 MILLIGRAM(S): 2.5 TABLET ORAL at 06:11

## 2024-10-18 RX ADMIN — IPRATROPIUM BROMIDE AND ALBUTEROL SULFATE 3 MILLILITER(S): .5; 2.5 SOLUTION RESPIRATORY (INHALATION) at 20:11

## 2024-10-18 RX ADMIN — MEROPENEM 100 MILLIGRAM(S): 1 INJECTION INTRAVENOUS at 22:18

## 2024-10-18 RX ADMIN — Medication 50 MICROGRAM(S): at 06:11

## 2024-10-18 RX ADMIN — Medication 20 MILLIGRAM(S): at 22:18

## 2024-10-18 RX ADMIN — Medication 1: at 08:24

## 2024-10-18 NOTE — PROGRESS NOTE ADULT - SUBJECTIVE AND OBJECTIVE BOX
BRIJESH POWER  89y, Female    All available historical data reviewed    OVERNIGHT EVENTS:  no fevers  nc 2 lit  NAD   off levo    ROS:  unable to obtain history secondary to patient's mental status and/or sedation     VITALS:  T(F): 97, Max: 97.5 (10-18-24 @ 00:00)  HR: 66  BP: 102/51  RR: 20Vital Signs Last 24 Hrs  T(C): 36.1 (18 Oct 2024 11:50), Max: 36.4 (18 Oct 2024 00:00)  T(F): 97 (18 Oct 2024 11:50), Max: 97.5 (18 Oct 2024 00:00)  HR: 66 (18 Oct 2024 11:50) (66 - 103)  BP: 102/51 (18 Oct 2024 11:50) (91/44 - 131/58)  BP(mean): 74 (18 Oct 2024 11:50) (63 - 77)  RR: 20 (18 Oct 2024 11:50) (18 - 20)  SpO2: 100% (18 Oct 2024 11:50) (97% - 100%)    Parameters below as of 18 Oct 2024 11:50  Patient On (Oxygen Delivery Method): nasal cannula        TESTS & MEASUREMENTS:                        9.8    13.14 )-----------( 128      ( 17 Oct 2024 22:25 )             29.8     10-17    136  |  102  |  26[H]  ----------------------------<  91  4.2   |  23  |  0.7    Ca    8.3[L]      17 Oct 2024 05:37    TPro  4.1[L]  /  Alb  2.6[L]  /  TBili  0.6  /  DBili  x   /  AST  16  /  ALT  10  /  AlkPhos  104  10-17    LIVER FUNCTIONS - ( 17 Oct 2024 05:37 )  Alb: 2.6 g/dL / Pro: 4.1 g/dL / ALK PHOS: 104 U/L / ALT: 10 U/L / AST: 16 U/L / GGT: x             Culture - Blood (collected 10-12-24 @ 21:42)  Source: .Blood BLOOD  Final Report (10-18-24 @ 04:00):    No growth at 5 days      Urinalysis Basic - ( 17 Oct 2024 05:37 )    Color: x / Appearance: x / SG: x / pH: x  Gluc: 91 mg/dL / Ketone: x  / Bili: x / Urobili: x   Blood: x / Protein: x / Nitrite: x   Leuk Esterase: x / RBC: x / WBC x   Sq Epi: x / Non Sq Epi: x / Bacteria: x          Social History:  Tobacco Use: No  Alcohol Use: No  Drug Use: No    RADIOLOGY & ADDITIONAL TESTS:  Personal review of radiological diagnostics performed  Echo and EKG results noted when applicable.     MEDICATIONS:  albuterol/ipratropium for Nebulization 3 milliLiter(s) Nebulizer every 6 hours  albuterol/ipratropium for Nebulization. 9 milliLiter(s) Nebulizer once  apixaban 5 milliGRAM(s) Oral every 12 hours  aspirin  chewable 81 milliGRAM(s) Oral daily  atorvastatin 20 milliGRAM(s) Oral at bedtime  bisacodyl Suppository 10 milliGRAM(s) Rectal once  chlorhexidine 2% Cloths 1 Application(s) Topical daily  glucagon  Injectable 1 milliGRAM(s) IntraMuscular once  hydrocortisone sodium succinate Injectable 25 milliGRAM(s) IV Push every 12 hours  insulin lispro (ADMELOG) corrective regimen sliding scale   SubCutaneous at bedtime  insulin lispro (ADMELOG) corrective regimen sliding scale   SubCutaneous three times a day before meals  levothyroxine 50 MICROGram(s) Oral daily  meropenem  IVPB 1000 milliGRAM(s) IV Intermittent every 8 hours  midodrine 5 milliGRAM(s) Oral every 8 hours  pantoprazole    Tablet 40 milliGRAM(s) Oral before breakfast  polyethylene glycol 3350 17 Gram(s) Oral two times a day  senna 2 Tablet(s) Oral at bedtime      ANTIBIOTICS:  meropenem  IVPB 1000 milliGRAM(s) IV Intermittent every 8 hours

## 2024-10-18 NOTE — CHART NOTE - NSCHARTNOTEFT_GEN_A_CORE
Transfer Note    Transfer from:  Transfer to:   Accepting physican:    >>HPI:  89 yo F with hx of HTN, HLD, DM II, HFrEF (LVEF 20-25% 2020), Hypothyroidism, CVA, Dementia and Recent PNA (dx on CXR 4 days ago) presents to ED for generalized weakness and decreased PO intake. 4 days ago, patient started having cough and decreased PO intake. CXR was done and patient was diagnosed with PNA and was started on 5 days course of Azithromycin and Cefpodoxime with Prednisone 20mg OD. Patient had positive sick contact whereby both son and daughter developed URTI symptoms few days earleir. Patient is generally not very verbal at baseline and doesn't raise complains. She is mobile with a walker and 1 person assistance. Over past few days has been very week to ambulate. Family denies any reported chest pain, shortness of breath, fever, chills, abdominal pain, nausea, vomiting. They report loose BM over past 1 day.     >>COURSE:  >ED    On presentation vitals:   · BP Systolic	 176 mm Hg  · BP Diastolic	84 mm Hg  · Heart Rate	76 /min  · Respiration Rate (breaths/min)	 21 /min  · Temp (F)	98.7 Degrees F  · Temp (C)	37.1 Degrees C  · Temp site	oral  · SpO2 (%)	98 %  · O2 Delivery/Oxygen Delivery Method	nasal cannula  · Oxygen Therapy Flow (L/min)	2 L/min     Labs are significant for: Leucocytosis 12K, Cr 1.8 (baseline 1), Trop 437>350, ProBNP 24K.   ECG with no ischemia changes    Imaging: CT head non con: No acute intracranial pathology. No evidence of midline shift, mass effect or intracranial hemorrhage.  CT A/P:  Mild fullness left renal pelvis. Mild left ureter with distal left ureteral stone. Stone within the urinary bladder on the left.  Significant fecal retention. Moderate distention the urinary bladder.  Small left pleural effusion with adjacent consolidation which may reflect atelectasis or infiltrate.    Patient admitted to medicine for management of suspected PNA and MARTINE.  (01 Oct 2024 23:12)    >Floor  Pt admitted for pna & MARTINE, developed resp failure with desat at rest, 2/2 submassive PE found on CTA, with RV strain (trop, elevated bpnp, hemodynamically stable) and COVID pneumonia POA (s/p s/p RDV and decadron, cefepime & doxy). IR consulted: no thrombectomy for now, received Eliquis 10mg BID through 10/10 - now on 5mg q 12hrs.    on 10/12, RR called for hypotension BP 70/40 with MAP 50, O2 Sat 84% on RA. Given 250ml NS bolus. BP improved 102/55 MAP 71. Patient became normotensive in the middle of 250 mL bolus so fluids were stopped (patient has known RV strain however was likely dry initially). Pt now on NRBT mask saturating 94. Sepsis w/u drawn and ABG, EKG, CXR ordered.  ABG confirming hypoxia (was done on NRB).  Xray essentially no change from last.  patient changed to high flow and discussed with pulm for possible worsening PE.  Recommend BNP, trop, and repeat CTA Family aware of patient's change in status, patient is DNR/DNI.  CT Angio PE ordered per pulm recommendation>> results showed no acute PE    on 10/13 Levophed uptitrated, Pt given 1g meropenem empirically for sepsis. ID consult ordered.    Patient upgraded to SDU for increasing levophed requirements.    >CEU  Pt successfully tapered off levophed, last dose 10/17 AM. VS, deemed stable for DGTF. Tapering off hydort (10/13 - 10/18).    >>ASSESSMENT & PLAN:   89 yo F with hx of HTN, HLD, DM II, HFrEF (LVEF 20-25% 2020), Hypothyroidism, CVA, Dementia and Recent PNA (dx 4 days ago) presents to ED for generalized weakness. Positive sick contact.     10/18/2024:  - weaned off levophed 10/17 AM  - deemed stable for DGTF  - poor PO intake, discussed with fam interventions including appetite stim and ngt, would like to hold off for now  - refused AM labs > f/u 11am  - dec hydrocort to 25 q12  ------------    #Acute hypoxic respiratory failure 2/2 submassive PE / suspected bacterial PNA  and COVID pneumonia  POA / septic shock / chronic diastolic CHF / elevated cardiac enzymes   - Echo demonstrated Romano's sign, biomarkers elevated, c/w therapeutic Eliquis   - monitor pulse Ox, supplement oxygen, pt is comfortable on RA now   - fluid restriction 1200 ml in 24 hours  - i&o, daily weight, maintain fluid balance  - received stress dose steroids  - wean down Levophed, keep MAP above 65   - CTA repeat reviewed which shows improvement in terms of PE hence this is not saddle embolus causing hypotension   - appreciate ID  - cw Meropenem 1 gm iv q8h (10/13 -- 10/18? )  - cw supportive care, aspiration precautions ,pulmonary toilet   - cw ASA, statin  - cw telemetry monitoring  - cw monitor and replete electrolytes   - Aspiration precautions  - Wean O2 as tolerated, target SpO2 92-96%  - waned off levophed this AM 10/17  - qw midodrine 5 q8H  - taper hydrocort to 25 q12 (started 10/13 -- end 10/18)    # Prerenal MARTINE, resolved   - d/c Flomax   - CT abdomen neg for hydronephrosis. Moderate distention the urinary bladder.  - Rodriges placed  (failed TOV)  - monitor BUN/cr and urine output   - avoid nephrotoxic medications     # Hyponatremia  - check urine osmolarity and urine Na  - CW monitor Na level     #Constipation with fecal retention  - resolved   - bowel regimen  - ensure daily BMs     #DM II   - CC diet   - monitor FS AC HS  - Insulin sliding scale    #Hypothyroidism   - c/w synthroid. TSH-1.84 10/5    DVT PPX: Eliquis  GI PPX: Protonix  DIET: pureed with thickened liquid  ACTIVITY: as tolerated    CODE STATUS: DNR/DNI    >>PENDINGS:  -encourage PO intake, calorie count  -finish hydrocort taper  -bp monitoring        Vital Signs Last 24 Hrs  T(C): 36.1 (18 Oct 2024 11:50), Max: 36.4 (18 Oct 2024 00:00)  T(F): 97 (18 Oct 2024 11:50), Max: 97.5 (18 Oct 2024 00:00)  HR: 66 (18 Oct 2024 11:50) (66 - 103)  BP: 102/51 (18 Oct 2024 11:50) (91/44 - 131/58)  BP(mean): 74 (18 Oct 2024 11:50) (63 - 77)  RR: 20 (18 Oct 2024 11:50) (18 - 20)  SpO2: 100% (18 Oct 2024 11:50) (97% - 100%)    Parameters below as of 18 Oct 2024 11:50  Patient On (Oxygen Delivery Method): nasal cannula      I&O's Summary    17 Oct 2024 07:01  -  18 Oct 2024 07:00  --------------------------------------------------------  IN: 30 mL / OUT: 100 mL / NET: -70 mL    MEDICATIONS  (STANDING):  albuterol/ipratropium for Nebulization 3 milliLiter(s) Nebulizer every 6 hours  albuterol/ipratropium for Nebulization. 9 milliLiter(s) Nebulizer once  apixaban 5 milliGRAM(s) Oral every 12 hours  aspirin  chewable 81 milliGRAM(s) Oral daily  atorvastatin 20 milliGRAM(s) Oral at bedtime  bisacodyl Suppository 10 milliGRAM(s) Rectal once  chlorhexidine 2% Cloths 1 Application(s) Topical daily  glucagon  Injectable 1 milliGRAM(s) IntraMuscular once  hydrocortisone sodium succinate Injectable 25 milliGRAM(s) IV Push every 12 hours  insulin lispro (ADMELOG) corrective regimen sliding scale   SubCutaneous at bedtime  insulin lispro (ADMELOG) corrective regimen sliding scale   SubCutaneous three times a day before meals  levothyroxine 50 MICROGram(s) Oral daily  meropenem  IVPB 1000 milliGRAM(s) IV Intermittent every 8 hours  midodrine 5 milliGRAM(s) Oral every 8 hours  pantoprazole    Tablet 40 milliGRAM(s) Oral before breakfast  polyethylene glycol 3350 17 Gram(s) Oral two times a day  senna 2 Tablet(s) Oral at bedtime    MEDICATIONS  (PRN):        LABS                                            9.8                   Neurophils% (auto):   84.6   (10-17 @ 22:25):    13.14)-----------(128          Lymphocytes% (auto):  9.1                                           29.8                   Eosinphils% (auto):   0.2      Manual%: Neutrophils x    ; Lymphocytes x    ; Eosinophils x    ; Bands%: x    ; Blasts x Transfer Note    Transfer from:  Transfer to:   Accepting physican:    >>HPI:  89 yo F with hx of HTN, HLD, DM II, HFrEF (LVEF 20-25% 2020), Hypothyroidism, CVA, Dementia and Recent PNA (dx on CXR 4 days ago) presents to ED for generalized weakness and decreased PO intake. 4 days ago, patient started having cough and decreased PO intake. CXR was done and patient was diagnosed with PNA and was started on 5 days course of Azithromycin and Cefpodoxime with Prednisone 20mg OD. Patient had positive sick contact whereby both son and daughter developed URTI symptoms few days earleir. Patient is generally not very verbal at baseline and doesn't raise complains. She is mobile with a walker and 1 person assistance. Over past few days has been very week to ambulate. Family denies any reported chest pain, shortness of breath, fever, chills, abdominal pain, nausea, vomiting. They report loose BM over past 1 day.     >>COURSE:  >ED    On presentation vitals:   · BP Systolic	 176 mm Hg  · BP Diastolic	84 mm Hg  · Heart Rate	76 /min  · Respiration Rate (breaths/min)	 21 /min  · Temp (F)	98.7 Degrees F  · Temp (C)	37.1 Degrees C  · Temp site	oral  · SpO2 (%)	98 %  · O2 Delivery/Oxygen Delivery Method	nasal cannula  · Oxygen Therapy Flow (L/min)	2 L/min     Labs are significant for: Leucocytosis 12K, Cr 1.8 (baseline 1), Trop 437>350, ProBNP 24K.   ECG with no ischemia changes    Imaging: CT head non con: No acute intracranial pathology. No evidence of midline shift, mass effect or intracranial hemorrhage.  CT A/P:  Mild fullness left renal pelvis. Mild left ureter with distal left ureteral stone. Stone within the urinary bladder on the left.  Significant fecal retention. Moderate distention the urinary bladder.  Small left pleural effusion with adjacent consolidation which may reflect atelectasis or infiltrate.    Patient admitted to medicine for management of suspected PNA and MARTINE.  (01 Oct 2024 23:12)    >Floor  Pt admitted for pna & MARTINE, developed resp failure with desat at rest, 2/2 submassive PE found on CTA, with RV strain (trop, elevated bpnp, hemodynamically stable) and COVID pneumonia POA (s/p s/p RDV and decadron, cefepime & doxy). IR consulted: no thrombectomy for now, received Eliquis 10mg BID through 10/10 - now on 5mg q 12hrs.    on 10/12, RR called for hypotension BP 70/40 with MAP 50, O2 Sat 84% on RA. Given 250ml NS bolus. BP improved 102/55 MAP 71. Patient became normotensive in the middle of 250 mL bolus so fluids were stopped (patient has known RV strain however was likely dry initially). Pt now on NRBT mask saturating 94. Sepsis w/u drawn and ABG, EKG, CXR ordered.  ABG confirming hypoxia (was done on NRB).  Xray essentially no change from last.  patient changed to high flow and discussed with pulm for possible worsening PE.  Recommend BNP, trop, and repeat CTA Family aware of patient's change in status, patient is DNR/DNI.  CT Angio PE ordered per pulm recommendation>> results showed no acute PE    on 10/13 Levophed uptitrated, Pt given 1g meropenem empirically for sepsis. ID consult ordered.    Patient upgraded to SDU for increasing levophed requirements.    >CEU  Pt successfully tapered off levophed, last dose 10/17 AM. VS, deemed stable for DGTF. Tapering off hydort (10/13 - 10/18).    >>ASSESSMENT & PLAN:   89 yo F with hx of HTN, HLD, DM II, HFrEF (LVEF 20-25% 2020), Hypothyroidism, CVA, Dementia and Recent PNA (dx 4 days ago) presents to ED for generalized weakness. Positive sick contact.     10/18/2024:  - weaned off levophed 10/17 AM  - deemed stable for DGTF  - poor PO intake, discussed with fam interventions including appetite stim and ngt, would like to hold off for now  - refused AM labs > f/u 11am  - dec hydrocort to 25 q12 (stop tmr)  ------------    #Acute hypoxic respiratory failure 2/2 submassive PE / suspected bacterial PNA  and COVID pneumonia  POA / septic shock / chronic diastolic CHF / elevated cardiac enzymes   - Echo demonstrated Romano's sign, biomarkers elevated, c/w therapeutic Eliquis   - monitor pulse Ox, supplement oxygen, pt is comfortable on RA now   - fluid restriction 1200 ml in 24 hours  - i&o, daily weight, maintain fluid balance  - received stress dose steroids  - wean down Levophed, keep MAP above 65   - CTA repeat reviewed which shows improvement in terms of PE hence this is not saddle embolus causing hypotension   - appreciate ID  - cw Meropenem 1 gm iv q8h (10/13 -- 10/18? )  - cw supportive care, aspiration precautions ,pulmonary toilet   - cw ASA, statin  - cw telemetry monitoring  - cw monitor and replete electrolytes   - Aspiration precautions  - Wean O2 as tolerated, target SpO2 92-96%  - waned off levophed this AM 10/17  - qw midodrine 5 q8H  - taper hydrocort to 25 q12 (started 10/13, stop tmr)    # Prerenal MARTINE, resolved   - d/c Flomax   - CT abdomen neg for hydronephrosis. Moderate distention the urinary bladder.  - Rodriges placed  (failed TOV)  - monitor BUN/cr and urine output   - avoid nephrotoxic medications     # Hyponatremia  - check urine osmolarity and urine Na  - CW monitor Na level     #Constipation with fecal retention  - resolved   - bowel regimen  - ensure daily BMs     #DM II   - CC diet   - monitor FS AC HS  - Insulin sliding scale    #Hypothyroidism   - c/w synthroid. TSH-1.84 10/5    DVT PPX: Eliquis  GI PPX: Protonix  DIET: pureed with thickened liquid  ACTIVITY: as tolerated    CODE STATUS: DNR/DNI    >>PENDINGS:  -encourage PO intake, calorie count  -finish hydrocort taper  -bp monitoring        Vital Signs Last 24 Hrs  T(C): 36.1 (18 Oct 2024 11:50), Max: 36.4 (18 Oct 2024 00:00)  T(F): 97 (18 Oct 2024 11:50), Max: 97.5 (18 Oct 2024 00:00)  HR: 66 (18 Oct 2024 11:50) (66 - 103)  BP: 102/51 (18 Oct 2024 11:50) (91/44 - 131/58)  BP(mean): 74 (18 Oct 2024 11:50) (63 - 77)  RR: 20 (18 Oct 2024 11:50) (18 - 20)  SpO2: 100% (18 Oct 2024 11:50) (97% - 100%)    Parameters below as of 18 Oct 2024 11:50  Patient On (Oxygen Delivery Method): nasal cannula      I&O's Summary    17 Oct 2024 07:01  -  18 Oct 2024 07:00  --------------------------------------------------------  IN: 30 mL / OUT: 100 mL / NET: -70 mL    MEDICATIONS  (STANDING):  albuterol/ipratropium for Nebulization 3 milliLiter(s) Nebulizer every 6 hours  albuterol/ipratropium for Nebulization. 9 milliLiter(s) Nebulizer once  apixaban 5 milliGRAM(s) Oral every 12 hours  aspirin  chewable 81 milliGRAM(s) Oral daily  atorvastatin 20 milliGRAM(s) Oral at bedtime  bisacodyl Suppository 10 milliGRAM(s) Rectal once  chlorhexidine 2% Cloths 1 Application(s) Topical daily  glucagon  Injectable 1 milliGRAM(s) IntraMuscular once  hydrocortisone sodium succinate Injectable 25 milliGRAM(s) IV Push every 12 hours  insulin lispro (ADMELOG) corrective regimen sliding scale   SubCutaneous at bedtime  insulin lispro (ADMELOG) corrective regimen sliding scale   SubCutaneous three times a day before meals  levothyroxine 50 MICROGram(s) Oral daily  meropenem  IVPB 1000 milliGRAM(s) IV Intermittent every 8 hours  midodrine 5 milliGRAM(s) Oral every 8 hours  pantoprazole    Tablet 40 milliGRAM(s) Oral before breakfast  polyethylene glycol 3350 17 Gram(s) Oral two times a day  senna 2 Tablet(s) Oral at bedtime    MEDICATIONS  (PRN):        LABS                                            9.8                   Neurophils% (auto):   84.6   (10-17 @ 22:25):    13.14)-----------(128          Lymphocytes% (auto):  9.1                                           29.8                   Eosinphils% (auto):   0.2      Manual%: Neutrophils x    ; Lymphocytes x    ; Eosinophils x    ; Bands%: x    ; Blasts x

## 2024-10-18 NOTE — PROGRESS NOTE ADULT - ASSESSMENT
· Assessment	  89 yo F with hx of HTN, HLD, DM II, HFrEF (LVEF 20-25% 2020), Hypothyroidism, CVA, Dementia and Recent PNA (dx on CXR 4 days ago) presents to ED for generalized weakness and decreased PO intake. 4 days ago, patient started having cough and decreased PO intake. CXR was done and patient was diagnosed with PNA and was started on 5 days course of Azithromycin and Cefpodoxime with Prednisone 20mg OD. Patient had positive sick contact whereby both son and daughter developed URTI symptoms few days earleir. Patient is generally not very verbal at baseline and doesn't raise complains. She is mobile with a walker and 1 person assistance. Over past few days has been very week to ambulate. Family denies any reported chest pain, shortness of breath, fever, chills, abdominal pain, nausea, vomiting. They report loose BM over past 1 day.     IMPRESSION/RECOMMENDATIONS  Immunosuppression/Immunosenescence ( above age 60 yrs there is a exponential decline in immunity which could result in poor clinical outcomes.   Acute  illness  which poses a threat to life or bodily function without treatment   Improving hypoxic respiratory failure leading to HF> NC  Resolved severe sepsis ( off pressors, toxic encephalopathy, WBC 13.1 )  10/13 CT chest : no PE, worsening LLL opacity  10/16 CXR opacity LLL  WBC 13.1  10/12 BCx NG    COVID 19 with mild illness- pt has physiological /non pulmonary complaints  10/2 CT with no GGO  Pt was in the early viral replicative phase based on the timeline/onset of symptoms. ( as per son Eliseo at the bedside he was NG and his wife who had a URI also tested NG ? )  S/p vaccination  s/p RDV for 3 days    Nares ORSA NG  10/2 urine for legionella ag NG  10/1,5 BCx NG  BNP 23834    -Monitor WBC .   -Off loading to prevent pressure sores and preventive measures to avoid aspiration  -Meropenem 1 gm iv q8h started 10/13 : hold on 10/20

## 2024-10-18 NOTE — PROGRESS NOTE ADULT - ASSESSMENT
IMPRESSION:    Septic shock off levophed  LLL PNA / atelectasis   Possible chronic PE   Positive COVID-19 Infection   Constipation  HO HTN/HLD  HO DM II  HO HFpEF  HO Hypothyroidism  HO CVA  HO Dementia    PLAN:    CNS: Avoid depressants, MS at baseline    HEENT: Oral care    PULMONARY:  HOB @ 45 degrees.  Aspiration precautions.  Pulmonary toilet. On 2L nasal cannula, target SpO2 92-96%. Repeat CTA noted.     CARDIOVASCULAR:   MAP adequate.  ECHO noted.  Goal directed fluid resuscitation. Off levophed. CW midodrine 5q8h. Target MAP > 60.     GI: GI prophylaxis. Feeding per speech and swallow.  Bowel regimen     RENAL:  Follow up lytes.  Correct as needed.    INFECTIOUS DISEASE: Follow up cultures. SP Remdesivir. Meropenem per ID. Repeat procal 0.11 noted.     HEMATOLOGICAL:  On eliquis. LE duplex noted. Monitor CBC and coags. Leukocytosis likely secondary to steroid use. Check labs today.     ENDOCRINE:  Follow up FS.  Insulin protocol if needed. Decrease HC 50Q12. TSH noted.     MUSCULOSKELETAL: Off loading;  PT OT    GOC  DNR DNI   Downgrade to general medical floor.   Very poor prognosis overall  27.5

## 2024-10-18 NOTE — PROGRESS NOTE ADULT - SUBJECTIVE AND OBJECTIVE BOX
BRIJESH POWER  89y Female    CHIEF COMPLAINT:    Patient is a 89y old  Female who presents with a chief complaint of shock (15 Oct 2024 12:28)    INTERVAL HPI/OVERNIGHT EVENTS:    Patient seen and examined. No acute events overnight. Remains off pressors     ROS: All other systems are negative.    Vital Signs:    T(F): 96.6 (10-18-24 @ 08:04), Max: 97.5 (10-18-24 @ 00:00)  HR: 74 (10-18-24 @ 08:04) (62 - 103)  BP: 99/48 (10-18-24 @ 08:04) (91/44 - 131/58)  RR: 20 (10-18-24 @ 08:04) (18 - 20)  SpO2: 97% (10-18-24 @ 08:04) (97% - 100%)    17 Oct 2024 07:01  -  18 Oct 2024 07:00  --------------------------------------------------------  IN: 30 mL / OUT: 100 mL / NET: -70 mL    Daily Weight in k.2 (18 Oct 2024 04:00)    POCT Blood Glucose.: 151 mg/dL (18 Oct 2024 07:51)  POCT Blood Glucose.: 227 mg/dL (17 Oct 2024 21:32)  POCT Blood Glucose.: 215 mg/dL (17 Oct 2024 21:17)  POCT Blood Glucose.: 170 mg/dL (17 Oct 2024 16:25)  POCT Blood Glucose.: 120 mg/dL (17 Oct 2024 10:59)    PHYSICAL EXAM:    GENERAL:  NAD chronically ill appearing   SKIN: No rashes or lesions  HEENT: Atraumatic. Normocephalic.   NECK: Supple, No JVD.    PULMONARY: Coarse breath sounds B/L. No wheezing   CVS: Normal S1, S2. Rate and Rhythm are regular   ABDOMEN/GI: Soft, Nontender, Nondistended   MSK:  No clubbing or cyanosis   NEUROLOGIC: able to follow simple commands   PSYCH: somnolent, arousable     Consultant(s) Notes Reviewed:  [x ] YES  [ ] NO  Care Discussed with Consultants/Other Providers [ x] YES  [ ] NO    LABS:                        9.8    13.14 )-----------( 128      ( 17 Oct 2024 22:25 )             29.8    136  |  102  |  26[H]  ----------------------------<  91  4.2   |  23  |  0.7    Ca    8.3[L]      17 Oct 2024 05:37    TPro  4.1[L]  /  Alb  2.6[L]  /  TBili  0.6  /  DBili  x   /  AST  16  /  ALT  10  /  AlkPhos  104  10-    RADIOLOGY & ADDITIONAL TESTS:  Imaging or report Personally Reviewed:  [x] YES  [ ] NO  EKG reviewed: [x] YES  [ ] NO    Medications:  Standing  albuterol/ipratropium for Nebulization 3 milliLiter(s) Nebulizer every 6 hours  albuterol/ipratropium for Nebulization. 9 milliLiter(s) Nebulizer once  apixaban 5 milliGRAM(s) Oral every 12 hours  aspirin  chewable 81 milliGRAM(s) Oral daily  atorvastatin 20 milliGRAM(s) Oral at bedtime  bisacodyl Suppository 10 milliGRAM(s) Rectal once  chlorhexidine 2% Cloths 1 Application(s) Topical daily  glucagon  Injectable 1 milliGRAM(s) IntraMuscular once  hydrocortisone sodium succinate Injectable 50 milliGRAM(s) IV Push every 12 hours  insulin lispro (ADMELOG) corrective regimen sliding scale   SubCutaneous at bedtime  insulin lispro (ADMELOG) corrective regimen sliding scale   SubCutaneous three times a day before meals  levothyroxine 50 MICROGram(s) Oral daily  meropenem  IVPB 1000 milliGRAM(s) IV Intermittent every 12 hours  midodrine 5 milliGRAM(s) Oral every 8 hours  pantoprazole    Tablet 40 milliGRAM(s) Oral before breakfast  polyethylene glycol 3350 17 Gram(s) Oral two times a day  senna 2 Tablet(s) Oral at bedtime    PRN Meds

## 2024-10-18 NOTE — PROGRESS NOTE ADULT - SUBJECTIVE AND OBJECTIVE BOX
Patient is a 89y old  Female who presents with a chief complaint of shock (15 Oct 2024 12:28)        Over Night Events:  Afebrile, no pressors > 24 hours. On nasal canula.       ROS:     All ROS are negative except HPI         PHYSICAL EXAM    ICU Vital Signs Last 24 Hrs  T(C): 35.9 (18 Oct 2024 08:04), Max: 36.4 (18 Oct 2024 00:00)  T(F): 96.6 (18 Oct 2024 08:04), Max: 97.5 (18 Oct 2024 00:00)  HR: 74 (18 Oct 2024 08:04) (62 - 103)  BP: 99/48 (18 Oct 2024 08:04) (91/44 - 131/58)  BP(mean): 69 (18 Oct 2024 08:04) (63 - 77)  ABP: --  ABP(mean): --  RR: 20 (18 Oct 2024 08:04) (18 - 20)  SpO2: 97% (18 Oct 2024 08:04) (97% - 100%)    O2 Parameters below as of 18 Oct 2024 08:04  Patient On (Oxygen Delivery Method): nasal cannula            CONSTITUTIONAL:  in NAD     ENT:   Airway patent,   Mouth with normal mucosa.     EYES:   Pupils equal,   Round and reactive to light.    CARDIAC:   Normal rate,   Regular rhythm.    No edema    RESPIRATORY:   Bilateral BS  Normal chest expansion  Not tachypneic,  No use of accessory muscles    GASTROINTESTINAL:  Abdomen soft,   Non-tender,   No guarding,     MUSCULOSKELETAL:   Range of motion is not limited,  No clubbing, cyanosis    NEUROLOGICAL:   somnolent   Follows commands     SKIN:   Skin normal color for race,   Warm and dry     10-17-24 @ 07:01  -  10-18-24 @ 07:00  --------------------------------------------------------  IN:    Oral Fluid: 30 mL  Total IN: 30 mL    OUT:    Indwelling Catheter - Urethral (mL): 100 mL  Total OUT: 100 mL    Total NET: -70 mL          LABS:                            9.8    13.14 )-----------( 128      ( 17 Oct 2024 22:25 )             29.8                                               10-17    136  |  102  |  26[H]  ----------------------------<  91  4.2   |  23  |  0.7    Ca    8.3[L]      17 Oct 2024 05:37    TPro  4.1[L]  /  Alb  2.6[L]  /  TBili  0.6  /  DBili  x   /  AST  16  /  ALT  10  /  AlkPhos  104  10-17                                             Urinalysis Basic - ( 17 Oct 2024 05:37 )    Color: x / Appearance: x / SG: x / pH: x  Gluc: 91 mg/dL / Ketone: x  / Bili: x / Urobili: x   Blood: x / Protein: x / Nitrite: x   Leuk Esterase: x / RBC: x / WBC x   Sq Epi: x / Non Sq Epi: x / Bacteria: x                                                  LIVER FUNCTIONS - ( 17 Oct 2024 05:37 )  Alb: 2.6 g/dL / Pro: 4.1 g/dL / ALK PHOS: 104 U/L / ALT: 10 U/L / AST: 16 U/L / GGT: x                                                                                                                                       MEDICATIONS  (STANDING):  albuterol/ipratropium for Nebulization 3 milliLiter(s) Nebulizer every 6 hours  albuterol/ipratropium for Nebulization. 9 milliLiter(s) Nebulizer once  apixaban 5 milliGRAM(s) Oral every 12 hours  aspirin  chewable 81 milliGRAM(s) Oral daily  atorvastatin 20 milliGRAM(s) Oral at bedtime  bisacodyl Suppository 10 milliGRAM(s) Rectal once  chlorhexidine 2% Cloths 1 Application(s) Topical daily  glucagon  Injectable 1 milliGRAM(s) IntraMuscular once  hydrocortisone sodium succinate Injectable 50 milliGRAM(s) IV Push every 12 hours  insulin lispro (ADMELOG) corrective regimen sliding scale   SubCutaneous three times a day before meals  insulin lispro (ADMELOG) corrective regimen sliding scale   SubCutaneous at bedtime  levothyroxine 50 MICROGram(s) Oral daily  meropenem  IVPB 1000 milliGRAM(s) IV Intermittent every 12 hours  midodrine 5 milliGRAM(s) Oral every 8 hours  pantoprazole    Tablet 40 milliGRAM(s) Oral before breakfast  polyethylene glycol 3350 17 Gram(s) Oral two times a day  senna 2 Tablet(s) Oral at bedtime    MEDICATIONS  (PRN):      New X-rays reviewed:                                                                                  ECHO

## 2024-10-18 NOTE — PROGRESS NOTE ADULT - ASSESSMENT
89 yo F with hx of HTN, HLD, DM II, HFrEF (LVEF 20-25% 2020), Hypothyroidism, CVA, Dementia and Recent PNA (dx 4 days PTP) presents to ED for generalized weakness. Admitted to SDU due to septic shock due to COVID PNA and submassive PE on pressors     Acute hypoxic respiratory failure 2/2 submassive PE   suspected bacterial PNA  and COVID pneumonia   with septic shock POA  Elevated trop  - currently on 2L NC, tapered off levophed yesterday AM   - taper down supplemental 02. CW midodrine 5 q8H  - decrease HC to 25 q12H and dc tomorrow  - currently on meropenem, ID following, likely hold abx  - s/p RDV    Poor po intake  - start calorie count, dietary fu, will discuss with family re; NGT v appetite stimulant     Prerenal MARTINE - resolved   - CT abdomen neg for hydronephrosis. Moderate distention the urinary bladder.  - off flomax   - Rodriges placed  (failed TOV)  - monitor BUN/cr and urine output   - avoid nephrotoxic medications      Hyponatremia - resolved - encourage PO intake, monitor Na     Constipation - resolved. monitor BM, c/w bowel regimen     DM II - CC diet , monitor FS AC HS, c/w iss     Hypothyroidism - c/w synthroid. TSH-1.84 10/5    CODE STATUS: DNR/DNI. Overall prognosis is poor  PEnding: monitor BP, start weaning steroids/midodrine. fu ID re: dc abx, calorie count    Patient seen at bedside, total time spent to evaluate and treat the patient's acute illness and chronic medical conditions as well as time spent reviewing prior records, labs, radiology, documenting in electronic medical records,  discussing medical plan with   medical team was more than 45 minutes with >50% of time spent face to face with patient, discussing with patient/family as well as coordination of care

## 2024-10-18 NOTE — PROGRESS NOTE ADULT - SUBJECTIVE AND OBJECTIVE BOX
BRIJESH POWER 89y Female  MRN#: [id number]   Hospital Day: 17d    HPI:  89 yo F with hx of HTN, HLD, DM II, HFrEF (LVEF 20-25% 2020), Hypothyroidism, CVA, Dementia and Recent PNA (dx on CXR 4 days ago) presents to ED for generalized weakness and decreased PO intake. 4 days ago, patient started having cough and decreased PO intake. CXR was done and patient was diagnosed with PNA and was started on 5 days course of Azithromycin and Cefpodoxime with Prednisone 20mg OD. Patient had positive sick contact whereby both son and daughter developed URTI symptoms few days earleir. Patient is generally not very verbal at baseline and doesn't raise complains. She is mobile with a walker and 1 person assistance. Over past few days has been very week to ambulate. Family denies any reported chest pain, shortness of breath, fever, chills, abdominal pain, nausea, vomiting. They report loose BM over past 1 day.     On presentation vitals:   · BP Systolic	 176 mm Hg  · BP Diastolic	84 mm Hg  · Heart Rate	76 /min  · Respiration Rate (breaths/min)	 21 /min  · Temp (F)	98.7 Degrees F  · Temp (C)	37.1 Degrees C  · Temp site	oral  · SpO2 (%)	98 %  · O2 Delivery/Oxygen Delivery Method	nasal cannula  · Oxygen Therapy Flow (L/min)	2 L/min     Labs are significant for: Leucocytosis 12K, Cr 1.8 (baseline 1), Trop 437>350, ProBNP 24K.   ECG with no ischemia changes    Imaging: CT head non con: No acute intracranial pathology. No evidence of midline shift, mass effect or intracranial hemorrhage.  CT A/P:  Mild fullness left renal pelvis. Mild left ureter with distal left ureteral stone. Stone within the urinary bladder on the left.  Significant fecal retention. Moderate distention the urinary bladder.  Small left pleural effusion with adjacent consolidation which may reflect atelectasis or infiltrate.    Patient admitted to medicine for management of suspected PNA and MARTINE.  (01 Oct 2024 23:12)      SUBJECTIVE  Patient is a 89y old Female who presents with a chief complaint of shock (15 Oct 2024 12:28)  Currently admitted to medicine with the primary diagnosis of Pneumonia      INTERVAL HPI AND OVERNIGHT EVENTS:  Patient was examined and seen at bedside. This morning she is resting comfortably in bed and reports no issues or overnight events.    REVIEW OF SYMPTOMS:  CONSTITUTIONAL: No weakness, fevers or chills; No headaches  EYES: No visual changes, eye pain, or discharge  ENT: No vertigo; No ear pain or change in hearing; No sore throat or difficulty swallowing  NECK: No pain or stiffness  RESPIRATORY: No cough, wheezing, or hemoptysis; No shortness of breath  CARDIOVASCULAR: No chest pain or palpitations  GASTROINTESTINAL: No abdominal or epigastric pain; No nausea, vomiting, or hematemesis; No diarrhea or constipation; No melena or hematochezia  GENITOURINARY: No dysuria, frequency or hematuria  MUSCULOSKELETAL: No joint pain, no muscle pain, no weakness  NEUROLOGICAL: No numbness or weakness  SKIN: No itching or rashes    OBJECTIVE  PAST MEDICAL & SURGICAL HISTORY  Dementia    Hypertension    Hyperlipidemia    Hypothyroidism    Diabetes      ALLERGIES:  No Known Allergies    MEDICATIONS:  STANDING MEDICATIONS  albuterol/ipratropium for Nebulization 3 milliLiter(s) Nebulizer every 6 hours  albuterol/ipratropium for Nebulization. 9 milliLiter(s) Nebulizer once  apixaban 5 milliGRAM(s) Oral every 12 hours  aspirin  chewable 81 milliGRAM(s) Oral daily  atorvastatin 20 milliGRAM(s) Oral at bedtime  bisacodyl Suppository 10 milliGRAM(s) Rectal once  chlorhexidine 2% Cloths 1 Application(s) Topical daily  glucagon  Injectable 1 milliGRAM(s) IntraMuscular once  hydrocortisone sodium succinate Injectable 25 milliGRAM(s) IV Push every 12 hours  insulin lispro (ADMELOG) corrective regimen sliding scale   SubCutaneous at bedtime  insulin lispro (ADMELOG) corrective regimen sliding scale   SubCutaneous three times a day before meals  levothyroxine 50 MICROGram(s) Oral daily  meropenem  IVPB 1000 milliGRAM(s) IV Intermittent every 8 hours  midodrine 5 milliGRAM(s) Oral every 8 hours  pantoprazole    Tablet 40 milliGRAM(s) Oral before breakfast  polyethylene glycol 3350 17 Gram(s) Oral two times a day  senna 2 Tablet(s) Oral at bedtime    PRN MEDICATIONS      VITAL SIGNS: Last 24 Hours  T(C): 36.1 (18 Oct 2024 11:50), Max: 36.4 (18 Oct 2024 00:00)  T(F): 97 (18 Oct 2024 11:50), Max: 97.5 (18 Oct 2024 00:00)  HR: 66 (18 Oct 2024 11:50) (66 - 103)  BP: 102/51 (18 Oct 2024 11:50) (91/44 - 131/58)  BP(mean): 74 (18 Oct 2024 11:50) (63 - 77)  RR: 20 (18 Oct 2024 11:50) (18 - 20)  SpO2: 100% (18 Oct 2024 11:50) (97% - 100%)    LABS:                        9.8    13.14 )-----------( 128      ( 17 Oct 2024 22:25 )             29.8     10-17    136  |  102  |  26[H]  ----------------------------<  91  4.2   |  23  |  0.7    Ca    8.3[L]      17 Oct 2024 05:37    TPro  4.1[L]  /  Alb  2.6[L]  /  TBili  0.6  /  DBili  x   /  AST  16  /  ALT  10  /  AlkPhos  104  10-17      Urinalysis Basic - ( 17 Oct 2024 05:37 )    Color: x / Appearance: x / SG: x / pH: x  Gluc: 91 mg/dL / Ketone: x  / Bili: x / Urobili: x   Blood: x / Protein: x / Nitrite: x   Leuk Esterase: x / RBC: x / WBC x   Sq Epi: x / Non Sq Epi: x / Bacteria: x      PHYSICAL EXAM:  CONSTITUTIONAL: No acute distress, comfortable  HEAD: Atraumatic, normocephalic  EYES: EOM intact, conjunctiva and sclera clear  ENT: Supple, no masses, no thyromegaly, no bruits, no JVD; moist mucous membranes  PULMONARY: Clear to auscultation bilaterally; no wheezes, rales, or rhonchi  CARDIOVASCULAR: Regular rate and rhythm; no murmurs, rubs, or gallops  GASTROINTESTINAL: Soft, non-tender, non-distended; bowel sounds present  NEUROLOGY: non-focal    ASSESSMENT & PLAN    89 yo F with hx of HTN, HLD, DM II, HFrEF (LVEF 20-25% 2020), Hypothyroidism, CVA, Dementia and Recent PNA (dx 4 days ago) presents to ED for generalized weakness. Positive sick contact.     10/17/2024:  - weaned off levophed 10/17 AM  - deemed stable for DGTF  - poor PO intake, discussed with fam interventions including appetite stim and ngt, would like to hold off for now  - refused AM labs > f/u 11am  - dec hydrocort to 25 q12  ------------    #Acute hypoxic respiratory failure 2/2 submassive PE / suspected bacterial PNA  and COVID pneumonia  POA / septic shock / chronic diastolic CHF / elevated cardiac enzymes   - Echo demonstrated Romano's sign, biomarkers elevated, c/w therapeutic Eliquis   - monitor pulse Ox, supplement oxygen, pt is comfortable on RA now   - fluid restriction 1200 ml in 24 hours  - i&o, daily weight, maintain fluid balance  - received stress dose steroids  - wean down Levophed, keep MAP above 65   - CTA repeat reviewed which shows improvement in terms of PE hence this is not saddle embolus causing hypotension   - appreciate ID  - cw Meropenem 1 gm iv q8h (10/13 -- 10/18? )  - cw supportive care, aspiration precautions ,pulmonary toilet   - cw ASA, statin  - cw telemetry monitoring  - cw monitor and replete electrolytes   - Aspiration precautions  - Wean O2 as tolerated, target SpO2 92-96%  - waned off levophed this AM 10/17  - qw midodrine 5 q8H  - taper hydrocort to 25 q12 (started 10/13 -- end 10/18)    # Prerenal MARTINE, resolved   - d/c Flomax   - CT abdomen neg for hydronephrosis. Moderate distention the urinary bladder.  - Rodriges placed  (failed TOV)  - monitor BUN/cr and urine output   - avoid nephrotoxic medications     # Hyponatremia  - check urine osmolarity and urine Na  - CW monitor Na level     #Constipation with fecal retention  - resolved   - bowel regimen  - ensure daily BMs     #DM II   - CC diet   - monitor FS AC HS  - Insulin sliding scale    #Hypothyroidism   - c/w synthroid. TSH-1.84 10/5    DVT PPX: Eliquis  GI PPX: Protonix  DIET: pureed with thickened liquid  ACTIVITY: as tolerated    CODE STATUS: DNR/DNI    Pt seen and staffed with attending physician Dr. Vences BRIJESH POWER 89y Female  MRN#: [id number]   Hospital Day: 17d    HPI:  89 yo F with hx of HTN, HLD, DM II, HFrEF (LVEF 20-25% 2020), Hypothyroidism, CVA, Dementia and Recent PNA (dx on CXR 4 days ago) presents to ED for generalized weakness and decreased PO intake. 4 days ago, patient started having cough and decreased PO intake. CXR was done and patient was diagnosed with PNA and was started on 5 days course of Azithromycin and Cefpodoxime with Prednisone 20mg OD. Patient had positive sick contact whereby both son and daughter developed URTI symptoms few days earleir. Patient is generally not very verbal at baseline and doesn't raise complains. She is mobile with a walker and 1 person assistance. Over past few days has been very week to ambulate. Family denies any reported chest pain, shortness of breath, fever, chills, abdominal pain, nausea, vomiting. They report loose BM over past 1 day.     On presentation vitals:   · BP Systolic	 176 mm Hg  · BP Diastolic	84 mm Hg  · Heart Rate	76 /min  · Respiration Rate (breaths/min)	 21 /min  · Temp (F)	98.7 Degrees F  · Temp (C)	37.1 Degrees C  · Temp site	oral  · SpO2 (%)	98 %  · O2 Delivery/Oxygen Delivery Method	nasal cannula  · Oxygen Therapy Flow (L/min)	2 L/min     Labs are significant for: Leucocytosis 12K, Cr 1.8 (baseline 1), Trop 437>350, ProBNP 24K.   ECG with no ischemia changes    Imaging: CT head non con: No acute intracranial pathology. No evidence of midline shift, mass effect or intracranial hemorrhage.  CT A/P:  Mild fullness left renal pelvis. Mild left ureter with distal left ureteral stone. Stone within the urinary bladder on the left.  Significant fecal retention. Moderate distention the urinary bladder.  Small left pleural effusion with adjacent consolidation which may reflect atelectasis or infiltrate.    Patient admitted to medicine for management of suspected PNA and MARTINE.  (01 Oct 2024 23:12)      SUBJECTIVE  Patient is a 89y old Female who presents with a chief complaint of shock (15 Oct 2024 12:28)  Currently admitted to medicine with the primary diagnosis of Pneumonia      INTERVAL HPI AND OVERNIGHT EVENTS:  Patient was examined and seen at bedside. This morning she is resting comfortably in bed and reports no issues or overnight events.    REVIEW OF SYMPTOMS:  CONSTITUTIONAL: No weakness, fevers or chills; No headaches  EYES: No visual changes, eye pain, or discharge  ENT: No vertigo; No ear pain or change in hearing; No sore throat or difficulty swallowing  NECK: No pain or stiffness  RESPIRATORY: No cough, wheezing, or hemoptysis; No shortness of breath  CARDIOVASCULAR: No chest pain or palpitations  GASTROINTESTINAL: No abdominal or epigastric pain; No nausea, vomiting, or hematemesis; No diarrhea or constipation; No melena or hematochezia  GENITOURINARY: No dysuria, frequency or hematuria  MUSCULOSKELETAL: No joint pain, no muscle pain, no weakness  NEUROLOGICAL: No numbness or weakness  SKIN: No itching or rashes    OBJECTIVE  PAST MEDICAL & SURGICAL HISTORY  Dementia    Hypertension    Hyperlipidemia    Hypothyroidism    Diabetes      ALLERGIES:  No Known Allergies    MEDICATIONS:  STANDING MEDICATIONS  albuterol/ipratropium for Nebulization 3 milliLiter(s) Nebulizer every 6 hours  albuterol/ipratropium for Nebulization. 9 milliLiter(s) Nebulizer once  apixaban 5 milliGRAM(s) Oral every 12 hours  aspirin  chewable 81 milliGRAM(s) Oral daily  atorvastatin 20 milliGRAM(s) Oral at bedtime  bisacodyl Suppository 10 milliGRAM(s) Rectal once  chlorhexidine 2% Cloths 1 Application(s) Topical daily  glucagon  Injectable 1 milliGRAM(s) IntraMuscular once  hydrocortisone sodium succinate Injectable 25 milliGRAM(s) IV Push every 12 hours  insulin lispro (ADMELOG) corrective regimen sliding scale   SubCutaneous at bedtime  insulin lispro (ADMELOG) corrective regimen sliding scale   SubCutaneous three times a day before meals  levothyroxine 50 MICROGram(s) Oral daily  meropenem  IVPB 1000 milliGRAM(s) IV Intermittent every 8 hours  midodrine 5 milliGRAM(s) Oral every 8 hours  pantoprazole    Tablet 40 milliGRAM(s) Oral before breakfast  polyethylene glycol 3350 17 Gram(s) Oral two times a day  senna 2 Tablet(s) Oral at bedtime    PRN MEDICATIONS      VITAL SIGNS: Last 24 Hours  T(C): 36.1 (18 Oct 2024 11:50), Max: 36.4 (18 Oct 2024 00:00)  T(F): 97 (18 Oct 2024 11:50), Max: 97.5 (18 Oct 2024 00:00)  HR: 66 (18 Oct 2024 11:50) (66 - 103)  BP: 102/51 (18 Oct 2024 11:50) (91/44 - 131/58)  BP(mean): 74 (18 Oct 2024 11:50) (63 - 77)  RR: 20 (18 Oct 2024 11:50) (18 - 20)  SpO2: 100% (18 Oct 2024 11:50) (97% - 100%)    LABS:                        9.8    13.14 )-----------( 128      ( 17 Oct 2024 22:25 )             29.8     10-17    136  |  102  |  26[H]  ----------------------------<  91  4.2   |  23  |  0.7    Ca    8.3[L]      17 Oct 2024 05:37    TPro  4.1[L]  /  Alb  2.6[L]  /  TBili  0.6  /  DBili  x   /  AST  16  /  ALT  10  /  AlkPhos  104  10-17      Urinalysis Basic - ( 17 Oct 2024 05:37 )    Color: x / Appearance: x / SG: x / pH: x  Gluc: 91 mg/dL / Ketone: x  / Bili: x / Urobili: x   Blood: x / Protein: x / Nitrite: x   Leuk Esterase: x / RBC: x / WBC x   Sq Epi: x / Non Sq Epi: x / Bacteria: x      PHYSICAL EXAM:  CONSTITUTIONAL: No acute distress, comfortable  HEAD: Atraumatic, normocephalic  EYES: EOM intact, conjunctiva and sclera clear  ENT: Supple, no masses, no thyromegaly, no bruits, no JVD; moist mucous membranes  PULMONARY: Clear to auscultation bilaterally; no wheezes, rales, or rhonchi  CARDIOVASCULAR: Regular rate and rhythm; no murmurs, rubs, or gallops  GASTROINTESTINAL: Soft, non-tender, non-distended; bowel sounds present  NEUROLOGY: non-focal    ASSESSMENT & PLAN    89 yo F with hx of HTN, HLD, DM II, HFrEF (LVEF 20-25% 2020), Hypothyroidism, CVA, Dementia and Recent PNA (dx 4 days ago) presents to ED for generalized weakness. Positive sick contact.     10/18/2024:  - weaned off levophed 10/17 AM  - deemed stable for DGTF  - poor PO intake, discussed with fam interventions including appetite stim and ngt, would like to hold off for now  - refused AM labs > f/u 11am  - dec hydrocort to 25 q12  ------------    #Acute hypoxic respiratory failure 2/2 submassive PE / suspected bacterial PNA  and COVID pneumonia  POA / septic shock / chronic diastolic CHF / elevated cardiac enzymes   - Echo demonstrated Romano's sign, biomarkers elevated, c/w therapeutic Eliquis   - monitor pulse Ox, supplement oxygen, pt is comfortable on RA now   - fluid restriction 1200 ml in 24 hours  - i&o, daily weight, maintain fluid balance  - received stress dose steroids  - wean down Levophed, keep MAP above 65   - CTA repeat reviewed which shows improvement in terms of PE hence this is not saddle embolus causing hypotension   - appreciate ID  - cw Meropenem 1 gm iv q8h (10/13 -- 10/18? )  - cw supportive care, aspiration precautions ,pulmonary toilet   - cw ASA, statin  - cw telemetry monitoring  - cw monitor and replete electrolytes   - Aspiration precautions  - Wean O2 as tolerated, target SpO2 92-96%  - waned off levophed this AM 10/17  - qw midodrine 5 q8H  - taper hydrocort to 25 q12 (started 10/13 -- end 10/18)    # Prerenal MARTINE, resolved   - d/c Flomax   - CT abdomen neg for hydronephrosis. Moderate distention the urinary bladder.  - Rodriges placed  (failed TOV)  - monitor BUN/cr and urine output   - avoid nephrotoxic medications     # Hyponatremia  - check urine osmolarity and urine Na  - CW monitor Na level     #Constipation with fecal retention  - resolved   - bowel regimen  - ensure daily BMs     #DM II   - CC diet   - monitor FS AC HS  - Insulin sliding scale    #Hypothyroidism   - c/w synthroid. TSH-1.84 10/5    DVT PPX: Eliquis  GI PPX: Protonix  DIET: pureed with thickened liquid  ACTIVITY: as tolerated    CODE STATUS: DNR/DNI    Pt seen and staffed with attending physician Dr. Vences

## 2024-10-18 NOTE — PROGRESS NOTE ADULT - TIME BILLING
direct patient care and chart review  -coordinated current plan of care with medical staff on MDR
time spent on review of labs, imaging studies, old records, obtaining history, personally examining patient, counselling and communicating with patient, entering orders for medications/tests/etc, discussions with other health care providers, documentation in electronic health records, independent interpretation of labs, imaging/procedure results and care coordination.
Direct pt care and interdisciplinary rounds / Inter - Professional Rounds - CCM/ID/RN/Family
direct patient care and chart review  -coordinated current plan of care with medical staff on MDR
time spent on review of labs, imaging studies, old records, obtaining history, personally examining patient, counselling and communicating with patient, entering orders for medications/tests/etc, discussions with other health care providers, documentation in electronic health records, independent interpretation of labs, imaging/procedure results and care coordination.
I have personally seen and examined this patient. I have reviewed all pertinent clinical information and reviewed all relevant imaging ( and noted the impression from the Radiologist ) and diagnostic studies personally. I counseled the patient about the diagnostic testing and treatment plan. I discussed my recommendations with the primary team.

## 2024-10-19 LAB
GLUCOSE BLDC GLUCOMTR-MCNC: 129 MG/DL — HIGH (ref 70–99)
GLUCOSE BLDC GLUCOMTR-MCNC: 154 MG/DL — HIGH (ref 70–99)
GLUCOSE BLDC GLUCOMTR-MCNC: 158 MG/DL — HIGH (ref 70–99)
GLUCOSE BLDC GLUCOMTR-MCNC: 161 MG/DL — HIGH (ref 70–99)

## 2024-10-19 PROCEDURE — 99232 SBSQ HOSP IP/OBS MODERATE 35: CPT

## 2024-10-19 RX ADMIN — MEROPENEM 100 MILLIGRAM(S): 1 INJECTION INTRAVENOUS at 21:25

## 2024-10-19 RX ADMIN — HYDROCORTISONE 25 MILLIGRAM(S): 10 TABLET ORAL at 17:45

## 2024-10-19 RX ADMIN — PANTOPRAZOLE SODIUM 40 MILLIGRAM(S): 40 TABLET, DELAYED RELEASE ORAL at 05:27

## 2024-10-19 RX ADMIN — MIDODRINE HYDROCHLORIDE 5 MILLIGRAM(S): 2.5 TABLET ORAL at 05:27

## 2024-10-19 RX ADMIN — APIXABAN 5 MILLIGRAM(S): 5 TABLET, FILM COATED ORAL at 17:46

## 2024-10-19 RX ADMIN — HYDROCORTISONE 25 MILLIGRAM(S): 10 TABLET ORAL at 05:27

## 2024-10-19 RX ADMIN — MEROPENEM 100 MILLIGRAM(S): 1 INJECTION INTRAVENOUS at 12:53

## 2024-10-19 RX ADMIN — Medication 2 TABLET(S): at 21:27

## 2024-10-19 RX ADMIN — APIXABAN 5 MILLIGRAM(S): 5 TABLET, FILM COATED ORAL at 05:27

## 2024-10-19 RX ADMIN — IPRATROPIUM BROMIDE AND ALBUTEROL SULFATE 3 MILLILITER(S): .5; 2.5 SOLUTION RESPIRATORY (INHALATION) at 07:55

## 2024-10-19 RX ADMIN — MEROPENEM 100 MILLIGRAM(S): 1 INJECTION INTRAVENOUS at 05:28

## 2024-10-19 RX ADMIN — MIDODRINE HYDROCHLORIDE 5 MILLIGRAM(S): 2.5 TABLET ORAL at 12:53

## 2024-10-19 RX ADMIN — Medication 2: at 21:26

## 2024-10-19 RX ADMIN — IPRATROPIUM BROMIDE AND ALBUTEROL SULFATE 3 MILLILITER(S): .5; 2.5 SOLUTION RESPIRATORY (INHALATION) at 19:44

## 2024-10-19 RX ADMIN — IPRATROPIUM BROMIDE AND ALBUTEROL SULFATE 3 MILLILITER(S): .5; 2.5 SOLUTION RESPIRATORY (INHALATION) at 13:37

## 2024-10-19 RX ADMIN — Medication 20 MILLIGRAM(S): at 21:27

## 2024-10-19 RX ADMIN — MIDODRINE HYDROCHLORIDE 5 MILLIGRAM(S): 2.5 TABLET ORAL at 21:29

## 2024-10-19 RX ADMIN — Medication 1: at 12:54

## 2024-10-19 RX ADMIN — Medication 1: at 17:46

## 2024-10-19 RX ADMIN — Medication 81 MILLIGRAM(S): at 12:53

## 2024-10-19 RX ADMIN — CHLORHEXIDINE GLUCONATE 1 APPLICATION(S): 40 SOLUTION TOPICAL at 12:55

## 2024-10-19 RX ADMIN — Medication 50 MICROGRAM(S): at 05:27

## 2024-10-19 RX ADMIN — POLYETHYLENE GLYCOL 3350 17 GRAM(S): 17 POWDER, FOR SOLUTION ORAL at 17:45

## 2024-10-19 RX ADMIN — POLYETHYLENE GLYCOL 3350 17 GRAM(S): 17 POWDER, FOR SOLUTION ORAL at 05:27

## 2024-10-19 NOTE — PROGRESS NOTE ADULT - SUBJECTIVE AND OBJECTIVE BOX
SUBJECTIVE/OVERNIGHT EVENTS  Today is hospital day 18d. This morning patient was seen and examined at bedside, resting comfortably in bed. No acute or major events overnight.      MEDICATIONS  STANDING MEDICATIONS  albuterol/ipratropium for Nebulization 3 milliLiter(s) Nebulizer every 6 hours  albuterol/ipratropium for Nebulization. 9 milliLiter(s) Nebulizer once  apixaban 5 milliGRAM(s) Oral every 12 hours  aspirin  chewable 81 milliGRAM(s) Oral daily  atorvastatin 20 milliGRAM(s) Oral at bedtime  bisacodyl Suppository 10 milliGRAM(s) Rectal once  chlorhexidine 2% Cloths 1 Application(s) Topical daily  glucagon  Injectable 1 milliGRAM(s) IntraMuscular once  hydrocortisone sodium succinate Injectable 25 milliGRAM(s) IV Push every 12 hours  insulin lispro (ADMELOG) corrective regimen sliding scale   SubCutaneous three times a day before meals  insulin lispro (ADMELOG) corrective regimen sliding scale   SubCutaneous at bedtime  levothyroxine 50 MICROGram(s) Oral daily  meropenem  IVPB 1000 milliGRAM(s) IV Intermittent every 8 hours  midodrine 5 milliGRAM(s) Oral every 8 hours  pantoprazole    Tablet 40 milliGRAM(s) Oral before breakfast  polyethylene glycol 3350 17 Gram(s) Oral two times a day  senna 2 Tablet(s) Oral at bedtime    PRN MEDICATIONS    VITALS  T(F): 97.2 (10-19-24 @ 04:37), Max: 97.6 (10-18-24 @ 21:15)  HR: 72 (10-19-24 @ 04:37) (66 - 82)  BP: 97/58 (10-19-24 @ 04:37) (95/62 - 102/51)  RR: 20 (10-18-24 @ 11:50) (20 - 20)  SpO2: 87% (10-19-24 @ 04:37) (87% - 100%)  POCT Blood Glucose.: 129 mg/dL (10-19-24 @ 07:52)  POCT Blood Glucose.: 266 mg/dL (10-18-24 @ 22:12)  POCT Blood Glucose.: 261 mg/dL (10-18-24 @ 17:20)    PHYSICAL EXAM  GENERAL  ( X ) NAD, lying in bed comfortably     (  ) obtunded     (  ) lethargic     (  ) somnolent    HEAD  (X  ) Atraumatic     (  ) hematoma     (  ) laceration (specify location:       )     NECK  ( X ) Supple     (  ) neck stiffness     (  ) nuchal rigidity     (  )  no JVD     (  ) JVD present ( -- cm)    HEART  Rate -->  (  ) normal rate    (  ) bradycardic    (  ) tachycardic  Rhythm -->  (  ) regular    (  ) regularly irregular    (  ) irregularly irregular  Murmurs -->  (  X) normal s1/s2    (  ) systolic murmur    (  ) diastolic murmur    (  ) continuous murmur     (  ) S3 present    (  ) S4 present    LUNGS  (  )Unlabored respirations     (  ) tachypnea  (  X) B/L air entry     (  ) decreased breath sounds in:  (location     )    (  ) no adventitious sound     (  ) crackles     (  ) wheezing      (  ) rhonchi      (specify location:       )  (  ) chest wall tenderness (specify location:       )    ABDOMEN  (  ) Soft     (  ) tense   |   (  ) nondistended     (  ) distended   |   ( X ) +BS     (  ) hypoactive bowel sounds     (  ) hyperactive bowel sounds  (  ) nontender     (  ) RUQ tenderness     (  ) RLQ tenderness     (  ) LLQ tenderness     (  ) epigastric tenderness     (  ) diffuse tenderness  (  ) Splenomegaly      (  ) Hepatomegaly      (  ) Jaundice     (  ) ecchymosis     EXTREMITIES  ( X ) Normal     (  ) Rash     (  ) ecchymosis     (  ) varicose veins      (  ) pitting edema     (  ) non-pitting edema   (  ) ulceration     (  ) gangrene:     (location:     )    NERVOUS SYSTEM  (  ) A&Ox3     (  ) confused     (  ) lethargic  CN II-XII:     (  ) Intact     (  ) focal deficits  (Specify:     )   Upper extremities:     (  ) strength X/5     (  ) focal deficit (specify:    )  Lower extremities:     (  ) strength  X/5    (  ) focal deficit (specify:    )    SKIN  (  ) No rashes or lesions     (  ) maculopapular rash     (  ) pustules     (  ) vesicles     (  ) ulcer     (  ) ecchymosis     (specify location:     )    ( X ) Indwelling Rodriges Catheter   Date insterted:    Reason (  ) Critical illness     (  ) urinary retention    (  ) Accurate Ins/Outs Monitoring     (  ) CMO patient    (  ) Central Line  Date inserted:  Location: (  ) Right IJ   (  ) Left IJ   (  ) Right Fem   (  ) Left Fem    (  ) SPC  (  ) pigtail  (  ) PEG tube  (  ) colostomy  (  ) jejunostomy  (  ) U-Dall    LABS             9.8    13.14 )-----------( 128       10-17-24 @ 22:25 )             29.8

## 2024-10-19 NOTE — PROGRESS NOTE ADULT - ASSESSMENT
89 yo F with hx of HTN, HLD, DM II, HFrEF (LVEF 20-25% 2020), Hypothyroidism, CVA, Dementia and Recent PNA (dx 4 days ago) presents to ED for generalized weakness. Positive sick contact.     #Acute hypoxic respiratory failure 2/2 submassive PE / suspected bacterial PNA  and COVID pneumonia  POA / septic shock / chronic diastolic CHF / elevated cardiac enzymes   - Echo demonstrated Romano's sign, biomarkers elevated, c/w therapeutic Eliquis   - CTA repeat reviewed which shows improvement in terms of PE hence this is not saddle embolus causing hypotension   - cw supportive care, aspiration precautions ,pulmonary toilet   - cw ASA, statin  - Wean O2 as tolerated, target SpO2 92-96%  - qw midodrine 5 q8H  - taper hydrocort to 25 q12 (started 10/13, stop tmr)  - ID: - cw Meropenem 1 gm iv q8h, hold on 10/20    # Prerenal MARTINE, resolved   - Rodriges placed  (failed TOV)    #Constipation with fecal retention- resolved   - ensure daily BMs     #DM II   - Insulin sliding scale    #Hypothyroidism   - c/w synthroid. TSH-1.84 10/5    DVT PPX: Eliquis  GI PPX: Protonix  DIET: pureed with thickened liquid  ACTIVITY: as tolerated  CODE STATUS: DNR/DNI

## 2024-10-20 LAB
GLUCOSE BLDC GLUCOMTR-MCNC: 119 MG/DL — HIGH (ref 70–99)
GLUCOSE BLDC GLUCOMTR-MCNC: 182 MG/DL — HIGH (ref 70–99)

## 2024-10-20 PROCEDURE — 73610 X-RAY EXAM OF ANKLE: CPT | Mod: 26,LT

## 2024-10-20 PROCEDURE — 99232 SBSQ HOSP IP/OBS MODERATE 35: CPT

## 2024-10-20 RX ADMIN — IPRATROPIUM BROMIDE AND ALBUTEROL SULFATE 3 MILLILITER(S): .5; 2.5 SOLUTION RESPIRATORY (INHALATION) at 20:26

## 2024-10-20 RX ADMIN — APIXABAN 5 MILLIGRAM(S): 5 TABLET, FILM COATED ORAL at 06:32

## 2024-10-20 RX ADMIN — IPRATROPIUM BROMIDE AND ALBUTEROL SULFATE 3 MILLILITER(S): .5; 2.5 SOLUTION RESPIRATORY (INHALATION) at 07:29

## 2024-10-20 RX ADMIN — Medication 81 MILLIGRAM(S): at 12:37

## 2024-10-20 RX ADMIN — Medication 20 MILLIGRAM(S): at 21:28

## 2024-10-20 RX ADMIN — Medication 2: at 21:27

## 2024-10-20 RX ADMIN — PANTOPRAZOLE SODIUM 40 MILLIGRAM(S): 40 TABLET, DELAYED RELEASE ORAL at 06:32

## 2024-10-20 RX ADMIN — MIDODRINE HYDROCHLORIDE 5 MILLIGRAM(S): 2.5 TABLET ORAL at 21:28

## 2024-10-20 RX ADMIN — Medication 2 TABLET(S): at 21:28

## 2024-10-20 RX ADMIN — CHLORHEXIDINE GLUCONATE 1 APPLICATION(S): 40 SOLUTION TOPICAL at 12:40

## 2024-10-20 RX ADMIN — HYDROCORTISONE 25 MILLIGRAM(S): 10 TABLET ORAL at 06:36

## 2024-10-20 RX ADMIN — APIXABAN 5 MILLIGRAM(S): 5 TABLET, FILM COATED ORAL at 18:05

## 2024-10-20 RX ADMIN — Medication 50 MICROGRAM(S): at 06:32

## 2024-10-20 RX ADMIN — IPRATROPIUM BROMIDE AND ALBUTEROL SULFATE 3 MILLILITER(S): .5; 2.5 SOLUTION RESPIRATORY (INHALATION) at 13:38

## 2024-10-20 RX ADMIN — MIDODRINE HYDROCHLORIDE 5 MILLIGRAM(S): 2.5 TABLET ORAL at 12:37

## 2024-10-20 RX ADMIN — POLYETHYLENE GLYCOL 3350 17 GRAM(S): 17 POWDER, FOR SOLUTION ORAL at 18:05

## 2024-10-20 NOTE — PROGRESS NOTE ADULT - SUBJECTIVE AND OBJECTIVE BOX
pt seen and examined.     My notes supersede resident's notes in case of discrepancy       ROS: no cp, no sob, no n/v, no fever    Vital Signs Last 24 Hrs  T(C): 36.3 (20 Oct 2024 12:28), Max: 36.3 (19 Oct 2024 20:45)  T(F): 97.3 (20 Oct 2024 12:28), Max: 97.3 (19 Oct 2024 20:45)  HR: 77 (20 Oct 2024 12:28) (65 - 89)  BP: 103/63 (20 Oct 2024 12:28) (103/63 - 147/76)  BP(mean): 84 (19 Oct 2024 21:23) (84 - 84)  RR: 20 (20 Oct 2024 12:28) (18 - 20)  SpO2: 97% (20 Oct 2024 12:28) (94% - 99%)    Parameters below as of 20 Oct 2024 12:28  Patient On (Oxygen Delivery Method): nasal cannula  O2 Flow (L/min): 3    physical exam  constitutional NAD, AA, Respiratory  lungs CTA, CVS heart RRR, GI: abdomen Soft NT, ND, BS+, skin: intact  neuro exam no focal deficit     MEDICATIONS  (STANDING):  albuterol/ipratropium for Nebulization 3 milliLiter(s) Nebulizer every 6 hours  albuterol/ipratropium for Nebulization. 9 milliLiter(s) Nebulizer once  apixaban 5 milliGRAM(s) Oral every 12 hours  aspirin  chewable 81 milliGRAM(s) Oral daily  atorvastatin 20 milliGRAM(s) Oral at bedtime  bisacodyl Suppository 10 milliGRAM(s) Rectal once  chlorhexidine 2% Cloths 1 Application(s) Topical daily  glucagon  Injectable 1 milliGRAM(s) IntraMuscular once  insulin lispro (ADMELOG) corrective regimen sliding scale   SubCutaneous at bedtime  insulin lispro (ADMELOG) corrective regimen sliding scale   SubCutaneous three times a day before meals  levothyroxine 50 MICROGram(s) Oral daily  meropenem  IVPB 1000 milliGRAM(s) IV Intermittent every 8 hours  midodrine 5 milliGRAM(s) Oral every 8 hours  pantoprazole    Tablet 40 milliGRAM(s) Oral before breakfast  polyethylene glycol 3350 17 Gram(s) Oral two times a day  senna 2 Tablet(s) Oral at bedtime    Procalcitonin: 0.11 ng/mL [0.02 - 0.10] (10-14-24 @ 10:45)  Procalcitonin: 0.08 ng/mL [0.02 - 0.10] (10-04-24 @ 10:34)  D-Dimer Assay, Quantitative: 1995 ng/mL DDU (10-02-24 @ 11:58)    a/p  89 yo F with hx of HTN, HLD, DM II, HFrEF (LVEF 20-25% 2020), Hypothyroidism, CVA, Dementia and Recent PNA (dx 4 days PTP) presents to ED for generalized weakness. Admitted to SDU due to septic shock due to COVID PNA and submassive PE on pressors     # sp covid pna, sp RVD   fu ID     # submassive PE   off pressors  cont ac     # leg ecchymosis,   check xray     # arm swelling and ecchymosis, due to hypoalbuminemia and on anticoagulation   Albumin: 2.6 g/dL (10.17.24 @ 05:37)    # pna, possible bacterial   today she had hypothermia, warming blanket   septic shock poa   cont abx per ID : uriel     # positive trop   possible demand ischemia/nstermi   conservative medical management     # diabetes   CAPILLARY BLOOD GLUCOSE    POCT Blood Glucose.: 119 mg/dL (20 Oct 2024 07:46)  POCT Blood Glucose.: 158 mg/dL (19 Oct 2024 20:50)  POCT Blood Glucose.: 154 mg/dL (19 Oct 2024 16:59)    cont insulin per protocol     # hypothyroidism   cont meds    #Progress Note Handoff    Pending :  clinical improvement, ID followup regarding abx tx duration   Family discussion: dw son and daughter in law at the bedside   Disposition: SNF when stable   code status: dnr, dni   poor prognosis

## 2024-10-20 NOTE — PROVIDER CONTACT NOTE (OTHER) - SITUATION
Pt is ecchymotic on bilateral forearms. Pt has right upper arm midline. Blood was noted on midline site. MD Seth was reached out to.

## 2024-10-21 LAB
ALBUMIN SERPL ELPH-MCNC: 2.7 G/DL — LOW (ref 3.5–5.2)
ALP SERPL-CCNC: 117 U/L — HIGH (ref 30–115)
ALT FLD-CCNC: 23 U/L — SIGNIFICANT CHANGE UP (ref 0–41)
ANION GAP SERPL CALC-SCNC: 12 MMOL/L — SIGNIFICANT CHANGE UP (ref 7–14)
AST SERPL-CCNC: 23 U/L — SIGNIFICANT CHANGE UP (ref 0–41)
BASOPHILS # BLD AUTO: 0.01 K/UL — SIGNIFICANT CHANGE UP (ref 0–0.2)
BASOPHILS NFR BLD AUTO: 0.1 % — SIGNIFICANT CHANGE UP (ref 0–1)
BILIRUB SERPL-MCNC: 0.9 MG/DL — SIGNIFICANT CHANGE UP (ref 0.2–1.2)
BUN SERPL-MCNC: 27 MG/DL — HIGH (ref 10–20)
CALCIUM SERPL-MCNC: 8.3 MG/DL — LOW (ref 8.4–10.5)
CHLORIDE SERPL-SCNC: 103 MMOL/L — SIGNIFICANT CHANGE UP (ref 98–110)
CO2 SERPL-SCNC: 22 MMOL/L — SIGNIFICANT CHANGE UP (ref 17–32)
CREAT SERPL-MCNC: 0.6 MG/DL — LOW (ref 0.7–1.5)
EGFR: 79 ML/MIN/1.73M2 — SIGNIFICANT CHANGE UP
EOSINOPHIL # BLD AUTO: 0.05 K/UL — SIGNIFICANT CHANGE UP (ref 0–0.7)
EOSINOPHIL NFR BLD AUTO: 0.4 % — SIGNIFICANT CHANGE UP (ref 0–8)
GLUCOSE BLDC GLUCOMTR-MCNC: 105 MG/DL — HIGH (ref 70–99)
GLUCOSE BLDC GLUCOMTR-MCNC: 159 MG/DL — HIGH (ref 70–99)
GLUCOSE BLDC GLUCOMTR-MCNC: 95 MG/DL — SIGNIFICANT CHANGE UP (ref 70–99)
GLUCOSE SERPL-MCNC: 92 MG/DL — SIGNIFICANT CHANGE UP (ref 70–99)
HCT VFR BLD CALC: 32.7 % — LOW (ref 37–47)
HGB BLD-MCNC: 10.4 G/DL — LOW (ref 12–16)
IMM GRANULOCYTES NFR BLD AUTO: 0.8 % — HIGH (ref 0.1–0.3)
LYMPHOCYTES # BLD AUTO: 1.98 K/UL — SIGNIFICANT CHANGE UP (ref 1.2–3.4)
LYMPHOCYTES # BLD AUTO: 16.6 % — LOW (ref 20.5–51.1)
MAGNESIUM SERPL-MCNC: 1.9 MG/DL — SIGNIFICANT CHANGE UP (ref 1.8–2.4)
MCHC RBC-ENTMCNC: 30.9 PG — SIGNIFICANT CHANGE UP (ref 27–31)
MCHC RBC-ENTMCNC: 31.8 G/DL — LOW (ref 32–37)
MCV RBC AUTO: 97 FL — SIGNIFICANT CHANGE UP (ref 81–99)
MONOCYTES # BLD AUTO: 1.14 K/UL — HIGH (ref 0.1–0.6)
MONOCYTES NFR BLD AUTO: 9.5 % — HIGH (ref 1.7–9.3)
NEUTROPHILS # BLD AUTO: 8.69 K/UL — HIGH (ref 1.4–6.5)
NEUTROPHILS NFR BLD AUTO: 72.6 % — SIGNIFICANT CHANGE UP (ref 42.2–75.2)
NRBC # BLD: 0 /100 WBCS — SIGNIFICANT CHANGE UP (ref 0–0)
PLATELET # BLD AUTO: 130 K/UL — SIGNIFICANT CHANGE UP (ref 130–400)
PMV BLD: 11.9 FL — HIGH (ref 7.4–10.4)
POTASSIUM SERPL-MCNC: 4.1 MMOL/L — SIGNIFICANT CHANGE UP (ref 3.5–5)
POTASSIUM SERPL-SCNC: 4.1 MMOL/L — SIGNIFICANT CHANGE UP (ref 3.5–5)
PROT SERPL-MCNC: 4.5 G/DL — LOW (ref 6–8)
RBC # BLD: 3.37 M/UL — LOW (ref 4.2–5.4)
RBC # FLD: 14.4 % — SIGNIFICANT CHANGE UP (ref 11.5–14.5)
SODIUM SERPL-SCNC: 137 MMOL/L — SIGNIFICANT CHANGE UP (ref 135–146)
WBC # BLD: 11.96 K/UL — HIGH (ref 4.8–10.8)
WBC # FLD AUTO: 11.96 K/UL — HIGH (ref 4.8–10.8)

## 2024-10-21 PROCEDURE — 93971 EXTREMITY STUDY: CPT | Mod: 26,RT

## 2024-10-21 PROCEDURE — 99232 SBSQ HOSP IP/OBS MODERATE 35: CPT

## 2024-10-21 RX ADMIN — IPRATROPIUM BROMIDE AND ALBUTEROL SULFATE 3 MILLILITER(S): .5; 2.5 SOLUTION RESPIRATORY (INHALATION) at 20:47

## 2024-10-21 RX ADMIN — PANTOPRAZOLE SODIUM 40 MILLIGRAM(S): 40 TABLET, DELAYED RELEASE ORAL at 05:17

## 2024-10-21 RX ADMIN — POLYETHYLENE GLYCOL 3350 17 GRAM(S): 17 POWDER, FOR SOLUTION ORAL at 05:17

## 2024-10-21 RX ADMIN — APIXABAN 5 MILLIGRAM(S): 5 TABLET, FILM COATED ORAL at 05:17

## 2024-10-21 RX ADMIN — Medication 2 TABLET(S): at 21:10

## 2024-10-21 RX ADMIN — APIXABAN 5 MILLIGRAM(S): 5 TABLET, FILM COATED ORAL at 17:58

## 2024-10-21 RX ADMIN — CHLORHEXIDINE GLUCONATE 1 APPLICATION(S): 40 SOLUTION TOPICAL at 12:20

## 2024-10-21 RX ADMIN — Medication 2: at 21:14

## 2024-10-21 RX ADMIN — POLYETHYLENE GLYCOL 3350 17 GRAM(S): 17 POWDER, FOR SOLUTION ORAL at 17:58

## 2024-10-21 RX ADMIN — Medication 50 MICROGRAM(S): at 05:17

## 2024-10-21 RX ADMIN — MIDODRINE HYDROCHLORIDE 5 MILLIGRAM(S): 2.5 TABLET ORAL at 05:17

## 2024-10-21 RX ADMIN — Medication 20 MILLIGRAM(S): at 21:10

## 2024-10-21 NOTE — PROVIDER CONTACT NOTE (OTHER) - ACTION/TREATMENT ORDERED:
MD ordered Duplex for right upper extremity.
Midline removed. Insertion of new access
250CC Bolus normal saline  Nonrebreather
MD aware and stated he will come to bedside
RT nurse contacted and arrived to unit to initiate Norepinephrine. RT nurse  started levo at ordered dose. 0.05.

## 2024-10-21 NOTE — PROGRESS NOTE ADULT - ASSESSMENT
· Assessment	  89 yo F with hx of HTN, HLD, DM II, HFrEF (LVEF 20-25% 2020), Hypothyroidism, CVA, Dementia and Recent PNA (dx on CXR 4 days ago) presents to ED for generalized weakness and decreased PO intake. 4 days ago, patient started having cough and decreased PO intake. CXR was done and patient was diagnosed with PNA and was started on 5 days course of Azithromycin and Cefpodoxime with Prednisone 20mg OD. Patient had positive sick contact whereby both son and daughter developed URTI symptoms few days earleir. Patient is generally not very verbal at baseline and doesn't raise complains. She is mobile with a walker and 1 person assistance. Over past few days has been very week to ambulate. Family denies any reported chest pain, shortness of breath, fever, chills, abdominal pain, nausea, vomiting. They report loose BM over past 1 day.     IMPRESSION/RECOMMENDATIONS  Immunosuppression/Immunosenescence ( above age 60 yrs there is a exponential decline in immunity which could result in poor clinical outcomes.   Acute  illness  which poses a threat to life or bodily function without treatment   Improving hypoxic respiratory failure leading to HF> NC  Resolved severe sepsis ( off pressors, toxic encephalopathy, WBC 13.1 )  10/13 CT chest : no PE, worsening LLL opacity  10/16 CXR opacity LLL  WBC 13.1>11.9  10/12 BCx NG    COVID 19 with mild illness- pt has physiological /non pulmonary complaints  10/2 CT with no GGO  Pt was in the early viral replicative phase based on the timeline/onset of symptoms. ( as per son Eliseo at the bedside he was NG and his wife who had a URI also tested NG ? )  S/p vaccination  s/p RDV for 3 days    Nares ORSA NG  10/2 urine for legionella ag NG  10/1,5 BCx NG  BNP 78330    -Monitor WBC .   -Off loading to prevent pressure sores and preventive measures to avoid aspiration  -based on clinical parameters and given that she has had a long enough course will recommend holding Meropenem ( started 10/13 )    Please do not hesitate to recall ID if any questions arise either through [employer] or through [employer] teams

## 2024-10-21 NOTE — PROGRESS NOTE ADULT - NS ATTEST RISK PROBLEM GEN_ALL_CORE FT
-The patient's injury acutely impairs one or more vital organ systems, and there is a high probability of imminent or life threatening deterioration in the patient's condition. The care of this patient requires complex medical decision making in the field of Infectious Diseases and extensive interpretation of laboratory, microbiological and radiographic data.
I have personally seen and examined this patient. I have reviewed all pertinent clinical information and reviewed all relevant imaging ( and noted the impression from the Radiologist ) and diagnostic studies personally. I counseled the patient about the diagnostic testing and treatment plan. I discussed my recommendations with the primary team.
I have personally seen and examined this patient. I have reviewed all pertinent clinical information and reviewed all relevant imaging ( and noted the impression from the Radiologist ) and diagnostic studies personally. I counseled the patient about the diagnostic testing and treatment plan. I discussed my recommendations with the primary team.
dw cc, DG to cardiac telemonitoring, labs reviewed, labs ordered, transitioned to eliquis
I have personally seen and examined this patient. I have reviewed all pertinent clinical information and reviewed all relevant imaging ( and noted the impression from the Radiologist ) and diagnostic studies personally. I counseled the patient about the diagnostic testing and treatment plan. I discussed my recommendations with the primary team.
acute submassive PE, dw cc, sdu, labs reviewed, heparin ggt DCed, labs ordered, imaging reviewed
-Patient has an illness which poses a threat to life or bodily function without treatment.  -I reviewed external records as available  -I recommended ordering relevant tests to diagnose an Infection  -I reviewed the completed testing ( labs, imaging )  -My assessment required an independent historian  -I independently interpreted the most recent imaging ( CXR )  -I discussed my recommendations with the primary team housestaff/Attending  -I assisted in the decision regarding continued need for hospitalization / or escalation of care as needed.  -Patient is on drug therapy that requires intense monitoring or toxicity and adjustment of the Antibiotics based on creatinine clearence
I have personally seen and examined this patient. I have reviewed all pertinent clinical information and reviewed all relevant imaging ( and noted the impression from the Radiologist ) and diagnostic studies personally. I counseled the patient about the diagnostic testing and treatment plan. I discussed my recommendations with the primary team.

## 2024-10-21 NOTE — CHART NOTE - NSCHARTNOTEFT_GEN_A_CORE
3 Day calorie count initiated from 10/18 - 10/20, the following are the results:    Day 1 - 10/18:    0% Breakfast    Lunch:  35% consumed - but no specific food items mentioned    Dinner:  100% vegetables  90% soup  30% chicken  100% vanilla pudding  Total for Dinner: 355 kcal, 15.2 gm Protein      Day 2 - 10/19:    Breakfast:  50% eggs  50% pancake    Lunch and Dinner Not documented    Total for Day 2: 215 kcal, 8.9 gm Protein    Day 3 - 10/20: no meals documented      Average PO intake for 2 days: 285 kcal, 12.05 gm Protein    RD to f/u up and provide further nutrition recommendations  RD to remain available: Rowena Brady RD [phone] or via Teams

## 2024-10-21 NOTE — PROVIDER CONTACT NOTE (OTHER) - RECOMMENDATIONS
MD to assess line and remove if needed
Removal of Midline and insertion of new access/device
Initiate Norepinephrine
MD requested to assess pt midline at bedside.
MD ordered Bolus 250cc  Labs , chest x-ray and EKG ordered and completed  Midline placed  Non rebreather

## 2024-10-21 NOTE — PROGRESS NOTE ADULT - MENTAL STATUS
alert, does not follow commands
poorly responsive
alert, does not follow commands
lethargic  does not follow commands
does not follow caros
lethargic
lethargic  poorly communicative
does not follow commands

## 2024-10-21 NOTE — PROVIDER CONTACT NOTE (OTHER) - SITUATION
patient midline has swelling, redness, and cold patient midline has swelling, redness, cool to touch, and bruising

## 2024-10-21 NOTE — PROGRESS NOTE ADULT - GASTROINTESTINAL
no new murmurs
normal/soft/nontender/nondistended/normal active bowel sounds
soft/nontender/nondistended/no guarding/no rigidity
normal/soft/nontender/nondistended/normal active bowel sounds
soft/nontender/no guarding/no rigidity

## 2024-10-21 NOTE — PROGRESS NOTE ADULT - SKIN
no induration
PACU/Transfer Note
no induration
no induration
warm and dry/color normal/normal/no rashes/no ulcers
no induration

## 2024-10-21 NOTE — PROVIDER CONTACT NOTE (OTHER) - BACKGROUND
this issue has been addressed last night (ultarsound was order). Nursing management aware of IV midline assessment. this issue has been addressed last night (ultrasound was order). Nursing management aware of IV midline assessment.

## 2024-10-21 NOTE — PROGRESS NOTE ADULT - EYES
PERRL/EOMI/conjunctiva clear/normal

## 2024-10-21 NOTE — PROGRESS NOTE ADULT - RESPIRATORY
few crackles
shallow
crackles left base
crackles left
shallow
shallow
crackles left base
shallow

## 2024-10-21 NOTE — PROGRESS NOTE ADULT - SUBJECTIVE AND OBJECTIVE BOX
pt seen and examined.     My notes supersede resident's notes in case of discrepancy       ROS: no cp, no sob, no n/v, no fever    Vital Signs Last 24 Hrs  T(C): 35.7 (21 Oct 2024 12:00), Max: 36.6 (21 Oct 2024 05:13)  T(F): 96.2 (21 Oct 2024 12:00), Max: 97.8 (21 Oct 2024 05:13)  HR: 79 (21 Oct 2024 12:00) (61 - 100)  BP: 97/46 (21 Oct 2024 12:00) (92/58 - 139/80)  BP(mean): --  RR: 17 (21 Oct 2024 12:00) (16 - 20)  SpO2: 100% (21 Oct 2024 12:00) (96% - 100%)    Parameters below as of 21 Oct 2024 05:13  Patient On (Oxygen Delivery Method): nasal cannula  O2 Flow (L/min): 3      physical exam  constitutional NAD, minimal communication, Respiratory  lungs CTA, CVS heart RRR, GI: abdomen Soft NT, ND, BS+, skin: bilat arm swelling and ecchymosis , ecchymosis on the left ankle   neuro exam : pt is not able to participate     MEDICATIONS  (STANDING):  albuterol/ipratropium for Nebulization 3 milliLiter(s) Nebulizer every 6 hours  albuterol/ipratropium for Nebulization. 9 milliLiter(s) Nebulizer once  apixaban 5 milliGRAM(s) Oral every 12 hours  aspirin  chewable 81 milliGRAM(s) Oral daily  atorvastatin 20 milliGRAM(s) Oral at bedtime  bisacodyl Suppository 10 milliGRAM(s) Rectal once  chlorhexidine 2% Cloths 1 Application(s) Topical daily  insulin lispro (ADMELOG) corrective regimen sliding scale   SubCutaneous at bedtime  insulin lispro (ADMELOG) corrective regimen sliding scale   SubCutaneous three times a day before meals  levothyroxine 50 MICROGram(s) Oral daily  midodrine 5 milliGRAM(s) Oral every 8 hours  pantoprazole    Tablet 40 milliGRAM(s) Oral before breakfast  polyethylene glycol 3350 17 Gram(s) Oral two times a day  senna 2 Tablet(s) Oral at bedtime                        10.4   11.96 )-----------( 130      ( 21 Oct 2024 11:31 )             32.7     10-21    137  |  103  |  27[H]  ----------------------------<  92  4.1   |  22  |  0.6[L]    Ca    8.3[L]      21 Oct 2024 11:31  Mg     1.9     10-21    TPro  4.5[L]  /  Alb  2.7[L]  /  TBili  0.9  /  DBili  x   /  AST  23  /  ALT  23  /  AlkPhos  117[H]  10-21    Procalcitonin: 0.11 ng/mL [0.02 - 0.10] (10-14-24 @ 10:45)  Procalcitonin: 0.08 ng/mL [0.02 - 0.10] (10-04-24 @ 10:34)  D-Dimer Assay, Quantitative: 1995 ng/mL DDU (10-02-24 @ 11:58)    < from: Xray Ankle Complete 3 Views, Left (10.20.24 @ 14:35) >  Diffuse osteopenia. The ankle mortise is symmetric. Chronic healed   deformity of the distal fibula at the syndesmotic level. No acute   fracture or dislocation. No ankle joint effusion. The anterior ankle soft   tissue swelling. 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   Vascular calcifications.    < end of copied text >    a/p  89 yo F with hx of HTN, HLD, DM II, HFrEF (LVEF 20-25% 2020), Hypothyroidism, CVA, Dementia and Recent PNA (dx 4 days PTP) presents to ED for generalized weakness. Admitted to SDU due to septic shock due to COVID PNA and submassive PE on pressors     # sp covid pna, sp RVD   fu ID   dw ID : will change abx to po    # submassive PE   off pressors  cont ac     # leg ecchymosis,   check xray     # arm swelling and ecchymosis, due to hypoalbuminemia and on anticoagulation   Albumin: 2.6 g/dL (10.17.24 @ 05:37)    # pna, possible bacterial   today she had hypothermia, warming blanket   septic shock poa   cont abx per ID : uriel     # positive trop   possible demand ischemia/nstermi   conservative medical management     # diabetes   CAPILLARY BLOOD GLUCOSE    POCT Blood Glucose.: 119 mg/dL (20 Oct 2024 07:46)  POCT Blood Glucose.: 158 mg/dL (19 Oct 2024 20:50)  POCT Blood Glucose.: 154 mg/dL (19 Oct 2024 16:59)    cont insulin per protocol     # hypothyroidism   cont meds    #Progress Note Handoff    Pending :  clinical improvement, ID followup regarding abx   Family discussion: dw son and daughter in law at the bedside   Disposition: SNF when stable   code status: dnr, dni   poor prognosis

## 2024-10-21 NOTE — PROGRESS NOTE ADULT - CONSTITUTIONAL
normal/well-groomed/no distress
normal/well-groomed/no distress
obese
normal/well-groomed/no distress
normal/well-groomed/no distress
obese
normal/well-groomed/no distress
obese
normal/well-groomed/no distress
normal/well-groomed/no distress
obese

## 2024-10-21 NOTE — PROGRESS NOTE ADULT - SUBJECTIVE AND OBJECTIVE BOX
BRIJESH POWER  89y, Female    All available historical data reviewed    OVERNIGHT EVENTS:  no fevers  on 2 LIT NC    ROS:  unable to obtain history secondary to patient's mental status and/or sedation     VITALS:  T(F): 96.2, Max: 97.8 (10-21-24 @ 05:13)  HR: 79  BP: 97/46  RR: 17Vital Signs Last 24 Hrs  T(C): 35.7 (21 Oct 2024 12:00), Max: 36.6 (21 Oct 2024 05:13)  T(F): 96.2 (21 Oct 2024 12:00), Max: 97.8 (21 Oct 2024 05:13)  HR: 79 (21 Oct 2024 12:00) (61 - 100)  BP: 97/46 (21 Oct 2024 12:00) (92/58 - 139/80)  BP(mean): --  RR: 17 (21 Oct 2024 12:00) (16 - 20)  SpO2: 100% (21 Oct 2024 12:00) (96% - 100%)    Parameters below as of 21 Oct 2024 05:13  Patient On (Oxygen Delivery Method): nasal cannula  O2 Flow (L/min): 3      TESTS & MEASUREMENTS:                        10.4   11.96 )-----------( 130      ( 21 Oct 2024 11:31 )             32.7     10-21    137  |  103  |  27[H]  ----------------------------<  92  4.1   |  22  |  0.6[L]    Ca    8.3[L]      21 Oct 2024 11:31  Mg     1.9     10-21    TPro  4.5[L]  /  Alb  2.7[L]  /  TBili  0.9  /  DBili  x   /  AST  23  /  ALT  23  /  AlkPhos  117[H]  10-21    LIVER FUNCTIONS - ( 21 Oct 2024 11:31 )  Alb: 2.7 g/dL / Pro: 4.5 g/dL / ALK PHOS: 117 U/L / ALT: 23 U/L / AST: 23 U/L / GGT: x             Urinalysis Basic - ( 21 Oct 2024 11:31 )    Color: x / Appearance: x / SG: x / pH: x  Gluc: 92 mg/dL / Ketone: x  / Bili: x / Urobili: x   Blood: x / Protein: x / Nitrite: x   Leuk Esterase: x / RBC: x / WBC x   Sq Epi: x / Non Sq Epi: x / Bacteria: x          Social History:  Tobacco Use: No  Alcohol Use: No  Drug Use: No    RADIOLOGY & ADDITIONAL TESTS:  Personal review of radiological diagnostics performed  Echo and EKG results noted when applicable.     MEDICATIONS:  albuterol/ipratropium for Nebulization 3 milliLiter(s) Nebulizer every 6 hours  albuterol/ipratropium for Nebulization. 9 milliLiter(s) Nebulizer once  apixaban 5 milliGRAM(s) Oral every 12 hours  aspirin  chewable 81 milliGRAM(s) Oral daily  atorvastatin 20 milliGRAM(s) Oral at bedtime  bisacodyl Suppository 10 milliGRAM(s) Rectal once  chlorhexidine 2% Cloths 1 Application(s) Topical daily  insulin lispro (ADMELOG) corrective regimen sliding scale   SubCutaneous three times a day before meals  insulin lispro (ADMELOG) corrective regimen sliding scale   SubCutaneous at bedtime  levothyroxine 50 MICROGram(s) Oral daily  midodrine 5 milliGRAM(s) Oral every 8 hours  pantoprazole    Tablet 40 milliGRAM(s) Oral before breakfast  polyethylene glycol 3350 17 Gram(s) Oral two times a day  senna 2 Tablet(s) Oral at bedtime      ANTIBIOTICS:

## 2024-10-21 NOTE — PROGRESS NOTE ADULT - ENMT
no gross abnormalities

## 2024-10-21 NOTE — PROGRESS NOTE ADULT - MUSCULOSKELETAL
no calf tenderness/no strength/no chest wall tenderness
no strength/no chest wall tenderness
no calf tenderness/no chest wall tenderness/decreased strength
no calf tenderness/no strength/no chest wall tenderness
no calf tenderness/no strength
no calf tenderness/no strength/no chest wall tenderness
no calf tenderness/no strength/no chest wall tenderness

## 2024-10-21 NOTE — CHART NOTE - NSCHARTNOTEFT_GEN_A_CORE
Registered Dietitian Follow-Up     Patient Profile Reviewed                           Yes []   No []     Nutrition History Previously Obtained        Yes []  No [x]       Pertinent Subjective Information:  Brief nutrition hx obtained from son present at bedside. Patient was eating 3 meals/day PTA. NKFA, no food intolerances or other restrictions reported. UBW / any weight changes - uncertain per Patient's son's report.     3 Day Calorie Count initiated 10/18 - 10/20, result of calorie count indicate poor PO intake (<50% of meals consumed)     Pertinent Medical Interventions:  89 yo F with hx of HTN, HLD, DM II, HFrEF (LVEF 20-25% 2020), Hypothyroidism, CVA, Dementia and Recent PNA (dx 4 days PTP) presents to ED for generalized weakness.  Admitted due to septic shock due to COVID PNA and submassive PE on pressors    s/p COVID pna, sp RVD; DM - insulin per protocol    SLP bedside swallow eval 10/7:  Diagnosis: Mild/moderate oral dysphagia with puree, mildly thick likely 2/2 cognition in the setting of dementia.    Recommending: puree with mildly thick liquids. dependent 1:1 feeding    This morning at breakast meal, patient had consume <50% of breakfast    Diet order:   Diet, Pureed:   Consistent Carbohydrate {No Snacks} (CSTCHO)  DASH/TLC {Sodium & Cholesterol Restricted} (DASH)  Mildly Thick Liquids (MILDTHICKLIQS) (10-16-24 @ 08:47) [Active]    Anthropometrics:  Height: 149.9 cm   Weight: 49.9 kg  BMI: 22.2  IBW: 43.2 kg     Daily Weight in k.2 (10-18), Weight in k (10-17), Weight in k.5 (10-16), Weight in k (10-15)  % Weight Change    MEDICATIONS  (STANDING):  albuterol/ipratropium for Nebulization 3 milliLiter(s) Nebulizer every 6 hours  albuterol/ipratropium for Nebulization. 9 milliLiter(s) Nebulizer once  apixaban 5 milliGRAM(s) Oral every 12 hours  aspirin  chewable 81 milliGRAM(s) Oral daily  atorvastatin 20 milliGRAM(s) Oral at bedtime  bisacodyl Suppository 10 milliGRAM(s) Rectal once  chlorhexidine 2% Cloths 1 Application(s) Topical daily  insulin lispro (ADMELOG) corrective regimen sliding scale   SubCutaneous three times a day before meals  insulin lispro (ADMELOG) corrective regimen sliding scale   SubCutaneous at bedtime  levothyroxine 50 MICROGram(s) Oral daily  midodrine 5 milliGRAM(s) Oral every 8 hours  pantoprazole    Tablet 40 milliGRAM(s) Oral before breakfast  polyethylene glycol 3350 17 Gram(s) Oral two times a day  senna 2 Tablet(s) Oral at bedtime    MEDICATIONS  (PRN):    Pertinent Labs: 10-21 @ 11:31: Na 137, BUN 27[H], Cr 0.6[L], BG 92, K+ 4.1, Phos --, Mg 1.9, Alk Phos 117[H], ALT/SGPT 23, AST/SGOT 23, HbA1c --    Finger Sticks:  POCT Blood Glucose.: 105 mg/dL (10-21 @ 11:48)  POCT Blood Glucose.: 95 mg/dL (10-21 @ 08:05)  POCT Blood Glucose.: 182 mg/dL (10-20 @ 21:07)    Physical Findings:  - Appearance: confused, disoriented x 4 - per flowsheet documentation   - GI function: last BM 10/20  - Tubes: n/a - no feeding tubes  - Oral/Mouth cavity: puree, mildly thick liquids   - Skin: per WOCN note 10/16 - b/l heel blanchable redness - evolving DTI to sacrococcygeal   - Edema: 1+ edema - left and right arm      Nutrition Requirements:  Weight Used: 49.9 kg (current dosing weight)      Estimated Energy Needs    Continue []  Adjust [x]  1247 - 1497 kcal/day (25- 30 kcal/kg)     Estimated Protein Needs    Continue []  Adjust [x]  59 - 65 gm/day (1.2 - 1.3 gm/kg)     Estimated Fluid Needs        Continue []  Adjust [x]  1247 - 1497 mL/day (25-30 mL/kg)     Nutrient Intake: Poor PO intake <50% of meals      [x] Previous Nutrition Diagnosis:  Inadequate Oral Intake             [x] Ongoing          [] Resolved    Nutrition Education: discussed oral nutrition supplements and benefits with family      Nutrition Intervention:  meals and snacks, medical food supplements, coordination of care     Goal/Expected Outcome:   PO intake >50% of meals within 3-5 days      Indicator/Monitoring:   energy intake, weight, labs, skin status, NFPF     Recommendation:   1) Continue current diet order  2) Recommend to Ensure Plus HP 3x/day (350 kcal, 20 gm Protein each) to aid in optimizing kcal and protein intake   *** Ensure supplement should be thickened to mildly thick consistency with 2 thickener packets each Ensure  3) Monitor PO intake - encouragement and assistance appreciated with all meals, snacks, supplement   4) As medically feasible, consider the addition of an appetite stimulant    Patient is at high nutrition risk, RD to f/u in 3-5 days or PRN  RD to remain available: Rowena Brady, MARISABEL [phone] or via Teams Registered Dietitian Follow-Up     Patient Profile Reviewed                           Yes []   No []     Nutrition History Previously Obtained        Yes []  No [x]       Pertinent Subjective Information:  Brief nutrition hx obtained from son present at bedside. Patient was eating 3 meals/day PTA. NKFA, no food intolerances or other restrictions reported. UBW / any weight changes - uncertain per Patient's son's report.     3 Day Calorie Count initiated 10/18 - 10/20, result of calorie count indicate poor PO intake (<50% of meals consumed)     Pertinent Medical Interventions:  89 yo F with hx of HTN, HLD, DM II, HFrEF (LVEF 20-25% 2020), Hypothyroidism, CVA, Dementia and Recent PNA (dx 4 days PTP) presents to ED for generalized weakness.  Admitted due to septic shock due to COVID PNA and submassive PE on pressors    s/p COVID pna, sp RVD; DM - insulin per protocol    SLP bedside swallow eval 10/7:  Diagnosis: Mild/moderate oral dysphagia with puree, mildly thick likely 2/2 cognition in the setting of dementia.    Recommending: puree with mildly thick liquids. dependent 1:1 feeding    This morning at breakast meal, patient had consume <50% of breakfast    Diet order:   Diet, Pureed:   Consistent Carbohydrate {No Snacks} (CSTCHO)  DASH/TLC {Sodium & Cholesterol Restricted} (DASH)  Mildly Thick Liquids (MILDTHICKLIQS) (10-16-24 @ 08:47) [Active]    Anthropometrics:  Height: 149.9 cm   Weight: 49.9 kg  BMI: 22.2  IBW: 43.2 kg     Daily Weight in k.2 (10-18), Weight in k (10-17), Weight in k.5 (10-16), Weight in k (10-15)  % Weight Change    MEDICATIONS  (STANDING):  albuterol/ipratropium for Nebulization 3 milliLiter(s) Nebulizer every 6 hours  albuterol/ipratropium for Nebulization. 9 milliLiter(s) Nebulizer once  apixaban 5 milliGRAM(s) Oral every 12 hours  aspirin  chewable 81 milliGRAM(s) Oral daily  atorvastatin 20 milliGRAM(s) Oral at bedtime  bisacodyl Suppository 10 milliGRAM(s) Rectal once  chlorhexidine 2% Cloths 1 Application(s) Topical daily  insulin lispro (ADMELOG) corrective regimen sliding scale   SubCutaneous three times a day before meals  insulin lispro (ADMELOG) corrective regimen sliding scale   SubCutaneous at bedtime  levothyroxine 50 MICROGram(s) Oral daily  midodrine 5 milliGRAM(s) Oral every 8 hours  pantoprazole    Tablet 40 milliGRAM(s) Oral before breakfast  polyethylene glycol 3350 17 Gram(s) Oral two times a day  senna 2 Tablet(s) Oral at bedtime    MEDICATIONS  (PRN):    Pertinent Labs: 10-21 @ 11:31: Na 137, BUN 27[H], Cr 0.6[L], BG 92, K+ 4.1, Phos --, Mg 1.9, Alk Phos 117[H], ALT/SGPT 23, AST/SGOT 23, HbA1c --    Finger Sticks:  POCT Blood Glucose.: 105 mg/dL (10-21 @ 11:48)  POCT Blood Glucose.: 95 mg/dL (10-21 @ 08:05)  POCT Blood Glucose.: 182 mg/dL (10-20 @ 21:07)    Physical Findings:  - Appearance: confused, disoriented x 4 - per flowsheet documentation   - GI function: last BM 10/20  - Tubes: n/a - no feeding tubes  - Oral/Mouth cavity: puree, mildly thick liquids   - Skin: per WOCN note 10/16 - b/l heel blanchable redness - evolving DTI to sacrococcygeal   - Edema: 1+ edema - left and right arm      Nutrition Requirements:  Weight Used: 49.9 kg (current dosing weight)      Estimated Energy Needs    Continue []  Adjust [x]  1247 - 1497 kcal/day (25- 30 kcal/kg)     Estimated Protein Needs    Continue []  Adjust [x]  59 - 65 gm/day (1.2 - 1.3 gm/kg)     Estimated Fluid Needs        Continue []  Adjust [x]  1247 - 1497 mL/day (25-30 mL/kg)     Nutrient Intake: Poor PO intake <50% of meals      [x] Previous Nutrition Diagnosis:  Inadequate Oral Intake             [x] Ongoing          [] Resolved    Nutrition Education: discussed oral nutrition supplements and benefits with family      Nutrition Intervention:  meals and snacks, medical food supplements, coordination of care     Goal/Expected Outcome:   PO intake >50% of meals within 3-5 days      Indicator/Monitoring:   energy intake, weight, labs, skin status, NFPF     Recommendation:   1) Continue current diet order  2) Recommend to Glucerna Shake 3x/day (220 kcal, 10 gm Protein each) to aid in optimizing kcal and protein intake   *** Ensure supplement should be thickened to mildly thick consistency with 2 thickener packets each Glucerna Shake  3) Monitor PO intake - encouragement and assistance appreciated with all meals, snacks, supplement   4) As medically feasible, consider the addition of an appetite stimulant    Patient is at high nutrition risk, RD to f/u in 3-5 days or PRN  RD to remain available: Rowena Brady, MARISABEL [phone] or via Teams Registered Dietitian Follow-Up     Patient Profile Reviewed                           Yes [x]   No []     Nutrition History Previously Obtained        Yes []  No [x]       Pertinent Subjective Information:  Brief nutrition hx obtained from son present at bedside. Patient was eating 3 meals/day PTA. NKFA, no food intolerances or other restrictions reported. UBW / any weight changes - uncertain per Patient's son's report.     3 Day Calorie Count initiated 10/18 - 10/20, result of calorie count indicate poor PO intake (<50% of meals consumed)     Pertinent Medical Interventions:  89 yo F with hx of HTN, HLD, DM II, HFrEF (LVEF 20-25% 2020), Hypothyroidism, CVA, Dementia and Recent PNA (dx 4 days PTP) presents to ED for generalized weakness.  Admitted due to septic shock due to COVID PNA and submassive PE on pressors    s/p COVID pna, sp RVD; DM - insulin per protocol    SLP bedside swallow eval 10/7:  Diagnosis: Mild/moderate oral dysphagia with puree, mildly thick likely 2/2 cognition in the setting of dementia.    Recommending: puree with mildly thick liquids. dependent 1:1 feeding    This morning at breakast meal, patient had consume <50% of breakfast    Diet order:   Diet, Pureed:   Consistent Carbohydrate {No Snacks} (CSTCHO)  DASH/TLC {Sodium & Cholesterol Restricted} (DASH)  Mildly Thick Liquids (MILDTHICKLIQS) (10-16-24 @ 08:47) [Active]    Anthropometrics:  Height: 149.9 cm   Weight: 49.9 kg  BMI: 22.2  IBW: 43.2 kg     Daily Weight in k.2 (10-18), Weight in k (10-17), Weight in k.5 (10-16), Weight in k (10-15)  % Weight Change    MEDICATIONS  (STANDING):  albuterol/ipratropium for Nebulization 3 milliLiter(s) Nebulizer every 6 hours  albuterol/ipratropium for Nebulization. 9 milliLiter(s) Nebulizer once  apixaban 5 milliGRAM(s) Oral every 12 hours  aspirin  chewable 81 milliGRAM(s) Oral daily  atorvastatin 20 milliGRAM(s) Oral at bedtime  bisacodyl Suppository 10 milliGRAM(s) Rectal once  chlorhexidine 2% Cloths 1 Application(s) Topical daily  insulin lispro (ADMELOG) corrective regimen sliding scale   SubCutaneous three times a day before meals  insulin lispro (ADMELOG) corrective regimen sliding scale   SubCutaneous at bedtime  levothyroxine 50 MICROGram(s) Oral daily  midodrine 5 milliGRAM(s) Oral every 8 hours  pantoprazole    Tablet 40 milliGRAM(s) Oral before breakfast  polyethylene glycol 3350 17 Gram(s) Oral two times a day  senna 2 Tablet(s) Oral at bedtime    MEDICATIONS  (PRN):    Pertinent Labs: 10-21 @ 11:31: Na 137, BUN 27[H], Cr 0.6[L], BG 92, K+ 4.1, Phos --, Mg 1.9, Alk Phos 117[H], ALT/SGPT 23, AST/SGOT 23, HbA1c --    Finger Sticks:  POCT Blood Glucose.: 105 mg/dL (10-21 @ 11:48)  POCT Blood Glucose.: 95 mg/dL (10-21 @ 08:05)  POCT Blood Glucose.: 182 mg/dL (10-20 @ 21:07)    Physical Findings:  - Appearance: confused, disoriented x 4 - per flowsheet documentation   - GI function: last BM 10/20  - Tubes: n/a - no feeding tubes  - Oral/Mouth cavity: puree, mildly thick liquids   - Skin: per WOCN note 10/16 - b/l heel blanchable redness - evolving DTI to sacrococcygeal   - Edema: 1+ edema - left and right arm      Nutrition Requirements:  Weight Used: 49.9 kg (current dosing weight)      Estimated Energy Needs    Continue []  Adjust [x]  1247 - 1497 kcal/day (25- 30 kcal/kg)     Estimated Protein Needs    Continue []  Adjust [x]  59 - 65 gm/day (1.2 - 1.3 gm/kg)     Estimated Fluid Needs        Continue []  Adjust [x]  1247 - 1497 mL/day (25-30 mL/kg)     Nutrient Intake: Poor PO intake <50% of meals      [x] Previous Nutrition Diagnosis:  Inadequate Oral Intake             [x] Ongoing          [] Resolved    Nutrition Education: discussed oral nutrition supplements and benefits with family      Nutrition Intervention:  meals and snacks, medical food supplements, coordination of care     Goal/Expected Outcome:   PO intake >50% of meals within 3-5 days      Indicator/Monitoring:   energy intake, weight, labs, skin status, NFPF     Recommendation:   1) Continue current diet order  2) Recommend to Glucerna Shake 3x/day (220 kcal, 10 gm Protein each) to aid in optimizing kcal and protein intake   *** Ensure supplement should be thickened to mildly thick consistency with 2 thickener packets each Glucerna Shake  3) Monitor PO intake - encouragement and assistance appreciated with all meals, snacks, supplement   4) As medically feasible, consider the addition of an appetite stimulant    Patient is at high nutrition risk, RD to f/u in 3-5 days or PRN  RD to remain available: Rowena Brady, MARISABEL [phone] or via Teams

## 2024-10-22 LAB
GLUCOSE BLDC GLUCOMTR-MCNC: 142 MG/DL — HIGH (ref 70–99)
GLUCOSE BLDC GLUCOMTR-MCNC: 90 MG/DL — SIGNIFICANT CHANGE UP (ref 70–99)

## 2024-10-22 PROCEDURE — 99232 SBSQ HOSP IP/OBS MODERATE 35: CPT

## 2024-10-22 RX ADMIN — Medication 2 TABLET(S): at 22:02

## 2024-10-22 RX ADMIN — Medication 50 MICROGRAM(S): at 05:34

## 2024-10-22 RX ADMIN — CHLORHEXIDINE GLUCONATE 1 APPLICATION(S): 40 SOLUTION TOPICAL at 18:36

## 2024-10-22 RX ADMIN — PANTOPRAZOLE SODIUM 40 MILLIGRAM(S): 40 TABLET, DELAYED RELEASE ORAL at 05:34

## 2024-10-22 RX ADMIN — APIXABAN 5 MILLIGRAM(S): 5 TABLET, FILM COATED ORAL at 05:34

## 2024-10-22 RX ADMIN — Medication 20 MILLIGRAM(S): at 22:02

## 2024-10-22 RX ADMIN — MIDODRINE HYDROCHLORIDE 5 MILLIGRAM(S): 2.5 TABLET ORAL at 22:02

## 2024-10-22 RX ADMIN — Medication 81 MILLIGRAM(S): at 12:32

## 2024-10-22 RX ADMIN — IPRATROPIUM BROMIDE AND ALBUTEROL SULFATE 3 MILLILITER(S): .5; 2.5 SOLUTION RESPIRATORY (INHALATION) at 19:43

## 2024-10-22 NOTE — PROGRESS NOTE ADULT - SUBJECTIVE AND OBJECTIVE BOX
pt seen and examined.     My notes supersede resident's notes in case of discrepancy       ROS: no cp, no sob, no n/v, no fever    Vital Signs Last 24 Hrs  T(C): 36.6 (22 Oct 2024 11:45), Max: 37.4 (22 Oct 2024 05:00)  T(F): 97.9 (22 Oct 2024 11:45), Max: 99.4 (22 Oct 2024 05:00)  HR: 83 (22 Oct 2024 11:45) (83 - 104)  BP: 123/72 (22 Oct 2024 11:45) (122/58 - 132/65)  RR: 18 (22 Oct 2024 11:45) (17 - 18)  SpO2: 100% (22 Oct 2024 11:45) (98% - 100%)    Parameters below as of 22 Oct 2024 05:00  Patient On (Oxygen Delivery Method): nasal cannula    physical exam  constitutional NAD, minimal communication, Respiratory  lungs CTA, CVS heart RRR, GI: abdomen Soft NT, ND, BS+, skin: bilat arm swelling and ecchymosis , ecchymosis on the left ankle   neuro exam : pt is not able to participate     MEDICATIONS  (STANDING):  albuterol/ipratropium for Nebulization 3 milliLiter(s) Nebulizer every 6 hours  albuterol/ipratropium for Nebulization. 9 milliLiter(s) Nebulizer once  apixaban 5 milliGRAM(s) Oral every 12 hours  aspirin  chewable 81 milliGRAM(s) Oral daily  atorvastatin 20 milliGRAM(s) Oral at bedtime  bisacodyl Suppository 10 milliGRAM(s) Rectal once  chlorhexidine 2% Cloths 1 Application(s) Topical daily  insulin lispro (ADMELOG) corrective regimen sliding scale   SubCutaneous at bedtime  insulin lispro (ADMELOG) corrective regimen sliding scale   SubCutaneous three times a day before meals  levothyroxine 50 MICROGram(s) Oral daily  midodrine 5 milliGRAM(s) Oral every 8 hours  pantoprazole    Tablet 40 milliGRAM(s) Oral before breakfast  polyethylene glycol 3350 17 Gram(s) Oral two times a day  senna 2 Tablet(s) Oral at bedtime                        10.4   11.96 )-----------( 130      ( 21 Oct 2024 11:31 )             32.7     10-21    137  |  103  |  27[H]  ----------------------------<  92  4.1   |  22  |  0.6[L]    Ca    8.3[L]      21 Oct 2024 11:31  Mg     1.9     10-21    TPro  4.5[L]  /  Alb  2.7[L]  /  TBili  0.9  /  DBili  x   /  AST  23  /  ALT  23  /  AlkPhos  117[H]  10-21            Procalcitonin: 0.11 ng/mL [0.02 - 0.10] (10-14-24 @ 10:45)  Procalcitonin: 0.08 ng/mL [0.02 - 0.10] (10-04-24 @ 10:34)  D-Dimer Assay, Quantitative: 1995 ng/mL DDU (10-02-24 @ 11:58)    TPro  4.5[L]  /  Alb  2.7[L]  /  TBili  0.9  /  DBili  x   /  AST  23  /  ALT  23  /  AlkPhos  117[H]  10-21    Procalcitonin: 0.11 ng/mL [0.02 - 0.10] (10-14-24 @ 10:45)  Procalcitonin: 0.08 ng/mL [0.02 - 0.10] (10-04-24 @ 10:34)  D-Dimer Assay, Quantitative: 1995 ng/mL DDU (10-02-24 @ 11:58)    < from: Xray Ankle Complete 3 Views, Left (10.20.24 @ 14:35) >  Diffuse osteopenia. The ankle mortise is symmetric. Chronic healed   deformity of the distal fibula at the syndesmotic level. No acute   fracture or dislocation. No ankle joint effusion. The anterior ankle soft   tissue swelling. 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   Vascular calcifications.    < end of copied text >    a/p  89 yo F with hx of HTN, HLD, DM II, HFrEF (LVEF 20-25% 2020), Hypothyroidism, CVA, Dementia and Recent PNA (dx 4 days PTP) presents to ED for generalized weakness. Admitted to SDU due to septic shock due to COVID PNA and submassive PE on pressors     # sp covid pna, sp RVD   fu ID   dw ID : hold abx ( sp course of antibiotics)     # submassive PE   off pressors  cont ac     # leg ecchymosis,   check xray     # arm swelling and ecchymosis, due to hypoalbuminemia and on anticoagulation   Albumin: 2.6 g/dL (10.17.24 @ 05:37)    # pna, possible bacterial   today she had hypothermia, warming blanket   septic shock poa   cont abx per ID : uriel     # positive trop   possible demand ischemia/nstermi   conservative medical management     # diabetes   CAPILLARY BLOOD GLUCOSE  POCT Blood Glucose.: 90 mg/dL (22 Oct 2024 07:49)  POCT Blood Glucose.: 159 mg/dL (21 Oct 2024 21:11)    cont insulin per protocol     # hypothyroidism   cont meds    #Progress Note Handoff    Pending : discharge planning  Disposition: SNF when stable   code status: dnr, dni   poor prognosis

## 2024-10-23 PROCEDURE — 99232 SBSQ HOSP IP/OBS MODERATE 35: CPT

## 2024-10-23 RX ADMIN — Medication 2 TABLET(S): at 21:18

## 2024-10-23 RX ADMIN — MIDODRINE HYDROCHLORIDE 5 MILLIGRAM(S): 2.5 TABLET ORAL at 06:35

## 2024-10-23 RX ADMIN — PANTOPRAZOLE SODIUM 40 MILLIGRAM(S): 40 TABLET, DELAYED RELEASE ORAL at 06:35

## 2024-10-23 RX ADMIN — APIXABAN 5 MILLIGRAM(S): 5 TABLET, FILM COATED ORAL at 06:35

## 2024-10-23 RX ADMIN — MIDODRINE HYDROCHLORIDE 5 MILLIGRAM(S): 2.5 TABLET ORAL at 13:17

## 2024-10-23 RX ADMIN — Medication 50 MICROGRAM(S): at 06:35

## 2024-10-23 RX ADMIN — POLYETHYLENE GLYCOL 3350 17 GRAM(S): 17 POWDER, FOR SOLUTION ORAL at 06:35

## 2024-10-23 RX ADMIN — CHLORHEXIDINE GLUCONATE 1 APPLICATION(S): 40 SOLUTION TOPICAL at 11:25

## 2024-10-23 RX ADMIN — MIDODRINE HYDROCHLORIDE 5 MILLIGRAM(S): 2.5 TABLET ORAL at 21:18

## 2024-10-23 RX ADMIN — POLYETHYLENE GLYCOL 3350 17 GRAM(S): 17 POWDER, FOR SOLUTION ORAL at 17:46

## 2024-10-23 RX ADMIN — Medication 20 MILLIGRAM(S): at 21:17

## 2024-10-23 RX ADMIN — Medication 81 MILLIGRAM(S): at 11:24

## 2024-10-23 RX ADMIN — APIXABAN 5 MILLIGRAM(S): 5 TABLET, FILM COATED ORAL at 17:46

## 2024-10-23 NOTE — PROGRESS NOTE ADULT - ASSESSMENT
89 yo F with hx of HTN, HLD, DM II, HFrEF (LVEF 20-25% 2020), Hypothyroidism, CVA, Dementia and Recent PNA (dx 4 days PTP) presents to ED for generalized weakness. Admitted to SDU due to septic shock due to COVID PNA and submassive PE on pressors     # sp covid pna, sp RVD   fu ID   dw ID : will change abx to po    # submassive PE   off pressors  cont ac     # leg ecchymosis,   check xray     # arm swelling and ecchymosis, due to hypoalbuminemia and on anticoagulation   Albumin: 2.6 g/dL (10.17.24 @ 05:37)    # pna, possible bacterial   today she had hypothermia, warming blanket   septic shock poa   cont abx per ID : uriel     # positive trop   possible demand ischemia/nstermi   conservative medical management     # diabetes   CAPILLARY BLOOD GLUCOSE    POCT Blood Glucose.: 119 mg/dL (20 Oct 2024 07:46)  POCT Blood Glucose.: 158 mg/dL (19 Oct 2024 20:50)  POCT Blood Glucose.: 154 mg/dL (19 Oct 2024 16:59)    cont insulin per protocol     # hypothyroidism   cont meds    #Progress Note Handoff    Pending :  clinical improvement, ID followup regarding abx   Family discussion: dw son and daughter in law at the bedside   Disposition: SNF when stable   code status: dnr, dni   poor prognosis

## 2024-10-23 NOTE — PROVIDER CONTACT NOTE (OTHER) - SITUATION
patient ss on refused fs, had discussion with MD yesterday, only wants two times at day. patient fs on refused fs, had discussion with MD yesterday, only wants two times at day.

## 2024-10-23 NOTE — PROGRESS NOTE ADULT - SUBJECTIVE AND OBJECTIVE BOX
SUBJECTIVE/OVERNIGHT EVENTS  Today is hospital day 22d. This morning patient was seen and examined at bedside.      HPI:  89 yo F with hx of HTN, HLD, DM II, HFrEF (LVEF 20-25% 2020), Hypothyroidism, CVA, Dementia and Recent PNA (dx on CXR 4 days ago) presents to ED for generalized weakness and decreased PO intake. 4 days ago, patient started having cough and decreased PO intake. CXR was done and patient was diagnosed with PNA and was started on 5 days course of Azithromycin and Cefpodoxime with Prednisone 20mg OD. Patient had positive sick contact whereby both son and daughter developed URTI symptoms few days earleir. Patient is generally not very verbal at baseline and doesn't raise complains. She is mobile with a walker and 1 person assistance. Over past few days has been very week to ambulate. Family denies any reported chest pain, shortness of breath, fever, chills, abdominal pain, nausea, vomiting. They report loose BM over past 1 day.     On presentation vitals:   · BP Systolic	 176 mm Hg  · BP Diastolic	84 mm Hg  · Heart Rate	76 /min  · Respiration Rate (breaths/min)	 21 /min  · Temp (F)	98.7 Degrees F  · Temp (C)	37.1 Degrees C  · Temp site	oral  · SpO2 (%)	98 %  · O2 Delivery/Oxygen Delivery Method	nasal cannula  · Oxygen Therapy Flow (L/min)	2 L/min     Labs are significant for: Leucocytosis 12K, Cr 1.8 (baseline 1), Trop 437>350, ProBNP 24K.   ECG with no ischemia changes    Imaging: CT head non con: No acute intracranial pathology. No evidence of midline shift, mass effect or intracranial hemorrhage.  CT A/P:  Mild fullness left renal pelvis. Mild left ureter with distal left ureteral stone. Stone within the urinary bladder on the left.  Significant fecal retention. Moderate distention the urinary bladder.  Small left pleural effusion with adjacent consolidation which may reflect atelectasis or infiltrate.    Patient admitted to medicine for management of suspected PNA and MARTINE.  (01 Oct 2024 23:12)      MEDICATIONS  STANDING MEDICATIONS  albuterol/ipratropium for Nebulization 3 milliLiter(s) Nebulizer every 6 hours  albuterol/ipratropium for Nebulization. 9 milliLiter(s) Nebulizer once  apixaban 5 milliGRAM(s) Oral every 12 hours  aspirin  chewable 81 milliGRAM(s) Oral daily  atorvastatin 20 milliGRAM(s) Oral at bedtime  bisacodyl Suppository 10 milliGRAM(s) Rectal once  chlorhexidine 2% Cloths 1 Application(s) Topical daily  insulin lispro (ADMELOG) corrective regimen sliding scale   SubCutaneous at bedtime  insulin lispro (ADMELOG) corrective regimen sliding scale   SubCutaneous three times a day before meals  levothyroxine 50 MICROGram(s) Oral daily  midodrine 5 milliGRAM(s) Oral every 8 hours  pantoprazole    Tablet 40 milliGRAM(s) Oral before breakfast  polyethylene glycol 3350 17 Gram(s) Oral two times a day  senna 2 Tablet(s) Oral at bedtime    PRN MEDICATIONS    VITALS  T(F): 98 (10-23-24 @ 12:28), Max: 98.8 (10-22-24 @ 20:38)  HR: 77 (10-23-24 @ 12:28) (77 - 100)  BP: 123/76 (10-23-24 @ 13:03) (98/69 - 123/76)  RR: 17 (10-23-24 @ 12:28) (17 - 19)  SpO2: 100% (10-23-24 @ 12:28) (98% - 100%)  POCT Blood Glucose.: 117 mg/dL (10-23-24 @ 07:51)  POCT Blood Glucose.: 142 mg/dL (10-22-24 @ 21:03)          PHYSICAL EXAM      constitutional NAD, minimal communication, Respiratory  lungs CTA, CVS heart RRR, GI: abdomen Soft NT, ND, BS+, skin: bilat arm swelling and ecchymosis , ecchymosis on the left ankle   neuro exam : pt is not able to participate       PAST MEDICAL & SURGICAL HISTORY:  Dementia      Hypertension      Hyperlipidemia      Hypothyroidism      Diabetes            LABS                    IMAGING

## 2024-10-24 LAB
ALBUMIN SERPL ELPH-MCNC: 2.8 G/DL — LOW (ref 3.5–5.2)
ALP SERPL-CCNC: 110 U/L — SIGNIFICANT CHANGE UP (ref 30–115)
ALT FLD-CCNC: 14 U/L — SIGNIFICANT CHANGE UP (ref 0–41)
ANION GAP SERPL CALC-SCNC: 9 MMOL/L — SIGNIFICANT CHANGE UP (ref 7–14)
AST SERPL-CCNC: 17 U/L — SIGNIFICANT CHANGE UP (ref 0–41)
BASOPHILS # BLD AUTO: 0.01 K/UL — SIGNIFICANT CHANGE UP (ref 0–0.2)
BASOPHILS NFR BLD AUTO: 0.1 % — SIGNIFICANT CHANGE UP (ref 0–1)
BILIRUB SERPL-MCNC: 1.4 MG/DL — HIGH (ref 0.2–1.2)
BUN SERPL-MCNC: 22 MG/DL — HIGH (ref 10–20)
CALCIUM SERPL-MCNC: 8.3 MG/DL — LOW (ref 8.4–10.5)
CHLORIDE SERPL-SCNC: 98 MMOL/L — SIGNIFICANT CHANGE UP (ref 98–110)
CO2 SERPL-SCNC: 29 MMOL/L — SIGNIFICANT CHANGE UP (ref 17–32)
CREAT SERPL-MCNC: 0.6 MG/DL — LOW (ref 0.7–1.5)
EGFR: 79 ML/MIN/1.73M2 — SIGNIFICANT CHANGE UP
EOSINOPHIL # BLD AUTO: 0.09 K/UL — SIGNIFICANT CHANGE UP (ref 0–0.7)
EOSINOPHIL NFR BLD AUTO: 0.8 % — SIGNIFICANT CHANGE UP (ref 0–8)
GLUCOSE SERPL-MCNC: 152 MG/DL — HIGH (ref 70–99)
HCT VFR BLD CALC: 32 % — LOW (ref 37–47)
HGB BLD-MCNC: 10.5 G/DL — LOW (ref 12–16)
IMM GRANULOCYTES NFR BLD AUTO: 0.5 % — HIGH (ref 0.1–0.3)
LACTATE SERPL-SCNC: 2 MMOL/L — SIGNIFICANT CHANGE UP (ref 0.7–2)
LYMPHOCYTES # BLD AUTO: 1.67 K/UL — SIGNIFICANT CHANGE UP (ref 1.2–3.4)
LYMPHOCYTES # BLD AUTO: 15.6 % — LOW (ref 20.5–51.1)
MCHC RBC-ENTMCNC: 31.3 PG — HIGH (ref 27–31)
MCHC RBC-ENTMCNC: 32.8 G/DL — SIGNIFICANT CHANGE UP (ref 32–37)
MCV RBC AUTO: 95.5 FL — SIGNIFICANT CHANGE UP (ref 81–99)
MONOCYTES # BLD AUTO: 0.85 K/UL — HIGH (ref 0.1–0.6)
MONOCYTES NFR BLD AUTO: 8 % — SIGNIFICANT CHANGE UP (ref 1.7–9.3)
NEUTROPHILS # BLD AUTO: 8.02 K/UL — HIGH (ref 1.4–6.5)
NEUTROPHILS NFR BLD AUTO: 75 % — SIGNIFICANT CHANGE UP (ref 42.2–75.2)
NRBC # BLD: 0 /100 WBCS — SIGNIFICANT CHANGE UP (ref 0–0)
PLATELET # BLD AUTO: 98 K/UL — LOW (ref 130–400)
PMV BLD: 12 FL — HIGH (ref 7.4–10.4)
POTASSIUM SERPL-MCNC: 3.8 MMOL/L — SIGNIFICANT CHANGE UP (ref 3.5–5)
POTASSIUM SERPL-SCNC: 3.8 MMOL/L — SIGNIFICANT CHANGE UP (ref 3.5–5)
PROT SERPL-MCNC: 4.6 G/DL — LOW (ref 6–8)
RBC # BLD: 3.35 M/UL — LOW (ref 4.2–5.4)
RBC # FLD: 14.2 % — SIGNIFICANT CHANGE UP (ref 11.5–14.5)
SODIUM SERPL-SCNC: 136 MMOL/L — SIGNIFICANT CHANGE UP (ref 135–146)
TROPONIN T, HIGH SENSITIVITY RESULT: 64 NG/L — CRITICAL HIGH (ref 6–13)
WBC # BLD: 10.69 K/UL — SIGNIFICANT CHANGE UP (ref 4.8–10.8)
WBC # FLD AUTO: 10.69 K/UL — SIGNIFICANT CHANGE UP (ref 4.8–10.8)

## 2024-10-24 PROCEDURE — 71045 X-RAY EXAM CHEST 1 VIEW: CPT | Mod: 26

## 2024-10-24 PROCEDURE — 99232 SBSQ HOSP IP/OBS MODERATE 35: CPT

## 2024-10-24 RX ORDER — MIDODRINE HYDROCHLORIDE 2.5 MG/1
1 TABLET ORAL
Qty: 0 | Refills: 0 | DISCHARGE
Start: 2024-10-24

## 2024-10-24 RX ORDER — POLYETHYLENE GLYCOL 3350 17 G/17G
17 POWDER, FOR SOLUTION ORAL
Qty: 0 | Refills: 0 | DISCHARGE
Start: 2024-10-24

## 2024-10-24 RX ADMIN — APIXABAN 5 MILLIGRAM(S): 5 TABLET, FILM COATED ORAL at 05:43

## 2024-10-24 RX ADMIN — CHLORHEXIDINE GLUCONATE 1 APPLICATION(S): 40 SOLUTION TOPICAL at 13:20

## 2024-10-24 RX ADMIN — APIXABAN 5 MILLIGRAM(S): 5 TABLET, FILM COATED ORAL at 18:31

## 2024-10-24 RX ADMIN — Medication 50 MICROGRAM(S): at 05:44

## 2024-10-24 RX ADMIN — POLYETHYLENE GLYCOL 3350 17 GRAM(S): 17 POWDER, FOR SOLUTION ORAL at 05:43

## 2024-10-24 RX ADMIN — PANTOPRAZOLE SODIUM 40 MILLIGRAM(S): 40 TABLET, DELAYED RELEASE ORAL at 05:43

## 2024-10-24 RX ADMIN — POLYETHYLENE GLYCOL 3350 17 GRAM(S): 17 POWDER, FOR SOLUTION ORAL at 18:31

## 2024-10-24 RX ADMIN — MIDODRINE HYDROCHLORIDE 5 MILLIGRAM(S): 2.5 TABLET ORAL at 05:43

## 2024-10-24 RX ADMIN — IPRATROPIUM BROMIDE AND ALBUTEROL SULFATE 3 MILLILITER(S): .5; 2.5 SOLUTION RESPIRATORY (INHALATION) at 19:54

## 2024-10-24 RX ADMIN — Medication 81 MILLIGRAM(S): at 12:59

## 2024-10-24 RX ADMIN — MIDODRINE HYDROCHLORIDE 5 MILLIGRAM(S): 2.5 TABLET ORAL at 13:40

## 2024-10-24 NOTE — PROVIDER CONTACT NOTE (OTHER) - REASON
issue with midline
unable to draw labs
Hypotensive and Hypoxic
Midline Removal
Finger stick
Provider at bedside with RN for reassessment
finger stick
Patient urine dark on assessment
Blood at midline site

## 2024-10-24 NOTE — PROVIDER CONTACT NOTE (OTHER) - ASSESSMENT
Patient nonverbal at baseline and on assessment. No signs of distress noted
Blood pressure and MAP dropped to 50's/40's with MAP below 65
Ecchymotic on bilateral forearms. Pt is edematous.
Pt lethargic, appears asleep through the most of the shift. Poor PO intake noted.
skin around midline has swelling, redness, and bruising

## 2024-10-24 NOTE — PROVIDER CONTACT NOTE (OTHER) - SITUATION
Pt has an indwelling urinary catheter. Urine was assessed and noted to be dark, concentrated and with sediment.

## 2024-10-24 NOTE — PROGRESS NOTE ADULT - ASSESSMENT
89 yo F with hx of HTN, HLD, DM II, HFrEF (LVEF 20-25% 2020), Hypothyroidism, CVA, Dementia and Recent PNA (dx 4 days PTP) presents to ED for generalized weakness. Admitted to SDU due to septic shock due to COVID PNA and submassive PE on pressors     # submassive PE   off pressors  cont eliquis     # leg ecchymosis,   xray without acute pathology  stable    # arm swelling and ecchymosis, due to hypoalbuminemia and on anticoagulation   Albumin: 2.6 g/dL (10.17.24 @ 05:37)    # pna, possible bacterial   resolved  off abx    # positive trop   possible demand ischemia/nstermi   conservative medical management     # diabetes   ISS  FS    # hypothyroidism   cont meds    #Progress Note Handoff    Pending :  dispo planning  Disposition: SNF when stable with pulido  code status: dnr, dni   poor prognosis

## 2024-10-24 NOTE — PROGRESS NOTE ADULT - SUBJECTIVE AND OBJECTIVE BOX
SUBJECTIVE/OVERNIGHT EVENTS  Today is hospital day 23d. This morning patient was seen and examined at bedside, resting comfortably in bed. No acute or major events overnight.      VITALS  T(F): 97.6 (10-24-24 @ 05:00), Max: 97.6 (10-23-24 @ 21:03)  HR: 91 (10-24-24 @ 05:00) (91 - 91)  BP: 123/78 (10-24-24 @ 05:00) (115/74 - 123/78)  RR: 18 (10-24-24 @ 05:00) (18 - 19)  SpO2: 99% (10-24-24 @ 05:00) (98% - 99%)  POCT Blood Glucose.: 112 mg/dL (10-24-24 @ 08:06)  POCT Blood Glucose.: 139 mg/dL (10-23-24 @ 20:47)      PHYSICAL EXAM:  GENERAL: NAD, lying in bed comfortably  HEAD:  Atraumatic, Normocephalic  EYES: EOMI, PERRLA, conjunctiva and sclera clear  ENT: Moist mucous membranes  NECK: Supple, No JVD  CHEST/LUNG: Clear to auscultation bilaterally; No rales, rhonchi, wheezing, or rubs. Unlabored respirations  HEART: Regular rate and rhythm; No murmurs, rubs, or gallops  ABDOMEN: Bowel sounds present; Soft, Nontender, Nondistended. No hepatomegaly  EXTREMITIES:  2+ Peripheral Pulses, brisk capillary refill. No clubbing, cyanosis, or edema  NERVOUS SYSTEM:  Alert & Oriented X3, speech clear. No deficits   MSK: FROM all 4 extremities, full and equal strength  SKIN: No rashes or lesions    LABS                    IMAGING    MEDICATIONS  STANDING MEDICATIONS  albuterol/ipratropium for Nebulization 3 milliLiter(s) Nebulizer every 6 hours  albuterol/ipratropium for Nebulization. 9 milliLiter(s) Nebulizer once  apixaban 5 milliGRAM(s) Oral every 12 hours  aspirin  chewable 81 milliGRAM(s) Oral daily  atorvastatin 20 milliGRAM(s) Oral at bedtime  bisacodyl Suppository 10 milliGRAM(s) Rectal once  chlorhexidine 2% Cloths 1 Application(s) Topical daily  insulin lispro (ADMELOG) corrective regimen sliding scale   SubCutaneous three times a day before meals  insulin lispro (ADMELOG) corrective regimen sliding scale   SubCutaneous at bedtime  levothyroxine 50 MICROGram(s) Oral daily  midodrine 5 milliGRAM(s) Oral every 8 hours  pantoprazole    Tablet 40 milliGRAM(s) Oral before breakfast  polyethylene glycol 3350 17 Gram(s) Oral two times a day  senna 2 Tablet(s) Oral at bedtime    PRN MEDICATIONS

## 2024-10-24 NOTE — PROVIDER CONTACT NOTE (OTHER) - BACKGROUND
Pt is 89 y/o female diagnosed with pneumonia, completed 5 day course of abx. Pt has hypoalbuminemia.

## 2024-10-24 NOTE — PROVIDER CONTACT NOTE (OTHER) - DATE AND TIME:
11-Oct-2024 20:10
23-Oct-2024 07:51
23-Oct-2024 12:43
13-Oct-2024 00:12
21-Oct-2024 09:00
24-Oct-2024 16:20
23-Oct-2024 17:36
12-Oct-2024 07:21
19-Oct-2024 22:22

## 2024-10-24 NOTE — CHART NOTE - NSCHARTNOTEFT_GEN_A_CORE
Around 8:15pm, pt began desatting to 80's on 3LNC. Was immediately placed on NRB and O2 went up to 94. I called the family and spoke to them about placing the patient on BIPAP. They said yes. Respiratory was called and pt was placed on bipap. Labs were also drawn, unremarkable. CXR shows increase right sided pleural effusion. Updated the family about the findings and they will let us know regarding GOC moving forward Around 8:15pm, pt began desatting to 80's on 3LNC. Was immediately placed on NRB and O2 went up to 94. I called the family and spoke to them about placing the patient on BIPAP. They said yes. Respiratory was called and pt was placed on bipap. Labs were also drawn, unremarkable. CXR shows increase right sided pleural effusion.     Updated the family about the findings and they would like to discuss CMO options in the AM with the day team

## 2024-10-25 VITALS
SYSTOLIC BLOOD PRESSURE: 102 MMHG | HEART RATE: 69 BPM | OXYGEN SATURATION: 100 % | DIASTOLIC BLOOD PRESSURE: 58 MMHG | RESPIRATION RATE: 16 BRPM | TEMPERATURE: 98 F

## 2024-10-25 PROCEDURE — 99231 SBSQ HOSP IP/OBS SF/LOW 25: CPT

## 2024-10-25 PROCEDURE — 99233 SBSQ HOSP IP/OBS HIGH 50: CPT

## 2024-10-25 RX ORDER — MORPHINE SULFATE 30 MG/1
2.5 TABLET, EXTENDED RELEASE ORAL
Refills: 0 | Status: DISCONTINUED | OUTPATIENT
Start: 2024-10-25 | End: 2024-10-28

## 2024-10-25 RX ORDER — MORPHINE SULFATE 30 MG/1
5 TABLET, EXTENDED RELEASE ORAL EVERY 4 HOURS
Refills: 0 | Status: DISCONTINUED | OUTPATIENT
Start: 2024-10-25 | End: 2024-10-28

## 2024-10-25 RX ORDER — MORPHINE SULFATE 30 MG/1
2 TABLET, EXTENDED RELEASE ORAL
Refills: 0 | Status: DISCONTINUED | OUTPATIENT
Start: 2024-10-25 | End: 2024-10-25

## 2024-10-25 RX ADMIN — MORPHINE SULFATE 5 MILLIGRAM(S): 30 TABLET, EXTENDED RELEASE ORAL at 16:10

## 2024-10-25 RX ADMIN — IPRATROPIUM BROMIDE AND ALBUTEROL SULFATE 3 MILLILITER(S): .5; 2.5 SOLUTION RESPIRATORY (INHALATION) at 04:44

## 2024-10-25 RX ADMIN — CHLORHEXIDINE GLUCONATE 1 APPLICATION(S): 40 SOLUTION TOPICAL at 11:12

## 2024-10-25 RX ADMIN — Medication 1: at 08:27

## 2024-10-25 RX ADMIN — MORPHINE SULFATE 5 MILLIGRAM(S): 30 TABLET, EXTENDED RELEASE ORAL at 16:35

## 2024-10-25 RX ADMIN — MORPHINE SULFATE 5 MILLIGRAM(S): 30 TABLET, EXTENDED RELEASE ORAL at 11:11

## 2024-10-25 RX ADMIN — MORPHINE SULFATE 5 MILLIGRAM(S): 30 TABLET, EXTENDED RELEASE ORAL at 11:40

## 2024-10-25 NOTE — PROGRESS NOTE ADULT - SUBJECTIVE AND OBJECTIVE BOX
BRIJESH POWER  [facility] 3[facility] 025 A ([facility])      Patient was evaluated and examined  by bedside, remains comfortable on CMO       REVIEW OF SYSTEMS: unable to obtain, patient is lethargic       T(C): , Max: 36.9 (10-24-24 @ 20:25)  HR: 69 (10-25-24 @ 05:00)  BP: 102/58 (10-25-24 @ 05:00)  RR: 16 (10-25-24 @ 05:00)  SpO2: 100% (10-25-24 @ 05:00)  CAPILLARY BLOOD GLUCOSE      POCT Blood Glucose.: 152 mg/dL (25 Oct 2024 08:24)  POCT Blood Glucose.: 147 mg/dL (24 Oct 2024 21:33)      PHYSICAL EXAM:  General: NAD, lethargic, patient is laying comfortably in bed  HEENT: AT, NC, Supple, NO JVD, NO CB  Lungs: decreased breath sounds  B/L, no wheezing, no rhonchi  CVS: normal S1, S2, RRR, NO M/G/R  Abdomen: soft, bowel sounds present, non-tender, non-distended  Extremities: no edema, no clubbing, no cyanosis, positive peripheral pulses b/l  Neuro:  lethargic state         LAB  CBC  Date: 10-24-24 @ 21:03  Mean cell Uljapnhzwb48.3  Mean cell Hemoglobin Conc32.8  Mean cell Volum 95.5  Platelet count-Automate 98  RBC Count 3.35  Red Cell Distrib Width14.2  WBC Count10.69  % Albumin, Urine--  Hematocrit 32.0  Hemoglobin 10.5  CBC  Date: 10-21-24 @ 11:31  Mean cell Xtbhakschu30.9  Mean cell Hemoglobin Conc31.8  Mean cell Volum 97.0  Platelet count-Automate 130  RBC Count 3.37  Red Cell Distrib Width14.4  WBC Count11.96  % Albumin, Urine--  Hematocrit 32.7  Hemoglobin 10.4    BMP  10-24-24 @ 21:03  Blood Gas Arterial-Calcium,Ionized--  Blood Urea Nitrogen, Serum 22 mg/dL[H] [10 - 20]  Carbon Dioxide, Serum29 mmol/L [17 - 32]  Chloride, Serum98 mmol/L [98 - 110]  Creatinie, Serum0.6 mg/dL[L] [0.7 - 1.5]  Glucose, Jirod557 mg/dL[H] [70 - 99]  Potassium, Serum3.8 mmol/L [3.5 - 5.0]  Sodium, Serum 136 mmol/L [135 - 146]            Microbiology:    Culture - Blood (collected 10-12-24 @ 21:42)  Source: .Blood BLOOD  Final Report (10-18-24 @ 04:00):    No growth at 5 days    Culture - Blood (collected 10-05-24 @ 06:13)  Source: .Blood BLOOD  Final Report (10-10-24 @ 17:00):    No growth at 5 days    Culture - Urine (collected 10-02-24 @ 00:45)  Source: Clean Catch None  Final Report (10-03-24 @ 11:59):    No growth    Urinalysis with Rflx Culture (collected 10-02-24 @ 00:45)    Culture - Blood (collected 10-01-24 @ 15:23)  Source: .Blood BLOOD  Final Report (10-06-24 @ 22:00):    No growth at 5 days    Culture - Blood (collected 10-01-24 @ 15:23)  Source: .Blood BLOOD  Final Report (10-06-24 @ 22:00):    No growth at 5 days        Mdications:  chlorhexidine 2% Cloths 1 Application(s) Topical daily  morphine Concentrate 5 milliGRAM(s) Oral every 4 hours  morphine Concentrate 2.5 milliGRAM(s) Oral every 2 hours PRN        Assessment and Plan:  89 yo F with hx of HTN, HLD, DM II, HFrEF (LVEF 20-25% 2020), Hypothyroidism, CVA, Dementia and Recent PNA (dx 4 days PTP) presents to ED for generalized weakness. Admitted to SDU due to septic shock due to COVID PNA and submassive PE was on pressors, post medical stabilization downgraded to medical unit ,developed recurrent hypoxic respiratory failure on 10/25/24 requiring bipap tx,  currently transitioned to CMO as per family request .   - continue supportive tx. and comfort care    Total time spent to complete patient's bedside assessment, review medical chart, discuss medical plan of care with covering medical team was more than 25 minutes with >50% of time spent face to face with patient, discussion with patient/family and/or coordination of care

## 2024-10-25 NOTE — PROGRESS NOTE ADULT - ASSESSMENT
89 yo F with hx of HTN, HLD, DM II, HFrEF (LVEF 20-25% 2020), Hypothyroidism, CVA, Dementia and Recent PNA (dx 4 days ago) presents to ED for generalized weakness found to have respiratory failure 2/2 PE.     Palliative care reconsulted as family decided on CMO. Today, she is resting comfortably, eating ice cream being fed by family. Family agreeable to stopping labs and vitals. They would like to continue NC understanding that it is helping to prolong life, and that symptoms can be controlled without supplemental O2. They anticipate patient dying in the hospital.     Plan:  - can continue roxanol 5mg q4 standing and 2.5mg q2h PRN    - if needed can increase PRN to 5mg q1h PRN for pain/dyspnea  - can consider ativan solution 0.5mg q1h PRN for anxiety/agitation  - continue NC  - no labs, vitals  - DNR/DNI    Palliative care will continue to follow.  Please call [phone] with questions or concerns 24/7.   _____________  Man Oreilly MD  Palliative Medicine  [facility]   of Geriatric and Palliative Medicine  [phone]

## 2024-10-25 NOTE — PROGRESS NOTE ADULT - SUBJECTIVE AND OBJECTIVE BOX
HPI:  89 yo F with hx of HTN, HLD, DM II, HFrEF (LVEF 20-25% 2020), Hypothyroidism, CVA, Dementia and Recent PNA (dx on CXR 4 days ago) presents to ED for generalized weakness and decreased PO intake. 4 days ago, patient started having cough and decreased PO intake. CXR was done and patient was diagnosed with PNA and was started on 5 days course of Azithromycin and Cefpodoxime with Prednisone 20mg OD. Patient had positive sick contact whereby both son and daughter developed URTI symptoms few days earleir. Patient is generally not very verbal at baseline and doesn't raise complains. She is mobile with a walker and 1 person assistance. Over past few days has been very week to ambulate. Family denies any reported chest pain, shortness of breath, fever, chills, abdominal pain, nausea, vomiting. They report loose BM over past 1 day.     On presentation vitals:   · BP Systolic	 176 mm Hg  · BP Diastolic	84 mm Hg  · Heart Rate	76 /min  · Respiration Rate (breaths/min)	 21 /min  · Temp (F)	98.7 Degrees F  · Temp (C)	37.1 Degrees C  · Temp site	oral  · SpO2 (%)	98 %  · O2 Delivery/Oxygen Delivery Method	nasal cannula  · Oxygen Therapy Flow (L/min)	2 L/min     Labs are significant for: Leucocytosis 12K, Cr 1.8 (baseline 1), Trop 437>350, ProBNP 24K.   ECG with no ischemia changes    Imaging: CT head non con: No acute intracranial pathology. No evidence of midline shift, mass effect or intracranial hemorrhage.  CT A/P:  Mild fullness left renal pelvis. Mild left ureter with distal left ureteral stone. Stone within the urinary bladder on the left.  Significant fecal retention. Moderate distention the urinary bladder.  Small left pleural effusion with adjacent consolidation which may reflect atelectasis or infiltrate.    Patient admitted to medicine for management of suspected PNA and MARTINE.  (01 Oct 2024 23:12)    Interval history:  Notified by primary team that family has decided on CMO.   Patient resting comfortably. Discussed with family at bedside.     ITEMS NOT CHECKED ARE NOT PRESENT    SOCIAL HISTORY:   Significant other/partner[ ]  Children[x ]  Rastafari/Spirituality:  Substance hx:  [ ]   Tobacco hx:  [ ]   Alcohol hx: [ ]   Living Situation: [ ]Home  [ ]Long term care  [ ]Rehab [ ]Other  Home Services: [ ] A [ ] Chely RN [ ] Hospice  Occupation:  Home Opioid hx:  [ ] Y [ ] N [ ] I-Stop Reference No:     ADVANCE DIRECTIVES:    [ ] Full Code [x ] DNR  MOLST  [ ]  Living Will  [ ]   DECISION MAKER(s):  [ ] Health Care Proxy(s)  [ ] Surrogate(s)  [ ] Guardian           Name(s): Phone Number(s):    BASELINE (I)ADL(s) (prior to admission):  [place]: [ ]Total  [ ] Moderate [ ]Dependent  Palliative Performance Status Version 2:         %    [email or web address]/[email or web address]_performance_scale_[email or web address].pdf    Allergies    No Known Allergies    Intolerances    MEDICATIONS  (STANDING):  chlorhexidine 2% Cloths 1 Application(s) Topical daily  morphine Concentrate 5 milliGRAM(s) Oral every 4 hours    MEDICATIONS  (PRN):  morphine Concentrate 2.5 milliGRAM(s) Oral every 2 hours PRN Dyspnea      PRESENT SYMPTOMS: [x]Unable to obtain due to poor mentation   Source if other than patient:  [ ]Family   [ ]Team     Pain: [ ]yes [ ]no  QOL impact -   Location -                    Aggravating factors -  Alleviating factors -   Quality -  Radiation -  Timing -   Severity (0-10 scale):  Minimal acceptable level (0-10 scale):     CPOT:    https://www.[email or web address]/getattachment/[email or web address]/Critical-Care-Pain-Observation-Tool-(CPOT)    PAIN AD Score: 0  http://geriatrictoolkit.[place].[facility]/cog/painad.pdf     Dyspnea:                           [ ]None[ ]Mild [ ]Moderate [ ]Severe     Respiratory Distress Observation Scale (RDOS): 0  A score of 0 to 2 signifies little or no respiratory distress, 3 signifies mild distress, scores 4 to 6 indicate moderate distress, and scores greater than 7 signify severe distress  [email or web address]://[email or web address]/sites/default/files/[email or web address]-olhehmaapzl-mnrlttcb-gothdorbteq-gezzv.pdf    Anxiety:                             [ ]None[ ]Mild [ ]Moderate [ ]Severe   Fatigue:                             [ ]None[ ]Mild [ ]Moderate [ ]Severe   Nausea:                             [ ]None[ ]Mild [ ]Moderate [ ]Severe   Loss of appetite:              [ ]None[ ]Mild [ ]Moderate [ ]Severe   Constipation:                    [ ]None[ ]Mild [ ]Moderate [ ]Severe    Other Symptoms:  [ ]All other review of systems negative     Palliative Performance Status Version 2:         %    http://[facility].org/files/news/palliative_performance_scale_ppsv2.pdf  PHYSICAL EXAM:    GENERAL:  NAD   PULMONARY:  Non labored breathing  NEUROLOGIC: Resting in bed  BEHAVIORAL/PSYCH:  Calm    LABS: I have reviewed daily labs               RADIOLOGY & ADDITIONAL STUDIES: I have reviewed new imaging    PROTEIN CALORIE MALNUTRITION PRESENT: [ ]mild [ ]moderate [ ]severe [ ]underweight [ ]morbid obesity  https://www.andeal.org/vault/2440/web/files/ONC/Table_Clinical%20Characteristics%20to%20Document%20Malnutrition-White%20JV%20et%20al%202012.pdf      Weight (kg): 52.617 (10-02-24 @ 10:55)    [ ]PPSV2 < or = to 30% [ ]significant weight loss  [ ]poor nutritional intake  [ ]anasarca      [ ]Artificial Nutrition      Palliative Care Spiritual/Emotional Screening Tool Question  Severity (0-4):                    OR                    [ x] Unable to determine. Will assess at later time if appropriate.  Score of 2 or greater indicates recommendation of Chaplaincy and/or SW referral  Chaplaincy Referral: [ ] Yes [ ] Refused [ ] Following     Caregiver [place]:  [ ] Yes [ ] No    OR    [x ] Unable to determine. Will assess at later time if appropriate.  Social Work Referral [ ]  Patient and Family Centered Care Referral [ ]    Anticipatory Grief Present: [ ] Yes [ ] No    OR     [ x] Unable to determine. Will assess at later time if appropriate.  Social Work Referral [ ]  Patient and Family Centered Care Referral [ ]    REFERRALS:   [ ]Chaplaincy  [ ]Hospice  [ ]Child Life  [ ]Social Work  [ ]Case management [ ]Holistic Therapy     Palliative care education provided to patient and/or family

## 2024-10-25 NOTE — CHART NOTE - NSCHARTNOTEFT_GEN_A_CORE
Patient's family have decided on making the patient CMO. MOLST form completed. Palliative reconsulted.

## 2024-10-26 PROCEDURE — 99231 SBSQ HOSP IP/OBS SF/LOW 25: CPT

## 2024-10-26 RX ORDER — GLYCERIN/PROPYLENE GLYCOL 0.6 %-0.6%
1 DROPPERETTE, SINGLE-USE DROP DISPENSER OPHTHALMIC (EYE) DAILY
Refills: 0 | Status: DISCONTINUED | OUTPATIENT
Start: 2024-10-26 | End: 2024-10-29

## 2024-10-26 RX ADMIN — MORPHINE SULFATE 5 MILLIGRAM(S): 30 TABLET, EXTENDED RELEASE ORAL at 11:57

## 2024-10-26 RX ADMIN — Medication 1 DROP(S): at 18:41

## 2024-10-26 RX ADMIN — CHLORHEXIDINE GLUCONATE 1 APPLICATION(S): 40 SOLUTION TOPICAL at 11:59

## 2024-10-26 RX ADMIN — MORPHINE SULFATE 5 MILLIGRAM(S): 30 TABLET, EXTENDED RELEASE ORAL at 12:30

## 2024-10-26 NOTE — PROGRESS NOTE ADULT - SUBJECTIVE AND OBJECTIVE BOX
BRIJESH POWER  [facility] 3A 025 A ([facility])      Patient was evaluated and examined  by bedside, remains comfortable      REVIEW OF SYSTEMS: unable to obtain , patient remains lethargic     T(C): --  HR: --  BP: --  RR: --  SpO2: --  CAPILLARY BLOOD GLUCOSE      PHYSICAL EXAM:  General: NAD, lethargic, patient is laying comfortably in bed  HEENT: AT, NC, Supple, NO JVD, NO CB  Lungs: decreased breath sounds  B/L, no wheezing, no rhonchi  CVS: normal S1, S2, RRR, NO M/G/R  Abdomen: soft, bowel sounds present, non-tender, non-distended  Extremities: no edema, no clubbing, no cyanosis, positive peripheral pulses b/l  Neuro:  lethargic state       LAB  CBC  Date: 10-24-24 @ 21:03  Mean cell Ikpmdkqrqq59.3  Mean cell Hemoglobin Conc32.8  Mean cell Volum 95.5  Platelet count-Automate 98  RBC Count 3.35  Red Cell Distrib Width14.2  WBC Count10.69  % Albumin, Urine--  Hematocrit 32.0  Hemoglobin 10.5  CBC  Date: 10-21-24 @ 11:31  Mean cell Sfibibnajl44.9  Mean cell Hemoglobin Conc31.8  Mean cell Volum 97.0  Platelet count-Automate 130  RBC Count 3.37  Red Cell Distrib Width14.4  WBC Count11.96  % Albumin, Urine--  Hematocrit 32.7  Hemoglobin 10.4    BMP  10-24-24 @ 21:03  Blood Gas Arterial-Calcium,Ionized--  Blood Urea Nitrogen, Serum 22 mg/dL[H] [10 - 20]  Carbon Dioxide, Serum29 mmol/L [17 - 32]  Chloride, Serum98 mmol/L [98 - 110]  Creatinie, Serum0.6 mg/dL[L] [0.7 - 1.5]  Glucose, Avdwy746 mg/dL[H] [70 - 99]  Potassium, Serum3.8 mmol/L [3.5 - 5.0]  Sodium, Serum 136 mmol/L [135 - 146]              Microbiology:    Culture - Blood (collected 10-12-24 @ 21:42)  Source: .Blood BLOOD  Final Report (10-18-24 @ 04:00):    No growth at 5 days    Culture - Blood (collected 10-05-24 @ 06:13)  Source: .Blood BLOOD  Final Report (10-10-24 @ 17:00):    No growth at 5 days    Culture - Urine (collected 10-02-24 @ 00:45)  Source: Clean Catch None  Final Report (10-03-24 @ 11:59):    No growth    Urinalysis with Rflx Culture (collected 10-02-24 @ 00:45)    Culture - Blood (collected 10-01-24 @ 15:23)  Source: .Blood BLOOD  Final Report (10-06-24 @ 22:00):    No growth at 5 days    Culture - Blood (collected 10-01-24 @ 15:23)  Source: .Blood BLOOD  Final Report (10-06-24 @ 22:00):    No growth at 5 days        Medications:  artificial  tears Solution 1 Drop(s) Both EYES daily  chlorhexidine 2% Cloths 1 Application(s) Topical daily  morphine Concentrate 5 milliGRAM(s) Oral every 4 hours  morphine Concentrate 2.5 milliGRAM(s) Oral every 2 hours PRN        Assessment and Plan:  89 y/o Female with hx of HTN, HLD, DM II, HFrEF (LVEF 20-25% 2020), Hypothyroidism, CVA, Dementia and Recent PNA (dx 4 days PTP) presents to ED for generalized weakness. Admitted to SDU due to septic shock due to COVID PNA and submassive PE was on pressors, post medical stabilization downgraded to medical unit ,developed recurrent hypoxic respiratory failure on 10/25/24 requiring bipap tx,  currently transitioned to CMO as per family request .   - continue supportive tx. and comfort care    Total time spent to complete patient's bedside assessment, review medical chart, discuss medical plan of care with covering medical team was more than 25 minutes with >50% of time spent face to face with patient, discussion with patient/family and/or coordination of care

## 2024-10-27 PROCEDURE — 99231 SBSQ HOSP IP/OBS SF/LOW 25: CPT

## 2024-10-27 RX ADMIN — MORPHINE SULFATE 5 MILLIGRAM(S): 30 TABLET, EXTENDED RELEASE ORAL at 22:07

## 2024-10-27 RX ADMIN — Medication 1 DROP(S): at 13:22

## 2024-10-27 RX ADMIN — MORPHINE SULFATE 5 MILLIGRAM(S): 30 TABLET, EXTENDED RELEASE ORAL at 06:55

## 2024-10-27 RX ADMIN — MORPHINE SULFATE 5 MILLIGRAM(S): 30 TABLET, EXTENDED RELEASE ORAL at 18:07

## 2024-10-27 RX ADMIN — MORPHINE SULFATE 5 MILLIGRAM(S): 30 TABLET, EXTENDED RELEASE ORAL at 18:37

## 2024-10-27 RX ADMIN — MORPHINE SULFATE 5 MILLIGRAM(S): 30 TABLET, EXTENDED RELEASE ORAL at 14:42

## 2024-10-27 RX ADMIN — CHLORHEXIDINE GLUCONATE 1 APPLICATION(S): 40 SOLUTION TOPICAL at 13:24

## 2024-10-27 RX ADMIN — MORPHINE SULFATE 5 MILLIGRAM(S): 30 TABLET, EXTENDED RELEASE ORAL at 15:15

## 2024-10-27 RX ADMIN — MORPHINE SULFATE 5 MILLIGRAM(S): 30 TABLET, EXTENDED RELEASE ORAL at 22:37

## 2024-10-27 RX ADMIN — MORPHINE SULFATE 5 MILLIGRAM(S): 30 TABLET, EXTENDED RELEASE ORAL at 06:23

## 2024-10-27 NOTE — PROGRESS NOTE ADULT - SUBJECTIVE AND OBJECTIVE BOX
BRIJESH POWER  [facility] 3[facility] 025 A ([facility])        Patient was evaluated and examined  by bedside, remains comfortable       REVIEW OF SYSTEMS: unable to obtain, patient remains lethargic         PHYSICAL EXAM:  General: NAD, lethargic , patient is laying comfortably in bed  HEENT: AT, NC, Supple, NO JVD, NO CB  Lungs: decreased breath sounds  B/L, no wheezing, no rhonchi  CVS: normal S1, S2, RRR, NO M/G/R  Abdomen: soft, bowel sounds present, non-tender, non-distended  Extremities: no edema, no clubbing, no cyanosis, positive peripheral pulses b/l  Neuro: lethargic state         LAB  CBC  Date: 10-24-24 @ 21:03  Mean cell Cdufefyver31.3  Mean cell Hemoglobin Conc32.8  Mean cell Volum 95.5  Platelet count-Automate 98  RBC Count 3.35  Red Cell Distrib Width14.2  WBC Count10.69  % Albumin, Urine--  Hematocrit 32.0  Hemoglobin 10.5  CBC  Date: 10-21-24 @ 11:31  Mean cell Suqzbgndyy62.9  Mean cell Hemoglobin Conc31.8  Mean cell Volum 97.0  Platelet count-Automate 130  RBC Count 3.37  Red Cell Distrib Width14.4  WBC Count11.96  % Albumin, Urine--  Hematocrit 32.7  Hemoglobin 10.4    BMP  10-24-24 @ 21:03  Blood Gas Arterial-Calcium,Ionized--  Blood Urea Nitrogen, Serum 22 mg/dL[H] [10 - 20]  Carbon Dioxide, Serum29 mmol/L [17 - 32]  Chloride, Serum98 mmol/L [98 - 110]  Creatinie, Serum0.6 mg/dL[L] [0.7 - 1.5]  Glucose, Ykcxp475 mg/dL[H] [70 - 99]  Potassium, Serum3.8 mmol/L [3.5 - 5.0]  Sodium, Serum 136 mmol/L [135 - 146]              Microbiology:    Culture - Blood (collected 10-12-24 @ 21:42)  Source: .Blood BLOOD  Final Report (10-18-24 @ 04:00):    No growth at 5 days    Culture - Blood (collected 10-05-24 @ 06:13)  Source: .Blood BLOOD  Final Report (10-10-24 @ 17:00):    No growth at 5 days    Culture - Urine (collected 10-02-24 @ 00:45)  Source: Clean Catch None  Final Report (10-03-24 @ 11:59):    No growth    Urinalysis with Rflx Culture (collected 10-02-24 @ 00:45)    Culture - Blood (collected 10-01-24 @ 15:23)  Source: .Blood BLOOD  Final Report (10-06-24 @ 22:00):    No growth at 5 days    Culture - Blood (collected 10-01-24 @ 15:23)  Source: .Blood BLOOD  Final Report (10-06-24 @ 22:00):    No growth at 5 days      Medications:  artificial  tears Solution 1 Drop(s) Both EYES daily  chlorhexidine 2% Cloths 1 Application(s) Topical daily  morphine Concentrate 2.5 milliGRAM(s) Oral every 2 hours PRN  morphine Concentrate 5 milliGRAM(s) Oral every 4 hours        Assessment and Plan:  89 y/o Female with hx of HTN, HLD, DM II, HFrEF (LVEF 20-25% 2020), Hypothyroidism, CVA, Dementia and Recent PNA (dx 4 days PTP) presents to ED for generalized weakness. Admitted to SDU due to septic shock due to COVID PNA and submassive PE was on pressors, post medical stabilization downgraded to medical unit ,developed recurrent hypoxic respiratory failure on 10/25/24 requiring bipap tx,  currently transitioned to CMO as per family request .   - continue supportive tx. and comfort care    Total time spent to complete patient's bedside assessment, review medical chart, discuss medical plan of care with covering medical team was more than 25 minutes with >50% of time spent face to face with patient, discussion with patient/family and/or coordination of care

## 2024-10-28 PROCEDURE — 99233 SBSQ HOSP IP/OBS HIGH 50: CPT

## 2024-10-28 PROCEDURE — 99232 SBSQ HOSP IP/OBS MODERATE 35: CPT

## 2024-10-28 RX ORDER — MORPHINE SULFATE 30 MG/1
5 TABLET, EXTENDED RELEASE ORAL
Refills: 0 | Status: DISCONTINUED | OUTPATIENT
Start: 2024-10-28 | End: 2024-10-29

## 2024-10-28 RX ORDER — LORAZEPAM 2 MG
0.5 TABLET ORAL
Refills: 0 | Status: DISCONTINUED | OUTPATIENT
Start: 2024-10-28 | End: 2024-10-29

## 2024-10-28 RX ADMIN — CHLORHEXIDINE GLUCONATE 1 APPLICATION(S): 40 SOLUTION TOPICAL at 11:24

## 2024-10-28 RX ADMIN — Medication 1 DROP(S): at 11:23

## 2024-10-28 NOTE — PROGRESS NOTE ADULT - SUBJECTIVE AND OBJECTIVE BOX
[facility] Medicine  Progress Note    Patient Name: Reza Morrow  MRN: [id number]  Patient Class: IP- Inpatient   Admission Date: 7/20/2024  Length of Stay: 1 days  Attending Physician: Pavel Adorno MD  Primary Care Provider: Kip Jimenez MD        Subjective:     Principal Problem:Acute encephalopathy        HPI:  Reza Morrow is a 58 y.o. female with PMH of substance induced mood disorder, multiple sclerosis, presenting to [facility] ED for abdominal pain.  Patient was brought in by EMS but was unable to obtain any history prior to their departure.  Per triage, patient was brought in for abdominal pain which is reported chronic.  Upon my interview, patient unable to answer any questions initially.  When stimulated, patient able to sit up in bed and rule away to face the other side of the room.  Patient able to sit up without any truncal ataxia.  Appears to move all 4 extremity with equal strength and will withdraw to painful stimuli in all 4 extremities.  Upon chart review, patient had recent admission on 05/22/2024 for similar symptoms where patient is able to move all extremities and unable to answer questions.  Initial MRI during that admission was concerning for PRES versus global hypoxic ischemic event due to areas of cortical/subcortical hyperintensity in the frontal/parietal/occipital lobes and left cerebral hemisphere.  During this admission patient's symptoms spontaneously resolved and returned to her baseline on the 2nd day of admission.  Received EEG which was normal.  Per Neurology during that admission, lower suspicion for PRES due to benign exam, did not suspect CNS infection at that time.  Concern for component of polypharmacy due to chronic opioid use and concomitant ketamine use.  Psychology note elicited history of patient having erratic behavior with high energy/little sleep followed by crash as well as significant history of polysubstance use.  And diagnosed patient with  delirium due to medical condition.  Chart review shows the patient's sister previously reported that she has had multiple episodes of similar behavior which began over a year ago when she was initially admitted for takotsubo cardiomyopathy and cardiogenic shock.     Overview/Hospital Course:  No notes on file    Interval History: Remains confused, but is hungry. Prior w/u have been negative, but will consider MRI if no improvement. Spoke to sister who would like to get her a rehab program. Reports various drug use.    Review of Systems  Objective:     Vital Signs (Most Recent):  Temp: 97.2 °F (36.2 °C) (07/23/24 0905)  Pulse: 99 (07/23/24 0905)  Resp: 18 (07/23/24 0905)  BP: (!) 144/91 (07/23/24 0905)  SpO2: 100 % (07/23/24 0905) Vital Signs (24h Range):  Temp:  [97.2 °F (36.2 °C)-98.4 °F (36.9 °C)] 97.2 °F (36.2 °C)  Pulse:  [] 99  Resp:  [16-19] 18  SpO2:  [96 %-100 %] 100 %  BP: (137-173)/() 144/91     Weight: 54.2 kg (119 lb 7.8 oz)  Body mass index is 19.88 kg/m².  No intake or output data in the 24 hours ending 07/23/24 1054      Physical Exam  Constitutional:       Appearance: She is ill-appearing.   HENT:      Nose: Nose normal.      Mouth/Throat:      Mouth: Mucous membranes are moist.   Cardiovascular:      Rate and Rhythm: Normal rate and regular rhythm.      Pulses: Normal pulses.   Pulmonary:      Effort: Pulmonary effort is normal.   Musculoskeletal:         General: Normal range of motion.      Cervical back: Normal range of motion.   Skin:     General: Skin is warm.   Neurological:      Mental Status: She is disoriented.   Psychiatric:      Comments: Pupils approximately 4-5 mm bilaterally and equally responsive to light.    Extraocular movements grossly intact but unable to follow commands.    Patient able to move 4 extremities with equal strength.    Patient will withdraw to painful stimuli in all 4 extremities.    Patient observed sitting up in bed and turning around without any  "significant ataxia.                Significant Labs: All pertinent labs within the past 24 hours have been reviewed.  BMP: No results for input(s): "GLU", "NA", "K", "CL", "CO2", "BUN", "CREATININE", "CALCIUM", "MG" in the last 48 hours.    Significant Imaging: I have reviewed all pertinent imaging results/findings within the past 24 hours.    Assessment/Plan:      * Acute encephalopathy  Uncertain etiology  C/W previous recent admit  Tox screen positive for opioids , THC, benzodiazepines  Unable to obtain MRI of head, will need anesthesia  Consult neurology      HFrEF (heart failure with reduced ejection fraction)  Stable      Psychosis  C/W previous admit      MS (multiple sclerosis)  Will review old records  MRI pending      Opioid dependence  Tox screen positive        VTE Risk Mitigation (From admission, onward)      None            Discharge Planning   JOANNE: 7/24/2024     Code Status: Full Code   Is the patient medically ready for discharge?:     Reason for patient still in hospital (select all that apply): Patient unstable                     Pavel Adorno MD  Department of Hospital Medicine   [facility] Surg    " Patient is a 89y old  Female who presents with a chief complaint of dyspnea (27 Oct 2024 13:10)      INTERVAL HPI/OVERNIGHT EVENTS: no overnight events    artificial  tears Solution 1 Drop(s) Both EYES daily  chlorhexidine 2% Cloths 1 Application(s) Topical daily  morphine Concentrate 5 milliGRAM(s) Oral every 4 hours  morphine Concentrate 2.5 milliGRAM(s) Oral every 2 hours PRN      REVIEW OF SYSTEMS:  unable to perform        PHYSICAL EXAM:  GENERAL: NAD, well-groomed, well-developed  HEAD:  Atraumatic, Normocephalic  EYES: EOMI, PERRLA, conjunctiva and sclera clear  ENMT: No tonsillar erythema, exudates, or enlargement; Moist mucous membranes  NECK: Supple, No JVD, Normal thyroid  CHEST/LUNG: Clear to auscultation bilaterally; No rales, rhonchi, wheezing, or rubs  HEART: Regular rate and rhythm; No murmurs, rubs, or gallops  ABDOMEN: Soft, Nontender, Nondistended; Bowel sounds present  NEURO: Alert & Oriented X3  EXTREMITIES: No LE edema, no calf tenderness  LYMPH: No lymphadenopathy noted  SKIN: No rashes or lesions    Consultant(s) Notes Reviewed:  [x ] YES  [ ] NO  Care Discussed with Consultants/Other Providers [ x] YES  [ ] NO    LABS:              CAPILLARY BLOOD GLUCOSE                  RADIOLOGY & ADDITIONAL TESTS:    Imaging Personally Reviewed:  [ ] YES  [ ] NO

## 2024-10-28 NOTE — PROGRESS NOTE ADULT - ASSESSMENT
89 y/o Female with hx of HTN, HLD, DM II, HFrEF (LVEF 20-25% 2020), Hypothyroidism, CVA, Dementia and Recent PNA (dx 4 days PTP) presents to ED for generalized weakness.      - CMO

## 2024-10-28 NOTE — PROGRESS NOTE ADULT - ASSESSMENT
89 yo F with hx of HTN, HLD, DM II, HFrEF (LVEF 20-25% 2020), Hypothyroidism, CVA, Dementia and Recent PNA (dx 4 days ago) presents to ED for generalized weakness found to have respiratory failure 2/2 PE.     10/28: Patient resting comfortably. Family would like morphine to be PRN instead of ATC.     Plan:  - stop ATC morphine  - increase PRN morphine solution to 5mg q1h PRN for pain/dyspnea  - can consider ativan solution 0.5mg q1h PRN for anxiety/agitation  - continue NC  - no labs, vitals  - DNR/DNI  - family would like discharge to [facility] with palliative    - currently appears stable, can     Discussed with primary team.     Palliative care will continue to follow.  Please call [phone] with questions or concerns 24/7.   _____________  Man Oreilly MD  Palliative Medicine  [facility]   of Geriatric and Palliative Medicine  [phone]   89 yo F with hx of HTN, HLD, DM II, HFrEF (LVEF 20-25% 2020), Hypothyroidism, CVA, Dementia and Recent PNA (dx 4 days ago) presents to ED for generalized weakness found to have respiratory failure 2/2 PE.     10/28: Patient resting comfortably. Family would like morphine to be PRN instead of ATC.     Plan:  - stop ATC morphine  - increase PRN morphine solution to 5mg q1h PRN for pain/dyspnea  - can consider ativan solution 0.5mg q1h PRN for anxiety/agitation  - continue NC  - no labs, vitals  - DNR/DNI  - family would like discharge to [facility] with palliative    - currently appears stable, can consider obtain set of vitals to assess hemodynamic stability for transport    Discussed with primary team.     Palliative care will continue to follow.  Please call [phone] with questions or concerns 24/7.   _____________  Man Oreilly MD  Palliative Medicine  [facility]   of Geriatric and Palliative Medicine  [phone]

## 2024-10-29 PROCEDURE — 99239 HOSP IP/OBS DSCHRG MGMT >30: CPT

## 2024-10-29 RX ORDER — MORPHINE SULFATE 30 MG/1
5 TABLET, EXTENDED RELEASE ORAL
Qty: 0 | Refills: 0 | DISCHARGE
Start: 2024-10-29

## 2024-10-29 RX ORDER — SENNA 187 MG
2 TABLET ORAL
Qty: 60 | Refills: 0
Start: 2024-10-29 | End: 2024-11-27

## 2024-10-29 RX ORDER — SITAGLIPTIN 100 MG/1
1 TABLET, FILM COATED ORAL
Refills: 0 | DISCHARGE

## 2024-10-29 RX ORDER — LORAZEPAM 2 MG
0.5 TABLET ORAL
Qty: 0 | Refills: 0 | DISCHARGE
Start: 2024-10-29

## 2024-10-29 RX ORDER — GLYCERIN/PROPYLENE GLYCOL 0.6 %-0.6%
1 DROPPERETTE, SINGLE-USE DROP DISPENSER OPHTHALMIC (EYE)
Qty: 0 | Refills: 0 | DISCHARGE
Start: 2024-10-29

## 2024-10-29 RX ADMIN — CHLORHEXIDINE GLUCONATE 1 APPLICATION(S): 40 SOLUTION TOPICAL at 11:27

## 2024-10-29 RX ADMIN — Medication 1 DROP(S): at 11:27

## 2024-10-29 NOTE — PROGRESS NOTE ADULT - PROVIDER SPECIALTY LIST ADULT
Critical Care
Hospitalist
Infectious Disease
Internal Medicine
Critical Care
Hospitalist
Internal Medicine
Palliative Care
Cardiology
Critical Care
Hospitalist
Hospitalist
Infectious Disease
Internal Medicine
Hospitalist
Palliative Care
Infectious Disease

## 2024-10-29 NOTE — PROGRESS NOTE ADULT - ATTENDING COMMENTS
89 y/o Female with hx of HTN, HLD, DM II, HFrEF (LVEF 20-25% 2020), Hypothyroidism, CVA, Dementia and Recent PNA (dx 4 days PTP) presents to ED for generalized weakness. Admitted to SDU due to septic shock due to COVID PNA and submassive PE was on pressors, post medical stabilization downgraded to medical unit ,developed recurrent hypoxic respiratory failure on 10/25/24 requiring bipap tx,  currently transitioned to CMO as per family request .   - continue supportive tx. and comfort care  - Awaiting auth from [facility]
IMPRESSION:    Septic shock off levophed  LLL PNA / atelectasis   Possible chronic PE   Positive COVID-19 Infection   Constipation  HO HTN/HLD  HO DM II  HO HFpEF  HO Hypothyroidism  HO CVA  HO Dementia    Plan as outlined above
89 y/o Female with hx of HTN, HLD, DM II, HFrEF (LVEF 20-25% 2020), Hypothyroidism, CVA, Dementia and Recent PNA (dx 4 days PTP) presents to ED for generalized weakness. Admitted to SDU due to septic shock due to COVID PNA and submassive PE was on pressors, post medical stabilization downgraded to medical unit ,developed recurrent hypoxic respiratory failure on 10/25/24 requiring bipap tx,  currently transitioned to CMO as per family request .   - continue supportive tx. and comfort care  -patient to be d/c to [facility] with palliative care team to continue with CMO and supportive tx.   Family is in agreement with pt. to be d/c to NH today.     Total time spent to complete patient's bedside assessment, review medical chart, discuss medical plan of care with covering medical team was more than 25 minutes with >50% of time spent face to face with patient, discussion with patient/family and/or coordination of care
89 yo F with hx of HTN, HLD, DM II, HFrEF (LVEF 20-25% 2020), Hypothyroidism, CVA, Dementia and Recent PNA (dx 4 days PTP) presents to ED for generalized weakness. Admitted to SDU due to septic shock due to COVID PNA and submassive PE on pressors     # sp covid pna, sp RVD   fu ID     # submassive PE   off pressors  cont ac     # pna, possible bacterial   septic shock poa     cont abx per ID : [email or web address]     # positive trop   possible demand ischemia/nstermi   conservative medical management     # diabetes   CAPILLARY BLOOD GLUCOSE  POCT Blood Glucose.: 161 mg/dL (19 Oct 2024 11:49)  POCT Blood Glucose.: 129 mg/dL (19 Oct 2024 07:52)  POCT Blood Glucose.: 266 mg/dL (18 Oct 2024 22:12)  POCT Blood Glucose.: 261 mg/dL (18 Oct 2024 17:20)    cont insulin per protocol     # hypothyroidism   cont meds    #Progress Note Handoff    Pending :  clinical improvement, ID followup regarding abx tx duration   Family discussion: resident updating family   Disposition: SNF when stable   code status: dnr, dni
IMPRESSION:    Septic shock off levophed  LLL PNA / atelectasis   Possible chronic PE   Positive COVID-19 Infection   Constipation  HO HTN/HLD  HO DM II  HO HFpEF  HO Hypothyroidism  HO CVA  HO Dementia    Plan as outlined above
IMPRESSION:    Septic shock on levophed  LLL PNA / atelectasis   Possible chronic PE   Positive COVID-19 Infection   Constipation  HO HTN/HLD  HO DM II  HO HFpEF  HO Hypothyroidism  HO CVA  HO Dementia    Plan as outlined above
IMPRESSION:    Septic shock on levophed  LLL PNA / atelectasis   Possible chronic PE   Positive COVID-19 Infection   Constipation  HO HTN/HLD  HO DM II  HO HFpEF  HO Hypothyroidism  HO CVA  HO Dementia    Plan as outlined above
Medicine Attending Addendum  Patient was seen and examined with medicine team.  Nursing records reviewed. I agree with the resident/PA/NP's note including past medical history, home medications, social history, allergies, surgical history, family history, and review of system. I have reviewed relevant vitals, laboratory values, imaging studies, and microbiology.   - Above resident's note was edited by me  - no overnight issue  - Admitted for Acute hypoxic respiratory failure. found to have PE and COVID. Management as per protocol.   - rest of A/P as per detailed housestaff note above except above modifications    Total time spent to complete patient's bedside assessment, reviewed medical chart, discussed medical plan of care with team was more than 35 minutes with >50% of time spent face to face with patient, discussion with patient/family and/or coordination of care.
IMPRESSION:    Septic shock on levophed  LLL PNA / atelectasis   Possible chronic PE   Positive COVID-19 Infection   Constipation  HO HTN/HLD  HO DM II  HO HFpEF  HO Hypothyroidism  HO CVA  HO Dementia    Plan as outlined above

## 2024-10-29 NOTE — PROGRESS NOTE ADULT - ASSESSMENT
89 y/o Female with hx of HTN, HLD, DM II, HFrEF (LVEF 20-25% 2020), Hypothyroidism, CVA, Dementia and Recent PNA (dx 4 days PTP) presents to ED for generalized weakness.      c/w comfort measures  - ativan 0.5 q1 prn  - roxanol 5mg q1 prn    Diet: pureed  code: CMO  activity: bedrest    pending: pending palcement

## 2024-10-29 NOTE — CHART NOTE - NSCHARTNOTEFT_GEN_A_CORE
Registered Dietitian Follow-Up     Patient Profile Reviewed                           Yes [x]   No []     Nutrition History Previously Obtained        Yes [x]  No []       Pertinent Subjective Information: per son at bedside, pt is eating ~50% of food she receives, has been eating things like apple sauce, puddings and soup. discussed nutrition supplements -- agreeable to add.      Pertinent Medical Interventions:  -c/w comfort measures  -- Family is in agreement with pt. to be d/c to NH today.     Diet order: Diet, Pureed:   Consistent Carbohydrate {No Snacks} (CSTCHO)  DASH/TLC {Sodium & Cholesterol Restricted} (DASH)  Mildly Thick Liquids (MILDTHICKLIQS) (10-16-24 @ 08:47) [Active]    Anthropometrics:  Height: 149.9 cm   Weight: 49.9 kg  BMI: 22.2  IBW: 43.2 kg     Daily Weight in k.2 (10-18), Weight in k (10-17), Weight in k.5 (10-16), Weight in k (10-15)    MEDICATIONS  (PRN):  LORazepam    Concentrate 0.5 milliGRAM(s) Oral every 1 hour PRN Anxiety  morphine Concentrate 5 milliGRAM(s) SubLingual every 1 hour PRN pain or dyspnea    Pertinent Labs:   Finger Sticks:    Physical Findings:  - Appearance: disoriented X4   - GI function: fecal incontinence, last BM 10/29  - Tubes: none   - Oral/Mouth cavity: no chewing/swallowing difficulty   - Skin: per WOCN note 10/16 - b/l heel blanchable redness - evolving DTI to sacrococcygeal   - Edema: 10/29: 2+ L/R arms      Nutrition Requirements:  Weight Used: 49.9 kg (current dosing weight)      Estimated Energy Needs    Continue [x]  Adjust []  1247 - 1497 kcal/day (25- 30 kcal/kg)     Estimated Protein Needs    Continue [x]  Adjust []  59 - 65 gm/day (1.2 - 1.3 gm/kg)     Estimated Fluid Needs        Continue [x]  Adjust []  1247 - 1497 mL/day (25-30 mL/kg)    Nutrient Intake: ~50%     [x] Previous Nutrition Diagnosis: Inadequate Oral Intake             [x] Ongoing          [] Resolved     Nutrition Education: discussed supplements      Goal/Expected Outcome: Pt to consume and tolerate >75% of meals/snacks and PO supplements in 5-7 days.      Nutrition Monitoring:diet order,energy intake,body composition,NFPF, lytes, GI (BM), BG     Recommendation:  1. cont w/ current diet order   2. Add ensure Plus HP 2x/day (700kcal/40g PRO)   3. encourage and assist with PO intake     mod risk f/u   RD remains available: Tonia Gaspar, MARISABELN [phone]

## 2024-10-29 NOTE — PROGRESS NOTE ADULT - SUBJECTIVE AND OBJECTIVE BOX
Patient is a 89y old  Female who presents with a chief complaint of dyspnea (27 Oct 2024 13:10)      INTERVAL HPI/OVERNIGHT EVENTS: No overnight events    artificial  tears Solution 1 Drop(s) Both EYES daily  chlorhexidine 2% Cloths 1 Application(s) Topical daily  LORazepam    Concentrate 0.5 milliGRAM(s) Oral every 1 hour PRN  morphine Concentrate 5 milliGRAM(s) SubLingual every 1 hour PRN      REVIEW OF SYSTEMS:  unable to perform        PHYSICAL EXAM:  GENERAL: NAD, well-groomed, well-developed  HEAD:  Atraumatic, Normocephalic  EYES: EOMI, PERRLA, conjunctiva and sclera clear  ENMT: No tonsillar erythema, exudates, or enlargement; Moist mucous membranes  NECK: Supple, No JVD, Normal thyroid  CHEST/LUNG: Clear to auscultation bilaterally; No rales, rhonchi, wheezing, or rubs  HEART: Regular rate and rhythm; No murmurs, rubs, or gallops  ABDOMEN: Soft, Nontender, Nondistended; Bowel sounds present  NEURO: Alert & Oriented x0. lethargicx  EXTREMITIES: No LE edema, no calf tenderness      Consultant(s) Notes Reviewed:  [x ] YES  [ ] NO  Care Discussed with Consultants/Other Providers [ x] YES  [ ] NO    LABS:              CAPILLARY BLOOD GLUCOSE                  RADIOLOGY & ADDITIONAL TESTS:    Imaging Personally Reviewed:  [ ] YES  [ ] NO   Patient is a 89y old  Female who presents with a chief complaint of dyspnea (27 Oct 2024 13:10)      INTERVAL HPI/OVERNIGHT EVENTS: No overnight events    artificial  tears Solution 1 Drop(s) Both EYES daily  chlorhexidine 2% Cloths 1 Application(s) Topical daily  LORazepam    Concentrate 0.5 milliGRAM(s) Oral every 1 hour PRN  morphine Concentrate 5 milliGRAM(s) SubLingual every 1 hour PRN      REVIEW OF SYSTEMS:  unable to perform        PHYSICAL EXAM:  GENERAL: NAD, well-groomed, well-developed  HEAD:  Atraumatic, Normocephalic  EYES: conjunctiva and sclera clear  ENMT: No tonsillar erythema, exudates, or enlargement; Moist mucous membranes  NECK: Supple, No JVD, Normal thyroid  CHEST/LUNG: mild decreased breath sounds bilaterally; No rales, rhonchi, wheezing, or rubs  HEART: Regular rate and rhythm; No murmurs, rubs, or gallops  ABDOMEN: Soft, Nontender, Nondistended; Bowel sounds present  NEURO:  lethargic, occasionally opens eyes   EXTREMITIES: No LE edema, no calf tenderness

## 2024-11-05 DIAGNOSIS — J12.82 PNEUMONIA DUE TO CORONAVIRUS DISEASE 2019: ICD-10-CM

## 2024-11-05 DIAGNOSIS — K59.09 OTHER CONSTIPATION: ICD-10-CM

## 2024-11-05 DIAGNOSIS — Z51.5 ENCOUNTER FOR PALLIATIVE CARE: ICD-10-CM

## 2024-11-05 DIAGNOSIS — Z86.73 PERSONAL HISTORY OF TRANSIENT ISCHEMIC ATTACK (TIA), AND CEREBRAL INFARCTION WITHOUT RESIDUAL DEFICITS: ICD-10-CM

## 2024-11-05 DIAGNOSIS — E11.9 TYPE 2 DIABETES MELLITUS WITHOUT COMPLICATIONS: ICD-10-CM

## 2024-11-05 DIAGNOSIS — G92.9 UNSPECIFIED TOXIC ENCEPHALOPATHY: ICD-10-CM

## 2024-11-05 DIAGNOSIS — N20.1 CALCULUS OF URETER: ICD-10-CM

## 2024-11-05 DIAGNOSIS — I11.0 HYPERTENSIVE HEART DISEASE WITH HEART FAILURE: ICD-10-CM

## 2024-11-05 DIAGNOSIS — I50.22 CHRONIC SYSTOLIC (CONGESTIVE) HEART FAILURE: ICD-10-CM

## 2024-11-05 DIAGNOSIS — I21.A1 MYOCARDIAL INFARCTION TYPE 2: ICD-10-CM

## 2024-11-05 DIAGNOSIS — U07.1 COVID-19: ICD-10-CM

## 2024-11-05 DIAGNOSIS — R65.21 SEVERE SEPSIS WITH SEPTIC SHOCK: ICD-10-CM

## 2024-11-05 DIAGNOSIS — J15.8 PNEUMONIA DUE TO OTHER SPECIFIED BACTERIA: ICD-10-CM

## 2024-11-05 DIAGNOSIS — N17.9 ACUTE KIDNEY FAILURE, UNSPECIFIED: ICD-10-CM

## 2024-11-05 DIAGNOSIS — E03.9 HYPOTHYROIDISM, UNSPECIFIED: ICD-10-CM

## 2024-11-05 DIAGNOSIS — I26.99 OTHER PULMONARY EMBOLISM WITHOUT ACUTE COR PULMONALE: ICD-10-CM

## 2024-11-05 DIAGNOSIS — E87.1 HYPO-OSMOLALITY AND HYPONATREMIA: ICD-10-CM

## 2024-11-05 DIAGNOSIS — Z79.84 LONG TERM (CURRENT) USE OF ORAL HYPOGLYCEMIC DRUGS: ICD-10-CM

## 2024-11-05 DIAGNOSIS — E78.5 HYPERLIPIDEMIA, UNSPECIFIED: ICD-10-CM

## 2024-11-05 DIAGNOSIS — A41.89 OTHER SPECIFIED SEPSIS: ICD-10-CM

## 2024-11-05 DIAGNOSIS — J96.01 ACUTE RESPIRATORY FAILURE WITH HYPOXIA: ICD-10-CM

## 2024-11-09 LAB
CORTICOSTEROID BINDING GLOBULIN RESULT: 1.2 MG/DL — LOW
CORTIS F/TOTAL MFR SERPL: 94 % — SIGNIFICANT CHANGE UP
CORTIS SERPL-MCNC: 146 UG/DL — HIGH
CORTISOL, FREE RESULT: 138 UG/DL — HIGH